# Patient Record
Sex: FEMALE | Race: BLACK OR AFRICAN AMERICAN | Employment: OTHER | URBAN - METROPOLITAN AREA
[De-identification: names, ages, dates, MRNs, and addresses within clinical notes are randomized per-mention and may not be internally consistent; named-entity substitution may affect disease eponyms.]

---

## 2018-05-31 ENCOUNTER — ANESTHESIA EVENT (OUTPATIENT)
Dept: SURGERY | Age: 78
End: 2018-05-31
Payer: MEDICARE

## 2018-05-31 RX ORDER — LATANOPROST 50 UG/ML
1 SOLUTION/ DROPS OPHTHALMIC
COMMUNITY

## 2018-05-31 RX ORDER — SPIRONOLACTONE 25 MG/1
25 TABLET ORAL DAILY
COMMUNITY
End: 2019-05-20

## 2018-05-31 NOTE — PERIOP NOTES
[facility]  Ambulatory Surgery Unit  Pre-operative Instructions for Endo Procedures    Procedure Date  06/18/18            Tentative Arrival [id number]      1. On the day of your procedure, please report to the Ambulatory Surgery Unit Registration Desk and sign in at your designated time. The Ambulatory Surgery Unit is located in [facility] on the [facility] side of the [facility] across from the [address] [place] [address]. Please have all of your health insurance cards and a photo ID. 2. You must have someone with you to drive you home, as you should not drive a car for 24 hours following anesthesia. Please make arrangements for a responsible adult friend or family member to stay with you for at least the first 24 hours after your procedure. 3. Do not have anything to eat or drink (including water, gum, mints, coffee, juice) after midnight   05/31/18. This may not apply to medications prescribed by your physician. (Please note below the special instructions with medications to take the morning of your procedure.)    4. If applicable, follow the clear liquid diet and bowel prep instructions provided by your physician's office. If you do not have this information, or have any questions, please contact your physician's office. 5. We recommend you do not drink any alcoholic beverages for 24 hours before and after your procedure. 6. Contact your surgeons office for instructions on the following medications: non-steroidal anti-inflammatory drugs (i.e. Advil, Aleve), vitamins, and supplements. (Some surgeons will want you to stop these medications prior to surgery and others may allow you to take them)   **If you are currently taking Plavix, Coumadin, Aspirin and/or other blood-thinning agents, contact your surgeon for instructions. ** Your surgeon will partner with the physician prescribing these medications to determine if it is safe to stop or if you need to continue taking.  Please do not stop taking these medications without instructions from your surgeon. 7. In an effort to help prevent surgical site infection, we ask that you shower with an anti-bacterial soap (i.e. Dial or Safeguard) on the morning of your procedure. Do not apply any lotions, powders, or deodorants after showering. 8. Wear comfortable clothes. Wear glasses instead of contacts. Do not bring any jewelry or money (other than copays or fees as instructed). Do not wear make-up, particularly mascara, the morning of your procedure. Wear your hair loose or down, no ponytails, buns, mary pins or clips. All body piercings must be removed. 9. You should understand that if you do not follow these instructions your procedure may be cancelled. If your physical condition changes (i.e. fever, cold or flu) please contact your surgeon as soon as possible. 10. It is important that you be on time. If a situation occurs where you may be late, or if you have any questions or problems, please call [phone]. 11. Your procedure time may be subject to change. You will receive a phone call the day prior to confirm your arrival time. Special Instructions: Take all medications and inhalers, as prescribed, on the morning of surgery with a sip of water EXCEPT: n/a      I understand a pre-operative phone call will be made to verify my procedure time. In the event that I am not available, I give permission for a message to be left on my answering service and/or with another person?       yes      Preop instructions reviewed  Pt verbalized understanding.    ___________________      ___________________      ___________________  (Signature of Patient)          (Witness)                   (Date and Time)

## 2018-06-01 ENCOUNTER — HOSPITAL ENCOUNTER (OUTPATIENT)
Age: 78
Setting detail: OUTPATIENT SURGERY
Discharge: HOME | End: 2018-06-01
Attending: SPECIALIST | Admitting: SPECIALIST
Payer: MEDICARE

## 2018-06-01 ENCOUNTER — ANESTHESIA (OUTPATIENT)
Dept: SURGERY | Age: 78
End: 2018-06-01
Payer: MEDICARE

## 2018-06-01 VITALS
WEIGHT: 293 LBS | BODY MASS INDEX: 44.41 KG/M2 | TEMPERATURE: 98.3 F | HEIGHT: 68 IN | SYSTOLIC BLOOD PRESSURE: 104 MMHG | RESPIRATION RATE: 22 BRPM | HEART RATE: 54 BPM | DIASTOLIC BLOOD PRESSURE: 73 MMHG | OXYGEN SATURATION: 95 %

## 2018-06-01 PROBLEM — Z86.010 HISTORY OF COLON POLYPS: Status: ACTIVE | Noted: 2018-06-01

## 2018-06-01 PROCEDURE — 76030000002 HC AMB SURG OR TIME FIRST 0.: Performed by: SPECIALIST

## 2018-06-01 PROCEDURE — 76210000040 HC AMBSU PH I REC FIRST 0.5 HR: Performed by: SPECIALIST

## 2018-06-01 PROCEDURE — 77030009426 HC FCPS BIOP ENDOSC BSC -B: Performed by: SPECIALIST

## 2018-06-01 PROCEDURE — 77030013992 HC SNR POLYP ENDOSC BSC -B: Performed by: SPECIALIST

## 2018-06-01 PROCEDURE — 77030021352 HC CBL LD SYS DISP COVD -B: Performed by: SPECIALIST

## 2018-06-01 PROCEDURE — 74011250636 HC RX REV CODE- 250/636: Performed by: ANESTHESIOLOGY

## 2018-06-01 PROCEDURE — 76060000073 HC AMB SURG ANES FIRST 0.5 HR: Performed by: SPECIALIST

## 2018-06-01 PROCEDURE — 76210000050 HC AMBSU PH II REC 0.5 TO 1 HR: Performed by: SPECIALIST

## 2018-06-01 PROCEDURE — 88305 TISSUE EXAM BY PATHOLOGIST: CPT | Performed by: SPECIALIST

## 2018-06-01 PROCEDURE — 74011000250 HC RX REV CODE- 250

## 2018-06-01 PROCEDURE — 74011250636 HC RX REV CODE- 250/636

## 2018-06-01 PROCEDURE — 77030020255 HC SOL INJ LR 1000ML BG: Performed by: SPECIALIST

## 2018-06-01 RX ORDER — DIPHENHYDRAMINE HYDROCHLORIDE 50 MG/ML
12.5 INJECTION, SOLUTION INTRAMUSCULAR; INTRAVENOUS AS NEEDED
Status: DISCONTINUED | OUTPATIENT
Start: 2018-06-01 | End: 2018-06-01 | Stop reason: HOSPADM

## 2018-06-01 RX ORDER — MIDAZOLAM HYDROCHLORIDE 1 MG/ML
.25-5 INJECTION, SOLUTION INTRAMUSCULAR; INTRAVENOUS
Status: DISCONTINUED | OUTPATIENT
Start: 2018-06-01 | End: 2018-06-01 | Stop reason: HOSPADM

## 2018-06-01 RX ORDER — ATROPINE SULFATE 0.1 MG/ML
0.5 INJECTION INTRAVENOUS
Status: DISCONTINUED | OUTPATIENT
Start: 2018-06-01 | End: 2018-06-01 | Stop reason: HOSPADM

## 2018-06-01 RX ORDER — LIDOCAINE HYDROCHLORIDE 20 MG/ML
INJECTION, SOLUTION EPIDURAL; INFILTRATION; INTRACAUDAL; PERINEURAL AS NEEDED
Status: DISCONTINUED | OUTPATIENT
Start: 2018-06-01 | End: 2018-06-01 | Stop reason: HOSPADM

## 2018-06-01 RX ORDER — ONDANSETRON 2 MG/ML
4 INJECTION INTRAMUSCULAR; INTRAVENOUS AS NEEDED
Status: DISCONTINUED | OUTPATIENT
Start: 2018-06-01 | End: 2018-06-01 | Stop reason: HOSPADM

## 2018-06-01 RX ORDER — FLUMAZENIL 0.1 MG/ML
0.2 INJECTION INTRAVENOUS
Status: DISCONTINUED | OUTPATIENT
Start: 2018-06-01 | End: 2018-06-01 | Stop reason: HOSPADM

## 2018-06-01 RX ORDER — SODIUM CHLORIDE 9 MG/ML
100 INJECTION, SOLUTION INTRAVENOUS CONTINUOUS
Status: DISCONTINUED | OUTPATIENT
Start: 2018-06-01 | End: 2018-06-01 | Stop reason: HOSPADM

## 2018-06-01 RX ORDER — SODIUM CHLORIDE 0.9 % (FLUSH) 0.9 %
5-10 SYRINGE (ML) INJECTION AS NEEDED
Status: DISCONTINUED | OUTPATIENT
Start: 2018-06-01 | End: 2018-06-01 | Stop reason: HOSPADM

## 2018-06-01 RX ORDER — SODIUM CHLORIDE 0.9 % (FLUSH) 0.9 %
5-10 SYRINGE (ML) INJECTION EVERY 8 HOURS
Status: DISCONTINUED | OUTPATIENT
Start: 2018-06-01 | End: 2018-06-01 | Stop reason: HOSPADM

## 2018-06-01 RX ORDER — LIDOCAINE HYDROCHLORIDE 10 MG/ML
0.1 INJECTION, SOLUTION EPIDURAL; INFILTRATION; INTRACAUDAL; PERINEURAL AS NEEDED
Status: DISCONTINUED | OUTPATIENT
Start: 2018-06-01 | End: 2018-06-01 | Stop reason: HOSPADM

## 2018-06-01 RX ORDER — DEXTROMETHORPHAN/PSEUDOEPHED 2.5-7.5/.8
1.2 DROPS ORAL
Status: DISCONTINUED | OUTPATIENT
Start: 2018-06-01 | End: 2018-06-01 | Stop reason: HOSPADM

## 2018-06-01 RX ORDER — SODIUM CHLORIDE, SODIUM LACTATE, POTASSIUM CHLORIDE, CALCIUM CHLORIDE 600; 310; 30; 20 MG/100ML; MG/100ML; MG/100ML; MG/100ML
25 INJECTION, SOLUTION INTRAVENOUS CONTINUOUS
Status: DISCONTINUED | OUTPATIENT
Start: 2018-06-01 | End: 2018-06-01 | Stop reason: HOSPADM

## 2018-06-01 RX ORDER — NALOXONE HYDROCHLORIDE 0.4 MG/ML
0.4 INJECTION, SOLUTION INTRAMUSCULAR; INTRAVENOUS; SUBCUTANEOUS
Status: DISCONTINUED | OUTPATIENT
Start: 2018-06-01 | End: 2018-06-01 | Stop reason: HOSPADM

## 2018-06-01 RX ORDER — FENTANYL CITRATE 50 UG/ML
25 INJECTION, SOLUTION INTRAMUSCULAR; INTRAVENOUS
Status: DISCONTINUED | OUTPATIENT
Start: 2018-06-01 | End: 2018-06-01 | Stop reason: HOSPADM

## 2018-06-01 RX ORDER — PROPOFOL 10 MG/ML
INJECTION, EMULSION INTRAVENOUS AS NEEDED
Status: DISCONTINUED | OUTPATIENT
Start: 2018-06-01 | End: 2018-06-01 | Stop reason: HOSPADM

## 2018-06-01 RX ADMIN — SODIUM CHLORIDE, SODIUM LACTATE, POTASSIUM CHLORIDE, AND CALCIUM CHLORIDE 25 ML/HR: 600; 310; 30; 20 INJECTION, SOLUTION INTRAVENOUS at 07:56

## 2018-06-01 RX ADMIN — PROPOFOL 100 MG: 10 INJECTION, EMULSION INTRAVENOUS at 08:19

## 2018-06-01 RX ADMIN — PROPOFOL 100 MG: 10 INJECTION, EMULSION INTRAVENOUS at 08:26

## 2018-06-01 RX ADMIN — LIDOCAINE HYDROCHLORIDE 40 MG: 20 INJECTION, SOLUTION EPIDURAL; INFILTRATION; INTRACAUDAL; PERINEURAL at 08:19

## 2018-06-01 RX ADMIN — PROPOFOL 50 MG: 10 INJECTION, EMULSION INTRAVENOUS at 08:33

## 2018-06-01 NOTE — H&P
Pre-endoscopy H and P    The patient was seen and examined in the endoscopy suite. The airway was assessed and docuemented. The problem list, past medical history, and medications were reviewed. Patient Active Problem List   Diagnosis Code    Screen for colon cancer Z12.11    History of colon polyps Z86.010     Social History     Social History    Marital status:      Spouse name: N/A    Number of children: N/A    Years of education: N/A     Occupational History    Not on file. Social History Main Topics    Smoking status: Never Smoker    Smokeless tobacco: Never Used    Alcohol use No    Drug use: No    Sexual activity: Not on file     Other Topics Concern    Not on file     Social History Narrative     Past Medical History:   Diagnosis Date    Anemia     Arthritis     Chronic kidney disease     Stage II followed by Dr Ángel Price [facility]ph[facility]     Diverticulosis     H/O colonoscopy with polypectomy     benign    History of colon polyps 6/1/2018 2013 tubular adenoma     Hypertension     Ill-defined condition     pulmonary hypertension    Other ill-defined conditions(799.89)     glaucoma and cataracts    Sleep apnea     CPAP    Thromboembolus ([address] 2015    Right  leg     Thyroid disease     hyptothyroidism     The patient has a family history of na    Prior to Admission Medications   Prescriptions Last Dose Informant Patient Reported? Taking? DORZOLAMIDE HCL/TIMOLOL MALEAT (DORZOLAMIDE-TIMOLOL OP) 6/1/2018 at Unknown time  Yes Yes   Sig: Apply 1 Drop to eye two (2) times a day. One drop to each eye twice daily   MULTIVITS W-FE,OTHER MIN/LUT (CENTRUM SILVER ULTRA WOMEN'S PO) 5/31/2018 at Unknown time  Yes Yes   Sig: Take 1 Tab by mouth daily. acetaminophen (TYLENOL EXTRA STRENGTH) 500 mg tablet 5/1/2018 at Unknown time  Yes Yes   Sig: Take 1,000 mg by mouth every six (6) hours as needed.  Indications: ARTHRITIC PAIN, PAIN   amLODIPine (NORVASC) 10 mg tablet 6/1/2018 at Unknown time  Yes Yes   Sig: Take 10 mg by mouth daily. Indications: HYPERTENSION   atenolol (TENORMIN) 25 mg tablet 6/1/2018 at 0610  Yes Yes   Sig: Take 25 mg by mouth daily. Indications: hypertension   brimonidine (ALPHAGAN) 0.2 % ophthalmic solution 6/1/2018 at Unknown time  Yes Yes   Sig: Administer 1 Drop to both eyes two (2) times a day. furosemide (LASIX) 40 mg tablet 6/1/2018 at Unknown time  Yes Yes   Sig: Take 40 mg by mouth two (2) times a day. Indications: HYPERTENSION   hydralazine HCl (HYDRALAZINE PO) 6/1/2018 at Unknown time  Yes Yes   Sig: Take 25 mg by mouth two (2) times a day. iron bisgly,ps-FA-B-C#12-succ 65 mg-65 mg -1,000 mcg (24) tab 5/31/2018 at Unknown time  Yes Yes   Sig: Take  by mouth.   latanoprost (XALATAN) 0.005 % ophthalmic solution 6/1/2018 at Unknown time  Yes Yes   Sig: Administer 1 Drop to both eyes nightly. levothyroxine (SYNTHROID) 100 mcg tablet 6/1/2018 at Unknown time  Yes Yes   Sig: Take 100 mcg by mouth Daily (before breakfast). Indications: HYPOTHYROIDISM   losartan (COZAAR) 100 mg tablet 6/1/2018 at Unknown time  Yes Yes   Sig: Take 100 mg by mouth daily. Indications: HYPERTENSION   spironolactone (ALDACTONE) 25 mg tablet 6/1/2018 at Unknown time  Yes Yes   Sig: Take 25 mg by mouth daily. Facility-Administered Medications: None           The review of systems is:  negative for shortness of breath or chest pain      The heart, lungs, and mental status were satisfactory for the administration of anesthesia sedation and for the procedure. I discussed with the patient the objectives, risks, consequences and alternatives to the procedure.       Jesus Bagley MD  6/1/2018  8:04 AM

## 2018-06-01 NOTE — IP AVS SNAPSHOT
[address] [facility] 
[phone] Patient: Daralyn Bernheim MRN: [id number] KUF:[id number] About your hospitalization You were admitted on:  June 1, 2018 You last received care in the:  [facility] PACU You were discharged on:  June 1, 2018 Why you were hospitalized Your primary diagnosis was:  Not on File Your diagnoses also included:  History Of Colon Polyps Follow-up Information Follow up With Details Comments Contact Info Rico Fernandez MD   [address] [place] [address] 
[phone] Discharge Orders None A check eden indicates which time of day the medication should be taken. My Medications CONTINUE taking these medications Instructions Each Dose to Equal  
 Morning Noon Evening Bedtime  
 amLODIPine 10 mg tablet Commonly known as:  Nancy Lawrence Your last dose was: Your next dose is: Take 10 mg by mouth daily. Indications: HYPERTENSION 10 mg  
    
   
   
   
  
 atenolol 25 mg tablet Commonly known as:  TENORMIN Your last dose was: Your next dose is: Take 25 mg by mouth daily. Indications: hypertension 25 mg  
    
   
   
   
  
 brimonidine 0.2 % ophthalmic solution Commonly known as:  Ward Cork Your last dose was: Your next dose is:    
   
   
 Administer 1 Drop to both eyes two (2) times a day. 1 Drop CENTRUM SILVER ULTRA WOMEN'S PO Your last dose was: Your next dose is: Take 1 Tab by mouth daily. 1 Tab DORZOLAMIDE-TIMOLOL OP Your last dose was: Your next dose is:    
   
   
 Apply 1 Drop to eye two (2) times a day. One drop to each eye twice daily 1 Drop  
    
   
   
   
  
 furosemide 40 mg tablet Commonly known as:  LASIX Your last dose was: Your next dose is: Take 40 mg by mouth two (2) times a day. Indications: HYPERTENSION 40 mg HYDRALAZINE PO Your last dose was: Your next dose is: Take 25 mg by mouth two (2) times a day. 25 mg  
    
   
   
   
  
 iron bisgly,ps-FA-B-C#12-succ 65 mg-65 mg -1,000 mcg (24) Tab Your last dose was: Your next dose is: Take  by mouth.  
     
   
   
   
  
 latanoprost 0.005 % ophthalmic solution Commonly known as:  Seth Ortizdi Your last dose was: Your next dose is:    
   
   
 Administer 1 Drop to both eyes nightly. 1 Drop  
    
   
   
   
  
 levothyroxine 100 mcg tablet Commonly known as:  SYNTHROID Your last dose was: Your next dose is: Take 100 mcg by mouth Daily (before breakfast). Indications: HYPOTHYROIDISM  
 100 mcg  
    
   
   
   
  
 losartan 100 mg tablet Commonly known as:  COZAAR Your last dose was: Your next dose is: Take 100 mg by mouth daily. Indications: HYPERTENSION  
 100 mg  
    
   
   
   
  
 spironolactone 25 mg tablet Commonly known as:  ALDACTONE Your last dose was: Your next dose is: Take 25 mg by mouth daily. 25 mg  
    
   
   
   
  
 TYLENOL EXTRA STRENGTH 500 mg tablet Generic drug:  acetaminophen Your last dose was: Your next dose is: Take 1,000 mg by mouth every six (6) hours as needed. Indications: ARTHRITIC PAIN, PAIN  
 1000 mg Discharge Instructions Chris Luciano [id number] 
1940 Procedure  Discharge Instructions:   
 
Discomfort: 
Redness at IV site- apply warm compress to area; if redness or soreness persist- contact your physician There may be a slight amount of blood passed from the rectum Gaseous discomfort- walking, belching will help relieve any discomfort You may not operate a vehicle for 24 hours You may not engage in an occupation involving machinery or appliances for rest of today You may not drink alcoholic beverages for at least 24 hours Avoid making any critical decisions for at least 24 hour DIET: 
 You may resume your normal diet today. You should not overeat or \"feast\" today as your abdomen may become distended or uncomfortable. MEDICATIONS: 
 I reconciled this list from the list you gave us when you came today for the procedure. Please clarify with me, your primary care physician and the nurse who is discharging you if we have any discrepancies. Aspirin and or non-steroidal medication (Ibuprofen, Motrin, naproxen, etc.) is ok in limited quantities. ACTIVITY: 
You may resume your normal daily activities it is recommended that you spend the remainder of the day resting -  avoid any strenuous activity. CALL M.D. ANY SIGN OF: Increasing pain, nausea, vomiting Abdominal distension (swelling) New increased bleeding (oral or rectal) Fever (chills) Pain in chest area Bloody discharge from nose or mouth Shortness of breath Follow-up Instructions: 
 Call Dr. Farzad Rose for the results of  biopsy in approximately one week Telephone #  [phone] Follow up visit as needed; recall 3 years. Rodolfo Clark MD 
8:46 AM 
6/1/2018 DO NOT TAKE SLEEPING MEDICATIONS OR ANTIANXIETY MEDICATIONS WHILE TAKING NARCOTIC PAIN MEDICATIONS,  ESPECIALLY THE NIGHT OF ANESTHESIA. CPAP PATIENTS BE SURE TO WEAR MACHINE WHENEVER NAPPING OR SLEEPING. DISCHARGE SUMMARY from Nurse The following personal items collected during your admission are returned to you:  
Dental Appliance: Dental Appliances: Lowers, Uppers, With patient Vision: Visual Aid: Glasses, At home Hearing Aid:   
Jewelry:   
Clothing:   
Other Valuables:   
Valuables sent to safe:   
 
 
PATIENT INSTRUCTIONS: 
 
 After General Anesthesia or Intravenous Sedation, for 24 hours or while taking prescription Narcotics: 
      Someone should be with you for the next 24 hours. For your own safety, a responsible adult must drive you home. · Limit your activities · Recommended activity: Rest today, up with assistance today. Do not climb stairs or shower unattended for the next 24 hours. · Please start with a soft bland diet and advance as tolerated (no nausea) to regular diet. · If you have a sore throat you should try the following: fluids, warm salt water gargles, or throat lozenges. If it does not improve after several days please follow up with your primary physician. · Do not drive and operate hazardous machinery · Do not make important personal or business decisions · Do  not drink alcoholic beverages · If you have not urinated within 8 hours after discharge, please contact your surgeon on call. Report the following to your surgeon: 
· Excessive pain, swelling, redness or odor of or around the surgical area · Temperature over 100.5 · Nausea and vomiting lasting longer than 4 hours or if unable to take medications · Any signs of decreased circulation or nerve impairment to extremity: change in color, persistent  numbness, tingling, coldness or increase pain · You will receive a Post Operative Call from one of the Recovery Room Nurses on the day after your surgery to check on you. It is very important for us to know how you are recovering after your surgery. If you have an issue or need to speak with someone, please call your surgeon, do not wait for the post operative call. · You may receive an e-mail or letter in the mail from [employer] regarding your experience with us in the Ambulatory Surgery Unit. Your feedback is valuable to us and we appreciate your participation in the survey.   
 
 
· If the above instructions are not adequate, please contact Osvaldo Spicer Juancarlos Sanabria RN, [place] anesthesia Nurse Manager or our Anesthesiologist, at [phone]. If you are having problems after your surgery, call the physician at his office number. · We wish you a speedy recovery ? What to do at Home: *  Please give a list of your current medications to your Primary Care Provider. *  Please update this list whenever your medications are discontinued, doses are 
    changed, or new medications (including over-the-counter products) are added. *  Please carry medication information at all times in case of emergency situations. These are general instructions for a healthy lifestyle: No smoking/ No tobacco products/ Avoid exposure to second hand smoke Surgeon General's Warning:  Quitting smoking now greatly reduces serious risk to your health. Obesity, smoking, and sedentary lifestyle greatly increases your risk for illness A healthy diet, regular physical exercise & weight monitoring are important for maintaining a healthy lifestyle You may be retaining fluid if you have a history of heart failure or if you experience any of the following symptoms:  Weight gain of 3 pounds or more overnight or 5 pounds in a week, increased swelling in our hands or feet or shortness of breath while lying flat in bed. Please call your doctor as soon as you notice any of these symptoms; do not wait until your next office visit. Recognize signs and symptoms of STROKE: 
 
B - Balance E - Eyes F-  Face looks uneven A-  Arms unable to move or move even S-  Speech slurred or non-existent T-  Time-call 911 as soon as signs and symptoms begin-DO NOT go Back to bed or wait to see if you get better-TIME IS BRAIN. If you have not received your influenza and/or pneumococcal vaccine, please follow up with your primary care physician.  
 
 
The discharge information has been reviewed with the patient and caregiver. The patient and cargiver verbalized understanding. Introducing [facility]! Neisha Jean-Baptiste introduces InterSt[employer]Net patient portal. Now you can access parts of your medical record, email your doctor's office, and request medication refills online. 1. In your internet browser, go to [email or web address] 2. Click on the First Time User? Click Here link in the Sign In box. You will see the New Member Sign Up page. 3. Enter your InterStelNet Access Code exactly as it appears below. You will not need to use this code after youve completed the sign-up process. If you do not sign up before the expiration date, you must request a new code. · InterStelNet Access Code: [email or web address] Expires: 8/12/2018  2:07 PM 
 
4. Enter the last four digits of your Social Security Number (xxxx) and Date of Birth (mm/dd/yyyy) as indicated and click Submit. You will be taken to the next sign-up page. 5. Create a InterStelNet ID. This will be your InterStelNet login ID and cannot be changed, so think of one that is secure and easy to remember. 6. Create a InterStelNet password. You can change your password at any time. 7. Enter your Password Reset Question and Answer. This can be used at a later time if you forget your password. 8. Enter your e-mail address. You will receive e-mail notification when new information is available in [address] 9. Click Sign Up. You can now view and download portions of your medical record. 10. Click the Download Summary menu link to download a portable copy of your medical information. If you have questions, please visit the Frequently Asked Questions section of the InterStelNet website. Remember, InterStelNet is NOT to be used for urgent needs. For medical emergencies, dial 911. Now available from your iPhone and Android! Introducing Taj Knight As a Neisha Jean-Baptiste patient, I wanted to make you aware of our electronic visit tool called Taj Knight. Iqua allows you to connect within minutes with a medical provider 24 hours a day, seven days a week via a mobile device or tablet or logging into a secure website from your computer. You can access Integration Management from anywhere in the [place]. A virtual visit might be right for you when you have a simple condition and feel like you just dont want to get out of bed, or cant get away from work for an appointment, when your regular [employer] provider is not available (evenings, weekends or holidays), or when youre out of town and need minor care. Electronic visits cost only $49 and if the [employer] provider determines a prescription is needed to treat your condition, one can be electronically transmitted to a nearby pharmacy*. Please take a moment to enroll today if you have not already done so. The enrollment process is free and takes just a few minutes. To enroll, please download the Iqua leonela to your tablet or phone, or visit [email or web address] to enroll on your computer. And, as an [address] [place] [address] patient with a [employer] account, the results of your visits will be scanned into your electronic medical record and your primary care provider will be able to view the scanned results. We urge you to continue to see your regular [employer] provider for your ongoing medical care. And while your primary care provider may not be the one available when you seek a [employer] virtual visit, the peace of mind you get from getting a real diagnosis real time can be priceless. For more information on [employer], view our Frequently Asked Questions (FAQs) at [email or web address]. Sincerely, 
 
Christopher Dave MD 
Chief Medical Officer Whitney8 Martha Barros *:  certain medications cannot be prescribed via [facility] [employer] Providers Seen During Your Hospitalization Provider Specialty Primary office phone Katelynn Mello MD Gastroenterology [phone] Your Primary Care Physician (PCP) Primary Care Physician Office Phone Office Fax [address] [phone] You are allergic to the following No active allergies Recent Documentation Height Weight Breastfeeding? BMI OB Status Smoking Status 1.727 m 135.2 kg No 45.31 kg/m2 Hysterectomy Never Smoker Emergency Contacts Name Discharge Info Relation Home Work Mobile [address] [facility] CAREGIVER [3] Son [22] [phone] JarrettFlora DISCHARGE CAREGIVER [3] Child [2] [phone] Patient Belongings The following personal items are in your possession at time of discharge: 
  Dental Appliances: Lowers, Uppers, With patient  Visual Aid: Glasses, At home Please provide this summary of care documentation to your next provider. Signatures-by signing, you are acknowledging that this After Visit Summary has been reviewed with you and you have received a copy. Patient Signature:  ____________________________________________________________ Date:  ____________________________________________________________  
  
Vidya Finger Provider Signature:  ____________________________________________________________ Date:  ____________________________________________________________

## 2018-06-01 NOTE — ANESTHESIA POSTPROCEDURE EVALUATION
Post-Anesthesia Evaluation and Assessment    Patient: Eveline Miranda MRN: [id number]  SSN: [id number]    YOB: 1940  Age: 68 y.o. Sex: female       Cardiovascular Function/Vital Signs  Visit Vitals    /73    Pulse (!) 54    Temp 36.8 °C (98.3 °F)    Resp 22    Ht 5' 8\" (1.727 m)    Wt 135.2 kg (298 lb)    SpO2 95%    Breastfeeding No    BMI 45.31 kg/m2       Patient is status post MAC anesthesia for Procedure(s):  COLONOSCOPY  ENDOSCOPIC POLYPECTOMY  COLON BIOPSY. Nausea/Vomiting: None    Postoperative hydration reviewed and adequate. Pain:  Pain Scale 1: Numeric (0 - 10) (06/01/18 0910)  Pain Intensity 1: 0 (06/01/18 0910)   Managed    Neurological Status:   Neuro (WDL): Within Defined Limits (06/01/18 0910)  Neuro  Neurologic State: Alert;Eyes open spontaneously (06/01/18 0910)  LUE Motor Response: Purposeful;Spontaneous  (06/01/18 0910)  LLE Motor Response: Purposeful;Spontaneous  (06/01/18 0910)  RUE Motor Response: Purposeful;Spontaneous  (06/01/18 0910)  RLE Motor Response: Purposeful;Spontaneous  (06/01/18 0910)   At baseline    Mental Status and Level of Consciousness: Arousable    Pulmonary Status:   O2 Device: Room air (06/01/18 0910)   Adequate oxygenation and airway patent    Complications related to anesthesia: None    Post-anesthesia assessment completed.  No concerns    Signed By: Luis Alberto Alegria MD     June 1, 2018

## 2018-06-01 NOTE — PERIOP NOTES
Amna Pan  1940  [id number]    Situation:  Verbal report given from: ZORA Salazar RN, Nehemiah Muse CRNA  Procedure: Procedure(s):  COLONOSCOPY  ENDOSCOPIC POLYPECTOMY  COLON BIOPSY    Background:    Preoperative diagnosis: PERSONAL HISTORY COLONIC POLYPS    Postoperative diagnosis: COLON POLYPS, DIVERTICULOSIS, RECTAL PROLAPSE     :  Dr. Allen Mulligan    Assistant(s): Circ-1: Ketty Hartley RN  Circ-2: Jane Gates RN  Scrub Tech-1: Vallie Councilman    Specimens:   ID Type Source Tests Collected by Time Destination   1 : TRANSVERSE COLON POLYP Preservative   Curtis Polanco MD 6/1/2018 8316 Pathology       Assessment:  Intra-procedure medications         Anesthesia gave intra-procedure sedation and medications, see anesthesia flow sheet     Intravenous fluids: LR@ KVO     Vital signs stable       Recommendation:    Permission to share finding with Kandice Child (son) : yes

## 2018-06-01 NOTE — PERIOP NOTES
9089 Pt arrived via stretcher into PACU from OR procedure with Dr. Melinda Curry drowsy and laying on left side. Received report from RN and Candis Curry brought back son and gave report to pt on procedure. 6583 Tolerating fluids. Having some congestioni and throat agitation. 2026 Pt. Alert. Denies pain or chill. Discharge instructions reviewed with caregiver and patient. Allowed and answered any and all questions. Tolerating PO fluids. Both state ready for discharge. Assisted pt with getting dressed.  Confirmed all belongings with patient

## 2018-06-01 NOTE — DISCHARGE INSTRUCTIONS
eLnard Ruiz  [id number]  1940              Procedure  Discharge Instructions:      Discomfort:  Redness at IV site- apply warm compress to area; if redness or soreness persist- contact your physician  There may be a slight amount of blood passed from the rectum  Gaseous discomfort- walking, belching will help relieve any discomfort  You may not operate a vehicle for 24 hours  You may not engage in an occupation involving machinery or appliances for rest of today  You may not drink alcoholic beverages for at least 24 hours  Avoid making any critical decisions for at least 24 hour  DIET:   You may resume your normal diet today. You should not overeat or \"feast\" today as your abdomen may become distended or uncomfortable. MEDICATIONS:   I reconciled this list from the list you gave us when you came today for the procedure. Please clarify with me, your primary care physician and the nurse who is discharging you if we have any discrepancies. Aspirin and or non-steroidal medication (Ibuprofen, Motrin, naproxen, etc.) is ok in limited quantities. ACTIVITY:  You may resume your normal daily activities it is recommended that you spend the remainder of the day resting -  avoid any strenuous activity. CALL M.D. ANY SIGN OF:  Increasing pain, nausea, vomiting  Abdominal distension (swelling)  New increased bleeding (oral or rectal)  Fever (chills)  Pain in chest area  Bloody discharge from nose or mouth  Shortness of breath          Follow-up Instructions:   Call Dr. Km Perrin for the results of  biopsy in approximately one week  Telephone #  [phone]  Follow up visit as needed; recall 3 years. Hunter Abrams MD  8:46 AM  6/1/2018             DO NOT TAKE SLEEPING MEDICATIONS OR ANTIANXIETY MEDICATIONS WHILE TAKING NARCOTIC PAIN MEDICATIONS,  ESPECIALLY THE NIGHT OF ANESTHESIA. CPAP PATIENTS BE SURE TO WEAR MACHINE WHENEVER NAPPING OR SLEEPING.     DISCHARGE SUMMARY from Nurse    The following personal items collected during your admission are returned to you:   Dental Appliance: Dental Appliances: Lowers, Uppers, With patient  Vision: Visual Aid: Glasses, At home  Hearing Aid:    Baldemar Mena:    Clothing:    Other Valuables:    Valuables sent to safe:        PATIENT INSTRUCTIONS:    After General Anesthesia or Intravenous Sedation, for 24 hours or while taking prescription Narcotics:        Someone should be with you for the next 24 hours. For your own safety, a responsible adult must drive you home. · Limit your activities  · Recommended activity: Rest today, up with assistance today. Do not climb stairs or shower unattended for the next 24 hours. · Please start with a soft bland diet and advance as tolerated (no nausea) to regular diet. · If you have a sore throat you should try the following: fluids, warm salt water gargles, or throat lozenges. If it does not improve after several days please follow up with your primary physician. · Do not drive and operate hazardous machinery  · Do not make important personal or business decisions  · Do  not drink alcoholic beverages  · If you have not urinated within 8 hours after discharge, please contact your surgeon on call. Report the following to your surgeon:  · Excessive pain, swelling, redness or odor of or around the surgical area  · Temperature over 100.5  · Nausea and vomiting lasting longer than 4 hours or if unable to take medications  · Any signs of decreased circulation or nerve impairment to extremity: change in color, persistent  numbness, tingling, coldness or increase pain      · You will receive a Post Operative Call from one of the Recovery Room Nurses on the day after your surgery to check on you. It is very important for us to know how you are recovering after your surgery. If you have an issue or need to speak with someone, please call your surgeon, do not wait for the post operative call.     · You may receive an e-mail or letter in the mail from [facility] regarding your experience with us in the Ambulatory Surgery Unit. Your feedback is valuable to us and we appreciate your participation in the survey. · If the above instructions are not adequate, please contact Asia Palomares RN, [facility] anesthesia Nurse Manager or our Anesthesiologist, at [phone]. If you are having problems after your surgery, call the physician at his office number. · We wish you a speedy recovery ? What to do at Home:      *  Please give a list of your current medications to your Primary Care Provider. *  Please update this list whenever your medications are discontinued, doses are      changed, or new medications (including over-the-counter products) are added. *  Please carry medication information at all times in case of emergency situations. These are general instructions for a healthy lifestyle:    No smoking/ No tobacco products/ Avoid exposure to second hand smoke    Surgeon General's Warning:  Quitting smoking now greatly reduces serious risk to your health. Obesity, smoking, and sedentary lifestyle greatly increases your risk for illness    A healthy diet, regular physical exercise & weight monitoring are important for maintaining a healthy lifestyle    You may be retaining fluid if you have a history of heart failure or if you experience any of the following symptoms:  Weight gain of 3 pounds or more overnight or 5 pounds in a week, increased swelling in our hands or feet or shortness of breath while lying flat in bed. Please call your doctor as soon as you notice any of these symptoms; do not wait until your next office visit.     Recognize signs and symptoms of STROKE:    B - Balance  E - Eyes    F-  Face looks uneven    A-  Arms unable to move or move even    S-  Speech slurred or non-existent    T-  Time-call 911 as soon as signs and symptoms begin-DO NOT go       Back to bed or wait to see if you get better-TIME IS BRAIN. If you have not received your influenza and/or pneumococcal vaccine, please follow up with your primary care physician. The discharge information has been reviewed with the patient and caregiver. The patient and cargiver verbalized understanding.

## 2018-06-01 NOTE — PERIOP NOTES
Permission received to review discharge instructions and discuss private health information with Santi Acuña (son).

## 2018-06-01 NOTE — ANESTHESIA PREPROCEDURE EVALUATION
Anesthetic History   No history of anesthetic complications            Review of Systems / Medical History  Patient summary reviewed, nursing notes reviewed and pertinent labs reviewed    Pulmonary        Sleep apnea: CPAP           Neuro/Psych   Within defined limits           Cardiovascular    Hypertension              Exercise tolerance: >4 METS     GI/Hepatic/Renal  Within defined limits              Endo/Other      Hypothyroidism  Morbid obesity and arthritis     Other Findings   Comments: DVT 2015           Physical Exam    Airway  Mallampati: II  TM Distance: 4 - 6 cm  Neck ROM: normal range of motion   Mouth opening: Normal     Cardiovascular  Regular rate and rhythm,  S1 and S2 normal,  no murmur, click, rub, or gallop             Dental  No notable dental hx       Pulmonary  Breath sounds clear to auscultation               Abdominal  GI exam deferred       Other Findings            Anesthetic Plan    ASA: 3  Anesthesia type: MAC            Anesthetic plan and risks discussed with: Patient

## 2018-06-01 NOTE — PROCEDURES
Colonoscopy    Indications: history of colon polyps    Pre-operative Diagnosis: see above    Medications:  See anesthesia form    Post-operative Diagnosis:  COLON POLYPS, DIVERTICULOSIS, RECTAL PROLAPSE       Procedure Details   Prior to the procedure its objectives, risks, consequences and alternatives were discussed with the patient who then elected to proceed. All questions were answered. Digital Rectal Exam:  Showed decreased tone. The Olympus videocolonoscope was inserted in the rectum and advanced to the cecum. The cecum was identified by typical landmarks. The colonoscope was slowly and carefully withdrawn as the mucosa was inspected. There were three polyps in the transverse colon (12mm, 8mm, 6mm). I hot snared the largest two and cold snared the smallest one. I used cold forceps biopsy to remove the remnant of the smallest one. Left sided diverticula were seen. No other abnormalities were noted. Retroflexion in the rectum was not performed (patient could not hold air in in her rectum). Upon removal of the scope I saw that mucosal prolapse occurred easily. Photos to document the ileocecal valve, appendiceal orifice and distal rectal exam were obtained. The preparation was adequate      Estimated Blood Loss:  none    Specimens:  Transverse colon polyps    Findings: Three polyps remove  Rectal prolapse  Decreased anal tone  diverticulosis    Complications:  none    Repeat colonoscopy is recommended in: Three years.                Flo Mota MD  8:44 AM  6/1/2018

## 2018-06-12 ENCOUNTER — APPOINTMENT (OUTPATIENT)
Dept: GENERAL RADIOLOGY | Age: 78
End: 2018-06-12
Attending: EMERGENCY MEDICINE
Payer: MEDICARE

## 2018-06-12 ENCOUNTER — HOSPITAL ENCOUNTER (EMERGENCY)
Age: 78
Discharge: HOME | End: 2018-06-12
Attending: EMERGENCY MEDICINE | Admitting: EMERGENCY MEDICINE
Payer: MEDICARE

## 2018-06-12 VITALS
BODY MASS INDEX: 43.95 KG/M2 | TEMPERATURE: 97.7 F | RESPIRATION RATE: 18 BRPM | OXYGEN SATURATION: 96 % | DIASTOLIC BLOOD PRESSURE: 82 MMHG | SYSTOLIC BLOOD PRESSURE: 135 MMHG | HEART RATE: 64 BPM | WEIGHT: 290 LBS | HEIGHT: 68 IN

## 2018-06-12 DIAGNOSIS — S81.811A LACERATION OF RIGHT LOWER EXTREMITY, INITIAL ENCOUNTER: Primary | ICD-10-CM

## 2018-06-12 PROCEDURE — 74011250637 HC RX REV CODE- 250/637: Performed by: EMERGENCY MEDICINE

## 2018-06-12 PROCEDURE — 77030002916 HC SUT ETHLN J&J -A

## 2018-06-12 PROCEDURE — 99284 EMERGENCY DEPT VISIT MOD MDM: CPT

## 2018-06-12 PROCEDURE — 75810000294 HC INTERM/LAYERED WND RPR

## 2018-06-12 PROCEDURE — 73590 X-RAY EXAM OF LOWER LEG: CPT

## 2018-06-12 PROCEDURE — 77030018836 HC SOL IRR NACL ICUM -A

## 2018-06-12 RX ORDER — LIDOCAINE HYDROCHLORIDE AND EPINEPHRINE 20; 5 MG/ML; UG/ML
INJECTION, SOLUTION EPIDURAL; INFILTRATION; INTRACAUDAL; PERINEURAL
Status: DISCONTINUED
Start: 2018-06-12 | End: 2018-06-13 | Stop reason: HOSPADM

## 2018-06-12 RX ORDER — HYDROCODONE BITARTRATE AND ACETAMINOPHEN 5; 325 MG/1; MG/1
1 TABLET ORAL
Qty: 10 TAB | Refills: 0 | Status: SHIPPED | OUTPATIENT
Start: 2018-06-12 | End: 2019-05-20

## 2018-06-12 RX ORDER — OXYCODONE AND ACETAMINOPHEN 5; 325 MG/1; MG/1
1 TABLET ORAL
Status: COMPLETED | OUTPATIENT
Start: 2018-06-12 | End: 2018-06-12

## 2018-06-12 RX ORDER — LIDOCAINE HYDROCHLORIDE AND EPINEPHRINE 20; 5 MG/ML; UG/ML
1.5 INJECTION, SOLUTION EPIDURAL; INFILTRATION; INTRACAUDAL; PERINEURAL
Status: DISCONTINUED | OUTPATIENT
Start: 2018-06-12 | End: 2018-06-13 | Stop reason: HOSPADM

## 2018-06-12 RX ADMIN — OXYCODONE HYDROCHLORIDE AND ACETAMINOPHEN 1 TABLET: 5; 325 TABLET ORAL at 18:30

## 2018-06-12 NOTE — ED PROVIDER NOTES
EMERGENCY DEPARTMENT HISTORY AND PHYSICAL EXAM      Date: 6/12/2018  Patient Name: Hans Medina    History of Presenting Illness     Chief Complaint   Patient presents with    Fall     Via w/c w/ c/o open wound to R calf from falling about 1.5 hours PTA       History Provided By: Patient    HPI: Hans Medina, 68 y.o. female with PMHx significant for HTN, anemia, CKD, sleep apnea, diverticulitis presents ambulatory to the ED with cc of sudden onset horizontal laceration to right shin with surrounding stinging sensation s/p mechanical GLF around 1600 today. She reports she hit her leg against a steel rail while falling down. She denies weakness, numbness, or tingling. Chief Complaint: laceration  Duration: 2 Hours  Timing:  Constant  Location: right shin  Quality: N/A  Severity: Moderate  Modifying Factors:   Associated Symptoms: bleeding      There are no other complaints, changes, or physical findings at this time. PCP: Yeimy Kong MD    Current Facility-Administered Medications   Medication Dose Route Frequency Provider Last Rate Last Dose    lidocaine-EPINEPHrine (PF) (XYLOCAINE) 2 %-1:200,000 injection 30 mg  1.5 mL IntraDERMal NOW Bindu James,         lidocaine-EPINEPHrine (PF) (XYLOCAINE) 2 %-1:200,000 injection              Current Outpatient Prescriptions   Medication Sig Dispense Refill    HYDROcodone-acetaminophen (NORCO) 5-325 mg per tablet Take 1 Tab by mouth every four (4) hours as needed for Pain. Max Daily Amount: 6 Tabs. 10 Tab 0    iron bisgly,ps-FA-B-C#12-succ 65 mg-65 mg -1,000 mcg (24) tab Take  by mouth.  spironolactone (ALDACTONE) 25 mg tablet Take 25 mg by mouth daily.  hydralazine HCl (HYDRALAZINE PO) Take 25 mg by mouth two (2) times a day.  latanoprost (XALATAN) 0.005 % ophthalmic solution Administer 1 Drop to both eyes nightly.  atenolol (TENORMIN) 25 mg tablet Take 25 mg by mouth daily.  Indications: hypertension      furosemide (LASIX) 40 mg tablet Take 40 mg by mouth two (2) times a day. Indications: HYPERTENSION      amLODIPine (NORVASC) 10 mg tablet Take 10 mg by mouth daily. Indications: HYPERTENSION      losartan (COZAAR) 100 mg tablet Take 100 mg by mouth daily. Indications: HYPERTENSION      MULTIVITS W-FE,OTHER MIN/LUT (CENTRUM SILVER ULTRA WOMEN'S PO) Take 1 Tab by mouth daily.  DORZOLAMIDE HCL/TIMOLOL MALEAT (DORZOLAMIDE-TIMOLOL OP) Apply 1 Drop to eye two (2) times a day. One drop to each eye twice daily      brimonidine (ALPHAGAN) 0.2 % ophthalmic solution Administer 1 Drop to both eyes two (2) times a day.  acetaminophen (TYLENOL EXTRA STRENGTH) 500 mg tablet Take 1,000 mg by mouth every six (6) hours as needed. Indications: ARTHRITIC PAIN, PAIN      levothyroxine (SYNTHROID) 100 mcg tablet Take 100 mcg by mouth Daily (before breakfast). Indications: HYPOTHYROIDISM         Past History     Past Medical History:  Past Medical History:   Diagnosis Date    Anemia     Arthritis     Chronic kidney disease     Stage II followed by Dr Shakira Barrientos Nephrology     Diverticulosis     H/O colonoscopy with polypectomy     benign    History of colon polyps 6/1/2018    2013 tubular adenoma     Hypertension     Ill-defined condition     pulmonary hypertension    Other ill-defined conditions(799.89)     glaucoma and cataracts    Sleep apnea     CPAP    Thromboembolus ([address] 2015    Right  leg     Thyroid disease     hyptothyroidism       Past Surgical History:  Past Surgical History:   Procedure Laterality Date    COLONOSCOPY N/A 6/1/2018    COLONOSCOPY performed by Jaleesa Duncan MD at [address] [place] [address]  6/1/2018         HX HEENT  1984    thyroid biopsy-benign    HX HYSTERECTOMY         Family History:  History reviewed. No pertinent family history.     Social History:  Social History   Substance Use Topics    Smoking status: Never Smoker    Smokeless tobacco: Never Used    Alcohol use No Allergies:  No Known Allergies      Review of Systems   Review of Systems   Constitutional: Negative for fatigue and fever. HENT: Negative. Eyes: Negative. Respiratory: Negative for shortness of breath and wheezing. Cardiovascular: Negative for chest pain and leg swelling. Gastrointestinal: Negative for blood in stool, constipation, diarrhea, nausea and vomiting. Endocrine: Negative. Genitourinary: Negative for difficulty urinating and dysuria. Musculoskeletal: Negative. Skin: Positive for wound (laceration right shin ). Negative for rash. Allergic/Immunologic: Negative. Neurological: Negative for weakness and numbness. Hematological: Negative. Psychiatric/Behavioral: Negative. Physical Exam   Physical Exam   Constitutional: She is oriented to person, place, and time. She appears well-developed and well-nourished. No distress. HENT:   Head: Normocephalic and atraumatic. Mouth/Throat: Oropharynx is clear and moist.   Eyes: Conjunctivae and EOM are normal.   Neck: Neck supple. No JVD present. No tracheal deviation present. Cardiovascular: Normal rate, regular rhythm and intact distal pulses. Exam reveals no gallop and no friction rub. No murmur heard. Pulmonary/Chest: Effort normal and breath sounds normal. No stridor. No respiratory distress. She has no wheezes. Abdominal: Soft. Bowel sounds are normal. She exhibits no distension and no mass. There is no tenderness. There is no guarding. Musculoskeletal: Normal range of motion. She exhibits no edema or tenderness. No deformity   Neurological: She is alert and oriented to person, place, and time. She has normal strength. No focal deficits   Skin: Skin is warm, dry and intact. No rash noted. 10cm gapping linear laceration to R shin, no foreign body   Psychiatric: She has a normal mood and affect.  Her behavior is normal. Judgment and thought content normal.   Nursing note and vitals reviewed. Diagnostic Study Results     Labs -   No results found for this or any previous visit (from the past 12 hour(s)). Radiologic Studies -   XR TIB/FIB RT   Final Result        CT Results  (Last 48 hours)    None        CXR Results  (Last 48 hours)    None            Medical Decision Making   I am the first provider for this patient. I reviewed the vital signs, available nursing notes, past medical history, past surgical history, family history and social history. Vital Signs-Reviewed the patient's vital signs. Patient Vitals for the past 12 hrs:   Temp Pulse Resp BP SpO2   06/12/18 2130 - - - - 96 %   06/12/18 2115 - - - - 96 %   06/12/18 2100 - - - 135/82 96 %   06/12/18 2045 - - - - 95 %   06/12/18 2030 - - - 139/90 96 %   06/12/18 2015 - - - - 96 %   06/12/18 2000 - - - 139/83 97 %   06/12/18 1945 - - - - 96 %   06/12/18 1930 - - - 139/86 97 %   06/12/18 1915 - - - - 98 %   06/12/18 1900 - - - 139/79 95 %   06/12/18 1830 - - - (!) 142/96 97 %   06/12/18 1745 97.7 °F (36.5 °C) 64 18 154/85 96 %       Pulse Oximetry Analysis - 96% on RA    Cardiac Monitor:   Rate: 64 bpm  Rhythm: Normal Sinus Rhythm      Records Reviewed: Nursing Notes and Old Medical Records    Provider Notes (Medical Decision Making):   Pt with a large leg laceration. No appreciable FB on exam. Will get XR to rule out foreign bodies and fx. Will clean and repair laceration. Pt is UTD on her immunizations. ED Course:   Initial assessment performed. The patients presenting problems have been discussed, and they are in agreement with the care plan formulated and outlined with them. I have encouraged them to ask questions as they arise throughout their visit. 8:57 PM  Pt states she is UTD on tetanus vaccine. Procedure Note - Laceration Repair:  9:00 PM  Procedure by Anushka Wallace MD.  Complexity: complex  10cm linear laceration to R shin  was irrigated copiously with NS under jet lavage and draped in a sterile fashion. The area was anesthetized with 10 mLs of  Lidocaine 1% without epinephrine via local infiltration. The wound was explored with the following results: No foreign bodies found. The wound was repaired with Two layer suture closure: Subcutaneous Layer:  2 sutures placed, stitch type:simple interrupted, suture: 4-0 chromic gut. Skin Layer:  26 sutures placed, stitch type:simple interrupted, suture: 4-0 ethylon. .  The wound was closed with good hemostasis and approximation. Sterile dressing applied. Estimated blood loss: 2mLs  The procedure took 16-30 minutes, and pt tolerated well. Written by Ilya Méndez, ED Scribe, as dictated by Shiva Rodriguez MD.          Disposition:  DISCHARGE NOTE:  8:59 PM  Pt has been reexamined. Pt has no new complaints, changes, or physical findings. Care plan outlined and precautions discussed. All available results reviewed with pt. All medications reviewed with pt. All of pts questions and concerns addressed. Pt agrees to f/u as instructed and agrees to return to ED upon further deterioration. Pt is ready to go home. Written by Ilya Méndez ED Scribe as dictated by Laurita Schmidt DO      PLAN:  1. Discharge Medication List as of 6/12/2018  8:55 PM      START taking these medications    Details   HYDROcodone-acetaminophen (NORCO) 5-325 mg per tablet Take 1 Tab by mouth every four (4) hours as needed for Pain. Max Daily Amount: 6 Tabs., Print, Disp-10 Tab, R-0         CONTINUE these medications which have NOT CHANGED    Details   iron bisgly,ps-FA-B-C#12-succ 65 mg-65 mg -1,000 mcg (24) tab Take  by mouth., Historical Med      spironolactone (ALDACTONE) 25 mg tablet Take 25 mg by mouth daily. , Historical Med      hydralazine HCl (HYDRALAZINE PO) Take 25 mg by mouth two (2) times a day., Historical Med      latanoprost (XALATAN) 0.005 % ophthalmic solution Administer 1 Drop to both eyes nightly., Historical Med      atenolol (TENORMIN) 25 mg tablet Take 25 mg by mouth daily. Indications: hypertension, Historical Med      furosemide (LASIX) 40 mg tablet Take 40 mg by mouth two (2) times a day. Indications: HYPERTENSION, Historical Med      amLODIPine (NORVASC) 10 mg tablet Take 10 mg by mouth daily. Indications: HYPERTENSION, Historical Med      losartan (COZAAR) 100 mg tablet Take 100 mg by mouth daily. Indications: HYPERTENSION, Historical Med      MULTIVITS W-FE,OTHER MIN/LUT (CENTRUM SILVER ULTRA WOMEN'S PO) Take 1 Tab by mouth daily. , Historical Med      DORZOLAMIDE HCL/TIMOLOL MALEAT (DORZOLAMIDE-TIMOLOL OP) Apply 1 Drop to eye two (2) times a day. One drop to each eye twice daily, Historical Med      brimonidine (ALPHAGAN) 0.2 % ophthalmic solution Administer 1 Drop to both eyes two (2) times a day., Historical Med      acetaminophen (TYLENOL EXTRA STRENGTH) 500 mg tablet Take 1,000 mg by mouth every six (6) hours as needed. Indications: ARTHRITIC PAIN, PAIN, Historical Med      levothyroxine (SYNTHROID) 100 mcg tablet Take 100 mcg by mouth Daily (before breakfast). Indications: HYPOTHYROIDISM, Historical Med           2. Follow-up Information     Follow up With Details Comments 529 Shirley Melendez MD Schedule an appointment as soon as possible for a visit in 1 day  [address]  [address] [address] [phone]      [facility] EMERGENCY DEPT  As needed, If symptoms worsen [address] [place] [address]  [phone]        Return to ED if worse     Diagnosis     Clinical Impression:   1. Laceration of right lower extremity, initial encounter        Attestations: This note is prepared by Alexis Bravo, acting as Scribe for [employer] DO. [employer] DO: The scribe's documentation has been prepared under my direction and personally reviewed by me in its entirety. I confirm that the note above accurately reflects all work, treatment, procedures, and medical decision making performed by me.

## 2018-06-12 NOTE — ED NOTES
Pt states she was stepping over a metal yard edger and tripped and fell. When she did her right shin hit the metal edger. Pt has an aprox 10 cm laceration to her right shin just above her ankle. Pt has adipose tissue hanging out of the wound. Bleeding is controlled at this time. Denies hitting her head. No LOC.

## 2018-06-13 NOTE — ED NOTES
Dr. Isaiah Clay reviewed discharge instructions with the patient. The patient verbalized understanding. Vitals stable. Pt to be wheeled out to discharge.

## 2018-06-13 NOTE — DISCHARGE INSTRUCTIONS
Cuts: Care Instructions  Your Care Instructions  A cut can happen anywhere on your body. Stitches, staples, skin adhesives, or pieces of tape called Steri-Strips are sometimes used to keep the edges of a cut together and help it heal. Steri-Strips can be used by themselves or with stitches or staples. Sometimes cuts are left open. If the cut went deep and through the skin, the doctor may have closed the cut in two layers. A deeper layer of stitches brings the deep part of the cut together. These stitches will dissolve and don't need to be removed. The upper layer closure, which could be stitches, staples, Steri-Strips, or adhesive, is what you see on the cut. A cut is often covered by a bandage. The doctor has checked you carefully, but problems can develop later. If you notice any problems or new symptoms, get medical treatment right away. Follow-up care is a key part of your treatment and safety. Be sure to make and go to all appointments, and call your doctor if you are having problems. It's also a good idea to know your test results and keep a list of the medicines you take. How can you care for yourself at home? If a cut is open or closed  · Prop up the sore area on a pillow anytime you sit or lie down during the next 3 days. Try to keep it above the level of your heart. This will help reduce swelling. · Keep the cut dry for the first 24 to 48 hours. After this, you can shower if your doctor okays it. Pat the cut dry. · Don't soak the cut, such as in a bathtub. Your doctor will tell you when it's safe to get the cut wet. · After the first 24 to 48 hours, clean the cut with soap and water 2 times a day unless your doctor gives you different instructions. ¨ Don't use hydrogen peroxide or alcohol, which can slow healing. ¨ You may cover the cut with a thin layer of petroleum jelly and a nonstick bandage.   ¨ If the doctor put a bandage over the cut, put on a new bandage after cleaning the cut or if the bandage gets wet or dirty. · Avoid any activity that could cause your cut to reopen. · Be safe with medicines. Read and follow all instructions on the label. ¨ If the doctor gave you a prescription medicine for pain, take it as prescribed. ¨ If you are not taking a prescription pain medicine, ask your doctor if you can take an over-the-counter medicine. If the cut is closed with stitches, staples, or Steri-Strips  · Follow the above instructions for open or closed cuts. · Do not remove the stitches or staples on your own. Your doctor will tell you when to come back to have the stitches or staples removed. · Leave Steri-Strips on until they fall off. If the cut is closed with a skin adhesive  · Follow the above instructions for open or closed cuts. · Leave the skin adhesive on your skin until it falls off on its own. This may take 5 to 10 days. · Do not scratch, rub, or pick at the adhesive. · Do not put the sticky part of a bandage directly on the adhesive. · Do not put any kind of ointment, cream, or lotion over the area. This can make the adhesive fall off too soon. Do not use hydrogen peroxide or alcohol, which can slow healing. When should you call for help? Call your doctor now or seek immediate medical care if:  ? · You have new pain, or your pain gets worse. ? · The skin near the cut is cold or pale or changes color. ? · You have tingling, weakness, or numbness near the cut.   ? · The cut starts to bleed, and blood soaks through the bandage. Oozing small amounts of blood is normal.   ? · You have trouble moving the area near the cut.   ?       Cuts Closed With Stitches: Care Instructions  Your Care Instructions  A cut can happen anywhere on your body. The doctor used stitches to close the cut. Using stitches also helps the cut heal and reduces scarring. Sometimes pieces of tape called Steri-Strips are put over the stitches.   If the cut went deep and through the skin, the doctor may have put in two layers of stitches. The deeper layer brings the deep part of the cut together. These stitches will dissolve and don't need to be removed. The stitches in the upper layer are the ones you see on the cut. You will probably have a bandage over the stitches. You will need to have the stitches removed, usually in 7 to 14 days. The doctor has checked you carefully, but problems can develop later. If you notice any problems or new symptoms, get medical treatment right away. Follow-up care is a key part of your treatment and safety. Be sure to make and go to all appointments, and call your doctor if you are having problems. It's also a good idea to know your test results and keep a list of the medicines you take. How can you care for yourself at home? · Keep the cut dry for the first 24 to 48 hours. After this, you can shower if your doctor okays it. Pat the cut dry. · Don't soak the cut, such as in a bathtub. Your doctor will tell you when it's safe to get the cut wet. · If your doctor told you how to care for your cut, follow your doctor's instructions. If you did not get instructions, follow this general advice:  ¨ After the first 24 to 48 hours, wash around the cut with clean water 2 times a day. Don't use hydrogen peroxide or alcohol, which can slow healing. ¨ You may cover the cut with a thin layer of petroleum jelly, such as Vaseline, and a nonstick bandage. ¨ Apply more petroleum jelly and replace the bandage as needed. · Prop up the sore area on a pillow anytime you sit or lie down during the next 3 days. Try to keep it above the level of your heart. This will help reduce swelling. · Avoid any activity that could cause your cut to reopen. · Do not remove the stitches on your own. Your doctor will tell you when to come back to have the stitches removed. · Leave Steri-Strips on until they fall off. · Be safe with medicines. Read and follow all instructions on the label.   ¨ If the doctor gave you a prescription medicine for pain, take it as prescribed. ¨ If you are not taking a prescription pain medicine, ask your doctor if you can take an over-the-counter medicine. When should you call for help? Call your doctor now or seek immediate medical care if:  ? · You have new pain, or your pain gets worse. ? · The skin near the cut is cold or pale or changes color. ? · You have tingling, weakness, or numbness near the cut.   ? · The cut starts to bleed, and blood soaks through the bandage. Oozing small amounts of blood is normal.   ? · You have trouble moving the area near the cut.   ? · You have symptoms of infection, such as:  ¨ Increased pain, swelling, warmth, or redness around the cut. ¨ Red streaks leading from the cut. ¨ Pus draining from the cut. ¨ A fever. ? Watch closely for changes in your health, and be sure to contact your doctor if:  ? · The cut reopens. ? · You do not get better as expected. Where can you learn more? Go to http://brunaCapsoVision.info/. Enter R217 in the search box to learn more about \"Cuts Closed With Stitches: Care Instructions. \"  Current as of: March 20, 2017  Content Version: 11.4  © 3977-9040 [employer]. Care instructions adapted under license by [employer] (which disclaims liability or warranty for this information). If you have questions about a medical condition or this instruction, always ask your healthcare professional. [place] [address] any warranty or liability for your use of this information. ? Watch closely for changes in your health, and be sure to contact your doctor if:  ? · The cut reopens. ? · You do not get better as expected. Where can you learn more? Go to http://[employer].info/. Enter M735 in the search box to learn more about \"Cuts: Care Instructions. \"  Current as of: March 20, 2017  Content Version: 11.4  © 8768-2582 [employer]. Care instructions adapted under license by [employer] (which disclaims liability or warranty for this information). If you have questions about a medical condition or this instruction, always ask your healthcare professional. [address] any warranty or liability for your use of this information.

## 2019-05-17 ENCOUNTER — HOSPITAL ENCOUNTER (OUTPATIENT)
Dept: GENERAL RADIOLOGY | Age: 79
Discharge: HOME | End: 2019-05-17
Payer: MEDICARE

## 2019-05-17 DIAGNOSIS — R13.10 DYSPHAGIA: ICD-10-CM

## 2019-05-17 DIAGNOSIS — K59.00 ACUTE CONSTIPATION: ICD-10-CM

## 2019-05-17 DIAGNOSIS — R12 HEARTBURN: ICD-10-CM

## 2019-05-17 PROCEDURE — 74018 RADEX ABDOMEN 1 VIEW: CPT

## 2019-05-20 ENCOUNTER — HOSPITAL ENCOUNTER (OUTPATIENT)
Dept: GENERAL RADIOLOGY | Age: 79
Discharge: HOME | End: 2019-05-20
Attending: SPECIALIST
Payer: MEDICARE

## 2019-05-20 DIAGNOSIS — R13.10 DYSPHAGIA: ICD-10-CM

## 2019-05-20 DIAGNOSIS — K59.00 ACUTE CONSTIPATION: ICD-10-CM

## 2019-05-20 DIAGNOSIS — R12 HEARTBURN: ICD-10-CM

## 2019-05-20 PROCEDURE — 74018 RADEX ABDOMEN 1 VIEW: CPT

## 2019-05-20 PROCEDURE — 74220 X-RAY XM ESOPHAGUS 1CNTRST: CPT

## 2019-05-22 ENCOUNTER — HOSPITAL ENCOUNTER (OUTPATIENT)
Age: 79
Setting detail: OUTPATIENT SURGERY
Discharge: HOME | End: 2019-05-22
Attending: SPECIALIST | Admitting: SPECIALIST
Payer: MEDICARE

## 2019-05-22 VITALS
HEART RATE: 63 BPM | WEIGHT: 261.06 LBS | BODY MASS INDEX: 39.56 KG/M2 | TEMPERATURE: 97.5 F | DIASTOLIC BLOOD PRESSURE: 92 MMHG | RESPIRATION RATE: 13 BRPM | HEIGHT: 68 IN | SYSTOLIC BLOOD PRESSURE: 143 MMHG | OXYGEN SATURATION: 96 %

## 2019-05-22 PROBLEM — R93.5 ABNORMAL X-RAY OF ABDOMEN: Status: ACTIVE | Noted: 2019-05-22

## 2019-05-22 PROBLEM — R13.19 ESOPHAGEAL DYSPHAGIA: Status: ACTIVE | Noted: 2019-05-22

## 2019-05-22 PROCEDURE — 76060000032 HC ANESTHESIA 0.5 TO 1 HR: Performed by: SPECIALIST

## 2019-05-22 PROCEDURE — 88305 TISSUE EXAM BY PATHOLOGIST: CPT

## 2019-05-22 PROCEDURE — 77030018712 HC DEV BLLN INFL BSC -B: Performed by: SPECIALIST

## 2019-05-22 PROCEDURE — 74011250636 HC RX REV CODE- 250/636: Performed by: SPECIALIST

## 2019-05-22 PROCEDURE — 76040000007: Performed by: SPECIALIST

## 2019-05-22 PROCEDURE — 88374 M/PHMTRC ALYS ISHQUANT/SEMIQ: CPT

## 2019-05-22 PROCEDURE — C1726 CATH, BAL DIL, NON-VASCULAR: HCPCS | Performed by: SPECIALIST

## 2019-05-22 PROCEDURE — 77030019988 HC FCPS ENDOSC DISP BSC -B: Performed by: SPECIALIST

## 2019-05-22 RX ORDER — MIDAZOLAM HYDROCHLORIDE 1 MG/ML
.25-5 INJECTION, SOLUTION INTRAMUSCULAR; INTRAVENOUS
Status: DISCONTINUED | OUTPATIENT
Start: 2019-05-22 | End: 2019-05-22 | Stop reason: HOSPADM

## 2019-05-22 RX ORDER — SODIUM CHLORIDE 0.9 % (FLUSH) 0.9 %
5-40 SYRINGE (ML) INJECTION EVERY 8 HOURS
Status: DISCONTINUED | OUTPATIENT
Start: 2019-05-22 | End: 2019-05-22 | Stop reason: HOSPADM

## 2019-05-22 RX ORDER — FENTANYL CITRATE 50 UG/ML
100 INJECTION, SOLUTION INTRAMUSCULAR; INTRAVENOUS
Status: DISCONTINUED | OUTPATIENT
Start: 2019-05-22 | End: 2019-05-22 | Stop reason: HOSPADM

## 2019-05-22 RX ORDER — SODIUM CHLORIDE 0.9 % (FLUSH) 0.9 %
5-40 SYRINGE (ML) INJECTION AS NEEDED
Status: DISCONTINUED | OUTPATIENT
Start: 2019-05-22 | End: 2019-05-22 | Stop reason: HOSPADM

## 2019-05-22 RX ORDER — DEXTROMETHORPHAN/PSEUDOEPHED 2.5-7.5/.8
1.2 DROPS ORAL
Status: DISCONTINUED | OUTPATIENT
Start: 2019-05-22 | End: 2019-05-22 | Stop reason: HOSPADM

## 2019-05-22 RX ORDER — MAG HYDROX/ALUMINUM HYD/SIMETH 200-200-20
30 SUSPENSION, ORAL (FINAL DOSE FORM) ORAL
Status: ON HOLD | COMMUNITY
End: 2020-03-05

## 2019-05-22 RX ORDER — ATROPINE SULFATE 0.1 MG/ML
0.5 INJECTION INTRAVENOUS
Status: DISCONTINUED | OUTPATIENT
Start: 2019-05-22 | End: 2019-05-22 | Stop reason: HOSPADM

## 2019-05-22 RX ORDER — NALOXONE HYDROCHLORIDE 0.4 MG/ML
0.4 INJECTION, SOLUTION INTRAMUSCULAR; INTRAVENOUS; SUBCUTANEOUS
Status: DISCONTINUED | OUTPATIENT
Start: 2019-05-22 | End: 2019-05-22 | Stop reason: HOSPADM

## 2019-05-22 RX ORDER — SODIUM CHLORIDE 9 MG/ML
75 INJECTION, SOLUTION INTRAVENOUS CONTINUOUS
Status: DISCONTINUED | OUTPATIENT
Start: 2019-05-22 | End: 2019-05-22 | Stop reason: HOSPADM

## 2019-05-22 RX ORDER — FLUMAZENIL 0.1 MG/ML
0.2 INJECTION INTRAVENOUS
Status: DISCONTINUED | OUTPATIENT
Start: 2019-05-22 | End: 2019-05-22 | Stop reason: HOSPADM

## 2019-05-22 RX ORDER — POLYETHYLENE GLYCOL 3350 17 G/17G
17 POWDER, FOR SOLUTION ORAL DAILY
Status: ON HOLD | COMMUNITY
End: 2020-03-05

## 2019-05-22 RX ADMIN — FENTANYL CITRATE 25 MCG: 50 INJECTION, SOLUTION INTRAMUSCULAR; INTRAVENOUS at 13:00

## 2019-05-22 RX ADMIN — MIDAZOLAM 1 MG: 1 INJECTION INTRAMUSCULAR; INTRAVENOUS at 13:00

## 2019-05-22 RX ADMIN — SODIUM CHLORIDE 75 ML/HR: 900 INJECTION, SOLUTION INTRAVENOUS at 12:29

## 2019-05-22 NOTE — PROGRESS NOTES
CRE balloon dilatation of the esophagus   8 mm Balloon inflated to 3 ATMs and held for 60 seconds. 9 mm Balloon inflated to 5.5 ATMs and held for 60 seconds. 10 mm Balloon inflated to 9 ATMs and held for 60 seconds. No subcutaneous crepitus of the chest or cervical region was noted post dilatation.

## 2019-05-22 NOTE — DISCHARGE INSTRUCTIONS
Evangelina Velázquez  [id number]  1940              Procedure  Discharge Instructions:      Discomfort:  Redness at IV site- apply warm compress to area; if redness or soreness persist- contact your physician  There may be a slight amount of blood passed from the rectum  Gaseous discomfort- walking, belching will help relieve any discomfort  You may not operate a vehicle for 12 hours  You may not engage in an occupation involving machinery or appliances for rest of today  You may not drink alcoholic beverages for at least 12 hours  Avoid making any critical decisions for at least 24 hour  DIET:   You may resume diet of liquids only today . You should not overeat or \"feast\" today as your abdomen may become distended or uncomfortable. MEDICATIONS:   I reconciled this list from the list you gave us when you came today for the procedure. Please clarify with me, your primary care physician and the nurse who is discharging you if we have any discrepancies. Aspirin and or non-steroidal medication (Ibuprofen, Motrin, naproxen, etc.) is ok in limited quantities. ACTIVITY:  You may resume your normal daily activities it is recommended that you spend the remainder of the day resting -  avoid any strenuous activity. CALL M.D. ANY SIGN OF:  Increasing pain, nausea, vomiting  Abdominal distension (swelling)  New increased bleeding (oral or rectal)  Fever (chills)  Pain in chest area  Bloody discharge from nose or mouth  Shortness of breath          Follow-up Instructions:   Call Dr. César Sky for the results of  biopsy in approximately one week  Telephone #  [phone]  Follow up visit as previously scheduled. I will arrange for you to get ct scan and to be dilated again. If biopsies are positive for cancer I will arrange for you to see a medical oncologist after the ct scans.     Nannette Benson MD  1:28 PM  5/22/2019

## 2019-05-22 NOTE — ROUTINE PROCESS
Debra Daysi  1940  [id number]    Situation:  Verbal report received from: VIRGIE Rico RN  Procedure: Procedure(s):  ESOPHAGOGASTRODUODENOSCOPY (EGD)  ESOPHAGOGASTRODUODENAL (EGD) BIOPSY  ESOPHAGEAL DILATION    Background:    Preoperative diagnosis: DYSPHAGIA  Postoperative diagnosis: EGD:Esophageal mass [K22.9]  Esophageal stricture [K22.2]        :  Dr. Rich Villalobos  Assistant(s): Endoscopy Technician-1: Angel De Anda  Endoscopy RN-1: Yenni Rico RN    Specimens:   ID Type Source Tests Collected by Time Destination   1 : proxminal esophagus mass Preservative   Ambrosio Arriaga MD 5/22/2019 1314 Pathology     H. Pylori  no    Assessment:  Intra-procedure medications   Versed  4 mg  Fentanyl 50mcg      Anesthesia gave intra-procedure sedation and medications, see anesthesia flow sheet     Intravenous fluids: NS@ KVO     Vital signs stable     Abdominal assessment: round and soft     Recommendation:  Discharge patient per MD order.   Family or Friend Permission to share finding with family or friend

## 2019-05-22 NOTE — PROCEDURES
Esophagogastroduodenoscopy    Indications:  Dysphagia  Abnormal barium swallow showing mass, stricture and large hh    Medications:  Fentanyl 50 mcg  Midazolam 4mg  Start time meds  1300  End time of procedure 1318  All sedation given by rn under my direct supervision    Assistants:  Danae Conn      Post procedure diagnosis:   Incomplete exam to 29 cm  Esophageal stricture  Esophageal mass  esophagesal nodules. Description of Procedure:    Prior to the procedure its objectives, risks, consequences and alternatives were discussed with the patient who then elected to proceed. The Olympus video endoscope was inserted under direct vision into the mouth and then into the esophagus. The esophagus looked normal.    There was a mass in the proximal/ mid esophagus at 27 cm. It was 2 cm long, soft, to biopsy and not obviously malignant. It occupied about 1/4 of the lumen. Opposite the mass there were approximately 3 nodules, less than 1 cm. The mass connected to a stricture/ stenosis at 29 cm. This was tight and had irregular mucosa that was firm to biopsy. The stricture is concerning for malignancy. I could not go through the stricture. I took biopsies of the mass, the stricture and the nodules. I used an 8 to 10 mm dilator. After several attempts, I was able to advance the dilator tip freely. I dilated the distal esophagus with a through the scope balloon. I dilated from 8mm to 9mm to 10 mm. Each time the balloon was inflated for sixty seconds. There were no apparent complications. I attempted to advance the scope across the lumen of the stricture but it was still too tight. I took more biopsies within the stricture. Complications: There were no apparent complications and the patient tolerated the procedure well.         Implants:  none    Estimated Blood Loss:  Less than 15 ml  Specimens Removed:  esophageal mass, nodules and stricture  27 to 29 cm (all in one jar)  Impressions: Mass in esophagus proximal to stricture  27 to 29 cm   Tight, irregular structure suggesting malignance  Nodules esophagus at about 27 cm      Signed By: Amelia Armstrong MD                        May 22, 2019     1:21 PM

## 2019-05-22 NOTE — H&P
Pre-endoscopy H and P    The patient was seen and examined in the endoscopy suite. The airway was assessed and docuemented. The problem list, past medical history, and medications were reviewed.      Patient Active Problem List   Diagnosis Code    Screen for colon cancer Z12.11    History of colon polyps Z86.010    Esophageal dysphagia R13.10    Abnormal x-ray of abdomen R93.5     Social History     Socioeconomic History    Marital status:      Spouse name: Not on file    Number of children: Not on file    Years of education: Not on file    Highest education level: Not on file   Occupational History    Not on file   Social Needs    Financial resource strain: Not on file    Food insecurity:     Worry: Not on file     Inability: Not on file    Transportation needs:     Medical: Not on file     Non-medical: Not on file   Tobacco Use    Smoking status: Never Smoker    Smokeless tobacco: Never Used   Substance and Sexual Activity    Alcohol use: Not Currently     Comment: in her 19's     Drug use: Never    Sexual activity: Not on file   Lifestyle    Physical activity:     Days per week: Not on file     Minutes per session: Not on file    Stress: Not on file   Relationships    Social connections:     Talks on phone: Not on file     Gets together: Not on file     Attends Restorationism service: Not on file     Active member of club or organization: Not on file     Attends meetings of clubs or organizations: Not on file     Relationship status: Not on file    Intimate partner violence:     Fear of current or ex partner: Not on file     Emotionally abused: Not on file     Physically abused: Not on file     Forced sexual activity: Not on file   Other Topics Concern    Not on file   Social History Narrative    Not on file     Past Medical History:   Diagnosis Date    Abnormal x-ray of abdomen 5/22/2019    Bas showed food and irregular stricture above ge junction in addition to large hiatal hernia 5.20.2019    Anemia     Arthritis     Chronic kidney disease     Stage II followed by Dr Efraín Watts Nephrology     Diverticulosis     Esophageal dysphagia 5/22/2019    H/O colonoscopy with polypectomy     benign    History of colon polyps 6/1/2018 2013 tubular adenoma     Hypertension     Ill-defined condition     pulmonary hypertension    Other ill-defined conditions(799.89)     glaucoma and cataracts    Sleep apnea     CPAP compliant, as stated 5/20/2019    Thromboembolus ([address] 2015    Right  leg     Thyroid disease     hypothyroidism     The patient has a family history of na    Prior to Admission Medications   Prescriptions Last Dose Informant Patient Reported? Taking? DORZOLAMIDE HCL/TIMOLOL MALEAT (DORZOLAMIDE-TIMOLOL OP) 5/22/2019 at Unknown time  Yes Yes   Sig: Apply 1 Drop to eye two (2) times a day. One drop to each eye twice daily   MULTIVITS W-FE,OTHER MIN/LUT (CENTRUM SILVER ULTRA WOMEN'S PO) 5/21/2019 at Unknown time  Yes Yes   Sig: Take 1 Tab by mouth daily. acetaminophen (TYLENOL EXTRA STRENGTH) 500 mg tablet 5/19/2019  Yes No   Sig: Take 1,000 mg by mouth every six (6) hours as needed. Indications: ARTHRITIC PAIN, PAIN   alum-mag hydroxide-simeth (MYLANTA) 200-200-20 mg/5 mL susp 5/19/2019  Yes Yes   Sig: Take 30 mL by mouth every four (4) hours as needed. amLODIPine (NORVASC) 10 mg tablet 5/22/2019 at Unknown time  Yes Yes   Sig: Take 10 mg by mouth daily. Indications: HYPERTENSION   brimonidine (ALPHAGAN) 0.2 % ophthalmic solution 5/22/2019 at Unknown time  Yes Yes   Sig: Administer 1 Drop to both eyes two (2) times a day. cloNIDine HCl (CATAPRES) 0.1 mg tablet 5/22/2019 at Unknown time  Yes Yes   Sig: Take 0.2 mg by mouth two (2) times a day. docusate sodium (COLACE) 100 mg capsule 4/22/2019 at Unknown time  Yes Yes   Sig: Take 100 mg by mouth two (2) times daily as needed for Constipation.    folic acid-vit G7-MDS S59 (FOLBEE) 2.5-25-1 mg tablet 5/22/2019 at Unknown time  Yes Yes   Sig: Take 1 Tab by mouth daily. iron bisgly,ps-FA-B-C#12-succ 65 mg-65 mg -1,000 mcg (24) tab 5/21/2019 at Unknown time  Yes Yes   Sig: Take 1 Tab by mouth daily. labetalol (NORMODYNE) 300 mg tablet 5/22/2019 at Unknown time  Yes Yes   Sig: Take 300 mg by mouth two (2) times a day. latanoprost (XALATAN) 0.005 % ophthalmic solution 5/21/2019 at Unknown time  Yes Yes   Sig: Administer 1 Drop to both eyes nightly. levothyroxine (SYNTHROID) 100 mcg tablet 5/22/2019 at Unknown time  Yes Yes   Sig: Take 100 mcg by mouth Daily (before breakfast). Indications: HYPOTHYROIDISM   losartan (COZAAR) 100 mg tablet 5/22/2019 at Unknown time  Yes Yes   Sig: Take 100 mg by mouth daily. Indications: HYPERTENSION   polyethylene glycol (MIRALAX) 17 gram packet 5/19/2019  Yes Yes   Sig: Take 17 g by mouth daily. Facility-Administered Medications: None           The review of systems is:  negative for shortness of breath or chest pain      The heart, lungs, and mental status were satisfactory for the administration of conscious sedation and for the procedure. I discussed with the patient the objectives, risks, consequences and alternatives to the procedure.       Trev Rosas MD  5/22/2019  12:56 PM

## 2019-05-30 ENCOUNTER — HOSPITAL ENCOUNTER (OUTPATIENT)
Age: 79
Setting detail: OUTPATIENT SURGERY
Discharge: HOME | End: 2019-05-30
Attending: SPECIALIST | Admitting: SPECIALIST
Payer: MEDICARE

## 2019-05-30 ENCOUNTER — ANESTHESIA (OUTPATIENT)
Dept: ENDOSCOPY | Age: 79
End: 2019-05-30
Payer: MEDICARE

## 2019-05-30 ENCOUNTER — ANESTHESIA EVENT (OUTPATIENT)
Dept: ENDOSCOPY | Age: 79
End: 2019-05-30
Payer: MEDICARE

## 2019-05-30 VITALS
DIASTOLIC BLOOD PRESSURE: 87 MMHG | HEIGHT: 68 IN | SYSTOLIC BLOOD PRESSURE: 134 MMHG | HEART RATE: 62 BPM | TEMPERATURE: 97.7 F | RESPIRATION RATE: 19 BRPM | OXYGEN SATURATION: 93 % | BODY MASS INDEX: 39.12 KG/M2 | WEIGHT: 258.13 LBS

## 2019-05-30 PROBLEM — C15.9 ADENOCARCINOMA OF ESOPHAGUS (HCC): Status: ACTIVE | Noted: 2019-05-30

## 2019-05-30 PROCEDURE — 77030039825 HC MSK NSL PAP SUPERNO2VA VYRM -B: Performed by: ANESTHESIOLOGY

## 2019-05-30 PROCEDURE — 76060000031 HC ANESTHESIA FIRST 0.5 HR: Performed by: SPECIALIST

## 2019-05-30 PROCEDURE — 74011250636 HC RX REV CODE- 250/636: Performed by: SPECIALIST

## 2019-05-30 PROCEDURE — 77030018712 HC DEV BLLN INFL BSC -B: Performed by: SPECIALIST

## 2019-05-30 PROCEDURE — 76040000019: Performed by: SPECIALIST

## 2019-05-30 PROCEDURE — 74011250636 HC RX REV CODE- 250/636

## 2019-05-30 PROCEDURE — C1726 CATH, BAL DIL, NON-VASCULAR: HCPCS | Performed by: SPECIALIST

## 2019-05-30 PROCEDURE — 88305 TISSUE EXAM BY PATHOLOGIST: CPT

## 2019-05-30 PROCEDURE — 77030019988 HC FCPS ENDOSC DISP BSC -B: Performed by: SPECIALIST

## 2019-05-30 RX ORDER — LIDOCAINE HYDROCHLORIDE 20 MG/ML
INJECTION, SOLUTION EPIDURAL; INFILTRATION; INTRACAUDAL; PERINEURAL AS NEEDED
Status: DISCONTINUED | OUTPATIENT
Start: 2019-05-30 | End: 2019-05-30 | Stop reason: HOSPADM

## 2019-05-30 RX ORDER — FLUMAZENIL 0.1 MG/ML
0.2 INJECTION INTRAVENOUS
Status: DISCONTINUED | OUTPATIENT
Start: 2019-05-30 | End: 2019-05-30 | Stop reason: HOSPADM

## 2019-05-30 RX ORDER — SODIUM CHLORIDE 0.9 % (FLUSH) 0.9 %
5-40 SYRINGE (ML) INJECTION AS NEEDED
Status: DISCONTINUED | OUTPATIENT
Start: 2019-05-30 | End: 2019-05-30 | Stop reason: HOSPADM

## 2019-05-30 RX ORDER — SODIUM CHLORIDE 0.9 % (FLUSH) 0.9 %
5-40 SYRINGE (ML) INJECTION EVERY 8 HOURS
Status: DISCONTINUED | OUTPATIENT
Start: 2019-05-30 | End: 2019-05-30 | Stop reason: HOSPADM

## 2019-05-30 RX ORDER — NALOXONE HYDROCHLORIDE 0.4 MG/ML
0.4 INJECTION, SOLUTION INTRAMUSCULAR; INTRAVENOUS; SUBCUTANEOUS
Status: DISCONTINUED | OUTPATIENT
Start: 2019-05-30 | End: 2019-05-30 | Stop reason: HOSPADM

## 2019-05-30 RX ORDER — ATROPINE SULFATE 0.1 MG/ML
0.5 INJECTION INTRAVENOUS
Status: DISCONTINUED | OUTPATIENT
Start: 2019-05-30 | End: 2019-05-30 | Stop reason: HOSPADM

## 2019-05-30 RX ORDER — FENTANYL CITRATE 50 UG/ML
50 INJECTION, SOLUTION INTRAMUSCULAR; INTRAVENOUS
Status: DISCONTINUED | OUTPATIENT
Start: 2019-05-30 | End: 2019-05-30 | Stop reason: HOSPADM

## 2019-05-30 RX ORDER — SODIUM CHLORIDE 9 MG/ML
75 INJECTION, SOLUTION INTRAVENOUS CONTINUOUS
Status: DISCONTINUED | OUTPATIENT
Start: 2019-05-30 | End: 2019-05-30 | Stop reason: HOSPADM

## 2019-05-30 RX ORDER — DEXTROMETHORPHAN/PSEUDOEPHED 2.5-7.5/.8
1.2 DROPS ORAL
Status: DISCONTINUED | OUTPATIENT
Start: 2019-05-30 | End: 2019-05-30 | Stop reason: HOSPADM

## 2019-05-30 RX ORDER — PROPOFOL 10 MG/ML
INJECTION, EMULSION INTRAVENOUS AS NEEDED
Status: DISCONTINUED | OUTPATIENT
Start: 2019-05-30 | End: 2019-05-30 | Stop reason: HOSPADM

## 2019-05-30 RX ORDER — MIDAZOLAM HYDROCHLORIDE 1 MG/ML
.25-5 INJECTION, SOLUTION INTRAMUSCULAR; INTRAVENOUS
Status: DISCONTINUED | OUTPATIENT
Start: 2019-05-30 | End: 2019-05-30 | Stop reason: HOSPADM

## 2019-05-30 RX ADMIN — LIDOCAINE HYDROCHLORIDE 80 MG: 20 INJECTION, SOLUTION EPIDURAL; INFILTRATION; INTRACAUDAL; PERINEURAL at 09:06

## 2019-05-30 RX ADMIN — LIDOCAINE HYDROCHLORIDE 20 MG: 20 INJECTION, SOLUTION EPIDURAL; INFILTRATION; INTRACAUDAL; PERINEURAL at 08:57

## 2019-05-30 RX ADMIN — PROPOFOL 50 MG: 10 INJECTION, EMULSION INTRAVENOUS at 09:09

## 2019-05-30 RX ADMIN — PROPOFOL 50 MG: 10 INJECTION, EMULSION INTRAVENOUS at 09:03

## 2019-05-30 RX ADMIN — PROPOFOL 50 MG: 10 INJECTION, EMULSION INTRAVENOUS at 09:08

## 2019-05-30 RX ADMIN — PROPOFOL 50 MG: 10 INJECTION, EMULSION INTRAVENOUS at 09:05

## 2019-05-30 RX ADMIN — PROPOFOL 50 MG: 10 INJECTION, EMULSION INTRAVENOUS at 09:12

## 2019-05-30 RX ADMIN — SODIUM CHLORIDE 75 ML/HR: 900 INJECTION, SOLUTION INTRAVENOUS at 08:11

## 2019-05-30 RX ADMIN — PROPOFOL 50 MG: 10 INJECTION, EMULSION INTRAVENOUS at 09:01

## 2019-05-30 RX ADMIN — PROPOFOL 50 MG: 10 INJECTION, EMULSION INTRAVENOUS at 09:04

## 2019-05-30 NOTE — PROCEDURES
Esophagogastroduodenoscopy Indications:  Dysphagia Known esophageal cancer for dilation and evaluation of stomach and duodenum Medications:  See anesthesia form Assistants:  Joselyn Osborn [facility] Post procedure diagnosis:  esophageal cancer, esophageal stricture, gastric ulcers, thick pre-pyloric fold, hiatal hernia, possible para esophageal hernia, barretts Description of Procedure:   
Prior to the procedure its objectives, risks, consequences and alternatives were discussed with the patient who then elected to proceed. The Olympus video endoscope was inserted under direct vision into the mouth and then into the esophagus. The esophagus looked normal to 27 cm. There was a 2 cm nodule that led to a tight stenosis. I could not go through the stenosis. I then dilated the distal esophagus with a through the scope balloon. I dilated from 10mm to 11mm to 12 mm. Each time the balloon was inflated for sixty seconds. There were no apparent complications. I was able to go through the lumen of the stricture. After the dilation I could see Boogie's mucosa in the mid esophagus. The mass was circumferential from 29 cm to the z line. The z-line was located at 33 cm. A large hiatal hernia was seen with the diaphragm pinch at 40 cm. There may be a paraesophageal component of the hernia. There were no mucosal diagnostic abnormalities of the body, fundus, and cardia of the stomach. This included direct and retroflexion examination. Along the lesser curve of the mid and distal antrum there was a 2 cm thick fold with overlying ulceration. It appeared benign. I took biopsies. The first and second portion of the duodenum appeared normal.   
 
 
 
Complications: There were no apparent complications and the patient tolerated the procedure well. Implants:  none Estimated Blood Loss:  Less than 20 ml Specimens Removed:  Gastric antrum ulcer Impressions:  Ulcer and thick fold distal stomach Large hh with possible paraesophageal component Distal esophageal cancer with stenosis Boogie's Signed By: Marisol Wynn MD 
                      May 30, 2019  
  9:19 AM

## 2019-05-30 NOTE — DISCHARGE INSTRUCTIONS
Keanu Bhandari  [id number]  1940              Procedure  Discharge Instructions:      Discomfort:  Redness at IV site- apply warm compress to area; if redness or soreness persist- contact your physician  There may be a slight amount of blood passed from the rectum  Gaseous discomfort- walking, belching will help relieve any discomfort  You may not operate a vehicle for 12 hours  You may not engage in an occupation involving machinery or appliances for rest of today  You may not drink alcoholic beverages for at least 12 hours  Avoid making any critical decisions for at least 24 hour  DIET:   You may resume a liquid and pureed food diet today. You should not overeat or \"feast\" today as your abdomen may become distended or uncomfortable. MEDICATIONS:   I reconciled this list from the list you gave us when you came today for the procedure. Please clarify with me, your primary care physician and the nurse who is discharging you if we have any discrepancies. Aspirin and or non-steroidal medication (Ibuprofen, Motrin, naproxen, etc.) is ok in limited quantities. ACTIVITY:  You may resume your normal daily activities it is recommended that you spend the remainder of the day resting -  avoid any strenuous activity. CALL M.D. ANY SIGN OF:  Increasing pain, nausea, vomiting  Abdominal distension (swelling)  New increased bleeding (oral or rectal)  Fever (chills)  Pain in chest area  Bloody discharge from nose or mouth  Shortness of breath          Follow-up Instructions:   Call Dr. Aman Youssef for the results of  biopsy in approximately one week  Telephone #  [phone]  Follow up visit as previously scheduled. We need to get your ct scan performed and have you see medical oncology. If the CT is negative for spread of tumor then you may need another scope with ultrasound.       Ayana Sanchez MD  9:25 AM  5/30/2019

## 2019-05-30 NOTE — ROUTINE PROCESS
Noé Machuca 1940 
[id number] Situation: 
Verbal report received from: [facility] Procedure: Procedure(s): ESOPHAGOGASTRODUODENOSCOPY (EGD) ESOPHAGEAL DILATION 
ESOPHAGOGASTRODUODENAL (EGD) BIOPSY Background: 
 
Preoperative diagnosis: Theadore Ruthy HEARTBURN Postoperative diagnosis: esophageal cancer, esophageal stricture, gastric ulcers, thick pre-pyloric fold, hiatal hernia, possible esophageal hernia, barretts :  Dr. Mahendra Cruz Assistant(s): Endoscopy Technician-1: Rocky Brito Endoscopy RN-1: Pual Philippe RN Specimens:  
ID Type Source Tests Collected by Time Destination 1 : gastric ulcers bx Preservative Gastric  Kaylah Pruett MD 5/30/2019 7411 Pathology H. Pylori  no Assessment: Anesthesia gave intra-procedure sedation and medications, see anesthesia flow sheet yes Intravenous fluids: NS@ [place] Awkward Vital signs stable Abdominal assessment: round and soft Recommendation: 
Discharge patient per MD order. Family or Friend Permission to share finding with family or friend yes

## 2019-05-30 NOTE — PROGRESS NOTES
..Anesthesia reports 350mg Propofol, 100mg Lidocaine and 450mL NS given during procedure. Received report from anesthesia staff on vital signs and status of patient.

## 2019-05-30 NOTE — ANESTHESIA POSTPROCEDURE EVALUATION
Procedure(s): ESOPHAGOGASTRODUODENOSCOPY (EGD) ESOPHAGEAL DILATION 
ESOPHAGOGASTRODUODENAL (EGD) BIOPSY. total IV anesthesia, general 
 
Anesthesia Post Evaluation Patient location during evaluation: PACU Note status: Adequate. Level of consciousness: responsive to verbal stimuli and sleepy but conscious Pain management: satisfactory to patient Airway patency: patent Anesthetic complications: no 
Cardiovascular status: acceptable Respiratory status: acceptable Hydration status: acceptable Comments: +Post-Anesthesia Evaluation and Assessment Patient: Samy Avalos MRN: [id number]  SSN: [id number] YOB: 1940  Age: 66 y.o. Sex: female Cardiovascular Function/Vital Signs BP (!) 134/97   Pulse 66   Temp 36.5 °C (97.7 °F)   Resp 19   Ht 5' 8\" (1.727 m)   Wt 117.1 kg (258 lb 2 oz)   SpO2 99%   Breastfeeding? No   BMI 39.25 kg/m² Patient is status post Procedure(s): ESOPHAGOGASTRODUODENOSCOPY (EGD) ESOPHAGEAL DILATION 
ESOPHAGOGASTRODUODENAL (EGD) BIOPSY. Nausea/Vomiting: Controlled. Postoperative hydration reviewed and adequate. Pain: 
Pain Scale 1: Numeric (0 - 10) (05/30/19 0925) Pain Intensity 1: 0 (05/30/19 0925) Managed. Neurological Status: At baseline. Mental Status and Level of Consciousness: Arousable. Pulmonary Status:  
O2 Device: Room air (05/30/19 0925) Adequate oxygenation and airway patent. Complications related to anesthesia: None Post-anesthesia assessment completed. No concerns. Signed By: Penelope Ware MD  
 5/30/2019 Post anesthesia nausea and vomiting:  controlled Vitals Value Taken Time /97 5/30/2019  9:31 AM  
Temp Pulse Resp SpO2 Vitals shown include unvalidated device data.

## 2019-05-30 NOTE — H&P
Pre-endoscopy H and P The patient was seen and examined in the endoscopy suite. The airway was assessed and docuemented. The problem list, past medical history, and medications were reviewed. Patient Active Problem List  
Diagnosis Code  Screen for colon cancer Z12.11  
 History of colon polyps Z86.010  
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  
  Worry: Not on file Inability: Not on file  Transportation needs:  
  Medical: Not on file Non-medical: Not on file Tobacco Use  Smoking status: Never Smoker  Smokeless tobacco: Never Used Substance and Sexual Activity  Alcohol use: Not Currently Comment: in her 19's  Drug use: Never  Sexual activity: Not on file Lifestyle  Physical activity:  
  Days per week: Not on file Minutes per session: Not on file  Stress: Not on file Relationships  Social connections:  
  Talks on phone: Not on file Gets together: Not on file Attends Zoroastrianism service: Not on file Active member of club or organization: Not on file Attends meetings of clubs or organizations: Not on file Relationship status: Not on file  Intimate partner violence:  
  Fear of current or ex partner: Not on file Emotionally abused: Not on file Physically abused: Not on file Forced sexual activity: Not on file Other Topics Concern  Not on file Social History Narrative  Not on file Past Medical History:  
Diagnosis Date  Abnormal x-ray of abdomen 5/22/2019 Bas showed food and irregular stricture above ge junction in addition to large hiatal hernia  5.20.2019  Adenocarcinoma of esophagus ([facility] [address] 5/30/2019  Anemia  Arthritis  Chronic kidney disease Stage II followed by Dr Suad Hilario Nephrology  Diverticulosis  Esophageal dysphagia 5/22/2019  H/O colonoscopy with polypectomy   
 benign  History of colon polyps 6/1/2018 2013 tubular adenoma  Hypertension  Ill-defined condition   
 pulmonary hypertension  Other ill-defined conditions(799.89)   
 glaucoma and cataracts  Sleep apnea CPAP compliant, as stated 5/20/2019  Thromboembolus ([facility] [address] 2015 Right  leg , spontaneous  Thyroid disease   
 hypothyroidism The patient has a family history of na Prior to Admission Medications Prescriptions Last Dose Informant Patient Reported? Taking? DORZOLAMIDE HCL/TIMOLOL MALEAT (DORZOLAMIDE-TIMOLOL OP) 5/29/2019 at Unknown time  Yes Yes Sig: Apply 1 Drop to eye three (3) times daily. One drop to each eye twice daily MULTIVITS W-FE,OTHER MIN/LUT (CENTRUM SILVER ULTRA WOMEN'S PO) 5/29/2019 at Unknown time  Yes Yes Sig: Take 1 Tab by mouth daily. acetaminophen (TYLENOL EXTRA STRENGTH) 500 mg tablet 5/23/2019 at Unknown time  Yes Yes Sig: Take 1,000 mg by mouth every six (6) hours as needed. Indications: ARTHRITIC PAIN, PAIN  
alum-mag hydroxide-simeth (MYLANTA) 200-200-20 mg/5 mL susp 5/23/2019 at Unknown time  Yes Yes Sig: Take 30 mL by mouth every four (4) hours as needed. amLODIPine (NORVASC) 10 mg tablet 5/29/2019 at Unknown time  Yes Yes Sig: Take 10 mg by mouth daily. Indications: HYPERTENSION  
brimonidine (ALPHAGAN) 0.2 % ophthalmic solution 5/30/2019 at Unknown time  Yes Yes Sig: Administer 1 Drop to both eyes three (3) times daily. cloNIDine HCl (CATAPRES) 0.1 mg tablet 5/29/2019 at Unknown time  Yes Yes Sig: Take 0.2 mg by mouth two (2) times a day. folic acid-vit Z8-AMW O91 (FOLBEE) 2.5-25-1 mg tablet 5/29/2019 at Unknown time  Yes Yes Sig: Take 1 Tab by mouth daily. iron chantel,ps-FA-B-C#12-succ 65 mg-65 mg -1,000 mcg (24) tab 5/29/2019 at Unknown time  Yes Yes Sig: Take 1 Tab by mouth daily. labetalol (NORMODYNE) 300 mg tablet 5/29/2019 at Unknown time  Yes Yes Sig: Take 300 mg by mouth two (2) times a day. latanoprost (XALATAN) 0.005 % ophthalmic solution 5/29/2019 at Unknown time  Yes Yes Sig: Administer 1 Drop to both eyes nightly. levothyroxine (SYNTHROID) 100 mcg tablet 5/29/2019 at Unknown time  Yes Yes Sig: Take 100 mcg by mouth Daily (before breakfast). Indications: HYPOTHYROIDISM  
losartan (COZAAR) 100 mg tablet 5/29/2019 at Unknown time  Yes Yes Sig: Take 100 mg by mouth daily. Indications: HYPERTENSION  
polyethylene glycol (MIRALAX) 17 gram packet 5/29/2019 at Unknown time  Yes Yes Sig: Take 17 g by mouth daily. Facility-Administered Medications: None The review of systems is:  negative for shortness of breath or chest pain The heart, lungs, and mental status were satisfactory for the administration of anesthesia sedation and for the procedure. I discussed with the patient the objectives, risks, consequences and alternatives to the procedure.    
 
Cholo West MD 
5/30/2019 
8:59 AM

## 2019-05-30 NOTE — ANESTHESIA PREPROCEDURE EVALUATION
Anesthetic History No history of anesthetic complications Review of Systems / Medical History Patient summary reviewed, nursing notes reviewed and pertinent labs reviewed Pulmonary Sleep apnea: CPAP Neuro/Psych Within defined limits Cardiovascular Hypertension Exercise tolerance: >4 METS 
  
GI/Hepatic/Renal 
Within defined limits Endo/Other Hypothyroidism Morbid obesity and arthritis Other Findings Comments: DVT 2015 Physical Exam 
 
Airway Mallampati: II 
TM Distance: 4 - 6 cm Neck ROM: normal range of motion Mouth opening: Normal 
 
 Cardiovascular Regular rate and rhythm,  S1 and S2 normal,  no murmur, click, rub, or gallop Dental 
No notable dental hx Pulmonary Breath sounds clear to auscultation Abdominal 
GI exam deferred Other Findings Anesthetic Plan ASA: 3 Anesthesia type: total IV anesthesia and general 
 
 
 
 
Induction: Intravenous Anesthetic plan and risks discussed with: Patient Propofol MAC/Supernova

## 2019-05-30 NOTE — PROGRESS NOTES
..CRE balloon dilatation of the esophagus 10 mm Balloon inflated to 3 ATMs and held for 60 seconds. 11 mm Balloon inflated to 5 ATMs and held for 60 seconds. 12 mm Balloon inflated to 8 ATMs and held for 60 seconds. No subcutaneous crepitus of the chest or cervical region was noted post dilatation.

## 2019-06-05 ENCOUNTER — HOSPITAL ENCOUNTER (OUTPATIENT)
Age: 79
Setting detail: OUTPATIENT SURGERY
Discharge: HOME | End: 2019-06-05
Attending: SPECIALIST | Admitting: SPECIALIST
Payer: MEDICARE

## 2019-06-05 VITALS
BODY MASS INDEX: 39.1 KG/M2 | WEIGHT: 258 LBS | DIASTOLIC BLOOD PRESSURE: 75 MMHG | HEART RATE: 69 BPM | HEIGHT: 68 IN | SYSTOLIC BLOOD PRESSURE: 155 MMHG | RESPIRATION RATE: 15 BRPM | OXYGEN SATURATION: 98 %

## 2019-06-05 PROCEDURE — 76040000019: Performed by: SPECIALIST

## 2019-06-05 PROCEDURE — 77030020268 HC MISC GENERAL SUPPLY: Performed by: SPECIALIST

## 2019-06-05 NOTE — PROGRESS NOTES
Rectal exam done by Shonda Patel RN. Anal manometry catheter inserted into rectum. Manometry procedure complete. Catheter inserted into rectum. Balloon filled with 40cc of luke warm H2O, and pt escorted to bathroom for 3 min expulsion test.  Pt was able to expel balloon. Balloon and catheter removed. Pt tolerated procedures well.

## 2019-06-05 NOTE — DISCHARGE INSTRUCTIONS
Juan Sylvester  [id number]  1940      MANOMETRY DISCHARGE INSTRUCTION    You may resume your regular diet as tolerated. You may resume your normal daily activities. If you develop a sore throat- throat lozenges or warm salt water gargles will help. Call your Physician if you have any complications or questions. [employer] Activation    Thank you for requesting access to [employer]. Please follow the instructions below to securely access and download your online medical record. [employer] allows you to send messages to your doctor, view your test results, renew your prescriptions, schedule appointments, and more. How Do I Sign Up? 1. In your internet browser, go to [email or web address]  2. Click on the First Time User? Click Here link in the Sign In box. You will be redirect to the New Member Sign Up page. 3. Enter your [employer] Access Code exactly as it appears below. You will not need to use this code after youve completed the sign-up process. If you do not sign up before the expiration date, you must request a new code. [employer] Access Code: [email or web address]  Expires: 2019  1:06 PM (This is the date your [employer] access code will )    4. Enter the last four digits of your Social Security Number (xxxx) and Date of Birth (mm/dd/yyyy) as indicated and click Submit. You will be taken to the next sign-up page. 5. Create a [employer] ID. This will be your [employer] login ID and cannot be changed, so think of one that is secure and easy to remember. 6. Create a [employer] password. You can change your password at any time. 7. Enter your Password Reset Question and Answer. This can be used at a later time if you forget your password. 8. Enter your e-mail address. You will receive e-mail notification when new information is available in [address] 9. Click Sign Up. You can now view and download portions of your medical record.   10. Click the Download Summary menu link to download a portable copy of your medical information. Additional Information    If you have questions, please visit the Frequently Asked Questions section of the [employer] website at [email or web address] Remember, [employer] is NOT to be used for urgent needs. For medical emergencies, dial 911.

## 2019-06-06 NOTE — PROCEDURES
[address] PROCEDURE NOTE Name:  Vijaya Cox 
MR#:  [id number] :  1940 ACCOUNT #:  [de-identified] DATE OF SERVICE:  2019 PREPROCEDURE DIAGNOSIS:  Constipation. POSTPROCEDURE DIAGNOSIS: 
1. Failure to relax as a voluntary squeeze. 2.  Was able to expel the balloon. 3.  Abnormal sensory findings, see below. PROCEDURES PLANNED AND PERFORMED: 
1. Anorectal manometry. 2.  Assessment of anorectal function with balloon. :  Марина Lopes MD 
 
ASSISTANT:  Ramy Shah RN 
 
ANESTHESIA:  None. ESTIMATED BLOOD LOSS:  None. SPECIMENS REMOVED:  None. COMPLICATIONS:  None. IMPLANTS:  None. DESCRIPTION OF PROCEDURE:  High-resolution anorectal manometry was performed by the nursing staff with subsequent interpretation by Dr. Lambert. Sphincter pressures were measured, rectal and abdominal reference mean and max. Maximum rectal reference pressure 79.2, mean 70.5. Maximum abdominal reference pressure 69.5 mmHg, mean 60.8. Normals are greater than 30 mmHg. The patient was then asked to voluntarily squeeze. Normals will increase squeeze by more than 30 mm and sustain for more than 10 seconds. She increases squeeze to 256.3 mmHg by rectal reference and 256.4 by abdominal reference. She sustains for 19.5 seconds. She was then asked to push or bear down. Residual anal pressure by abdominal reference is essentially unchanged at 69.4 mmHg. This is scored with a 26% relaxation. Intrarectal pressure at this time is 69.4 mmHg for a rectoanal pressure differential of 0. The balloon was utilized. She was able to expel the balloon. Rectoanal inhibitory reflex is present. First sensation to rectal filling is at 70 ml, normals are 20 ml. The urge to defecate is at 120 ml, normal is . Maximum discomfort is at 180 ml, which is normal, there are number of minor findings.   The failure to relax significantly with push maneuver appears to be compensated by a satisfactory enough intrarectal pressure to facilitate balloon expulsion. The squeeze pressure seems high and I wonder if gluteal muscles are being precluded for this. I will consider enemas that were suppositories as well as laxatives first.  If this would not help allow doing MR defecography, look for other findings and consider Valium suppository. Pelvic floor training could then be considered. Mely Daugherty MD 
 
 
[id number] 
D:  06/06/2019 10:08 
T:  06/06/2019 10:16 JOB #:  [id number] CC:  Jomar Luis MD

## 2019-06-10 ENCOUNTER — HOSPITAL ENCOUNTER (OUTPATIENT)
Dept: CT IMAGING | Age: 79
End: 2019-06-10
Attending: SPECIALIST
Payer: MEDICARE

## 2019-06-10 ENCOUNTER — HOSPITAL ENCOUNTER (OUTPATIENT)
Dept: CT IMAGING | Age: 79
Discharge: HOME | End: 2019-06-10
Attending: SPECIALIST
Payer: MEDICARE

## 2019-06-10 DIAGNOSIS — R63.4 WEIGHT LOSS: ICD-10-CM

## 2019-06-10 DIAGNOSIS — R13.10 DYSPHAGIA: ICD-10-CM

## 2019-06-10 DIAGNOSIS — K59.09 OTHER CONSTIPATION: ICD-10-CM

## 2019-06-10 DIAGNOSIS — Z85.01 PERSONAL HISTORY OF ESOPHAGEAL CANCER: ICD-10-CM

## 2019-06-10 DIAGNOSIS — R07.9 CHEST PAIN: ICD-10-CM

## 2019-06-10 LAB — CREAT BLD-MCNC: 2.9 MG/DL (ref 0.6–1.3)

## 2019-06-10 PROCEDURE — 82565 ASSAY OF CREATININE: CPT

## 2019-06-10 PROCEDURE — 74176 CT ABD & PELVIS W/O CONTRAST: CPT

## 2019-06-10 PROCEDURE — 71250 CT THORAX DX C-: CPT

## 2019-06-10 RX ORDER — SODIUM CHLORIDE 0.9 % (FLUSH) 0.9 %
10 SYRINGE (ML) INJECTION
Status: DISCONTINUED | OUTPATIENT
Start: 2019-06-10 | End: 2019-06-11 | Stop reason: HOSPADM

## 2019-06-10 RX ORDER — BARIUM SULFATE 20 MG/ML
900 SUSPENSION ORAL
Status: DISCONTINUED | OUTPATIENT
Start: 2019-06-10 | End: 2019-06-11 | Stop reason: HOSPADM

## 2019-06-13 ENCOUNTER — HOSPITAL ENCOUNTER (OUTPATIENT)
Dept: NUTRITION | Age: 79
End: 2019-06-13

## 2019-06-24 ENCOUNTER — ANESTHESIA EVENT (OUTPATIENT)
Dept: ENDOSCOPY | Age: 79
End: 2019-06-24
Payer: MEDICARE

## 2019-06-24 ENCOUNTER — ANESTHESIA (OUTPATIENT)
Dept: ENDOSCOPY | Age: 79
End: 2019-06-24
Payer: MEDICARE

## 2019-06-24 ENCOUNTER — HOSPITAL ENCOUNTER (OUTPATIENT)
Age: 79
Setting detail: OUTPATIENT SURGERY
Discharge: HOME | End: 2019-06-24
Attending: INTERNAL MEDICINE | Admitting: INTERNAL MEDICINE
Payer: MEDICARE

## 2019-06-24 ENCOUNTER — APPOINTMENT (OUTPATIENT)
Dept: ULTRASOUND IMAGING | Age: 79
End: 2019-06-24
Attending: INTERNAL MEDICINE
Payer: MEDICARE

## 2019-06-24 VITALS
TEMPERATURE: 97.8 F | DIASTOLIC BLOOD PRESSURE: 74 MMHG | RESPIRATION RATE: 24 BRPM | SYSTOLIC BLOOD PRESSURE: 132 MMHG | OXYGEN SATURATION: 98 %

## 2019-06-24 PROCEDURE — 76040000007: Performed by: INTERNAL MEDICINE

## 2019-06-24 PROCEDURE — 77030019957 HC CUF BLN GASTSCP OCOA -B: Performed by: INTERNAL MEDICINE

## 2019-06-24 PROCEDURE — 76060000032 HC ANESTHESIA 0.5 TO 1 HR: Performed by: INTERNAL MEDICINE

## 2019-06-24 PROCEDURE — 74011250636 HC RX REV CODE- 250/636

## 2019-06-24 RX ORDER — SODIUM CHLORIDE 9 MG/ML
INJECTION, SOLUTION INTRAVENOUS
Status: DISCONTINUED | OUTPATIENT
Start: 2019-06-24 | End: 2019-06-24 | Stop reason: HOSPADM

## 2019-06-24 RX ORDER — NALOXONE HYDROCHLORIDE 0.4 MG/ML
0.4 INJECTION, SOLUTION INTRAMUSCULAR; INTRAVENOUS; SUBCUTANEOUS
Status: DISCONTINUED | OUTPATIENT
Start: 2019-06-24 | End: 2019-06-24 | Stop reason: HOSPADM

## 2019-06-24 RX ORDER — SODIUM CHLORIDE 9 MG/ML
50 INJECTION, SOLUTION INTRAVENOUS CONTINUOUS
Status: DISCONTINUED | OUTPATIENT
Start: 2019-06-24 | End: 2019-06-24 | Stop reason: HOSPADM

## 2019-06-24 RX ORDER — SODIUM CHLORIDE 0.9 % (FLUSH) 0.9 %
5-40 SYRINGE (ML) INJECTION AS NEEDED
Status: DISCONTINUED | OUTPATIENT
Start: 2019-06-24 | End: 2019-06-24 | Stop reason: HOSPADM

## 2019-06-24 RX ORDER — ATROPINE SULFATE 0.1 MG/ML
0.5 INJECTION INTRAVENOUS
Status: DISCONTINUED | OUTPATIENT
Start: 2019-06-24 | End: 2019-06-24 | Stop reason: HOSPADM

## 2019-06-24 RX ORDER — EPINEPHRINE 0.1 MG/ML
1 INJECTION INTRACARDIAC; INTRAVENOUS
Status: DISCONTINUED | OUTPATIENT
Start: 2019-06-24 | End: 2019-06-24 | Stop reason: HOSPADM

## 2019-06-24 RX ORDER — DEXTROMETHORPHAN/PSEUDOEPHED 2.5-7.5/.8
1.2 DROPS ORAL
Status: DISCONTINUED | OUTPATIENT
Start: 2019-06-24 | End: 2019-06-24 | Stop reason: HOSPADM

## 2019-06-24 RX ORDER — FLUMAZENIL 0.1 MG/ML
0.2 INJECTION INTRAVENOUS
Status: DISCONTINUED | OUTPATIENT
Start: 2019-06-24 | End: 2019-06-24 | Stop reason: HOSPADM

## 2019-06-24 RX ORDER — LIDOCAINE HYDROCHLORIDE 20 MG/ML
INJECTION, SOLUTION EPIDURAL; INFILTRATION; INTRACAUDAL; PERINEURAL AS NEEDED
Status: DISCONTINUED | OUTPATIENT
Start: 2019-06-24 | End: 2019-06-24 | Stop reason: HOSPADM

## 2019-06-24 RX ORDER — PROPOFOL 10 MG/ML
INJECTION, EMULSION INTRAVENOUS AS NEEDED
Status: DISCONTINUED | OUTPATIENT
Start: 2019-06-24 | End: 2019-06-24 | Stop reason: HOSPADM

## 2019-06-24 RX ADMIN — PROPOFOL 30 MG: 10 INJECTION, EMULSION INTRAVENOUS at 07:58

## 2019-06-24 RX ADMIN — PROPOFOL 40 MG: 10 INJECTION, EMULSION INTRAVENOUS at 07:50

## 2019-06-24 RX ADMIN — LIDOCAINE HYDROCHLORIDE 50 MG: 20 INJECTION, SOLUTION EPIDURAL; INFILTRATION; INTRACAUDAL; PERINEURAL at 07:48

## 2019-06-24 RX ADMIN — SODIUM CHLORIDE: 9 INJECTION, SOLUTION INTRAVENOUS at 07:30

## 2019-06-24 RX ADMIN — PROPOFOL 100 MG: 10 INJECTION, EMULSION INTRAVENOUS at 07:48

## 2019-06-24 RX ADMIN — PROPOFOL 60 MG: 10 INJECTION, EMULSION INTRAVENOUS at 07:56

## 2019-06-24 NOTE — ANESTHESIA POSTPROCEDURE EVALUATION
Procedure(s): 
RADIAL EUS 
ESOPHAGOGASTRODUODENOSCOPY (EGD). total IV anesthesia, general 
 
Anesthesia Post Evaluation Patient location during evaluation: PACU Patient participation: complete - patient participated Level of consciousness: awake and alert Pain management: adequate Airway patency: patent Anesthetic complications: no 
Cardiovascular status: acceptable Respiratory status: acceptable Hydration status: acceptable Comments: I have seen and evaluated the patient and is ready for discharge. Comfort Boyd MD 
 
Post anesthesia nausea and vomiting:  none Vitals Value Taken Time /70 6/24/2019  8:12 AM  
Temp Pulse 56 6/24/2019  8:15 AM  
Resp 25 6/24/2019  8:15 AM  
SpO2 99 % 6/24/2019  8:15 AM  
Vitals shown include unvalidated device data.

## 2019-06-24 NOTE — PROCEDURES
[address] [facility]. 
[address] [facility] [address] [facility], [address] 
[phone] Endoscopic Ultrasound NAME:  Primo Moon :   1940 MRN:   [id number] Date/Time:  2019 Procedure Type: Radial upper EUS Indications: Esophageal mass Pre-operative Diagnosis: see indication above Post-operative Diagnosis:  See findings below : Yasmin Dupree MD 
 
Surgical Assistant: None Implants: none Referring Provider: -Gale Najera MD -Ramírez Anderson MD 
 
Anethesia/Sedation:  MAC anesthesia Procedure Details After infom consent was obtained for the procedure, with all risks and benefits of procedure explained the patient was taken to the endoscopy suite and placed in the left lateral decubitus position. Following sequential administration of sedation as per above, the radial echoendoscope was inserted into the mouth and advanced under direct vision to esophagus. A careful inspection was made as the gastroscope was withdrawn, including a retroflexed view of the proximal stomach; findings and interventions are described below. Findings:  
 
Endoscopic: 
 Esophagus: Extensive whitish plaques were seen in the upper and middle third of esophagus. A friable obstructing mass was seen at 28 cm. This could not be traversed. Stomach: not examined Duodenum/jejunum: not examined Ultrasound: 
 Esophagus: A partially circumferential mass was seen in mid esophagus. Mass was seen invading the muscularis propria. No extension into adjacent organs or vascular structures was noted. No emeterio-lesional lymph nodes were noted. Stomach: not examined Pancreas: not examined Liver: not examined Lymph Node: no adenopathy Specimen Removed:  None Complications: None. EBL:  None. Interventions: none Recommendations:  
-Start Fluconazole 
-EGD with stent placement for nutrition -Follow up with Dr Terell Rodriguez for further treatment Kajal العراقي MD 
6/24/2019  8:12 AM

## 2019-06-24 NOTE — ROUTINE PROCESS
Jeannine Mg 1940 
[id number] Situation: 
[facility] Procedure: Procedure(s): 
RADIAL EUS 
ESOPHAGOGASTRODUODENOSCOPY (EGD) Background: 
 
Preoperative diagnosis: MALIGNNAT TUMOR OF ESOPHAGUS, ESOPHAGEAL MASS, DYSPHAGIA Postoperative diagnosis: Esophageal Mass Candida Esophagitis Esophageal Stricture :  Dr. Geovany Randolph 
Assistant(s): Endoscopy Technician-1: Betina Beasley Endoscopy RN-1: Dago Cleveland RN Specimens: * No specimens in log * H. Pylori  no and no Assessment: 
Intra-procedure medications Anesthesia gave intra-procedure sedation and medications, see anesthesia flow sheet yes Intravenous fluids: NS@ Candida Goon Vital signs stable yes Abdominal assessment: round and soft  yes Recommendation: 
Discharge patient per MD order yes . Return to floor na Family or Friend fam 
Permission to share finding with family or friend yes

## 2019-06-24 NOTE — DISCHARGE INSTRUCTIONS
[address] [facility].  [address] [facility] [address] Jeannine Mills  [id number]  1940    DISCOMFORT:  Sore throat- throat lozenges or warm salt water gargle  redness at IV site- apply warm compress to area; if redness or soreness persist- contact your physician  Gaseous discomfort- walking, belching will help relieve any discomfort  You may not operate a vehicle for 12 hours  You may not engage in an occupation involving machinery or appliances for rest of today  You may not drink alcoholic beverages for at least 12 hours  Avoid making any critical decisions for at least 24 hour  DIET  You may eat and drink after you leave. You may resume your regular diet - however -  remember your colon is empty and a heavy meal will produce gas. Avoid these foods:  vegetables, fried / greasy foods, carbonated drinks    ACTIVITY  You may resume your normal daily activities   Spend the remainder of the day resting -  avoid any strenuous activity. CALL M.D. ANY SIGN OF   Increasing pain, nausea, vomiting  Abdominal distension (swelling)  New increased bleeding (oral or rectal)  Fever (chills)  Pain in chest area  Bloody discharge from nose or mouth  Shortness of breath    Follow-up Instructions:   Call Dr. Alfred Villegas for any questions or problems. If we took a biopsy please call the office within 2 weeks to discuss your                             pathology results. Telephone # [phone]        Continue same medications.     ENDOSCOPY FINDINGS:   Esophageal Mass  Candida Esophagitis  Esophageal Stricture

## 2019-06-24 NOTE — PROGRESS NOTES

## 2019-06-26 ENCOUNTER — APPOINTMENT (OUTPATIENT)
Dept: GENERAL RADIOLOGY | Age: 79
End: 2019-06-26
Attending: INTERNAL MEDICINE
Payer: MEDICARE

## 2019-06-26 ENCOUNTER — ANESTHESIA (OUTPATIENT)
Dept: ENDOSCOPY | Age: 79
End: 2019-06-26
Payer: MEDICARE

## 2019-06-26 ENCOUNTER — HOSPITAL ENCOUNTER (OUTPATIENT)
Age: 79
Setting detail: OUTPATIENT SURGERY
Discharge: HOME | End: 2019-06-26
Attending: INTERNAL MEDICINE | Admitting: INTERNAL MEDICINE
Payer: MEDICARE

## 2019-06-26 ENCOUNTER — ANESTHESIA EVENT (OUTPATIENT)
Dept: ENDOSCOPY | Age: 79
End: 2019-06-26
Payer: MEDICARE

## 2019-06-26 VITALS
HEART RATE: 60 BPM | DIASTOLIC BLOOD PRESSURE: 84 MMHG | BODY MASS INDEX: 39.1 KG/M2 | HEIGHT: 68 IN | WEIGHT: 258 LBS | OXYGEN SATURATION: 96 % | SYSTOLIC BLOOD PRESSURE: 154 MMHG | TEMPERATURE: 97.8 F | RESPIRATION RATE: 16 BRPM

## 2019-06-26 PROCEDURE — 76060000032 HC ANESTHESIA 0.5 TO 1 HR: Performed by: INTERNAL MEDICINE

## 2019-06-26 PROCEDURE — 74011250636 HC RX REV CODE- 250/636: Performed by: INTERNAL MEDICINE

## 2019-06-26 PROCEDURE — 74011250636 HC RX REV CODE- 250/636

## 2019-06-26 PROCEDURE — 76040000007: Performed by: INTERNAL MEDICINE

## 2019-06-26 PROCEDURE — 74011000250 HC RX REV CODE- 250

## 2019-06-26 PROCEDURE — C1769 GUIDE WIRE: HCPCS | Performed by: INTERNAL MEDICINE

## 2019-06-26 PROCEDURE — C1874 STENT, COATED/COV W/DEL SYS: HCPCS | Performed by: INTERNAL MEDICINE

## 2019-06-26 DEVICE — STENT SYSTEM
Type: IMPLANTABLE DEVICE | Site: ESOPHAGUS | Status: FUNCTIONAL
Brand: WALLFLEX™ ESOPHAGEAL

## 2019-06-26 RX ORDER — GLYCOPYRROLATE 0.2 MG/ML
INJECTION INTRAMUSCULAR; INTRAVENOUS AS NEEDED
Status: DISCONTINUED | OUTPATIENT
Start: 2019-06-26 | End: 2019-06-26 | Stop reason: HOSPADM

## 2019-06-26 RX ORDER — LIDOCAINE HYDROCHLORIDE 20 MG/ML
INJECTION, SOLUTION INFILTRATION; PERINEURAL AS NEEDED
Status: DISCONTINUED | OUTPATIENT
Start: 2019-06-26 | End: 2019-06-26 | Stop reason: HOSPADM

## 2019-06-26 RX ORDER — SODIUM CHLORIDE 9 MG/ML
50 INJECTION, SOLUTION INTRAVENOUS CONTINUOUS
Status: DISCONTINUED | OUTPATIENT
Start: 2019-06-26 | End: 2019-06-26 | Stop reason: HOSPADM

## 2019-06-26 RX ORDER — SODIUM CHLORIDE 9 MG/ML
INJECTION, SOLUTION INTRAVENOUS
Status: DISCONTINUED | OUTPATIENT
Start: 2019-06-26 | End: 2019-06-26 | Stop reason: HOSPADM

## 2019-06-26 RX ORDER — DEXTROMETHORPHAN/PSEUDOEPHED 2.5-7.5/.8
1.2 DROPS ORAL
Status: DISCONTINUED | OUTPATIENT
Start: 2019-06-26 | End: 2019-06-26 | Stop reason: HOSPADM

## 2019-06-26 RX ORDER — ATROPINE SULFATE 0.1 MG/ML
0.5 INJECTION INTRAVENOUS
Status: DISCONTINUED | OUTPATIENT
Start: 2019-06-26 | End: 2019-06-26 | Stop reason: HOSPADM

## 2019-06-26 RX ORDER — SODIUM CHLORIDE 0.9 % (FLUSH) 0.9 %
5-40 SYRINGE (ML) INJECTION EVERY 8 HOURS
Status: DISCONTINUED | OUTPATIENT
Start: 2019-06-26 | End: 2019-06-26 | Stop reason: HOSPADM

## 2019-06-26 RX ORDER — EPINEPHRINE 0.1 MG/ML
1 INJECTION INTRACARDIAC; INTRAVENOUS
Status: DISCONTINUED | OUTPATIENT
Start: 2019-06-26 | End: 2019-06-26 | Stop reason: HOSPADM

## 2019-06-26 RX ORDER — FLUMAZENIL 0.1 MG/ML
0.2 INJECTION INTRAVENOUS
Status: DISCONTINUED | OUTPATIENT
Start: 2019-06-26 | End: 2019-06-26 | Stop reason: HOSPADM

## 2019-06-26 RX ORDER — SODIUM CHLORIDE 0.9 % (FLUSH) 0.9 %
5-40 SYRINGE (ML) INJECTION AS NEEDED
Status: DISCONTINUED | OUTPATIENT
Start: 2019-06-26 | End: 2019-06-26 | Stop reason: HOSPADM

## 2019-06-26 RX ORDER — NALOXONE HYDROCHLORIDE 0.4 MG/ML
0.4 INJECTION, SOLUTION INTRAMUSCULAR; INTRAVENOUS; SUBCUTANEOUS
Status: DISCONTINUED | OUTPATIENT
Start: 2019-06-26 | End: 2019-06-26 | Stop reason: HOSPADM

## 2019-06-26 RX ORDER — PROPOFOL 10 MG/ML
INJECTION, EMULSION INTRAVENOUS AS NEEDED
Status: DISCONTINUED | OUTPATIENT
Start: 2019-06-26 | End: 2019-06-26 | Stop reason: HOSPADM

## 2019-06-26 RX ADMIN — PROPOFOL 30 MG: 10 INJECTION, EMULSION INTRAVENOUS at 08:40

## 2019-06-26 RX ADMIN — SODIUM CHLORIDE: 9 INJECTION, SOLUTION INTRAVENOUS at 08:25

## 2019-06-26 RX ADMIN — LIDOCAINE HYDROCHLORIDE 50 MG: 20 INJECTION, SOLUTION INFILTRATION; PERINEURAL at 08:35

## 2019-06-26 RX ADMIN — PROPOFOL 30 MG: 10 INJECTION, EMULSION INTRAVENOUS at 08:43

## 2019-06-26 RX ADMIN — PROPOFOL 80 MG: 10 INJECTION, EMULSION INTRAVENOUS at 08:35

## 2019-06-26 RX ADMIN — GLYCOPYRROLATE 0.2 MG: 0.2 INJECTION INTRAMUSCULAR; INTRAVENOUS at 08:25

## 2019-06-26 RX ADMIN — PROPOFOL 30 MG: 10 INJECTION, EMULSION INTRAVENOUS at 08:52

## 2019-06-26 RX ADMIN — PROPOFOL 30 MG: 10 INJECTION, EMULSION INTRAVENOUS at 08:37

## 2019-06-26 RX ADMIN — PROPOFOL 30 MG: 10 INJECTION, EMULSION INTRAVENOUS at 08:36

## 2019-06-26 RX ADMIN — PROPOFOL 30 MG: 10 INJECTION, EMULSION INTRAVENOUS at 08:49

## 2019-06-26 RX ADMIN — PROPOFOL 30 MG: 10 INJECTION, EMULSION INTRAVENOUS at 08:46

## 2019-06-26 NOTE — H&P
[facility] 
[address] [place], [address] 
[phone] History and Physical  
 
NAME: Nawaf Castañeda :  1940 MRN:  [id number] HPI:  The patient was seen and examined. Past Surgical History:  
Procedure Laterality Date  ANAL PRESSURE RECORD  2019  COLONOSCOPY N/A 2018 COLONOSCOPY performed by Nadya Wagner MD at [place] [address]  2018  HX CATARACT REMOVAL Bilateral   HX HEENT  1984 thyroid biopsy-benign  HX HYSTERECTOMY  UPPER GI ENDOSCOPY,BALL DIL,30MM  2019  UPPER GI ENDOSCOPY,BALL DIL,30MM  2019  UPPER GI ENDOSCOPY,BIOPSY  2019  UPPER GI ENDOSCOPY,BIOPSY  2019 Past Medical History:  
Diagnosis Date  Abnormal x-ray of abdomen 2019 Bas showed food and irregular stricture above ge junction in addition to large hiatal hernia  2019  Adenocarcinoma of esophagus ([address] 2019  Anemia  Arthritis  Chronic kidney disease Stage II followed by Dr Lorie Gilford Nephrology  Diverticulosis  Esophageal dysphagia 2019  H/O colonoscopy with polypectomy   
 benign  History of colon polyps 2018 2013 tubular adenoma  Hypertension  Ill-defined condition   
 pulmonary hypertension  Other ill-defined conditions(799.89)   
 glaucoma and cataracts  Sleep apnea CPAP compliant, as stated 2019  Thromboembolus ([address] 2015 Right  leg , spontaneous  Thyroid disease   
 hypothyroidism Social History Tobacco Use  Smoking status: Never Smoker  Smokeless tobacco: Never Used Substance Use Topics  Alcohol use: Not Currently Comment: in her 19's  Drug use: Never No Known Allergies History reviewed. No pertinent family history. No current facility-administered medications for this encounter.    
 
 
 
PHYSICAL EXAM: 
 General: WD, WN. Alert, cooperative, no acute distress   
HEENT: NC, Atraumatic. PERRLA, EOMI. Anicteric sclerae. Lungs:  CTA Bilaterally. No Wheezing/Rhonchi/Rales. Heart:  Regular  rhythm,  No murmur, No Rubs, No Gallops Abdomen: Soft, Non distended, Non tender.  +Bowel sounds, no HSM Extremities: No c/c/e Neurologic:  CN 2-12 gi, Alert and oriented X 3. No acute neurological distress Psych:   Good insight. Not anxious nor agitated. The heart, lungs and mental status were satisfactory for the administration of MAC sedation and for the procedure. Mallampati score: 3 Assessment:  
· Esophageal cancer-for stent placement Plan:  
· Endoscopic procedure · MAC sedation ·

## 2019-06-26 NOTE — ROUTINE PROCESS
Stefano Wynn 1940 
[id number] Situation: 
Verbal report received from: [address] [place] [address] RN Procedure: Procedure(s): ESOPHAGOGASTRODUODENOSCOPY (EGD) ENDOSCOPY WITH PROSTHESIS OR STENT PLACEMENT Background: 
 
Preoperative diagnosis: Esophageal Mass Postoperative diagnosis: 1.esophaeal cancer 2.hiatal hurnia :  Dr. Chery Cabrera 
Assistant(s): Endoscopy Technician-1: Brent Lizarraga Endoscopy RN-1: Elizabeth Jean Specimens: * No specimens in log * H. Pylori  no Assessment: 
Intra-procedure medications Anesthesia gave intra-procedure sedation and medications, see anesthesia flow sheet yes Intravenous fluids: NS@ Horris Harms Vital signs stable Abdominal assessment: round and soft Recommendation: 
Discharge patient per MD order. Family or Friend Permission to share finding with family or friend yes

## 2019-06-26 NOTE — ANESTHESIA POSTPROCEDURE EVALUATION
Post-Anesthesia Evaluation and Assessment Patient: Keanu Bhandari MRN: [id number]  SSN: [id number] YOB: 1940  Age: 66 y.o. Sex: female I have evaluated the patient and they are stable and ready for discharge from the PACU. Cardiovascular Function/Vital Signs Visit Vitals /82 Pulse 65 Temp 37.1 °C (98.7 °F) Resp 15 Ht 5' 8\" (1.727 m) Wt 117 kg (258 lb) SpO2 100% Breastfeeding? No  
BMI 39.23 kg/m² Patient is status post MAC anesthesia for Procedure(s): ESOPHAGOGASTRODUODENOSCOPY (EGD) ENDOSCOPY WITH PROSTHESIS OR STENT PLACEMENT. Nausea/Vomiting: None Postoperative hydration reviewed and adequate. Pain: 
Pain Scale 1: Numeric (0 - 10) (06/26/19 8966) Pain Intensity 1: 0 (06/26/19 0806) Managed Neurological Status: At baseline Mental Status, Level of Consciousness: Alert and  oriented to person, place, and time Pulmonary Status:  
O2 Device: 02 face tent (06/26/19 0890) Adequate oxygenation and airway patent Complications related to anesthesia: None Post-anesthesia assessment completed. No concerns Signed By: Robert Lobato MD   
 June 26, 2019 Procedure(s): ESOPHAGOGASTRODUODENOSCOPY (EGD) ENDOSCOPY WITH PROSTHESIS OR STENT PLACEMENT. MAC 
 
<BSHSIANPOST> Vitals Value Taken Time /82 6/26/2019  8:55 AM  
Temp Pulse 65 6/26/2019  8:55 AM  
Resp 15 6/26/2019  8:55 AM  
SpO2 100 % 6/26/2019  8:55 AM

## 2019-06-26 NOTE — ANESTHESIA PREPROCEDURE EVALUATION
Relevant Problems No relevant active problems Anesthetic History No history of anesthetic complications Review of Systems / Medical History Patient summary reviewed, nursing notes reviewed and pertinent labs reviewed Pulmonary Sleep apnea: CPAP Neuro/Psych Within defined limits Cardiovascular Hypertension: well controlled GI/Hepatic/Renal 
Within defined limits Endo/Other Hypothyroidism: well controlled Morbid obesity Other Findings Physical Exam 
 
Airway Mallampati: II 
TM Distance: > 6 cm Neck ROM: normal range of motion Mouth opening: Normal 
 
 Cardiovascular Regular rate and rhythm,  S1 and S2 normal,  no murmur, click, rub, or gallop Dental 
No notable dental hx Pulmonary Breath sounds clear to auscultation Abdominal 
GI exam deferred Other Findings Anesthetic Plan ASA: 3 Anesthesia type: MAC Anesthetic plan and risks discussed with: Patient

## 2019-06-26 NOTE — PERIOP NOTES

## 2019-06-26 NOTE — PROCEDURES
[address] [facility]. 
[address] [facility] [address] [place], [address]  
[phone] NAME:  Juan Sylvester :   1940 MRN:   [id number] Date/Time:  2019 8:58 AMEsophagogastroduodenoscopy (EGD) Procedure Note :  Binta Molina MD 
 
Surgical Assistant: None Implants: Wallflex fully covered esophageal stent 18 mm X 120 mm [address]) Referring Provider:  Mary Alice Amaya MD 
 
Anethesia/Sedation:  MAC anesthesia Procedure Details After infom consent was obtained for the procedure, with all risks and benefits of procedure explained the patient was taken to the endoscopy suite and placed in the left lateral decubitus position. Following sequential administration of sedation as per above, the Ultrathin gastroscope was inserted into the mouth and advanced under direct vision to stomach. A careful inspection was made as the gastroscope was withdrawn, including a retroflexed view of the proximal stomach; findings and interventions are described below. Findings: 
Esophagus: A circumferential infiltrating mass with tight stenosis was noted at 28 cm and extended till 31 cm. Z line was noted at 33 cm. A large hiatal hernia with a paraesophageal component was noted. Stomach: A nodule was noted in antrum. This has been biopsied earlier. Duodenum/jejunum: not intubated Therapies:  A stiffshaft Dreamwire was placed into the stomach. Wallflex fully covered esophageal stent 18 mm X 120 mm [facility]) was placed successfully under fluoroscopic and direct visualization. Specimens: none EBL: None Complications:   None; patient tolerated the procedure well. Impression:   
See Postoperative diagnosis above Recommendations: 
-Continue acid suppression. ,  
-Follow symptoms. ,  
-Post stent diet 
-X ray KUB in 4 weeks to assess the position of stent and then every 2 weeks Discharge disposition:  Home in the company of  when able to ambulate Mono Crews MD

## 2019-06-26 NOTE — DISCHARGE INSTRUCTIONS
[address] [facility]3].  [address] [address] Jeannine Danielle  [id number]  1940    DISCOMFORT:  Sore throat- throat lozenges or warm salt water gargle  redness at IV site- apply warm compress to area; if redness or soreness persist- contact your physician  Gaseous discomfort- walking, belching will help relieve any discomfort  You may not operate a vehicle for 12 hours  You may not engage in an occupation involving machinery or appliances for rest of today  You may not drink alcoholic beverages for at least 12 hours  Avoid making any critical decisions for at least 24 hour  DIET  You may eat and drink after you leave. You may resume your regular diet - however -  remember your colon is empty and a heavy meal will produce gas. Avoid these foods:  vegetables, fried / greasy foods, carbonated drinks    ACTIVITY  You may resume your normal daily activities   Spend the remainder of the day resting -  avoid any strenuous activity. CALL M.D. ANY SIGN OF   Increasing pain, nausea, vomiting  Abdominal distension (swelling)  New increased bleeding (oral or rectal)  Fever (chills)  Pain in chest area  Bloody discharge from nose or mouth  Shortness of breath    Follow-up Instructions:   Call Dr. Rupal Saez for any questions or problems. If we took a biopsy please call the office within 2 weeks to discuss your  pathology results. Telephone # [phone]     Post stent diet  X ray KUB in 4 weeks and then every 2 weeks  Continue same medications. ENDOSCOPY FINDINGS:  1.esophaeal cancer-stent placed  2.hiatal hurnia       Patient Education        Hiatal Hernia: Care Instructions  Your Care Instructions  A hiatal hernia occurs when part of the stomach bulges into the chest cavity. A hiatal hernia may allow stomach acid and juices to back up into the esophagus (acid reflux).  This can cause a feeling of burning, warmth, heat, or pain behind the breastbone. This feeling may often occur after you eat, soon after you lie down, or when you bend forward, and it may come and go. You also may have a sour taste in your mouth. These symptoms are commonly known as heartburn or reflux. But not all hiatal hernias cause symptoms. Follow-up care is a key part of your treatment and safety. Be sure to make and go to all appointments, and call your doctor if you are having problems. It's also a good idea to know your test results and keep a list of the medicines you take. How can you care for yourself at home? · Take your medicines exactly as prescribed. Call your doctor if you think you are having a problem with your medicine. · Do not take aspirin or other nonsteroidal anti-inflammatory drugs (NSAIDs), such as ibuprofen (Advil, Motrin) or naproxen (Aleve), unless your doctor says it is okay. Ask your doctor what you can take for pain. · Your doctor may recommend over-the-counter medicine. For mild or occasional indigestion, antacids such as Tums, Gaviscon, Maalox, or Mylanta may help. Your doctor also may recommend over-the-counter acid reducers, such as famotidine (Pepcid AC), cimetidine (Tagamet HB), ranitidine (Zantac 75 and Zantac 150), or omeprazole (Prilosec). Read and follow all instructions on the label. If you use these medicines often, talk with your doctor. · Change your eating habits. ? It's best to eat several small meals instead of two or three large meals. ? After you eat, wait 2 to 3 hours before you lie down. Late-night snacks aren't a good idea. ? Chocolate, mint, and alcohol can make heartburn worse. They relax the valve between the esophagus and the stomach. ? Spicy foods, foods that have a lot of acid (like tomatoes and oranges), and coffee can make heartburn symptoms worse in some people.  If your symptoms are worse after you eat a certain food, you may want to stop eating that food to see if your symptoms get better. · Do not smoke or chew tobacco.  · If you get heartburn at night, raise the head of your bed 6 to 8 inches by putting the frame on blocks or placing a foam wedge under the head of your mattress. (Adding extra pillows does not work.)  · Do not wear tight clothing around your middle. · Lose weight if you need to. Losing just 5 to 10 pounds can help. When should you call for help? Call your doctor now or seek immediate medical care if:    · You have new or worse belly pain.     · You are vomiting.    Watch closely for changes in your health, and be sure to contact your doctor if:    · You have new or worse symptoms of indigestion.     · You have trouble or pain swallowing.     · You are losing weight.     · You do not get better as expected. Where can you learn more? Go to http://bruna-adela.info/. Enter E000 in the search box to learn more about \"Hiatal Hernia: Care Instructions. \"  Current as of: March 27, 2018  Content Version: 11.9  © 7156-8449 [employer]. Care instructions adapted under license by [employer] (which disclaims liability or warranty for this information). If you have questions about a medical condition or this instruction, always ask your healthcare professional. [address] any warranty or liability for your use of this information.

## 2019-07-05 ENCOUNTER — HOSPITAL ENCOUNTER (OUTPATIENT)
Dept: PET IMAGING | Age: 79
Discharge: HOME | End: 2019-07-05
Attending: INTERNAL MEDICINE
Payer: MEDICARE

## 2019-07-05 VITALS — WEIGHT: 248 LBS | HEIGHT: 68 IN | BODY MASS INDEX: 37.59 KG/M2

## 2019-07-05 DIAGNOSIS — C15.3 MALIGNANT NEOPLASM OF CERVICAL ESOPHAGUS (HCC): ICD-10-CM

## 2019-07-05 PROCEDURE — 78815 PET IMAGE W/CT SKULL-THIGH: CPT

## 2019-07-05 RX ORDER — SODIUM CHLORIDE 0.9 % (FLUSH) 0.9 %
10 SYRINGE (ML) INJECTION
Status: COMPLETED | OUTPATIENT
Start: 2019-07-05 | End: 2019-07-05

## 2019-07-05 RX ADMIN — Medication 10 ML: at 07:36

## 2019-07-16 ENCOUNTER — HOSPITAL ENCOUNTER (OUTPATIENT)
Dept: GENERAL RADIOLOGY | Age: 79
Discharge: HOME | End: 2019-07-16
Payer: MEDICARE

## 2019-07-16 DIAGNOSIS — Z86.010 PERSONAL HISTORY OF COLONIC POLYPS: ICD-10-CM

## 2019-07-16 PROCEDURE — 74018 RADEX ABDOMEN 1 VIEW: CPT

## 2019-07-19 ENCOUNTER — HOSPITAL ENCOUNTER (OUTPATIENT)
Dept: INTERVENTIONAL RADIOLOGY/VASCULAR | Age: 79
Discharge: HOME | End: 2019-07-19
Attending: INTERNAL MEDICINE
Payer: MEDICARE

## 2019-07-19 VITALS
DIASTOLIC BLOOD PRESSURE: 73 MMHG | BODY MASS INDEX: 38.19 KG/M2 | TEMPERATURE: 98.1 F | SYSTOLIC BLOOD PRESSURE: 135 MMHG | RESPIRATION RATE: 21 BRPM | OXYGEN SATURATION: 98 % | WEIGHT: 252 LBS | HEIGHT: 68 IN | HEART RATE: 64 BPM

## 2019-07-19 DIAGNOSIS — C15.3 MALIGNANT NEOPLASM OF UPPER THIRD OF ESOPHAGUS (HCC): ICD-10-CM

## 2019-07-19 PROCEDURE — C1892 INTRO/SHEATH,FIXED,PEEL-AWAY: HCPCS

## 2019-07-19 PROCEDURE — 74011250636 HC RX REV CODE- 250/636: Performed by: RADIOLOGY

## 2019-07-19 PROCEDURE — C1788 PORT, INDWELLING, IMP: HCPCS

## 2019-07-19 PROCEDURE — C1769 GUIDE WIRE: HCPCS

## 2019-07-19 PROCEDURE — 77030039266 HC ADH SKN EXOFIN S2SG -A

## 2019-07-19 PROCEDURE — 36561 INSERT TUNNELED CV CATH: CPT

## 2019-07-19 PROCEDURE — 77030031139 HC SUT VCRL2 J&J -A

## 2019-07-19 PROCEDURE — 74011000250 HC RX REV CODE- 250: Performed by: RADIOLOGY

## 2019-07-19 PROCEDURE — 77030029065 HC DRSG HEMO QCLOT ZMED -B

## 2019-07-19 RX ORDER — LIDOCAINE HYDROCHLORIDE AND EPINEPHRINE 10; 10 MG/ML; UG/ML
20 INJECTION, SOLUTION INFILTRATION; PERINEURAL ONCE
Status: COMPLETED | OUTPATIENT
Start: 2019-07-19 | End: 2019-07-19

## 2019-07-19 RX ORDER — MIDAZOLAM HYDROCHLORIDE 1 MG/ML
5 INJECTION, SOLUTION INTRAMUSCULAR; INTRAVENOUS
Status: DISCONTINUED | OUTPATIENT
Start: 2019-07-19 | End: 2019-07-19

## 2019-07-19 RX ORDER — CEFAZOLIN SODIUM/WATER 2 G/20 ML
2 SYRINGE (ML) INTRAVENOUS ONCE
Status: COMPLETED | OUTPATIENT
Start: 2019-07-19 | End: 2019-07-19

## 2019-07-19 RX ORDER — LIDOCAINE HYDROCHLORIDE 20 MG/ML
18 INJECTION, SOLUTION INFILTRATION; PERINEURAL ONCE
Status: COMPLETED | OUTPATIENT
Start: 2019-07-19 | End: 2019-07-19

## 2019-07-19 RX ORDER — SODIUM CHLORIDE 9 MG/ML
25 INJECTION, SOLUTION INTRAVENOUS CONTINUOUS
Status: DISCONTINUED | OUTPATIENT
Start: 2019-07-19 | End: 2019-07-19

## 2019-07-19 RX ORDER — FENTANYL CITRATE 50 UG/ML
100 INJECTION, SOLUTION INTRAMUSCULAR; INTRAVENOUS
Status: DISCONTINUED | OUTPATIENT
Start: 2019-07-19 | End: 2019-07-19

## 2019-07-19 RX ORDER — HEPARIN SODIUM 200 [USP'U]/100ML
400 INJECTION, SOLUTION INTRAVENOUS ONCE
Status: DISPENSED | OUTPATIENT
Start: 2019-07-19 | End: 2019-07-19

## 2019-07-19 RX ORDER — HEPARIN 100 UNIT/ML
300 SYRINGE INTRAVENOUS ONCE
Status: COMPLETED | OUTPATIENT
Start: 2019-07-19 | End: 2019-07-19

## 2019-07-19 RX ADMIN — MIDAZOLAM 1 MG: 1 INJECTION INTRAMUSCULAR; INTRAVENOUS at 10:36

## 2019-07-19 RX ADMIN — MIDAZOLAM 1 MG: 1 INJECTION INTRAMUSCULAR; INTRAVENOUS at 10:25

## 2019-07-19 RX ADMIN — LIDOCAINE HYDROCHLORIDE 10 MG: 20 INJECTION, SOLUTION INFILTRATION; PERINEURAL at 10:37

## 2019-07-19 RX ADMIN — Medication 2 G: at 10:15

## 2019-07-19 RX ADMIN — SODIUM CHLORIDE 25 ML/HR: 900 INJECTION, SOLUTION INTRAVENOUS at 10:15

## 2019-07-19 RX ADMIN — FENTANYL CITRATE 25 MCG: 50 INJECTION, SOLUTION INTRAMUSCULAR; INTRAVENOUS at 10:41

## 2019-07-19 RX ADMIN — FENTANYL CITRATE 25 MCG: 50 INJECTION, SOLUTION INTRAMUSCULAR; INTRAVENOUS at 10:26

## 2019-07-19 RX ADMIN — FENTANYL CITRATE 25 MCG: 50 INJECTION, SOLUTION INTRAMUSCULAR; INTRAVENOUS at 10:36

## 2019-07-19 RX ADMIN — LIDOCAINE HYDROCHLORIDE AND EPINEPHRINE 200 MG: 10; 10 INJECTION, SOLUTION INFILTRATION; PERINEURAL at 10:38

## 2019-07-19 RX ADMIN — SODIUM CHLORIDE, PRESERVATIVE FREE 300 UNITS: 5 INJECTION INTRAVENOUS at 10:37

## 2019-07-19 NOTE — ROUTINE PROCESS
Discharge instructions have been reviewed with patient and present family. Copy given to patient. Pt verbalized understand of instructions and was given  the opportunity to ask questions and receive answers prior to leaving. Pt signed paper copy of printed AVS instructions due to sign pad on this computer not working. Pt discharged with family and assisted out by RN in wheelchair.

## 2019-07-19 NOTE — H&P
Interventional and Vascular Radiology History and Physical    Patient: Raymundo Valenzuela 66 y.o. female       Chief Complaint: No chief complaint on file. History of Present Illness: chemotherapy     History:    Past Medical History:   Diagnosis Date    Abnormal x-ray of abdomen 5/22/2019    Bas showed food and irregular stricture above ge junction in addition to large hiatal hernia  5.20.2019    Adenocarcinoma of esophagus ([facility] [address] 5/30/2019    Anemia     Arthritis     Chronic kidney disease     Stage II followed by Dr Paolo Luna Nephrology     Diverticulosis     Esophageal dysphagia 5/22/2019    H/O colonoscopy with polypectomy     benign    History of colon polyps 6/1/2018    2013 tubular adenoma     Hypertension     Ill-defined condition     pulmonary hypertension    Other ill-defined conditions(799.89)     glaucoma and cataracts    Sleep apnea     CPAP compliant, as stated 5/20/2019    Thromboembolus ([facility] [address] 2015    Right  leg , spontaneous    Thyroid disease     hypothyroidism     No family history on file.   Social History     Socioeconomic History    Marital status:      Spouse name: Not on file    Number of children: Not on file    Years of education: Not on file    Highest education level: Not on file   Occupational History    Not on file   Social Needs    Financial resource strain: Not on file    Food insecurity:     Worry: Not on file     Inability: Not on file    Transportation needs:     Medical: Not on file     Non-medical: Not on file   Tobacco Use    Smoking status: Never Smoker    Smokeless tobacco: Never Used   Substance and Sexual Activity    Alcohol use: Not Currently     Comment: in her 19's     Drug use: Never    Sexual activity: Not on file   Lifestyle    Physical activity:     Days per week: Not on file     Minutes per session: Not on file    Stress: Not on file   Relationships    Social connections:     Talks on phone: Not on file     Gets together: Not on file     Attends Judaism service: Not on file     Active member of club or organization: Not on file     Attends meetings of clubs or organizations: Not on file     Relationship status: Not on file    Intimate partner violence:     Fear of current or ex partner: Not on file     Emotionally abused: Not on file     Physically abused: Not on file     Forced sexual activity: Not on file   Other Topics Concern    Not on file   Social History Narrative    Not on file       Allergies: No Known Allergies    Current Medications:  Current Outpatient Medications   Medication Sig    polyethylene glycol (MIRALAX) 17 gram packet Take 17 g by mouth daily.  labetalol (NORMODYNE) 300 mg tablet Take 300 mg by mouth two (2) times a day.  cloNIDine HCl (CATAPRES) 0.1 mg tablet Take 0.2 mg by mouth two (2) times a day.  folic acid-vit T7-SEV L65 (FOLBEE) 2.5-25-1 mg tablet Take 1 Tab by mouth daily.  iron bisgly,ps-FA-B-C#12-succ 65 mg-65 mg -1,000 mcg (24) tab Take 1 Tab by mouth daily.  latanoprost (XALATAN) 0.005 % ophthalmic solution Administer 1 Drop to both eyes nightly.  amLODIPine (NORVASC) 10 mg tablet Take 10 mg by mouth daily. Indications: HYPERTENSION    losartan (COZAAR) 100 mg tablet Take 100 mg by mouth daily. Indications: HYPERTENSION    MULTIVITS W-FE,OTHER MIN/LUT (CENTRUM SILVER ULTRA WOMEN'S PO) Take 1 Tab by mouth daily.  DORZOLAMIDE HCL/TIMOLOL MALEAT (DORZOLAMIDE-TIMOLOL OP) Apply 1 Drop to eye three (3) times daily. One drop to each eye twice daily     brimonidine (ALPHAGAN) 0.2 % ophthalmic solution Administer 1 Drop to both eyes three (3) times daily.  levothyroxine (SYNTHROID) 100 mcg tablet Take 100 mcg by mouth Daily (before breakfast). Indications: HYPOTHYROIDISM    alum-mag hydroxide-simeth (MYLANTA) 200-200-20 mg/5 mL susp Take 30 mL by mouth every four (4) hours as needed.     acetaminophen (TYLENOL EXTRA STRENGTH) 500 mg tablet Take 1,000 mg by mouth every six (6) hours as needed. Indications: ARTHRITIC PAIN, PAIN     Current Facility-Administered Medications   Medication Dose Route Frequency    heparinized saline 2 units/mL infusion 800 Units  400 mL Irrigation ONCE        Physical Exam:  Blood pressure 135/73, pulse 64, temperature 98.1 °F (36.7 °C), resp. rate 21, height 5' 8\" (1.727 m), weight 114.3 kg (252 lb), SpO2 98 %, not currently breastfeeding. LUNG: clear to auscultation bilaterally, HEART: regular rate and rhythm, S1, S2 normal, no murmur, click, rub or gallop      Alerts:    Hospital Problems  Date Reviewed: 5/30/2019    None          Laboratory:    No results for input(s): HGB, HCT, WBC, PLT, INR, BUN, CREA, K, CRCLT, HGBEXT, HCTEXT, PLTEXT in the last 72 hours. No lab exists for component: PTT, PT, INREXT      Plan of Care/Planned Procedure:  Risks, benefits, and alternatives reviewed with patient and she agrees to proceed with the procedure. Conscious sedation will be performed with IV fentanyl and versed.  Plan is for chest port placement       Мария Sutton MD

## 2019-07-19 NOTE — ROUTINE PROCESS
0845-Received care of pt from [facility] to radiology recovery area for [facility]. Pt being prepped for procedure. 0940-Dr. Rufino Jones into assess pt prior to procedure, assessment completed and procedure explained along with risks and benefits; consent obtained.

## 2019-07-19 NOTE — DISCHARGE INSTRUCTIONS
[address]  Angiography Department      Radiologist:   Dr. Zoe Rodriguez      Date:   07/19/2019      [facility] Discharge Instructions      Watch for signs of infection:    1. Redness,   2. Fever, chills,   3. Increased pain, and/or drainage from the site. If this occurs, call your physician at once. Return next week for a [facility] Check check:   Friday, July 26, 2019    Do not register. Proceed to the Radiology Waiting Area and let the  know you are here for a \"PORT site check\". If you have an appointment with a provider or at the infusion center in the upcoming week,  they may check your site. Keep your port site clean and dry. Do not get area wet for the next week. Do not pick at Dermabond (Skin Glue) at site. Continue your previous diet and restart your regularly prescribed medications. You may take Tylenol, as directed on the label, for pain if needed. Avoid ibuprofen (Advil, Motrin) and aspirin as they may cause you to bleed. Because you received sedation, you are not to drive or sign any legal documents for the next 24 hours. Do not lift anything heavier than 5 pounds with the affected arm and avoid pushing and pulling movements for several days. If you have any questions or concerns, please call our radiology department at [phone].

## 2019-10-15 ENCOUNTER — HOSPITAL ENCOUNTER (OUTPATIENT)
Dept: CT IMAGING | Age: 79
Discharge: HOME | End: 2019-10-15
Attending: INTERNAL MEDICINE
Payer: MEDICARE

## 2019-10-15 DIAGNOSIS — Z51.11 ENCOUNTER FOR ANTINEOPLASTIC CHEMOTHERAPY: ICD-10-CM

## 2019-10-15 DIAGNOSIS — C15.3 MALIGNANT NEOPLASM OF UPPER THIRD OF ESOPHAGUS (HCC): ICD-10-CM

## 2019-10-15 PROCEDURE — 74150 CT ABDOMEN W/O CONTRAST: CPT

## 2019-10-15 PROCEDURE — 71250 CT THORAX DX C-: CPT

## 2019-10-17 ENCOUNTER — HOSPITAL ENCOUNTER (OUTPATIENT)
Age: 79
Setting detail: OUTPATIENT SURGERY
Discharge: HOME | End: 2019-10-17
Attending: SPECIALIST | Admitting: SPECIALIST
Payer: MEDICARE

## 2019-10-17 ENCOUNTER — ANESTHESIA EVENT (OUTPATIENT)
Dept: ENDOSCOPY | Age: 79
End: 2019-10-17
Payer: MEDICARE

## 2019-10-17 ENCOUNTER — ANESTHESIA (OUTPATIENT)
Dept: ENDOSCOPY | Age: 79
End: 2019-10-17
Payer: MEDICARE

## 2019-10-17 VITALS
HEART RATE: 88 BPM | TEMPERATURE: 97.9 F | HEIGHT: 68 IN | SYSTOLIC BLOOD PRESSURE: 101 MMHG | BODY MASS INDEX: 35.19 KG/M2 | DIASTOLIC BLOOD PRESSURE: 59 MMHG | OXYGEN SATURATION: 99 % | WEIGHT: 232.19 LBS | RESPIRATION RATE: 21 BRPM

## 2019-10-17 PROCEDURE — 76060000031 HC ANESTHESIA FIRST 0.5 HR: Performed by: SPECIALIST

## 2019-10-17 PROCEDURE — 74011250636 HC RX REV CODE- 250/636: Performed by: SPECIALIST

## 2019-10-17 PROCEDURE — 88305 TISSUE EXAM BY PATHOLOGIST: CPT

## 2019-10-17 PROCEDURE — 76040000019: Performed by: SPECIALIST

## 2019-10-17 PROCEDURE — 74011000250 HC RX REV CODE- 250: Performed by: NURSE ANESTHETIST, CERTIFIED REGISTERED

## 2019-10-17 PROCEDURE — 74011250636 HC RX REV CODE- 250/636: Performed by: NURSE ANESTHETIST, CERTIFIED REGISTERED

## 2019-10-17 PROCEDURE — 77030019988 HC FCPS ENDOSC DISP BSC -B: Performed by: SPECIALIST

## 2019-10-17 RX ORDER — SODIUM CHLORIDE 9 MG/ML
75 INJECTION, SOLUTION INTRAVENOUS CONTINUOUS
Status: DISCONTINUED | OUTPATIENT
Start: 2019-10-17 | End: 2019-10-17 | Stop reason: HOSPADM

## 2019-10-17 RX ORDER — MIDAZOLAM HYDROCHLORIDE 1 MG/ML
.25-5 INJECTION, SOLUTION INTRAMUSCULAR; INTRAVENOUS
Status: DISCONTINUED | OUTPATIENT
Start: 2019-10-17 | End: 2019-10-17 | Stop reason: HOSPADM

## 2019-10-17 RX ORDER — FLUMAZENIL 0.1 MG/ML
0.2 INJECTION INTRAVENOUS
Status: DISCONTINUED | OUTPATIENT
Start: 2019-10-17 | End: 2019-10-17 | Stop reason: HOSPADM

## 2019-10-17 RX ORDER — MIDAZOLAM HYDROCHLORIDE 1 MG/ML
INJECTION, SOLUTION INTRAMUSCULAR; INTRAVENOUS
Status: DISCONTINUED
Start: 2019-10-17 | End: 2019-10-17 | Stop reason: HOSPADM

## 2019-10-17 RX ORDER — DEXTROMETHORPHAN/PSEUDOEPHED 2.5-7.5/.8
1.2 DROPS ORAL
Status: DISCONTINUED | OUTPATIENT
Start: 2019-10-17 | End: 2019-10-17 | Stop reason: HOSPADM

## 2019-10-17 RX ORDER — PROPOFOL 10 MG/ML
INJECTION, EMULSION INTRAVENOUS AS NEEDED
Status: DISCONTINUED | OUTPATIENT
Start: 2019-10-17 | End: 2019-10-17 | Stop reason: HOSPADM

## 2019-10-17 RX ORDER — LIDOCAINE HYDROCHLORIDE 20 MG/ML
INJECTION, SOLUTION EPIDURAL; INFILTRATION; INTRACAUDAL; PERINEURAL AS NEEDED
Status: DISCONTINUED | OUTPATIENT
Start: 2019-10-17 | End: 2019-10-17 | Stop reason: HOSPADM

## 2019-10-17 RX ORDER — SODIUM CHLORIDE 0.9 % (FLUSH) 0.9 %
5-40 SYRINGE (ML) INJECTION AS NEEDED
Status: DISCONTINUED | OUTPATIENT
Start: 2019-10-17 | End: 2019-10-17 | Stop reason: HOSPADM

## 2019-10-17 RX ORDER — SODIUM CHLORIDE 0.9 % (FLUSH) 0.9 %
5-40 SYRINGE (ML) INJECTION EVERY 8 HOURS
Status: DISCONTINUED | OUTPATIENT
Start: 2019-10-17 | End: 2019-10-17 | Stop reason: HOSPADM

## 2019-10-17 RX ORDER — ATROPINE SULFATE 0.1 MG/ML
0.5 INJECTION INTRAVENOUS
Status: DISCONTINUED | OUTPATIENT
Start: 2019-10-17 | End: 2019-10-17 | Stop reason: HOSPADM

## 2019-10-17 RX ORDER — NALOXONE HYDROCHLORIDE 0.4 MG/ML
0.4 INJECTION, SOLUTION INTRAMUSCULAR; INTRAVENOUS; SUBCUTANEOUS
Status: DISCONTINUED | OUTPATIENT
Start: 2019-10-17 | End: 2019-10-17 | Stop reason: HOSPADM

## 2019-10-17 RX ADMIN — MIDAZOLAM 1 MG: 1 INJECTION INTRAMUSCULAR; INTRAVENOUS at 13:30

## 2019-10-17 RX ADMIN — SODIUM CHLORIDE 75 ML/HR: 900 INJECTION, SOLUTION INTRAVENOUS at 12:22

## 2019-10-17 RX ADMIN — PROPOFOL 50 MG: 10 INJECTION, EMULSION INTRAVENOUS at 13:22

## 2019-10-17 RX ADMIN — LIDOCAINE HYDROCHLORIDE 40 MG: 20 INJECTION, SOLUTION EPIDURAL; INFILTRATION; INTRACAUDAL; PERINEURAL at 13:22

## 2019-10-17 RX ADMIN — PROPOFOL 50 MG: 10 INJECTION, EMULSION INTRAVENOUS at 13:30

## 2019-10-17 RX ADMIN — PROPOFOL 100 MG: 10 INJECTION, EMULSION INTRAVENOUS at 13:27

## 2019-10-17 RX ADMIN — PROPOFOL 50 MG: 10 INJECTION, EMULSION INTRAVENOUS at 13:25

## 2019-10-17 NOTE — DISCHARGE INSTRUCTIONS
Josy Cedeño  [id number]  1940              Procedure  Discharge Instructions:      Discomfort:  Redness at IV site- apply warm compress to area; if redness or soreness persist- contact your physician  There may be a slight amount of blood passed from the rectum  Gaseous discomfort- walking, belching will help relieve any discomfort  You may not operate a vehicle for 12 hours  You may not engage in an occupation involving machinery or appliances for rest of today  You may not drink alcoholic beverages for at least 12 hours  Avoid making any critical decisions for at least 24 hour  DIET:   You may resume your normal diet today. You should not overeat or \"feast\" today as your abdomen may become distended or uncomfortable. MEDICATIONS:   I reconciled this list from the list you gave us when you came today for the procedure. Please clarify with me, your primary care physician and the nurse who is discharging you if we have any discrepancies. Aspirin and or non-steroidal medication (Ibuprofen, Motrin, naproxen, etc.) is ok in limited quantities. ACTIVITY:  You may resume your normal daily activities it is recommended that you spend the remainder of the day resting -  avoid any strenuous activity. CALL M.D. ANY SIGN OF:  Increasing pain, nausea, vomiting  Abdominal distension (swelling)  New increased bleeding (oral or rectal)  Fever (chills)  Pain in chest area  Bloody discharge from nose or mouth  Shortness of breath          Follow-up Instructions:   Call Dr. Latosha Keane for the results of  biopsy in approximately one week  Telephone #  [phone]  Follow up visit as needed or as previously scheduled. We will leave the stent in until you have been seen by the surgeon or unless Dr Angelika Brandt advises otherwise.       Apryl Mo MD  1:46 PM  10/17/2019

## 2019-10-17 NOTE — H&P
Pre-endoscopy H and P The patient was seen and examined in the endoscopy suite. The airway was assessed and docuemented. The problem list, past medical history, and medications were reviewed. Patient Active Problem List  
Diagnosis Code  History of colon polyps Z86.010  
 Esophageal dysphagia R13.10  Abnormal x-ray of abdomen R93.5  Adenocarcinoma of esophagus (HCC) C15.9 this is a re do post Rx to check status of esophageal cancer. Social History Socioeconomic History  Marital status:  Spouse name: Not on file  Number of children: Not on file  Years of education: Not on file  Highest education level: Not on file Occupational History  Not on file Social Needs  Financial resource strain: Not on file  Food insecurity:  
  Worry: Not on file Inability: Not on file  Transportation needs:  
  Medical: Not on file Non-medical: Not on file Tobacco Use  Smoking status: Never Smoker  Smokeless tobacco: Never Used Substance and Sexual Activity  Alcohol use: Not Currently Comment: in her 19's  Drug use: Never  Sexual activity: Not on file Lifestyle  Physical activity:  
  Days per week: Not on file Minutes per session: Not on file  Stress: Not on file Relationships  Social connections:  
  Talks on phone: Not on file Gets together: Not on file Attends Church service: Not on file Active member of club or organization: Not on file Attends meetings of clubs or organizations: Not on file Relationship status: Not on file  Intimate partner violence:  
  Fear of current or ex partner: Not on file Emotionally abused: Not on file Physically abused: Not on file Forced sexual activity: Not on file Other Topics Concern  Not on file Social History Narrative  Not on file Past Medical History:  
Diagnosis Date  Abnormal x-ray of abdomen 5/22/2019 Bas showed food and irregular stricture above ge junction in addition to large hiatal hernia  5.20.2019  Adenocarcinoma of esophagus ([facility] [address] 5/30/2019  Anemia  Arthritis  Chronic kidney disease Stage II followed by Dr Madelin Avendano Nephrology  Diverticulosis  Esophageal dysphagia 5/22/2019  H/O colonoscopy with polypectomy   
 benign  History of colon polyps 6/1/2018 2013 tubular adenoma  Hypertension  Ill-defined condition   
 pulmonary hypertension  Other ill-defined conditions(799.89)   
 glaucoma and cataracts  Sleep apnea CPAP compliant, as stated 5/20/2019  Thromboembolus ([facility] [address] 2015 Right  leg , spontaneous  Thyroid disease   
 hypothyroidism The patient has a family history of na Prior to Admission Medications Prescriptions Last Dose Informant Patient Reported? Taking? DORZOLAMIDE HCL/TIMOLOL MALEAT (DORZOLAMIDE-TIMOLOL OP) 10/16/2019 at Unknown time  Yes Yes Sig: Apply 1 Drop to eye three (3) times daily. One drop to each eye twice daily MULTIVITS W-FE,OTHER MIN/LUT (CENTRUM SILVER ULTRA WOMEN'S PO) 10/16/2019 at Unknown time  Yes Yes Sig: Take 1 Tab by mouth daily. acetaminophen (TYLENOL EXTRA STRENGTH) 500 mg tablet Unknown at Unknown time  Yes No  
Sig: Take 1,000 mg by mouth every six (6) hours as needed. Indications: ARTHRITIC PAIN, PAIN  
alum-mag hydroxide-simeth (MYLANTA) 200-200-20 mg/5 mL susp 10/10/2019 at Unknown time  Yes Yes Sig: Take 30 mL by mouth every four (4) hours as needed. amLODIPine (NORVASC) 10 mg tablet 10/17/2019 at Unknown time  Yes Yes Sig: Take 10 mg by mouth daily. Indications: HYPERTENSION  
brimonidine (ALPHAGAN) 0.2 % ophthalmic solution 10/17/2019 at Unknown time  Yes Yes Sig: Administer 1 Drop to both eyes three (3) times daily. cloNIDine HCl (CATAPRES) 0.1 mg tablet 10/17/2019 at Unknown time  Yes Yes Sig: Take 0.2 mg by mouth two (2) times a day. folic acid-vit F9-[facility] O65 (FOLBEE) 2.5-25-1 mg tablet 10/17/2019 at Unknown time  Yes Yes Sig: Take 1 Tab by mouth daily. iron chantel,ps-FA-B-C#12-succ 65 mg-65 mg -1,000 mcg (24) tab 10/16/2019 at Unknown time  Yes Yes Sig: Take 1 Tab by mouth daily. labetalol (NORMODYNE) 300 mg tablet 10/17/2019 at Unknown time  Yes Yes Sig: Take 300 mg by mouth two (2) times a day. latanoprost (XALATAN) 0.005 % ophthalmic solution 10/17/2019 at Unknown time  Yes Yes Sig: Administer 1 Drop to both eyes nightly. levothyroxine (SYNTHROID) 100 mcg tablet 10/17/2019 at Unknown time  Yes Yes Sig: Take 100 mcg by mouth Daily (before breakfast). Indications: HYPOTHYROIDISM  
losartan (COZAAR) 100 mg tablet 10/17/2019 at Unknown time  Yes Yes Sig: Take 100 mg by mouth daily. Indications: HYPERTENSION  
polyethylene glycol (MIRALAX) 17 gram packet 9/17/2019 at Unknown time  Yes Yes Sig: Take 17 g by mouth daily. Facility-Administered Medications: None The review of systems is:  negative for shortness of breath or chest pain The heart, lungs, and mental status were satisfactory for the administration of anesthesia sedation and for the procedure. I discussed with the patient the objectives, risks, consequences and alternatives to the procedure.    
 
Lucia Ibrahim MD 
10/17/2019 
1:16 PM

## 2019-10-17 NOTE — ANESTHESIA PREPROCEDURE EVALUATION
Relevant Problems No relevant active problems Anesthetic History No history of anesthetic complications Review of Systems / Medical History Patient summary reviewed, nursing notes reviewed and pertinent labs reviewed Pulmonary Sleep apnea: CPAP Neuro/Psych Within defined limits Cardiovascular Hypertension: well controlled Exercise tolerance: >4 METS 
  
GI/Hepatic/Renal 
Within defined limits Endo/Other Hypothyroidism: well controlled Morbid obesity, arthritis and cancer Other Findings Comments: Dysphagia Esophageal stent CA esophagus Physical Exam 
 
Airway Mallampati: II 
TM Distance: > 6 cm Neck ROM: normal range of motion Mouth opening: Normal 
 
 Cardiovascular Regular rate and rhythm,  S1 and S2 normal,  no murmur, click, rub, or gallop Dental 
No notable dental hx Pulmonary Breath sounds clear to auscultation Abdominal 
GI exam deferred Other Findings Anesthetic Plan ASA: 3 Anesthesia type: MAC and total IV anesthesia Induction: Intravenous Anesthetic plan and risks discussed with: Patient

## 2019-10-17 NOTE — ANESTHESIA POSTPROCEDURE EVALUATION
Procedure(s): ESOPHAGOGASTRODUODENOSCOPY (EGD) ESOPHAGOGASTRODUODENAL (EGD) BIOPSY. MAC, total IV anesthesia Anesthesia Post Evaluation Patient location during evaluation: PACU Note status: Adequate. Level of consciousness: responsive to verbal stimuli and sleepy but conscious Pain management: satisfactory to patient Airway patency: patent Anesthetic complications: no 
Cardiovascular status: acceptable Respiratory status: acceptable Hydration status: acceptable Comments: +Post-Anesthesia Evaluation and Assessment Patient: Laila Mccauley MRN: [id number]  SSN: [id number] YOB: 1940  Age: 66 y.o. Sex: female Cardiovascular Function/Vital Signs /59   Pulse 88   Temp 36.6 °C (97.9 °F)   Resp 21   Ht 5' 8\" (1.727 m)   Wt 105.3 kg (232 lb 3 oz)   SpO2 99%   Breastfeeding? No   BMI 35.30 kg/m² Patient is status post Procedure(s): ESOPHAGOGASTRODUODENOSCOPY (EGD) ESOPHAGOGASTRODUODENAL (EGD) BIOPSY. Nausea/Vomiting: Controlled. Postoperative hydration reviewed and adequate. Pain: 
Pain Scale 1: Numeric (0 - 10) (10/17/19 1409) Pain Intensity 1: 0 (10/17/19 1409) Managed. Neurological Status: At baseline. Mental Status and Level of Consciousness: Arousable. Pulmonary Status:  
O2 Device: Room air (10/17/19 1409) Adequate oxygenation and airway patent. Complications related to anesthesia: None Post-anesthesia assessment completed. No concerns. Signed By: Edvin Gore MD  
 10/17/2019 Post anesthesia nausea and vomiting:  controlled Vitals Value Taken Time /59 10/17/2019  2:10 PM  
Temp 36.6 °C (97.9 °F) 10/17/2019  1:49 PM  
Pulse 85 10/17/2019  2:10 PM  
Resp 29 10/17/2019  2:10 PM  
SpO2 98 % 10/17/2019  2:10 PM  
Vitals shown include unvalidated device data.

## 2019-10-17 NOTE — PROCEDURES
Esophagogastroduodenoscopy Indications:  History of esopahgeal cancer, s/p RX for re evaulation Medications:  See anesthesia form Assistants:  Hossein guillen Post procedure diagnosis:  Possible tissue in growth into proximal stent, esophageal stent Description of Procedure:   
Prior to the procedure its objectives, risks, consequences and alternatives were discussed with the patient who then elected to proceed. The Olympus video endoscope was inserted under direct vision into the mouth and then into the esophagus. The esophagus looked normal to about 26 cm. There is some erythema above the upper aspect of the stent. IN the proximal area of the stent there is some debris, mucus or tissue. It appeared benign. I took biopsies of the proximal esophagus and the material in the stent. The stent continues to the stomach. It protrudes about 3cm into the gastric lumen. The z-line was not seen. There were no diagnostic abnormalities of the body, fundus, antrum, cardia and incisura of the stomach. This included direct and retroflexion examination. The first and second portion of the duodenum appeared normal.   
 
 
 
Complications: There were no apparent complications and the patient tolerated the procedure well. Implants:  none Estimated Blood Loss:  none Specimens Removed:  Proximal esopahgus (tissue in stent and proximal to stent) Impressions:  Stent in place No obvious malignancy seen Tissue or debris in proximal stent, sampled Signed By: Kristin Santana MD 
                      October 17, 2019  
  1:43 PM

## 2019-10-17 NOTE — ROUTINE PROCESS
Steve Suárez 1940 
[id number] Situation: 
Verbal report received from: Tevin Domingo $RN 
Procedure: Procedure(s): ESOPHAGOGASTRODUODENOSCOPY (EGD) ESOPHAGOGASTRODUODENAL (EGD) BIOPSY Background: 
 
Preoperative diagnosis: DYSPHAGIA, ESOPHAGEAL MASS, GASTRIC ULCER, HEARTBURN, H PYLORI, MALIGNANT NEOPLASM OF MIDDLE THIRD OF ESOPHAGUS, MALIGNANT TUMOR OF ESOPHAGUS Postoperative diagnosis: tissue in grow into proximal stent, esophageal stent :  Dr. Randa Valenzuela Assistant(s): Endoscopy Technician-1: Chance Granda Scrub RN-1: Linsey Melgar RN Specimens:  
ID Type Source Tests Collected by Time Destination 1 : proximal esophagus bx Preservative Esophagus, Proximal  Elviraynda MD Bandar 10/17/2019 1324 Pathology H. Pylori  no Assessment: 
Intra-procedure medications Anesthesia gave intra-procedure sedation and medications, see anesthesia flow sheet Intravenous fluids: NS@ Lodema Kilts Vital signs stable Abdominal assessment: round and soft Recommendation: 
Discharge patient per MD order. Family or Friend Permission to share finding with family or friend yes

## 2019-10-17 NOTE — PROGRESS NOTES
..Anesthesia reports 300mg Propofol, 40mg Lidocaine and 100mL NS given during procedure. Received report from anesthesia staff on vital signs and status of patient.

## 2020-01-01 ENCOUNTER — HOSPITAL ENCOUNTER (OUTPATIENT)
Dept: CT IMAGING | Age: 80
Discharge: HOME | End: 2020-11-10
Attending: INTERNAL MEDICINE
Payer: MEDICARE

## 2020-01-01 ENCOUNTER — TRANSCRIBE ORDER (OUTPATIENT)
Dept: SCHEDULING | Age: 80
End: 2020-01-01

## 2020-01-01 ENCOUNTER — ANESTHESIA EVENT (OUTPATIENT)
Dept: ENDOSCOPY | Age: 80
End: 2020-01-01
Payer: MEDICARE

## 2020-01-01 ENCOUNTER — HOSPITAL ENCOUNTER (OUTPATIENT)
Dept: PREADMISSION TESTING | Age: 80
Discharge: HOME | End: 2020-12-07
Payer: MEDICARE

## 2020-01-01 ENCOUNTER — ANESTHESIA (OUTPATIENT)
Dept: ENDOSCOPY | Age: 80
End: 2020-01-01
Payer: MEDICARE

## 2020-01-01 ENCOUNTER — HOSPITAL ENCOUNTER (OUTPATIENT)
Age: 80
Setting detail: OUTPATIENT SURGERY
Discharge: HOME | End: 2020-12-11
Attending: SPECIALIST | Admitting: SPECIALIST
Payer: MEDICARE

## 2020-01-01 VITALS
BODY MASS INDEX: 30.37 KG/M2 | HEART RATE: 75 BPM | SYSTOLIC BLOOD PRESSURE: 150 MMHG | WEIGHT: 200.4 LBS | DIASTOLIC BLOOD PRESSURE: 83 MMHG | TEMPERATURE: 100.5 F | RESPIRATION RATE: 25 BRPM | OXYGEN SATURATION: 99 % | HEIGHT: 68 IN

## 2020-01-01 DIAGNOSIS — R63.0 ANOREXIA: ICD-10-CM

## 2020-01-01 DIAGNOSIS — C15.3 MALIGNANT NEOPLASM OF CERVICAL ESOPHAGUS (HCC): ICD-10-CM

## 2020-01-01 DIAGNOSIS — C15.3: Primary | ICD-10-CM

## 2020-01-01 DIAGNOSIS — R19.7 DIARRHEA: ICD-10-CM

## 2020-01-01 DIAGNOSIS — C15.3 MALIGNANT NEOPLASM OF CERVICAL ESOPHAGUS (HCC): Primary | ICD-10-CM

## 2020-01-01 DIAGNOSIS — D63.1 ANEMIA IN CHRONIC RENAL DISEASE: ICD-10-CM

## 2020-01-01 DIAGNOSIS — R53.83 FATIGUE: ICD-10-CM

## 2020-01-01 DIAGNOSIS — Z51.11 ENCOUNTER FOR ANTINEOPLASTIC CHEMOTHERAPY: ICD-10-CM

## 2020-01-01 DIAGNOSIS — N18.9 ANEMIA IN CHRONIC RENAL DISEASE: ICD-10-CM

## 2020-01-01 DIAGNOSIS — N18.30 CHRONIC KIDNEY DISEASE, STAGE III (MODERATE) (HCC): ICD-10-CM

## 2020-01-01 LAB
HEALTH STATUS, XMCV2T: NORMAL
SARS-COV-2, COV2NT: NOT DETECTED
SOURCE, COVRS: NORMAL
SPECIMEN SOURCE, FCOV2M: NORMAL
SPECIMEN TYPE, XMCV1T: NORMAL

## 2020-01-01 PROCEDURE — 76040000019: Performed by: SPECIALIST

## 2020-01-01 PROCEDURE — 76060000031 HC ANESTHESIA FIRST 0.5 HR: Performed by: SPECIALIST

## 2020-01-01 PROCEDURE — 74176 CT ABD & PELVIS W/O CONTRAST: CPT

## 2020-01-01 PROCEDURE — 74011250636 HC RX REV CODE- 250/636: Performed by: NURSE ANESTHETIST, CERTIFIED REGISTERED

## 2020-01-01 PROCEDURE — 88305 TISSUE EXAM BY PATHOLOGIST: CPT

## 2020-01-01 PROCEDURE — 87635 SARS-COV-2 COVID-19 AMP PRB: CPT

## 2020-01-01 PROCEDURE — 2709999900 HC NON-CHARGEABLE SUPPLY: Performed by: SPECIALIST

## 2020-01-01 PROCEDURE — 88342 IMHCHEM/IMCYTCHM 1ST ANTB: CPT

## 2020-01-01 PROCEDURE — 77030021593 HC FCPS BIOP ENDOSC BSC -A: Performed by: SPECIALIST

## 2020-01-01 PROCEDURE — 71250 CT THORAX DX C-: CPT

## 2020-01-01 PROCEDURE — 74011000250 HC RX REV CODE- 250: Performed by: NURSE ANESTHETIST, CERTIFIED REGISTERED

## 2020-01-01 RX ORDER — LIDOCAINE HYDROCHLORIDE 20 MG/ML
INJECTION, SOLUTION EPIDURAL; INFILTRATION; INTRACAUDAL; PERINEURAL AS NEEDED
Status: DISCONTINUED | OUTPATIENT
Start: 2020-01-01 | End: 2020-01-01 | Stop reason: HOSPADM

## 2020-01-01 RX ORDER — MIDAZOLAM HYDROCHLORIDE 1 MG/ML
.25-5 INJECTION, SOLUTION INTRAMUSCULAR; INTRAVENOUS AS NEEDED
Status: DISCONTINUED | OUTPATIENT
Start: 2020-01-01 | End: 2020-01-01 | Stop reason: HOSPADM

## 2020-01-01 RX ORDER — SODIUM CHLORIDE 0.9 % (FLUSH) 0.9 %
10 SYRINGE (ML) INJECTION
Status: ACTIVE | OUTPATIENT
Start: 2020-01-01 | End: 2020-01-01

## 2020-01-01 RX ORDER — FLUMAZENIL 0.1 MG/ML
0.2 INJECTION INTRAVENOUS
Status: DISCONTINUED | OUTPATIENT
Start: 2020-01-01 | End: 2020-01-01 | Stop reason: HOSPADM

## 2020-01-01 RX ORDER — FENTANYL CITRATE 50 UG/ML
25 INJECTION, SOLUTION INTRAMUSCULAR; INTRAVENOUS AS NEEDED
Status: DISCONTINUED | OUTPATIENT
Start: 2020-01-01 | End: 2020-01-01 | Stop reason: HOSPADM

## 2020-01-01 RX ORDER — SODIUM CHLORIDE 9 MG/ML
50 INJECTION, SOLUTION INTRAVENOUS CONTINUOUS
Status: DISCONTINUED | OUTPATIENT
Start: 2020-01-01 | End: 2020-01-01 | Stop reason: HOSPADM

## 2020-01-01 RX ORDER — DEXTROMETHORPHAN/PSEUDOEPHED 2.5-7.5/.8
1.2 DROPS ORAL
Status: DISCONTINUED | OUTPATIENT
Start: 2020-01-01 | End: 2020-01-01 | Stop reason: HOSPADM

## 2020-01-01 RX ORDER — BARIUM SULFATE 20 MG/ML
900 SUSPENSION ORAL
Status: ACTIVE | OUTPATIENT
Start: 2020-01-01 | End: 2020-01-01

## 2020-01-01 RX ORDER — LANOLIN ALCOHOL/MO/W.PET/CERES
400 CREAM (GRAM) TOPICAL 2 TIMES DAILY
COMMUNITY
End: 2021-01-01

## 2020-01-01 RX ORDER — PROPOFOL 10 MG/ML
INJECTION, EMULSION INTRAVENOUS
Status: DISCONTINUED | OUTPATIENT
Start: 2020-01-01 | End: 2020-01-01 | Stop reason: HOSPADM

## 2020-01-01 RX ORDER — NALOXONE HYDROCHLORIDE 0.4 MG/ML
0.4 INJECTION, SOLUTION INTRAMUSCULAR; INTRAVENOUS; SUBCUTANEOUS
Status: DISCONTINUED | OUTPATIENT
Start: 2020-01-01 | End: 2020-01-01 | Stop reason: HOSPADM

## 2020-01-01 RX ORDER — SODIUM CHLORIDE 9 MG/ML
INJECTION, SOLUTION INTRAVENOUS
Status: DISCONTINUED | OUTPATIENT
Start: 2020-01-01 | End: 2020-01-01 | Stop reason: HOSPADM

## 2020-01-01 RX ORDER — FENTANYL CITRATE 50 UG/ML
INJECTION, SOLUTION INTRAMUSCULAR; INTRAVENOUS AS NEEDED
Status: DISCONTINUED | OUTPATIENT
Start: 2020-01-01 | End: 2020-01-01 | Stop reason: HOSPADM

## 2020-01-01 RX ORDER — LOSARTAN POTASSIUM 100 MG/1
100 TABLET ORAL DAILY
COMMUNITY

## 2020-01-01 RX ORDER — PROPOFOL 10 MG/ML
INJECTION, EMULSION INTRAVENOUS AS NEEDED
Status: DISCONTINUED | OUTPATIENT
Start: 2020-01-01 | End: 2020-01-01 | Stop reason: HOSPADM

## 2020-01-01 RX ADMIN — SODIUM CHLORIDE: 9 INJECTION, SOLUTION INTRAVENOUS at 14:00

## 2020-01-01 RX ADMIN — PROPOFOL 30 MG: 10 INJECTION, EMULSION INTRAVENOUS at 14:11

## 2020-01-01 RX ADMIN — PROPOFOL 100 MCG/KG/MIN: 10 INJECTION, EMULSION INTRAVENOUS at 14:11

## 2020-01-01 RX ADMIN — FENTANYL CITRATE 50 MCG: 0.05 INJECTION, SOLUTION INTRAMUSCULAR; INTRAVENOUS at 14:14

## 2020-01-01 RX ADMIN — LIDOCAINE HYDROCHLORIDE 100 MG: 20 INJECTION, SOLUTION INTRAVENOUS at 14:11

## 2020-03-04 ENCOUNTER — APPOINTMENT (OUTPATIENT)
Dept: GENERAL RADIOLOGY | Age: 80
DRG: 871 | End: 2020-03-04
Attending: EMERGENCY MEDICINE
Payer: MEDICARE

## 2020-03-04 ENCOUNTER — HOSPITAL ENCOUNTER (INPATIENT)
Age: 80
LOS: 7 days | Discharge: HOME | DRG: 871 | End: 2020-03-12
Attending: EMERGENCY MEDICINE | Admitting: INTERNAL MEDICINE
Payer: MEDICARE

## 2020-03-04 DIAGNOSIS — C15.9 ADENOCARCINOMA OF ESOPHAGUS (HCC): ICD-10-CM

## 2020-03-04 DIAGNOSIS — J96.01 ACUTE RESPIRATORY FAILURE WITH HYPOXIA (HCC): Primary | ICD-10-CM

## 2020-03-04 LAB
ALBUMIN SERPL-MCNC: 2.5 G/DL (ref 3.5–5)
ALBUMIN/GLOB SERPL: 0.7 {RATIO} (ref 1.1–2.2)
ALP SERPL-CCNC: 69 U/L (ref 45–117)
ALT SERPL-CCNC: 25 U/L (ref 12–78)
AMYLASE SERPL-CCNC: 81 U/L (ref 25–115)
ANION GAP SERPL CALC-SCNC: 12 MMOL/L (ref 5–15)
APPEARANCE UR: CLEAR
ARTERIAL PATENCY WRIST A: ABNORMAL
AST SERPL-CCNC: 23 U/L (ref 15–37)
BACTERIA URNS QL MICRO: NEGATIVE /HPF
BASE DEFICIT BLD-SCNC: 6 MMOL/L
BASOPHILS # BLD: 0.1 K/UL (ref 0–0.1)
BASOPHILS NFR BLD: 1 % (ref 0–1)
BDY SITE: ABNORMAL
BILIRUB SERPL-MCNC: 1.1 MG/DL (ref 0.2–1)
BILIRUB UR QL: NEGATIVE
BNP SERPL-MCNC: 6980 PG/ML
BUN SERPL-MCNC: 107 MG/DL (ref 6–20)
BUN/CREAT SERPL: 23 (ref 12–20)
CA-I BLD-SCNC: 1.15 MMOL/L (ref 1.12–1.32)
CALCIUM SERPL-MCNC: 8.3 MG/DL (ref 8.5–10.1)
CHLORIDE SERPL-SCNC: 103 MMOL/L (ref 97–108)
CO2 SERPL-SCNC: 23 MMOL/L (ref 21–32)
COLOR UR: ABNORMAL
COMMENT, HOLDF: NORMAL
CREAT SERPL-MCNC: 4.6 MG/DL (ref 0.55–1.02)
DIFFERENTIAL METHOD BLD: ABNORMAL
EOSINOPHIL # BLD: 0 K/UL (ref 0–0.4)
EOSINOPHIL NFR BLD: 0 % (ref 0–7)
EPITH CASTS URNS QL MICRO: ABNORMAL /LPF
ERYTHROCYTE [DISTWIDTH] IN BLOOD BY AUTOMATED COUNT: 19.2 % (ref 11.5–14.5)
GAS FLOW.O2 O2 DELIVERY SYS: ABNORMAL L/MIN
GLOBULIN SER CALC-MCNC: 3.7 G/DL (ref 2–4)
GLUCOSE SERPL-MCNC: 173 MG/DL (ref 65–100)
GLUCOSE UR STRIP.AUTO-MCNC: NEGATIVE MG/DL
HCO3 BLD-SCNC: 19.2 MMOL/L (ref 22–26)
HCT VFR BLD AUTO: 22.4 % (ref 35–47)
HGB BLD-MCNC: 7.3 G/DL (ref 11.5–16)
HGB UR QL STRIP: NEGATIVE
HYALINE CASTS URNS QL MICRO: ABNORMAL /LPF (ref 0–5)
IMM GRANULOCYTES # BLD AUTO: 0 K/UL
IMM GRANULOCYTES NFR BLD AUTO: 0 %
KETONES UR QL STRIP.AUTO: NEGATIVE MG/DL
LACTATE SERPL-SCNC: 0.8 MMOL/L (ref 0.4–2)
LEUKOCYTE ESTERASE UR QL STRIP.AUTO: NEGATIVE
LIPASE SERPL-CCNC: 363 U/L (ref 73–393)
LYMPHOCYTES # BLD: 0.1 K/UL (ref 0.8–3.5)
LYMPHOCYTES NFR BLD: 2 % (ref 12–49)
MAGNESIUM SERPL-MCNC: 1.6 MG/DL (ref 1.6–2.4)
MCH RBC QN AUTO: 31.5 PG (ref 26–34)
MCHC RBC AUTO-ENTMCNC: 32.6 G/DL (ref 30–36.5)
MCV RBC AUTO: 96.6 FL (ref 80–99)
MONOCYTES # BLD: 0.2 K/UL (ref 0–1)
MONOCYTES NFR BLD: 3 % (ref 5–13)
NEUTS BAND NFR BLD MANUAL: 3 % (ref 0–6)
NEUTS SEG # BLD: 6.5 K/UL (ref 1.8–8)
NEUTS SEG NFR BLD: 91 % (ref 32–75)
NITRITE UR QL STRIP.AUTO: NEGATIVE
NRBC # BLD: 0 K/UL (ref 0–0.01)
NRBC BLD-RTO: 0 PER 100 WBC
PCO2 BLD: 32.6 MMHG (ref 35–45)
PH BLD: 7.38 [PH] (ref 7.35–7.45)
PH UR STRIP: 5 [PH] (ref 5–8)
PHOSPHATE SERPL-MCNC: 5 MG/DL (ref 2.6–4.7)
PLATELET # BLD AUTO: 66 K/UL (ref 150–400)
PMV BLD AUTO: 12.5 FL (ref 8.9–12.9)
PO2 BLD: 57 MMHG (ref 80–100)
POTASSIUM SERPL-SCNC: 3.3 MMOL/L (ref 3.5–5.1)
PROT SERPL-MCNC: 6.2 G/DL (ref 6.4–8.2)
PROT UR STRIP-MCNC: 30 MG/DL
RBC # BLD AUTO: 2.32 M/UL (ref 3.8–5.2)
RBC #/AREA URNS HPF: ABNORMAL /HPF (ref 0–5)
RBC MORPH BLD: ABNORMAL
SAMPLES BEING HELD,HOLD: NORMAL
SAO2 % BLD: 89 % (ref 92–97)
SODIUM SERPL-SCNC: 138 MMOL/L (ref 136–145)
SP GR UR REFRACTOMETRY: 1.01 (ref 1–1.03)
SPECIMEN TYPE: ABNORMAL
TROPONIN I SERPL-MCNC: <0.05 NG/ML
UA: UC IF INDICATED,UAUC: ABNORMAL
UROBILINOGEN UR QL STRIP.AUTO: 1 EU/DL (ref 0.2–1)
WBC # BLD AUTO: 6.9 K/UL (ref 3.6–11)
WBC URNS QL MICRO: ABNORMAL /HPF (ref 0–4)

## 2020-03-04 PROCEDURE — 87040 BLOOD CULTURE FOR BACTERIA: CPT

## 2020-03-04 PROCEDURE — 87077 CULTURE AEROBIC IDENTIFY: CPT

## 2020-03-04 PROCEDURE — 83735 ASSAY OF MAGNESIUM: CPT

## 2020-03-04 PROCEDURE — 36600 WITHDRAWAL OF ARTERIAL BLOOD: CPT

## 2020-03-04 PROCEDURE — 36415 COLL VENOUS BLD VENIPUNCTURE: CPT

## 2020-03-04 PROCEDURE — 83690 ASSAY OF LIPASE: CPT

## 2020-03-04 PROCEDURE — 82803 BLOOD GASES ANY COMBINATION: CPT

## 2020-03-04 PROCEDURE — 87186 SC STD MICRODIL/AGAR DIL: CPT

## 2020-03-04 PROCEDURE — 84145 PROCALCITONIN (PCT): CPT

## 2020-03-04 PROCEDURE — 83605 ASSAY OF LACTIC ACID: CPT

## 2020-03-04 PROCEDURE — 85025 COMPLETE CBC W/AUTO DIFF WBC: CPT

## 2020-03-04 PROCEDURE — 84484 ASSAY OF TROPONIN QUANT: CPT

## 2020-03-04 PROCEDURE — 74011000250 HC RX REV CODE- 250: Performed by: EMERGENCY MEDICINE

## 2020-03-04 PROCEDURE — 99285 EMERGENCY DEPT VISIT HI MDM: CPT

## 2020-03-04 PROCEDURE — 94640 AIRWAY INHALATION TREATMENT: CPT

## 2020-03-04 PROCEDURE — 83880 ASSAY OF NATRIURETIC PEPTIDE: CPT

## 2020-03-04 PROCEDURE — 82150 ASSAY OF AMYLASE: CPT

## 2020-03-04 PROCEDURE — 81001 URINALYSIS AUTO W/SCOPE: CPT

## 2020-03-04 PROCEDURE — 84100 ASSAY OF PHOSPHORUS: CPT

## 2020-03-04 PROCEDURE — 77030019905 HC CATH URETH INTMIT MDII -A

## 2020-03-04 PROCEDURE — 80053 COMPREHEN METABOLIC PANEL: CPT

## 2020-03-04 PROCEDURE — 94664 DEMO&/EVAL PT USE INHALER: CPT

## 2020-03-04 PROCEDURE — 71045 X-RAY EXAM CHEST 1 VIEW: CPT

## 2020-03-04 PROCEDURE — 94762 N-INVAS EAR/PLS OXIMTRY CONT: CPT

## 2020-03-04 PROCEDURE — 93005 ELECTROCARDIOGRAM TRACING: CPT

## 2020-03-04 RX ADMIN — ALBUTEROL SULFATE 1 DOSE: 2.5 SOLUTION RESPIRATORY (INHALATION) at 22:57

## 2020-03-05 ENCOUNTER — APPOINTMENT (OUTPATIENT)
Dept: NUCLEAR MEDICINE | Age: 80
DRG: 871 | End: 2020-03-05
Attending: INTERNAL MEDICINE
Payer: MEDICARE

## 2020-03-05 ENCOUNTER — APPOINTMENT (OUTPATIENT)
Dept: NON INVASIVE DIAGNOSTICS | Age: 80
DRG: 871 | End: 2020-03-05
Attending: HOSPITALIST
Payer: MEDICARE

## 2020-03-05 ENCOUNTER — APPOINTMENT (OUTPATIENT)
Dept: VASCULAR SURGERY | Age: 80
DRG: 871 | End: 2020-03-05
Attending: INTERNAL MEDICINE
Payer: MEDICARE

## 2020-03-05 ENCOUNTER — APPOINTMENT (OUTPATIENT)
Dept: CT IMAGING | Age: 80
DRG: 871 | End: 2020-03-05
Attending: INTERNAL MEDICINE
Payer: MEDICARE

## 2020-03-05 ENCOUNTER — APPOINTMENT (OUTPATIENT)
Dept: GENERAL RADIOLOGY | Age: 80
DRG: 871 | End: 2020-03-05
Attending: HOSPITALIST
Payer: MEDICARE

## 2020-03-05 ENCOUNTER — APPOINTMENT (OUTPATIENT)
Dept: CT IMAGING | Age: 80
DRG: 871 | End: 2020-03-05
Attending: EMERGENCY MEDICINE
Payer: MEDICARE

## 2020-03-05 PROBLEM — J96.00 ACUTE RESPIRATORY FAILURE (HCC): Status: ACTIVE | Noted: 2020-03-05

## 2020-03-05 LAB
ALBUMIN SERPL-MCNC: 2.2 G/DL (ref 3.5–5)
ALBUMIN/GLOB SERPL: 0.7 {RATIO} (ref 1.1–2.2)
ALP SERPL-CCNC: 62 U/L (ref 45–117)
ALT SERPL-CCNC: 22 U/L (ref 12–78)
ANION GAP SERPL CALC-SCNC: 11 MMOL/L (ref 5–15)
ANION GAP SERPL CALC-SCNC: 13 MMOL/L (ref 5–15)
AST SERPL-CCNC: 20 U/L (ref 15–37)
ATRIAL RATE: 82 BPM
AV PEAK GRADIENT: 62.93 MMHG
AV VELOCITY RATIO: 0.74
AV VTI RATIO: 0.7
BASOPHILS # BLD: 0 K/UL (ref 0–0.1)
BASOPHILS NFR BLD: 0 % (ref 0–1)
BILIRUB SERPL-MCNC: 1 MG/DL (ref 0.2–1)
BUN SERPL-MCNC: 108 MG/DL (ref 6–20)
BUN SERPL-MCNC: 109 MG/DL (ref 6–20)
BUN/CREAT SERPL: 25 (ref 12–20)
BUN/CREAT SERPL: 27 (ref 12–20)
CALCIUM SERPL-MCNC: 7.9 MG/DL (ref 8.5–10.1)
CALCIUM SERPL-MCNC: 8.1 MG/DL (ref 8.5–10.1)
CALCULATED P AXIS, ECG09: 63 DEGREES
CALCULATED R AXIS, ECG10: 3 DEGREES
CALCULATED T AXIS, ECG11: 68 DEGREES
CHLORIDE SERPL-SCNC: 104 MMOL/L (ref 97–108)
CHLORIDE SERPL-SCNC: 106 MMOL/L (ref 97–108)
CHOLEST SERPL-MCNC: 96 MG/DL
CO2 SERPL-SCNC: 20 MMOL/L (ref 21–32)
CO2 SERPL-SCNC: 23 MMOL/L (ref 21–32)
COMMENT, HOLDF: NORMAL
CREAT SERPL-MCNC: 4 MG/DL (ref 0.55–1.02)
CREAT SERPL-MCNC: 4.39 MG/DL (ref 0.55–1.02)
DIAGNOSIS, 93000: NORMAL
DIFFERENTIAL METHOD BLD: ABNORMAL
ECHO AO ROOT DIAM: 3.25 CM
ECHO AV AREA PEAK VELOCITY: 3.3 CM2
ECHO AV AREA VTI: 3.2 CM2
ECHO AV MEAN GRADIENT: 11 MMHG
ECHO AV MEAN VELOCITY: 1.58 M/S
ECHO AV PEAK GRADIENT: 21.3 MMHG
ECHO AV PEAK VELOCITY: 230.7 CM/S
ECHO AV REGURGITANT PHT: 646.8 CM
ECHO AV VTI: 39.57 CM
ECHO IVC SNIFF: 2.31 CM
ECHO LA AREA 4C: 37.1 CM2
ECHO LA MAJOR AXIS: 4.92 CM
ECHO LA TO AORTIC ROOT RATIO: 1.51
ECHO LA VOL 2C: 169.03 ML (ref 22–52)
ECHO LA VOL 4C: 150.47 ML (ref 22–52)
ECHO LA VOLUME INDEX A2C: 77.1 ML/M2 (ref 16–28)
ECHO LA VOLUME INDEX A4C: 68.63 ML/M2 (ref 16–28)
ECHO LV INTERNAL DIMENSION DIASTOLIC: 4.29 CM (ref 3.9–5.3)
ECHO LV INTERNAL DIMENSION SYSTOLIC: 2.89 CM
ECHO LV IVSD: 1.35 CM (ref 0.6–0.9)
ECHO LV MASS 2D: 268.7 G (ref 67–162)
ECHO LV MASS INDEX 2D: 122.6 G/M2 (ref 43–95)
ECHO LV POSTERIOR WALL DIASTOLIC: 1.41 CM (ref 0.6–0.9)
ECHO LVOT DIAM: 2.36 CM
ECHO LVOT PEAK GRADIENT: 11.8 MMHG
ECHO LVOT PEAK VELOCITY: 171.77 CM/S
ECHO LVOT SV: 125.6 ML
ECHO LVOT VTI: 28.64 CM
ECHO PULMONARY ARTERY SYSTOLIC PRESSURE (PASP): 50 MMHG
ECHO PV MAX VELOCITY: 91.13 CM/S
ECHO PV PEAK GRADIENT: 3.3 MMHG
ECHO RV TAPSE: 1.99 CM (ref 1.5–2)
ECHO TV REGURGITANT MAX VELOCITY: 314.93 CM/S
ECHO TV REGURGITANT PEAK GRADIENT: 39.7 MMHG
EOSINOPHIL # BLD: 0 K/UL (ref 0–0.4)
EOSINOPHIL NFR BLD: 0 % (ref 0–7)
ERYTHROCYTE [DISTWIDTH] IN BLOOD BY AUTOMATED COUNT: 18.5 % (ref 11.5–14.5)
EST. AVERAGE GLUCOSE BLD GHB EST-MCNC: 128 MG/DL
FERRITIN SERPL-MCNC: 613 NG/ML (ref 26–388)
FOLATE SERPL-MCNC: 95.7 NG/ML (ref 5–21)
GLOBULIN SER CALC-MCNC: 3.3 G/DL (ref 2–4)
GLUCOSE SERPL-MCNC: 157 MG/DL (ref 65–100)
GLUCOSE SERPL-MCNC: 174 MG/DL (ref 65–100)
HBA1C MFR BLD: 6.1 % (ref 4–5.6)
HCT VFR BLD AUTO: 20.3 % (ref 35–47)
HCT VFR BLD AUTO: 21.1 % (ref 35–47)
HDLC SERPL-MCNC: 12 MG/DL
HDLC SERPL: 8 {RATIO} (ref 0–5)
HGB BLD-MCNC: 6.9 G/DL (ref 11.5–16)
HGB BLD-MCNC: 7 G/DL (ref 11.5–16)
IMM GRANULOCYTES # BLD AUTO: 0 K/UL
IMM GRANULOCYTES NFR BLD AUTO: 0 %
INR PPP: 1.9 (ref 0.9–1.1)
IRON SATN MFR SERPL: 16 % (ref 20–50)
IRON SERPL-MCNC: 23 UG/DL (ref 35–150)
LACTATE SERPL-SCNC: 1.1 MMOL/L (ref 0.4–2)
LACTATE SERPL-SCNC: 1.4 MMOL/L (ref 0.4–2)
LDLC SERPL CALC-MCNC: 40.8 MG/DL (ref 0–100)
LIPID PROFILE,FLP: ABNORMAL
LVFS 2D: 32.56 %
LVOT MG: 6.23 MMHG
LVOT MV: 1.15 CM/S
LYMPHOCYTES # BLD: 0.1 K/UL (ref 0.8–3.5)
LYMPHOCYTES NFR BLD: 1 % (ref 12–49)
MCH RBC QN AUTO: 31.9 PG (ref 26–34)
MCHC RBC AUTO-ENTMCNC: 34 G/DL (ref 30–36.5)
MCV RBC AUTO: 94 FL (ref 80–99)
MONOCYTES # BLD: 0.1 K/UL (ref 0–1)
MONOCYTES NFR BLD: 1 % (ref 5–13)
NEUTS BAND NFR BLD MANUAL: 1 % (ref 0–6)
NEUTS SEG # BLD: 5.7 K/UL (ref 1.8–8)
NEUTS SEG NFR BLD: 97 % (ref 32–75)
NRBC # BLD: 0 K/UL (ref 0–0.01)
NRBC BLD-RTO: 0 PER 100 WBC
P-R INTERVAL, ECG05: 166 MS
PISA AR MAX VEL: 396.64 CM/S
PLATELET # BLD AUTO: 65 K/UL (ref 150–400)
PMV BLD AUTO: 12 FL (ref 8.9–12.9)
POTASSIUM SERPL-SCNC: 3.1 MMOL/L (ref 3.5–5.1)
POTASSIUM SERPL-SCNC: 3.1 MMOL/L (ref 3.5–5.1)
PROCALCITONIN SERPL-MCNC: 37.1 NG/ML
PROT SERPL-MCNC: 5.5 G/DL (ref 6.4–8.2)
PROTHROMBIN TIME: 18.5 SEC (ref 9–11.1)
Q-T INTERVAL, ECG07: 392 MS
QRS DURATION, ECG06: 88 MS
QTC CALCULATION (BEZET), ECG08: 457 MS
RBC # BLD AUTO: 2.16 M/UL (ref 3.8–5.2)
RBC MORPH BLD: ABNORMAL
SAMPLES BEING HELD,HOLD: NORMAL
SODIUM SERPL-SCNC: 137 MMOL/L (ref 136–145)
SODIUM SERPL-SCNC: 140 MMOL/L (ref 136–145)
TIBC SERPL-MCNC: 148 UG/DL (ref 250–450)
TRIGL SERPL-MCNC: 216 MG/DL (ref ?–150)
TROPONIN I SERPL-MCNC: <0.05 NG/ML
TSH SERPL DL<=0.05 MIU/L-ACNC: 1.42 UIU/ML (ref 0.36–3.74)
VENTRICULAR RATE, ECG03: 82 BPM
VIT B12 SERPL-MCNC: >2000 PG/ML (ref 193–986)
VLDLC SERPL CALC-MCNC: 43.2 MG/DL
WBC # BLD AUTO: 5.9 K/UL (ref 3.6–11)

## 2020-03-05 PROCEDURE — 94664 DEMO&/EVAL PT USE INHALER: CPT

## 2020-03-05 PROCEDURE — 82533 TOTAL CORTISOL: CPT

## 2020-03-05 PROCEDURE — 74011250636 HC RX REV CODE- 250/636: Performed by: HOSPITALIST

## 2020-03-05 PROCEDURE — 36600 WITHDRAWAL OF ARTERIAL BLOOD: CPT

## 2020-03-05 PROCEDURE — P9045 ALBUMIN (HUMAN), 5%, 250 ML: HCPCS | Performed by: EMERGENCY MEDICINE

## 2020-03-05 PROCEDURE — 93970 EXTREMITY STUDY: CPT

## 2020-03-05 PROCEDURE — 84443 ASSAY THYROID STIM HORMONE: CPT

## 2020-03-05 PROCEDURE — 74011250636 HC RX REV CODE- 250/636: Performed by: NURSE PRACTITIONER

## 2020-03-05 PROCEDURE — 94640 AIRWAY INHALATION TREATMENT: CPT

## 2020-03-05 PROCEDURE — 84484 ASSAY OF TROPONIN QUANT: CPT

## 2020-03-05 PROCEDURE — 74011000258 HC RX REV CODE- 258: Performed by: HOSPITALIST

## 2020-03-05 PROCEDURE — 71045 X-RAY EXAM CHEST 1 VIEW: CPT

## 2020-03-05 PROCEDURE — 36430 TRANSFUSION BLD/BLD COMPNT: CPT

## 2020-03-05 PROCEDURE — 83540 ASSAY OF IRON: CPT

## 2020-03-05 PROCEDURE — 80053 COMPREHEN METABOLIC PANEL: CPT

## 2020-03-05 PROCEDURE — 82728 ASSAY OF FERRITIN: CPT

## 2020-03-05 PROCEDURE — 36415 COLL VENOUS BLD VENIPUNCTURE: CPT

## 2020-03-05 PROCEDURE — 74011250637 HC RX REV CODE- 250/637: Performed by: INTERNAL MEDICINE

## 2020-03-05 PROCEDURE — 82607 VITAMIN B-12: CPT

## 2020-03-05 PROCEDURE — 74011000250 HC RX REV CODE- 250: Performed by: INTERNAL MEDICINE

## 2020-03-05 PROCEDURE — 83036 HEMOGLOBIN GLYCOSYLATED A1C: CPT

## 2020-03-05 PROCEDURE — 83605 ASSAY OF LACTIC ACID: CPT

## 2020-03-05 PROCEDURE — 84145 PROCALCITONIN (PCT): CPT

## 2020-03-05 PROCEDURE — 74011250637 HC RX REV CODE- 250/637: Performed by: HOSPITALIST

## 2020-03-05 PROCEDURE — 30233N1 TRANSFUSION OF NONAUTOLOGOUS RED BLOOD CELLS INTO PERIPHERAL VEIN, PERCUTANEOUS APPROACH: ICD-10-PCS | Performed by: INTERNAL MEDICINE

## 2020-03-05 PROCEDURE — 82746 ASSAY OF FOLIC ACID SERUM: CPT

## 2020-03-05 PROCEDURE — 85025 COMPLETE CBC W/AUTO DIFF WBC: CPT

## 2020-03-05 PROCEDURE — 74011000258 HC RX REV CODE- 258: Performed by: INTERNAL MEDICINE

## 2020-03-05 PROCEDURE — 85018 HEMOGLOBIN: CPT

## 2020-03-05 PROCEDURE — 65660000001 HC RM ICU INTERMED STEPDOWN

## 2020-03-05 PROCEDURE — P9016 RBC LEUKOCYTES REDUCED: HCPCS

## 2020-03-05 PROCEDURE — 71250 CT THORAX DX C-: CPT

## 2020-03-05 PROCEDURE — A9558 XE133 XENON 10MCI: HCPCS

## 2020-03-05 PROCEDURE — 77010033678 HC OXYGEN DAILY

## 2020-03-05 PROCEDURE — 74011250636 HC RX REV CODE- 250/636: Performed by: EMERGENCY MEDICINE

## 2020-03-05 PROCEDURE — 80061 LIPID PANEL: CPT

## 2020-03-05 PROCEDURE — P9047 ALBUMIN (HUMAN), 25%, 50ML: HCPCS | Performed by: HOSPITALIST

## 2020-03-05 PROCEDURE — 86923 COMPATIBILITY TEST ELECTRIC: CPT

## 2020-03-05 PROCEDURE — 86900 BLOOD TYPING SEROLOGIC ABO: CPT

## 2020-03-05 PROCEDURE — 93306 TTE W/DOPPLER COMPLETE: CPT

## 2020-03-05 PROCEDURE — 85610 PROTHROMBIN TIME: CPT

## 2020-03-05 RX ORDER — CARVEDILOL 12.5 MG/1
12.5 TABLET ORAL 2 TIMES DAILY WITH MEALS
COMMUNITY
End: 2020-03-12

## 2020-03-05 RX ORDER — SODIUM BICARBONATE 650 MG/1
650 TABLET ORAL 2 TIMES DAILY
COMMUNITY
End: 2020-06-11

## 2020-03-05 RX ORDER — ACETAMINOPHEN 325 MG/1
650 TABLET ORAL
Status: DISCONTINUED | OUTPATIENT
Start: 2020-03-05 | End: 2020-03-12 | Stop reason: HOSPADM

## 2020-03-05 RX ORDER — IPRATROPIUM BROMIDE AND ALBUTEROL SULFATE 2.5; .5 MG/3ML; MG/3ML
3 SOLUTION RESPIRATORY (INHALATION)
Status: DISCONTINUED | OUTPATIENT
Start: 2020-03-05 | End: 2020-03-12 | Stop reason: HOSPADM

## 2020-03-05 RX ORDER — ALBUMIN HUMAN 50 G/1000ML
25 SOLUTION INTRAVENOUS ONCE
Status: COMPLETED | OUTPATIENT
Start: 2020-03-05 | End: 2020-03-06

## 2020-03-05 RX ORDER — CAPECITABINE 500 MG/1
1500 TABLET, FILM COATED ORAL 2 TIMES DAILY
COMMUNITY
End: 2020-06-11

## 2020-03-05 RX ORDER — DORZOLAMIDE HCL 20 MG/ML
1 SOLUTION/ DROPS OPHTHALMIC 3 TIMES DAILY
Status: DISCONTINUED | OUTPATIENT
Start: 2020-03-05 | End: 2020-03-12 | Stop reason: HOSPADM

## 2020-03-05 RX ORDER — ONDANSETRON 2 MG/ML
4 INJECTION INTRAMUSCULAR; INTRAVENOUS
Status: DISCONTINUED | OUTPATIENT
Start: 2020-03-05 | End: 2020-03-12 | Stop reason: HOSPADM

## 2020-03-05 RX ORDER — MICONAZOLE NITRATE 2 %
POWDER (GRAM) TOPICAL 2 TIMES DAILY
Status: DISCONTINUED | OUTPATIENT
Start: 2020-03-05 | End: 2020-03-12 | Stop reason: HOSPADM

## 2020-03-05 RX ORDER — FERROUS SULFATE 324(65)MG
324 TABLET, DELAYED RELEASE (ENTERIC COATED) ORAL 2 TIMES DAILY WITH MEALS
COMMUNITY
End: 2020-06-11

## 2020-03-05 RX ORDER — SODIUM CHLORIDE 0.9 % (FLUSH) 0.9 %
5-40 SYRINGE (ML) INJECTION AS NEEDED
Status: DISCONTINUED | OUTPATIENT
Start: 2020-03-05 | End: 2020-03-12 | Stop reason: HOSPADM

## 2020-03-05 RX ORDER — SODIUM CHLORIDE 0.9 % (FLUSH) 0.9 %
5-40 SYRINGE (ML) INJECTION EVERY 8 HOURS
Status: DISCONTINUED | OUTPATIENT
Start: 2020-03-05 | End: 2020-03-12 | Stop reason: HOSPADM

## 2020-03-05 RX ORDER — POTASSIUM CHLORIDE 750 MG/1
10 TABLET, FILM COATED, EXTENDED RELEASE ORAL
Status: COMPLETED | OUTPATIENT
Start: 2020-03-05 | End: 2020-03-05

## 2020-03-05 RX ORDER — HEPARIN SODIUM 5000 [USP'U]/ML
5000 INJECTION, SOLUTION INTRAVENOUS; SUBCUTANEOUS EVERY 8 HOURS
Status: DISCONTINUED | OUTPATIENT
Start: 2020-03-05 | End: 2020-03-05

## 2020-03-05 RX ORDER — ALBUMIN HUMAN 250 G/1000ML
SOLUTION INTRAVENOUS
Status: DISPENSED
Start: 2020-03-05 | End: 2020-03-06

## 2020-03-05 RX ORDER — LATANOPROST 50 UG/ML
1 SOLUTION/ DROPS OPHTHALMIC
Status: DISCONTINUED | OUTPATIENT
Start: 2020-03-05 | End: 2020-03-12 | Stop reason: HOSPADM

## 2020-03-05 RX ORDER — BRIMONIDINE TARTRATE 2 MG/ML
1 SOLUTION/ DROPS OPHTHALMIC 3 TIMES DAILY
Status: DISCONTINUED | OUTPATIENT
Start: 2020-03-05 | End: 2020-03-12 | Stop reason: HOSPADM

## 2020-03-05 RX ORDER — CHLORTHALIDONE 25 MG/1
25 TABLET ORAL DAILY
COMMUNITY
End: 2020-03-12

## 2020-03-05 RX ORDER — BISACODYL 5 MG
5 TABLET, DELAYED RELEASE (ENTERIC COATED) ORAL DAILY PRN
Status: DISCONTINUED | OUTPATIENT
Start: 2020-03-05 | End: 2020-03-12 | Stop reason: HOSPADM

## 2020-03-05 RX ORDER — LEVOTHYROXINE SODIUM 100 UG/1
100 TABLET ORAL
Status: DISCONTINUED | OUTPATIENT
Start: 2020-03-05 | End: 2020-03-12 | Stop reason: HOSPADM

## 2020-03-05 RX ORDER — SODIUM CHLORIDE 9 MG/ML
250 INJECTION, SOLUTION INTRAVENOUS AS NEEDED
Status: DISCONTINUED | OUTPATIENT
Start: 2020-03-05 | End: 2020-03-12 | Stop reason: HOSPADM

## 2020-03-05 RX ORDER — TIMOLOL MALEATE 5 MG/ML
1 SOLUTION/ DROPS OPHTHALMIC 2 TIMES DAILY
Status: DISCONTINUED | OUTPATIENT
Start: 2020-03-05 | End: 2020-03-12 | Stop reason: HOSPADM

## 2020-03-05 RX ORDER — SODIUM CHLORIDE 9 MG/ML
125 INJECTION, SOLUTION INTRAVENOUS CONTINUOUS
Status: DISCONTINUED | OUTPATIENT
Start: 2020-03-05 | End: 2020-03-05

## 2020-03-05 RX ORDER — ALBUMIN HUMAN 250 G/1000ML
25 SOLUTION INTRAVENOUS ONCE
Status: DISPENSED | OUTPATIENT
Start: 2020-03-05 | End: 2020-03-06

## 2020-03-05 RX ORDER — OMEPRAZOLE 20 MG/1
20 TABLET, DELAYED RELEASE ORAL DAILY
COMMUNITY
End: 2021-01-01

## 2020-03-05 RX ORDER — ALBUMIN HUMAN 250 G/1000ML
25 SOLUTION INTRAVENOUS EVERY 6 HOURS
Status: COMPLETED | OUTPATIENT
Start: 2020-03-05 | End: 2020-03-06

## 2020-03-05 RX ORDER — POLYETHYLENE GLYCOL 3350 17 G/17G
17 POWDER, FOR SOLUTION ORAL DAILY
Status: DISCONTINUED | OUTPATIENT
Start: 2020-03-05 | End: 2020-03-05

## 2020-03-05 RX ORDER — TIMOLOL MALEATE 5 MG/ML
1 SOLUTION/ DROPS OPHTHALMIC 2 TIMES DAILY
Status: DISCONTINUED | OUTPATIENT
Start: 2020-03-05 | End: 2020-03-05 | Stop reason: SDUPTHER

## 2020-03-05 RX ORDER — CALCIUM ACETATE 667 MG/1
1 TABLET ORAL
Status: DISCONTINUED | OUTPATIENT
Start: 2020-03-05 | End: 2020-03-12 | Stop reason: HOSPADM

## 2020-03-05 RX ORDER — DORZOLAMIDE HCL 20 MG/ML
1 SOLUTION/ DROPS OPHTHALMIC 2 TIMES DAILY
Status: DISCONTINUED | OUTPATIENT
Start: 2020-03-05 | End: 2020-03-05 | Stop reason: SDUPTHER

## 2020-03-05 RX ADMIN — Medication 10 ML: at 05:08

## 2020-03-05 RX ADMIN — LEVOTHYROXINE SODIUM 100 MCG: 0.1 TABLET ORAL at 07:33

## 2020-03-05 RX ADMIN — ALBUMIN (HUMAN) 25 G: 0.25 INJECTION, SOLUTION INTRAVENOUS at 09:20

## 2020-03-05 RX ADMIN — DEXTROSE MONOHYDRATE AND SODIUM CHLORIDE: 5; .45 INJECTION, SOLUTION INTRAVENOUS at 18:21

## 2020-03-05 RX ADMIN — DORZOLAMIDE HYDROCHLORIDE 1 DROP: 20 SOLUTION/ DROPS OPHTHALMIC at 23:21

## 2020-03-05 RX ADMIN — BRIMONIDINE TARTRATE 1 DROP: 2 SOLUTION OPHTHALMIC at 23:21

## 2020-03-05 RX ADMIN — ALBUMIN (HUMAN) 25 G: 12.5 INJECTION, SOLUTION INTRAVENOUS at 23:00

## 2020-03-05 RX ADMIN — TIMOLOL MALEATE 1 DROP: 5 SOLUTION/ DROPS OPHTHALMIC at 17:25

## 2020-03-05 RX ADMIN — Medication 667 MG: at 09:20

## 2020-03-05 RX ADMIN — Medication 667 MG: at 17:24

## 2020-03-05 RX ADMIN — TIMOLOL MALEATE 1 DROP: 5 SOLUTION/ DROPS OPHTHALMIC at 09:24

## 2020-03-05 RX ADMIN — PIPERACILLIN AND TAZOBACTAM 3.38 G: 3; .375 INJECTION, POWDER, LYOPHILIZED, FOR SOLUTION INTRAVENOUS at 11:05

## 2020-03-05 RX ADMIN — IPRATROPIUM BROMIDE AND ALBUTEROL SULFATE 3 ML: .5; 3 SOLUTION RESPIRATORY (INHALATION) at 19:43

## 2020-03-05 RX ADMIN — DORZOLAMIDE HYDROCHLORIDE 1 DROP: 20 SOLUTION/ DROPS OPHTHALMIC at 09:24

## 2020-03-05 RX ADMIN — SODIUM CHLORIDE 75 ML/HR: 900 INJECTION, SOLUTION INTRAVENOUS at 11:03

## 2020-03-05 RX ADMIN — POTASSIUM CHLORIDE 10 MEQ: 750 TABLET, FILM COATED, EXTENDED RELEASE ORAL at 05:07

## 2020-03-05 RX ADMIN — Medication 10 ML: at 14:00

## 2020-03-05 RX ADMIN — MICONAZOLE NITRATE 2 % TOPICAL POWDER: at 18:22

## 2020-03-05 RX ADMIN — ALBUMIN (HUMAN) 25 G: 0.25 INJECTION, SOLUTION INTRAVENOUS at 17:03

## 2020-03-05 RX ADMIN — SODIUM CHLORIDE, SODIUM LACTATE, POTASSIUM CHLORIDE, AND CALCIUM CHLORIDE 1000 ML: 600; 310; 30; 20 INJECTION, SOLUTION INTRAVENOUS at 22:09

## 2020-03-05 RX ADMIN — BRIMONIDINE TARTRATE 1 DROP: 2 SOLUTION OPHTHALMIC at 18:21

## 2020-03-05 RX ADMIN — DORZOLAMIDE HYDROCHLORIDE 1 DROP: 20 SOLUTION/ DROPS OPHTHALMIC at 17:25

## 2020-03-05 RX ADMIN — SODIUM CHLORIDE 500 ML: 900 INJECTION, SOLUTION INTRAVENOUS at 10:00

## 2020-03-05 RX ADMIN — Medication 10 ML: at 18:24

## 2020-03-05 RX ADMIN — IPRATROPIUM BROMIDE AND ALBUTEROL SULFATE 3 ML: .5; 3 SOLUTION RESPIRATORY (INHALATION) at 14:48

## 2020-03-05 RX ADMIN — Medication 10 ML: at 22:00

## 2020-03-05 NOTE — ED PROVIDER NOTES
The patient is a 41-year-old female with a past medical history significant for adenocarcinoma of the esophagus, anemia, arthritis, chronic kidney disease, diverticulosis, esophageal dysphagia, hypertension, sleep apnea, hypothyroidism who presents to the ED with family at the bedside with a complaint of shortness of breath today, decreased appetite, decreased activity, lethargy and generalized weakness x2 days. Family states that the patient had an attempt at freezing of her tumor last week but has been slowly declining since then. He denies any fever, sore throat, chest pain, nausea, vomiting, abdominal pain, diarrhea, constipation, dysuria, dizziness, extremity weakness or numbness, sick contacts or skin rash. The patient is full code at this time. Past Medical History:  
Diagnosis Date  Abnormal x-ray of abdomen 5/22/2019 Bas showed food and irregular stricture above ge junction in addition to large hiatal hernia  5.20.2019  Adenocarcinoma of esophagus ([facility] [address] 5/30/2019  Anemia  Arthritis  Chronic kidney disease Stage II followed by Dr Loza [facility] Nephrology  Diverticulosis  Esophageal dysphagia 5/22/2019  H/O colonoscopy with polypectomy   
 benign  History of colon polyps 6/1/2018 2013 tubular adenoma  Hypertension  Ill-defined condition   
 pulmonary hypertension  Other ill-defined conditions(799.89)   
 glaucoma and cataracts  Sleep apnea CPAP compliant, as stated 5/20/2019  Thromboembolus ([address] 2015 Right  leg , spontaneous  Thyroid disease   
 hypothyroidism Past Surgical History:  
Procedure Laterality Date  ANAL PRESSURE RECORD  6/6/2019  COLONOSCOPY N/A 6/1/2018 COLONOSCOPY performed by Fabiana Acevedo MD at [place] [address]  6/1/2018  HX CATARACT REMOVAL Bilateral 2017  HX HEENT  1984 thyroid biopsy-benign  HX HYSTERECTOMY  IR INSERT TUNL CVC W PORT OVER 5 YEARS  7/19/2019  UPPER GI ENDOSCOPY,BALL DIL,30MM  5/22/2019  UPPER GI ENDOSCOPY,BALL DIL,30MM  5/30/2019  UPPER GI ENDOSCOPY,BIOPSY  5/22/2019  UPPER GI ENDOSCOPY,BIOPSY  5/30/2019  UPPER GI ENDOSCOPY,BIOPSY  10/17/2019 History reviewed. No pertinent family history. Social History Socioeconomic History  Marital status:  Spouse name: Not on file  Number of children: Not on file  Years of education: Not on file  Highest education level: Not on file Occupational History  Not on file Social Needs  Financial resource strain: Not on file  Food insecurity:  
  Worry: Not on file Inability: Not on file  Transportation needs:  
  Medical: Not on file Non-medical: Not on file Tobacco Use  Smoking status: Never Smoker  Smokeless tobacco: Never Used Substance and Sexual Activity  Alcohol use: Not Currently Comment: in her 19's  Drug use: Never  Sexual activity: Not on file Lifestyle  Physical activity:  
  Days per week: Not on file Minutes per session: Not on file  Stress: Not on file Relationships  Social connections:  
  Talks on phone: Not on file Gets together: Not on file Attends Restoration service: Not on file Active member of club or organization: Not on file Attends meetings of clubs or organizations: Not on file Relationship status: Not on file  Intimate partner violence:  
  Fear of current or ex partner: Not on file Emotionally abused: Not on file Physically abused: Not on file Forced sexual activity: Not on file Other Topics Concern  Not on file Social History Narrative  Not on file ALLERGIES: Patient has no known allergies. Review of Systems All other systems reviewed and are negative. Vitals:  
 03/04/20 2211 BP: (!) 78/36 Pulse: 91  
Resp: (!) 32  
 Temp: 98 °F (36.7 °C) SpO2: (!) 87% Physical Exam 
Vitals signs and nursing note reviewed. Exam conducted with a chaperone present. CONSTITUTIONAL: Well-appearing; well-nourished; in no apparent distress HEAD: Normocephalic; atraumatic EYES: PERRL; EOM intact; conjunctiva and sclera are clear bilaterally. ENT: No rhinorrhea; normal pharynx with no tonsillar hypertrophy; mucous membranes pink/moist, no erythema, no exudate. NECK: Supple; non-tender; no cervical lymphadenopathy CARD: Normal S1, S2; no murmurs, rubs, or gallops. Regular rate and rhythm. RESP: Normal respiratory effort; breath sounds clear and equal bilaterally; no wheezes, rhonchi, or rales. ABD: Normal bowel sounds; non-distended; non-tender; no palpable organomegaly, no masses, no bruits. Back Exam: Normal inspection; no vertebral point tenderness, no CVA tenderness. Normal range of motion. EXT: Normal ROM in all four extremities; non-tender to palpation; no swelling or deformity; distal pulses are normal, no edema. SKIN: Warm; dry; no rash. NEURO:Alert and oriented x 3, coherent, DIVYA-XII grossly intact, sensory and motor are non-focal. 
 
 
 
MDM Number of Diagnoses or Management Options Acute respiratory failure with hypoxia [facility]):  
Adenocarcinoma of esophagus [facility]):  
Diagnosis management comments: Assessment: Differential diagnosis include pneumonia/ VTE/ ACS/electrolyte abnormality; failure to thrive and progression of illness Plan: EKG/chest x-ray/lab/IV fluids/ABG/respiratory support with supplemental oxygen/serial exam/ Monitor and Reevaluate. Amount and/or Complexity of Data Reviewed Clinical lab tests: ordered and reviewed Tests in the radiology section of CPT®: ordered and reviewed Tests in the medicine section of CPT®: reviewed and ordered Discussion of test results with the performing providers: yes Decide to obtain previous medical records or to obtain history from someone other than the patient: yes Obtain history from someone other than the patient: yes Review and summarize past medical records: yes Discuss the patient with other providers: yes Independent visualization of images, tracings, or specimens: yes Risk of Complications, Morbidity, and/or Mortality Presenting problems: moderate Diagnostic procedures: moderate Management options: moderate Critical Care Total time providing critical care: (Total critical care time spent exclusive of procedures: 45 minutes) Patient Progress Patient progress: stable Procedures ED EKG interpretation: 
Rhythm: normal sinus rhythm; and irregular. Rate (approx.): 82; Axis: left axis deviation; P wave: normal; QRS interval: normal ; ST/T wave: non-specific changes; in  Lead: Diffusely; occasional PVCs;other findings: abnormal ekg. This EKG was interpreted by Cady Robles MD,ED Provider. XRAY INTERPRETATION (ED MD) Chest Xray No acute process seen. Normal heart size. No bony abnormalities. No infiltrate. Marcio Henderson MD 10:26 PM 
 
PROGRESS NOTE: 
Pt has been reexamined by Marcio Henderson MD all available results have been reviewed with pt and any available family. Pt understands sx, dx, and tx in ED. Care plan has been outlined and questions have been answered. Pt and any available family understands and agrees to need for admission to hospital for further tx not available in ED. Pt is ready for admission. Written by Cady Robles MD,  10:26 PM 
 
Hospitalist Perfect Serve for Admission 12:42 AM 
 
ED Room Number: KP75/99 Patient Name and age:  Nawaf Castañeda 78 y.o.  female Working Diagnosis: 1. Acute respiratory failure with hypoxia ([address] 2. Adenocarcinoma of esophagus ([facility] [address] Readmission: no 
Isolation Requirements:  no 
Recommended Level of Care:  telemetry Code Status:  Full Code Department:[facility] Adult ED - [phone] Other: The patient is not usually on oxygen and satting approximately 85 to 90% on room air CONSULT NOTE: 
Tino Mohr MD spoke with Dr. Gabriella Bardales of the adult hospitalist team. Discussed patient's presentation, history, physical assessment, and available diagnostic results. He will evaluate, write orders and admit the patient to the hospital. 12:42 AM 
 
.

## 2020-03-05 NOTE — PROGRESS NOTES
1700: Dr Compa Jung notified of repeat Hgb at 7.0 at 1600. Order received from Dr Compa Jung to hold blood transfusion for today.

## 2020-03-05 NOTE — H&P
[address] HISTORY AND PHYSICAL Name:  Viv Last 
MR#:  [id number] :  1940 ACCOUNT #:  [de-identified] ADMIT DATE:  2020 The patient was seen, evaluated and admitted by me on 2020. PRIMARY CARE PHYSICIAN:  Jack Khan MD 
 
SOURCE OF INFORMATION:  The patient and the patient's relatives who were present at the bedside. CHIEF COMPLAINT:  Shortness of breath. HISTORY OF PRESENT ILLNESS:  This 66-year-old woman with past medical history significant for adenocarcinoma of the esophagus, hypothyroidism, obstructive sleep apnea, hypertension, chronic kidney disease, and glaucoma was in her usual state of health until about 2 days ago when the patient developed shortness of breath. The shortness of breath is progressive and getting worse. The shortness of breath is worse when the patient is lying down and also with very minimal exertion such as walking. The patient has adenocarcinoma of the esophagus. According to the patient, an attempt was made last week to undergo some form of procedure for the adenocarcinoma of the esophagus. Since after the procedure, the patient has been having progressive shortness of breath as well as lethargy and weakness. The patient described the weakness as generalized weakness. The patient came to the emergency room today for further evaluation. The patient denies fever, rigors, and chills. No record of prior admission to this hospital.  When the patient arrived at the emergency room, the patient was found to be in acute respiratory failure. The patient was referred to the hospitalist service for evaluation for admission. The patient is not on oxygen at home. The patient has no history of respiratory failure. No congestive heart failure. No COPD. PAST MEDICAL HISTORY:  Adenocarcinoma of the esophagus, hypothyroidism, obstructive sleep apnea, hypertension, chronic kidney disease, and glaucoma. ALLERGIES:  NO KNOWN DRUG ALLERGIES. MEDICATIONS: 
1. Tylenol Extra Strength 1000 mg every 6 hours as needed. 2.  Mylanta 30 mL every 4 hours as needed. 3.  Norvasc 10 mg daily. 4.  Alphagan 0.2% ophthalmic solution 1 drop into each eye 3 times daily. 5.  Clonidine 0.2 mg twice daily. 6.  Dorzolamide/timolol 1 drop into each eye 3 times daily. 7.  Labetalol 300 mg twice daily. 8.  Xalatan 0.005% ophthalmic solution 1 drop into each eye daily at bedtime. 9.  Synthroid 100 mcg daily. 10.  Losartan 100 mg daily. 11.  MiraLax 17 g daily. FAMILY HISTORY:  This was reviewed. Her mother had diabetes and hypertension. PAST SURGICAL HISTORY:  This is significant for hysterectomy and bilateral cataract extraction. SOCIAL HISTORY:  No history of alcohol or tobacco abuse. REVIEW OF SYSTEMS: 
HEAD, EYES, EARS, NOSE AND THROAT:  No headache. No dizziness. No blurring of vision. No photophobia. RESPIRATORY SYSTEM:  This is positive for shortness of breath. No cough. No hemoptysis. CARDIOVASCULAR SYSTEM:  This is positive for orthopnea. No chest pain. No palpitation. GASTROINTESTINAL SYSTEM:  No nausea or vomiting. No diarrhea. No constipation. GENITOURINARY SYSTEM:  No dysuria. No urgency. No frequency. All other systems are reviewed and they are negative. PHYSICAL EXAMINATION: 
GENERAL APPEARANCE:  The patient appeared ill and in moderate distress. VITAL SIGNS:  On arrival at the emergency room; temperature 98, pulse 91, respiratory rate 32, and blood pressure 78/36. This eventually improved to 92/55. Oxygen saturation 87% on room air. HEAD:  Normocephalic, atraumatic. EYES:  Normal eye movements. No redness. No drainage. No discharge. EARS:  Normal external ears with no obvious drainage. NOSE:  No deformity. No drainage. MOUTH AND THROAT:  No visible oral lesion. NECK:  Neck is supple. Mild JVD. No thyromegaly. CHEST:  Bilateral basilar crackles. No wheezing. HEART:  Normal S1 and S2, regular. No clinically appreciable murmur. ABDOMEN:  Soft, nontender. Normal bowel sounds. CNS:  Alert and oriented x3. No gross focal neurological deficit. EXTREMITIES:  Edema 2+. Pulses 2+ bilaterally. MUSCULOSKELETAL SYSTEM:  No obvious joint deformity and swelling. SKIN:  No active skin lesions seen in the exposed part of the body. PSYCHIATRY:  Normal mood and affect. LYMPHATIC SYSTEM:  No cervical lymphadenopathy. DIAGNOSTIC DATA:  The EKG shows sinus rhythm and nonspecific ST and T-wave abnormalities. Chest x-ray, no acute process. The CT scan of the chest without contrast shows bibasilar atelectasis and small pleural effusions, large hiatal hernia, interval removal of esophageal stent, enlarged paratracheal lymph node, cystic structures in the kidney and liver partly visualized similar to prior study. LABORATORY DATA:  Lactic acid level 0.8. Urinalysis is significant for negative blood, negative nitrite, negative leukocyte esterase, and negative for bacteria. Hematology; WBC 6.9, hemoglobin 7.3, hematocrit 22.4, platelets 66. Magnesium level 1.6, amylase 81, and lipase 363. Cardiac profile, troponin less than 0.05, pro-BNP level 6980, and phosphorus 5.0. Chemistry; sodium 138, potassium 3.1, chloride 103, CO2 is 23, glucose 173, , creatinine 4.60, calcium 8.3, total bilirubin 1.1, ALT 25, AST 23, alkaline phosphatase 69, total protein 6.2, albumin level 2.5, and globulin at 3.7. ABG done in the emergency room; pH 7.379, pCO2 is 32.6, pO2 of 57, bicarbonate 19.2, and oxygen saturation 89%. ASSESSMENT: 
1. Acute respiratory failure with hypoxemia. 2.  Adenocarcinoma of the esophagus. 3.  Hypothyroidism. 4.  Suspected acute congestive heart failure. 5.  Obstructive sleep apnea. 6.  Hypotension. 7.  Hyperglycemia. 8.  Hyperphosphatemia. 9.  Hypokalemia. 10.  Acute-on-chronic kidney disease stage V. 
11.  Bilateral leg swelling. 12.  Glaucoma. 13.  Anemia. 14.  Thrombocytopenia. PLAN: 
1. Acute respiratory failure with hypoxemia. We will admit the patient for further evaluation and treatment. We will identify and treat underlying etiological factors. We will continue with supplemental oxygen. This is most likely due to congestive heart failure. This will be discussed below. Because of the fluid overload from the chronic kidney disease, this will also be discussed below. We will check VQ scan of the chest to evaluate the patient for pulmonary embolism as a possible cause of acute respiratory failure with hypoxemia. We will identify and treat underlying etiological factors. We will monitor the patient closely. 2.  Adenocarcinoma of the esophagus. Hematology consult will be requested to assist in further evaluation and treatment. 3.  Hypothyroidism. We will continue with Synthroid. We will check TSH level. 4.  Suspected acute congestive heart failure. This is contributing to the patient's shortness of breath. We will obtain echocardiogram to determine the ejection fraction and the type of congestive heart failure. We will hold Lasix because of the patient's associated hypotension. We will await further recommendation from the cardiologist.  We will check serial cardiac markers to rule out acute myocardial infarction as a possible cause of shortness of breath. 5.  Obstructive sleep apnea. We will place the patient on CPAP with home setting. 6.  Hypotension. The patient's blood pressure later improved to 92/55. We will hold antihypertensive medication and monitor the patient's blood pressure closely. 7.  Hyperglycemia. We will check hemoglobin A1c level. The patient has no history of diabetes. 8.  Hyperphosphatemia. This is most likely as a result of the patient's renal failure. Start the patient on PhosLo and repeat phosphorus level. 9.  Hypokalemia. We will replace potassium and repeat potassium level. We will also check magnesium level. 10.  Acute-on-chronic kidney disease stage V. We will obtain CT scan of the abdomen and pelvis to rule out obstructive lesion. The patient's Nephrology group will be consulted to assist in further evaluation and treatment. 11.  Bilateral leg swelling. We will check ultrasound of the lower extremity for DVT. This is most likely due to the patient's congestive heart failure. It could also be due to the patient's renal failure. 12.  Glaucoma. We will resume her preadmission medication. 13.  Anemia. This is most likely due to chronic disease. We will carry out anemia workup including checking stool guaiac to rule out occult GI bleed. 14.  Thrombocytopenia. The patient is asymptomatic. We will monitor the patient's platelet count. We will avoid heparin product. 15.  Other issues. Code status, the patient is a full code. We will request SCD for DVT prophylaxis. FUNCTIONAL STATUS PRIOR TO ADMISSION:  The patient came from home. The patient is ambulatory with a cane. MD RUSLAN [id number] 
D:  03/05/2020 6:20 T:  03/05/2020 7:41 JOB #:  [id number]

## 2020-03-05 NOTE — PROGRESS NOTES
Consult called for patient Nita Earl for esophageal cancer. After discussion with patient, it was found she is receiving treatment at [facility] with Dr. Preethi Macias. Informed Unit [place] to consult to [facility] for this patient. Signed By: Alexa Sawyer NP March 5, 2020

## 2020-03-05 NOTE — CONSULTS
Cardiology Consult Note CC: SOB Reason for consult:  CHF Requesting MD:  Dr. Claudio Abbott Subjective:  
  
Date of  Admission: 3/4/2020  9:48 PM  
 
Admission type:Emergency Manlynne Wynn is a 78 y.o. female admitted for Acute respiratory failure ([facility] [address] [J96.00]. Patient complains of worsening SOB/DEAN/weakness since last week. Her weight is up some but she does not know how much. She reports of orthopnea for last couple of days. No CP or palpitations. In my review of her previous office records, no mention of CHF, MI, or arrhythmia. Her last two years were remarkable for leg wound and progressive CKD. 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 Patient Active Problem List  
 Diagnosis Date Noted  Acute respiratory failure ([facility] [address] 03/05/2020  Adenocarcinoma of esophagus ([facility] [address] 05/30/2019  Esophageal dysphagia 05/22/2019  Abnormal x-ray of abdomen 05/22/2019  
 History of colon polyps 06/01/2018 Alyssa Noble MD 
Past Medical History:  
Diagnosis Date  Abnormal x-ray of abdomen 5/22/2019 Bas showed food and irregular stricture above ge junction in addition to large hiatal hernia  5.20.2019  Adenocarcinoma of esophagus ([facility] [address] 5/30/2019  Anemia  Arthritis  Chronic kidney disease Stage II followed by Dr [facility]  Diverticulosis  Esophageal dysphagia 5/22/2019  H/O colonoscopy with polypectomy   
 benign  History of colon polyps 6/1/2018 2013 tubular adenoma  Hypertension  Ill-defined condition   
 pulmonary hypertension  Other ill-defined conditions(799.89)   
 glaucoma and cataracts  Sleep apnea CPAP compliant, as stated 5/20/2019  Thromboembolus ([facility] [address] 2015 Right  leg , spontaneous  Thyroid disease   
 hypothyroidism Past Surgical History:  
Procedure Laterality Date  ANAL PRESSURE RECORD  6/6/2019  COLONOSCOPY N/A 6/1/2018 COLONOSCOPY performed by Cedrick Mcmillan MD at [place] [address]  6/1/2018  HX CATARACT REMOVAL Bilateral 2017  HX HEENT  1984 thyroid biopsy-benign  HX HYSTERECTOMY  IR INSERT TUNL CVC W PORT OVER 5 YEARS  7/19/2019  UPPER GI ENDOSCOPY,BALL DIL,30MM  5/22/2019  UPPER GI ENDOSCOPY,BALL DIL,30MM  5/30/2019  UPPER GI ENDOSCOPY,BIOPSY  5/22/2019  UPPER GI ENDOSCOPY,BIOPSY  5/30/2019  UPPER GI ENDOSCOPY,BIOPSY  10/17/2019 No Known Allergies History reviewed. No pertinent family history. Current Facility-Administered Medications Medication Dose Route Frequency  acetaminophen (TYLENOL) tablet 650 mg  650 mg Oral Q4H PRN  
 albuterol-ipratropium (DUO-NEB) 2.5 MG-0.5 MG/3 ML  3 mL Nebulization Q4H PRN  
 brimonidine (ALPHAGAN) 0.2 % ophthalmic solution 1 Drop  1 Drop Both Eyes TID  latanoprost (XALATAN) 0.005 % ophthalmic solution 1 Drop  1 Drop Both Eyes QHS  levothyroxine (SYNTHROID) tablet 100 mcg  100 mcg Oral ACB  sodium chloride (NS) flush 5-40 mL  5-40 mL IntraVENous Q8H  
 sodium chloride (NS) flush 5-40 mL  5-40 mL IntraVENous PRN  
 ondansetron (ZOFRAN) injection 4 mg  4 mg IntraVENous Q4H PRN  
 bisacodyL (DULCOLAX) tablet 5 mg  5 mg Oral DAILY PRN  
 calcium acetate(phosphat bind) (PHOSLO) tablet 667 mg  1 Tab Oral TID WITH MEALS  dorzolamide (TRUSOPT) 2 % ophthalmic solution 1 Drop  1 Drop Both Eyes TID And  
 timolol (TIMOPTIC) 0.5 % ophthalmic solution 1 Drop  1 Drop Both Eyes BID  albumin human 25% (BUMINATE) solution 25 g  25 g IntraVENous Q6H  
 0.9% sodium chloride infusion 250 mL  250 mL IntraVENous PRN  piperacillin-tazobactam (ZOSYN) 3.375 g in 0.9% sodium chloride (MBP/ADV) 100 mL  3.375 g IntraVENous Q12H  
 sodium chloride 0.9 % bolus infusion 500 mL  500 mL IntraVENous ONCE  
 0.9% sodium chloride infusion  75 mL/hr IntraVENous CONTINUOUS  
  
 
 Prior to Admission Medications: 
Prior to Admission medications Medication Sig Start Date End Date Taking? Authorizing Provider  
polyethylene glycol (MIRALAX) 17 gram packet Take 17 g by mouth daily. Yes Provider, Historical  
labetalol (NORMODYNE) 300 mg tablet Take 300 mg by mouth two (2) times a day. Yes Provider, Historical  
cloNIDine HCl (CATAPRES) 0.1 mg tablet Take 0.2 mg by mouth two (2) times a day. Yes Provider, Historical  
folic acid-vit V4-GJE R67 (FOLBEE) 2.5-25-1 mg tablet Take 1 Tab by mouth daily. Yes Provider, Historical  
iron bisgly,ps-FA-B-C#12-succ 65 mg-65 mg -1,000 mcg (24) tab Take 1 Tab by mouth daily. Yes Provider, Historical  
latanoprost (XALATAN) 0.005 % ophthalmic solution Administer 1 Drop to both eyes nightly. Yes Provider, Historical  
amLODIPine (NORVASC) 10 mg tablet Take 10 mg by mouth daily. Indications: HYPERTENSION   Yes Provider, Historical  
losartan (COZAAR) 100 mg tablet Take 100 mg by mouth daily. Indications: HYPERTENSION   Yes Provider, Historical  
MULTIVITS W-FE,OTHER MIN/LUT (CENTRUM SILVER ULTRA WOMEN'S PO) Take 1 Tab by mouth daily. Yes Provider, Historical  
DORZOLAMIDE HCL/TIMOLOL MALEAT (DORZOLAMIDE-TIMOLOL OP) Apply 1 Drop to eye three (3) times daily. One drop to each eye twice daily    Yes Provider, Historical  
brimonidine (ALPHAGAN) 0.2 % ophthalmic solution Administer 1 Drop to both eyes three (3) times daily. Yes Provider, Historical  
acetaminophen (TYLENOL EXTRA STRENGTH) 500 mg tablet Take 1,000 mg by mouth every six (6) hours as needed. Indications: ARTHRITIC PAIN, PAIN   Yes Provider, Historical  
levothyroxine (SYNTHROID) 100 mcg tablet Take 100 mcg by mouth Daily (before breakfast). Indications: HYPOTHYROIDISM   Yes Provider, Historical  
alum-mag hydroxide-simeth (MYLANTA) 200-200-20 mg/5 mL susp Take 30 mL by mouth every four (4) hours as needed. Provider, Historical  
 
 
 Review of Symptoms: Except as noted in HPI, patient denies recent fever or chills, nausea, vomiting, diarrhea, hemoptysis, hematemesis, dysuria, myalgias, focal neurologic symptoms, ecchymosis, angioedema, odynophagia, dysphagia, sore throat, earache,rash, melena, hematochezia, depression, GERD, cold intolerance, petechia, bleeding gums, or significant weight loss. A comprehensive review of systems was negative except for that written in the HPI. Subjective:  
 24 hr VS reviewed, overall VSSAF Temp (24hrs), Av.4 °F (36.9 °C), Min:98 °F (36.7 °C), Max:99.1 °F (37.3 °C) Patient Vitals for the past 8 hrs: 
 Pulse 20 0853 84  
20 0845 83  
20 0840 83  
20 0712 83  
20 0254 86  
20 0200 84 Patient Vitals for the past 8 hrs: 
 Resp  
20 0845 27  
20 0840 28  
20 0712 (!) 36  
20 0254 (!) 33  
20 0200 30 Patient Vitals for the past 8 hrs: 
 BP  
20 0853 93/44  
20 0845 92/61  
20 0844 (!) 88/52  
20 0840 (!) 88/48  
20 0712 (!) 81/47  
20 0254 97/58  
20 0200 92/55 No intake or output data in the 24 hours ending 20 0958 Physical Exam (complete single organ system exam) Visit Vitals BP 93/44 Pulse 84 Temp 98.3 °F (36.8 °C) Resp 27 Ht 5' 7.99\" (1.727 m) Wt 235 lb 14.3 oz (107 kg) SpO2 96% Breastfeeding No  
BMI 35.88 kg/m² General Appearance:  Well developed, well nourished,alert and oriented x 3, and individual in no acute distress. Ears/Nose/Mouth/Throat:   Hearing grossly normal. 
  
    Neck: Supple. Chest:   Lungs with rales Cardiovascular:  Regular rate and rhythm, S1, S2 normal, no murmur. Noted S4 gallop Abdomen:   Soft, non-tender, bowel sounds are active. Extremities: 2+ edema bilaterally. Skin: Warm and dry. Cardiographics Telemetry: normal sinus rhythm ECG: normal EKG, normal sinus rhythm, unchanged from previous tracings Echocardiogram: Not done Labs:  
Recent Results (from the past 24 hour(s)) EKG, 12 LEAD, INITIAL Collection Time: 03/04/20 10:34 PM  
Result Value Ref Range Ventricular Rate 82 BPM  
 Atrial Rate 82 BPM  
 P-R Interval 166 ms  
 QRS Duration 88 ms Q-T Interval 392 ms QTC Calculation (Bezet) 457 ms Calculated P Axis 63 degrees Calculated R Axis 3 degrees Calculated T Axis 68 degrees Diagnosis Sinus rhythm with occasional premature atrial complexes Nonspecific ST abnormality When compared with ECG of 30-JUL-2004 15:21, 
premature atrial complexes are now present Confirmed by Maria E Qureshi M.D., [place]amey ([id number]) on 3/5/2020 8:32:05 AM 
  
CBC WITH AUTOMATED DIFF Collection Time: 03/04/20 10:36 PM  
Result Value Ref Range WBC 6.9 3.6 - 11.0 K/uL  
 RBC 2.32 (L) 3.80 - 5.20 M/uL HGB 7.3 (L) 11.5 - 16.0 g/dL HCT 22.4 (L) 35.0 - 47.0 % MCV 96.6 80.0 - 99.0 FL  
 MCH 31.5 26.0 - 34.0 PG  
 MCHC 32.6 30.0 - 36.5 g/dL  
 RDW 19.2 (H) 11.5 - 14.5 % PLATELET 66 (L) 023 - 400 K/uL MPV 12.5 8.9 - 12.9 FL  
 NRBC 0.0 0  WBC ABSOLUTE NRBC 0.00 0.00 - 0.01 K/uL NEUTROPHILS 91 (H) 32 - 75 % BAND NEUTROPHILS 3 0 - 6 % LYMPHOCYTES 2 (L) 12 - 49 % MONOCYTES 3 (L) 5 - 13 % EOSINOPHILS 0 0 - 7 % BASOPHILS 1 0 - 1 % IMMATURE GRANULOCYTES 0 %  
 ABS. NEUTROPHILS 6.5 1.8 - 8.0 K/UL  
 ABS. LYMPHOCYTES 0.1 (L) 0.8 - 3.5 K/UL  
 ABS. MONOCYTES 0.2 0.0 - 1.0 K/UL  
 ABS. EOSINOPHILS 0.0 0.0 - 0.4 K/UL  
 ABS. BASOPHILS 0.1 0.0 - 0.1 K/UL  
 ABS. IMM. GRANS. 0.0 K/UL  
 DF MANUAL    
 RBC COMMENTS MACROCYTOSIS 1+ 
    
 RBC COMMENTS ANISOCYTOSIS 1+ 
    
 RBC COMMENTS OVALOCYTES 1+ RBC COMMENTS SCHISTOCYTES 1+ RBC COMMENTS HYPOCHROMIA 1+ 
    
 RBC COMMENTS MICROCYTOSIS 
1+ METABOLIC PANEL, COMPREHENSIVE Collection Time: 03/04/20 10:36 PM  
Result Value Ref Range Sodium 138 136 - 145 mmol/L  Potassium 3.3 (L) 3.5 - 5.1 mmol/L  
 Chloride 103 97 - 108 mmol/L  
 CO2 23 21 - 32 mmol/L Anion gap 12 5 - 15 mmol/L Glucose 173 (H) 65 - 100 mg/dL  (H) 6 - 20 MG/DL Creatinine 4.60 (H) 0.55 - 1.02 MG/DL  
 BUN/Creatinine ratio 23 (H) 12 - 20 GFR est AA 11 (L) >60 ml/min/1.73m2 GFR est non-AA 9 (L) >60 ml/min/1.73m2 Calcium 8.3 (L) 8.5 - 10.1 MG/DL Bilirubin, total 1.1 (H) 0.2 - 1.0 MG/DL  
 ALT (SGPT) 25 12 - 78 U/L  
 AST (SGOT) 23 15 - 37 U/L Alk. phosphatase 69 45 - 117 U/L Protein, total 6.2 (L) 6.4 - 8.2 g/dL Albumin 2.5 (L) 3.5 - 5.0 g/dL Globulin 3.7 2.0 - 4.0 g/dL A-G Ratio 0.7 (L) 1.1 - 2.2 LIPASE Collection Time: 03/04/20 10:36 PM  
Result Value Ref Range Lipase 363 73 - 393 U/L  
AMYLASE Collection Time: 03/04/20 10:36 PM  
Result Value Ref Range Amylase 81 25 - 115 U/L  
MAGNESIUM Collection Time: 03/04/20 10:36 PM  
Result Value Ref Range Magnesium 1.6 1.6 - 2.4 mg/dL PHOSPHORUS Collection Time: 03/04/20 10:36 PM  
Result Value Ref Range Phosphorus 5.0 (H) 2.6 - 4.7 MG/DL  
NT-PRO BNP Collection Time: 03/04/20 10:36 PM  
Result Value Ref Range NT pro-BNP 6,980 (H) <450 PG/ML  
TROPONIN I Collection Time: 03/04/20 10:36 PM  
Result Value Ref Range Troponin-I, Qt. <0.05 <0.05 ng/mL LACTIC ACID Collection Time: 03/04/20 10:38 PM  
Result Value Ref Range Lactic acid 0.8 0.4 - 2.0 MMOL/L  
URINALYSIS W/ REFLEX CULTURE Collection Time: 03/04/20 10:38 PM  
Result Value Ref Range Color YELLOW/STRAW Appearance CLEAR CLEAR Specific gravity 1.014 1.003 - 1.030    
 pH (UA) 5.0 5.0 - 8.0 Protein 30 (A) NEG mg/dL Glucose NEGATIVE  NEG mg/dL Ketone NEGATIVE  NEG mg/dL Bilirubin NEGATIVE  NEG Blood NEGATIVE  NEG Urobilinogen 1.0 0.2 - 1.0 EU/dL Nitrites NEGATIVE  NEG  Leukocyte Esterase NEGATIVE  NEG    
 UA:UC IF INDICATED CULTURE NOT INDICATED BY UA RESULT CNI    
 WBC 0-4 0 - 4 /hpf  
 RBC 0-5 0 - 5 /hpf  
 Epithelial cells FEW FEW /lpf Bacteria NEGATIVE  NEG /hpf Hyaline cast 2-5 0 - 5 /lpf CULTURE, BLOOD, PAIRED Collection Time: 03/04/20 11:13 PM  
Result Value Ref Range Special Requests: NO SPECIAL REQUESTS Culture result: (A) GRAM NEGATIVE RODS GROWING IN 1 OF 4 BOTTLES DRAWN SITE = NO SITE INDICATED Culture result: REMAINING BOTTLE(S) HAS/HAVE NO GROWTH SO FAR    
SAMPLES BEING HELD Collection Time: 03/04/20 11:13 PM  
Result Value Ref Range SAMPLES BEING HELD 1RED, 1BLU   
 COMMENT Add-on orders for these samples will be processed based on acceptable specimen integrity and analyte stability, which may vary by analyte. POC EG7 Collection Time: 03/04/20 11:19 PM  
Result Value Ref Range Calcium, ionized (POC) 1.15 1.12 - 1.32 mmol/L  
 pH (POC) 7.379 7.35 - 7.45    
 pCO2 (POC) 32.6 (L) 35.0 - 45.0 MMHG  
 pO2 (POC) 57 (L) 80 - 100 MMHG  
 HCO3 (POC) 19.2 (L) 22 - 26 MMOL/L Base deficit (POC) 6 mmol/L  
 sO2 (POC) 89 (L) 92 - 97 % Site RIGHT RADIAL Device: ROOM AIR Allens test (POC) N/A Specimen type (POC) ARTERIAL METABOLIC PANEL, COMPREHENSIVE Collection Time: 03/05/20  4:26 AM  
Result Value Ref Range Sodium 137 136 - 145 mmol/L Potassium 3.1 (L) 3.5 - 5.1 mmol/L Chloride 104 97 - 108 mmol/L  
 CO2 20 (L) 21 - 32 mmol/L Anion gap 13 5 - 15 mmol/L Glucose 174 (H) 65 - 100 mg/dL  (H) 6 - 20 MG/DL Creatinine 4.39 (H) 0.55 - 1.02 MG/DL  
 BUN/Creatinine ratio 25 (H) 12 - 20 GFR est AA 12 (L) >60 ml/min/1.73m2 GFR est non-AA 10 (L) >60 ml/min/1.73m2 Calcium 7.9 (L) 8.5 - 10.1 MG/DL Bilirubin, total 1.0 0.2 - 1.0 MG/DL  
 ALT (SGPT) 22 12 - 78 U/L  
 AST (SGOT) 20 15 - 37 U/L Alk. phosphatase 62 45 - 117 U/L Protein, total 5.5 (L) 6.4 - 8.2 g/dL Albumin 2.2 (L) 3.5 - 5.0 g/dL Globulin 3.3 2.0 - 4.0 g/dL A-G Ratio 0.7 (L) 1.1 - 2.2 LIPID PANEL  Collection Time: 03/05/20  4:26 AM  
 Result Value Ref Range LIPID PROFILE Cholesterol, total 96 <200 MG/DL Triglyceride 216 (H) <150 MG/DL  
 HDL Cholesterol 12 MG/DL  
 LDL, calculated 40.8 0 - 100 MG/DL VLDL, calculated 43.2 MG/DL  
 CHOL/HDL Ratio 8.0 (H) 0.0 - 5.0    
CBC WITH AUTOMATED DIFF Collection Time: 03/05/20  4:26 AM  
Result Value Ref Range WBC 5.9 3.6 - 11.0 K/uL  
 RBC 2.16 (L) 3.80 - 5.20 M/uL HGB 6.9 (L) 11.5 - 16.0 g/dL HCT 20.3 (L) 35.0 - 47.0 % MCV 94.0 80.0 - 99.0 FL  
 MCH 31.9 26.0 - 34.0 PG  
 MCHC 34.0 30.0 - 36.5 g/dL  
 RDW 18.5 (H) 11.5 - 14.5 % PLATELET 65 (L) 997 - 400 K/uL MPV 12.0 8.9 - 12.9 FL  
 NRBC 0.0 0  WBC ABSOLUTE NRBC 0.00 0.00 - 0.01 K/uL NEUTROPHILS 97 (H) 32 - 75 % BAND NEUTROPHILS 1 0 - 6 % LYMPHOCYTES 1 (L) 12 - 49 % MONOCYTES 1 (L) 5 - 13 % EOSINOPHILS 0 0 - 7 % BASOPHILS 0 0 - 1 % IMMATURE GRANULOCYTES 0 %  
 ABS. NEUTROPHILS 5.7 1.8 - 8.0 K/UL  
 ABS. LYMPHOCYTES 0.1 (L) 0.8 - 3.5 K/UL  
 ABS. MONOCYTES 0.1 0.0 - 1.0 K/UL  
 ABS. EOSINOPHILS 0.0 0.0 - 0.4 K/UL  
 ABS. BASOPHILS 0.0 0.0 - 0.1 K/UL  
 ABS. IMM. GRANS. 0.0 K/UL  
 DF MANUAL    
 RBC COMMENTS HYPOCHROMIA 3+ 
    
 RBC COMMENTS OVALOCYTES PRESENT 
    
 RBC COMMENTS ANISOCYTOSIS 2+ 
    
 RBC COMMENTS RBC FRAGMENTS PRESENT 
MACROCYTOSIS 1+ 
   
 RBC COMMENTS MICROCYTOSIS 
1+ TSH 3RD GENERATION Collection Time: 03/05/20  4:26 AM  
Result Value Ref Range TSH 1.42 0.36 - 3.74 uIU/mL  
TROPONIN I Collection Time: 03/05/20  4:26 AM  
Result Value Ref Range Troponin-I, Qt. <0.05 <0.05 ng/mL HEMOGLOBIN A1C WITH EAG Collection Time: 03/05/20  4:26 AM  
Result Value Ref Range Hemoglobin A1c 6.1 (H) 4.0 - 5.6 % Est. average glucose 128 mg/dL FOLATE Collection Time: 03/05/20  4:26 AM  
Result Value Ref Range Folate 95.7 (H) 5.0 - 21.0 ng/mL VITAMIN B12 Collection Time: 03/05/20  4:26 AM  
Result Value Ref Range Vitamin B12 >2,000 (H) 193 - 986 pg/mL IRON PROFILE Collection Time: 03/05/20  4:26 AM  
Result Value Ref Range Iron 23 (L) 35 - 150 ug/dL TIBC 148 (L) 250 - 450 ug/dL Iron % saturation 16 (L) 20 - 50 % FERRITIN Collection Time: 03/05/20  4:26 AM  
Result Value Ref Range Ferritin 613 (H) 26 - 388 NG/ML  
SAMPLES BEING HELD Collection Time: 03/05/20  4:26 AM  
Result Value Ref Range SAMPLES BEING HELD 1PST COMMENT Add-on orders for these samples will be processed based on acceptable specimen integrity and analyte stability, which may vary by analyte. TYPE + CROSSMATCH Collection Time: 03/05/20  9:15 AM  
Result Value Ref Range Crossmatch Expiration 03/08/2020 ABO/Rh(D) O POSITIVE Antibody screen PENDING Assessment: 
 
 Assessment:  
SOB; multiple factors; volume overload from advanced renal failure, anemia, ? PE, and possible acute diastolic failure CHF; acute diastolic HF; due to anemia and hypertensive CV disease; noted negative cardiac enzymes CKD; stage V 
HTN; a long history of HTN but now BPs on low side Plan:  
Tele Strict I & O 
Echo V/Q scan to rule out PE Diuretics per Dr. Valencia Daniels MD

## 2020-03-05 NOTE — WOUND CARE
WOCN Note Attempted to see patient for consult. Patient off the floor for testing. Will attempt later. Merton Ormond BSN RN [facility] Wound Care Office [phone] Pager [phone]

## 2020-03-05 NOTE — PROGRESS NOTES
TRANSFER - IN REPORT: 
 
Verbal report received from Deonna HO(name) on Asher Whelan  being received from ED(unit) for routine progression of care Report consisted of patients Situation, Background, Assessment and  
Recommendations(SBAR). Information from the following report(s) SBAR, Kardex, ED Summary, Intake/Output, MAR, Accordion, Recent Results, Med Rec Status and Cardiac Rhythm NSR was reviewed with the receiving nurse. Opportunity for questions and clarification was provided. Assessment completed upon patients arrival to unit and care assumed. DUAL SKIN ASSESSMENT Primary Nurse Tiesha Mora RN, Gianni Smith RN and Keturah Miranda RN performed a dual skin assessment on this patient and the following impairments were noted:  
 
Scars- R lower leg, scattered scars all over the body Cracked skin-both heels Open skin-R inner belly fold Dry skin-both elbows, all over the body Sacrum-redness Tomy score is 14 Problem: Pressure Injury - Risk of 
Goal: Prevention of pressure injury Outcome: Progressing Towards Goal 
Pt approximately every 2 hours, heels offloaded Problem: Breathing Pattern - Ineffective Goal: Absence of hypoxia Outcome: Progressing Towards Goal 
Pt on 3L NC, O2 saturation greater than 93% 
 
 
 
0650-Pt's HGB is now 6.9, it was at 7.3 before. Aydin Diez MD notified, orders received to draw another CBC in 4 hours, will continue to monitor. Hourly rounding done, pt in NSR, on 3L NC, O2 saturation greater than 93%, on bedrest, has a purewick,  no complaints of pain throughout shift. 0800-Bedside shift change report given to [address] [place] [address] (oncoming nurse) by Esme Spence RN (offgoing nurse). Report included the following information SBAR, Kardex, ED Summary, Intake/Output, MAR, Accordion, Recent Results, Med Rec Status and Cardiac Rhythm NSR.

## 2020-03-05 NOTE — PROGRESS NOTES
Bedside and Verbal shift change report given to Kandi Osuna RN (oncoming nurse) by Keith Balderrama RN (offgoing nurse). Report included the following information SBAR, Kardex, Intake/Output, MAR and Recent Results.

## 2020-03-05 NOTE — PROGRESS NOTES
NUTRITION COMPLETE ASSESSMENT 
 
RECOMMENDATIONS:  
 
2 gm Na+ diet. Avoid excess diet restrictions. RD added ONS TID with meals. Repeat Phos Monitor tolerance of regular texture diet Interventions/Plan:  
Food/Nutrient Delivery:  General/healthful diet - 2 gm Na+ Commercial supplement - Glucerna TID with meals Nutrition Discharge Needs: regular diet with supplements PRN Assessment:  
Reason for Assessment: [facility] Referral for EN/PN PTA Diet: cardiac, 2 gm Na+ Supplements: none at this time Meal Intake: No data found. Subjective: 
Per pt, she had a feeding tube in 2019, but has since been removed. Now eats by mouth without difficulty. Drink Ensure \"sometimes\" when doesn't feel like eating much. Denies recent wt loss. Objective: 
78 y.o. female admitted with Acute respiratory failure ([facility] [J96.00] Pt has a past medical history of Abnormal x-ray of abdomen (5/22/2019), Adenocarcinoma of esophagus ([facility] (5/30/2019), Anemia, Arthritis, Chronic kidney disease, Diverticulosis, Esophageal dysphagia (5/22/2019), H/O colonoscopy with polypectomy, History of colon polyps (6/1/2018), Hypertension, Ill-defined condition, Other ill-defined conditions(799.89), Sleep apnea, Thromboembolus ([facility] (2015), and Thyroid disease. Pt with recent hx of esophageal CA. Treatment hx is unclear at this time as records not in our system. Pt received treatment at [address] [place] [address] per EHR. Per discussion in rounds, pt had chemo 2-3 weeks ago and SOB developed 3 weeks (for which she was admitted). Per limited records available, pt had EGD in 10/2019 s/p treatment for esophageal CA to check status of disease. Impression - \"stent in place; no obvious malignancy seen; tissue or debris in proximal stent, sampled\" Per cardiology consult - SOB d/t multiple factors including volume overload from advanced renal failure, anemia, ?PE, and possible acute diastolic failure. Plan for ECHO. V/Q scan to rule out PE. Diuretics per nephrology. (noted, nephrology note still pending at this time). Pt declined supplements at this time, stating she is eating well. Pt was admitted overnight and no records of PO intake. Will continue to monitor for PO adequacy and need for supplements. Possibly follow-up when family present to try and obtain more detailed history of pt's nutrition, feeding tube hx, wt, etc over the past year as she seems to be a poor historian. Given wt records below, does appear that pt has lost close to 60 lb in the recent years. Noted - no hx of diabetes. HbA1C 6.1% on admission indicating some insulin resistance. Phos elevated on admission, but K+ low. Would avoid any excessive diet restriction given predicted inadequate oral intake. Continue Phos binder. Continue low Na+ diet restriction for now given acute fluid issues. D/c cardiac to open up some options. Will add Glucerna TID with meals for now (high kcal content, but less CHO and protein = 660 kcal, 30 gm pro). Monitor PO intake, supplement acceptance, labs, etc.   
If PO intake inadequate, would liberalize diet to SANNA. If BG uncontrolled, add no concentrated sweets. Wt Readings from Last 20 Encounters:  
03/05/20 107 kg (235 lb 14.3 oz) 10/17/19 105.3 kg (232 lb 3 oz) 07/19/19 114.3 kg (252 lb) 07/05/19 112.5 kg (248 lb)  
06/26/19 117 kg (258 lb) 06/05/19 117 kg (258 lb) 05/30/19 117.1 kg (258 lb 2 oz) 05/22/19 118.4 kg (261 lb 1 oz) 06/12/18 131.5 kg (290 lb) 06/01/18 135.2 kg (298 lb)  
03/14/13 129.4 kg (285 lb 6 oz) Nutritionally Significant Medications:  
Buminate, Phoslo TID with meals, Synthroid, Zosyn Intake/Output: 
No intake or output data in the 24 hours ending 03/05/20 1327 Last BM: 3/3 Physical Findings: Abdomen: WDL Edema: 2+ bilat LE Skin Integrity: not listed, but pending [facility] assessment for [employer] abdominal fold open wound/sacral redness\" Nutrition focused physical exam: deferred at this time Anthropometrics: 
Weight Loss Metrics 3/5/2020 10/17/2019 7/19/2019 7/5/2019 6/26/2019 6/5/2019 5/30/2019 Today's Wt 235 lb 14.3 oz 232 lb 3 oz 252 lb 248 lb 258 lb 258 lb 258 lb 2 oz BMI 35.87 kg/m2 35.3 kg/m2 38.32 kg/m2 37.71 kg/m2 39.23 kg/m2 39.23 kg/m2 39.25 kg/m2 Weight Source: Bed Height: 5' 8\" (172.7 cm), Body mass index is 35.87 kg/m². IBW : 63.5 kg (140 lb), % IBW (Calculated): 168.49 % 
 ,   
 
Labs:   
 
Recent Labs 03/05/20 
0426 03/04/20 
2236 * 173* * 107* CREA 4.39* 4.60*  138  
K 3.1* 3.3*  
 103 CO2 20* 23  
CA 7.9* 8.3* PHOS  --  5.0*  
MG  --  1.6 Recent Labs 03/05/20 
0426 03/04/20 
2236 SGOT 20 23 ALT 22 25 AP 62 69 TBILI 1.0 1.1* TP 5.5* 6.2* ALB 2.2* 2.5*  
GLOB 3.3 3.7 AML  --  81  
LPSE  --  363 Recent Labs 03/05/20 
1056 03/04/20 
2238 LAC 1.1 0.8 Recent Labs 03/05/20 
0426 03/04/20 
2236 WBC 5.9 6.9 HGB 6.9* 7.3* HCT 20.3* 22.4* PLT 65* 66* No results for input(s): PREALB in the last 72 hours. Recent Labs 03/05/20 
2798 TRIGL 216* No results for input(s): GLUCPOC in the last 72 hours. Lab Results Component Value Date/Time Hemoglobin A1c 6.1 (H) 03/05/2020 04:26 AM  
 
 
Cultural, Yazidi and ethnic food preferences identified: None Food Allergies: NKFA Diet Restrictions: Cultural/Episcopalian Preference(s): None Estimated Nutrition Needs:  
Kcals/day: 1900 Kcals/day(-2200 kcal/day) Protein: 90 g(1.2 gm/kg d/t CA - with caution d/t CKD) Fluid: 1900 ml(or per nephrology) Based On: Kcal/kg - specify (Comment)(25-30 kcal/kg) Weight Used: Other (comment)(Adj BW of 74.4 kg) Pt expected to meet estimated nutrient needs:  Unable to predict at this time Nutrition Diagnosis: 1. Increased nutrient needs related to disease as evidenced by esophageal CA 2. Predicted suboptimal energy intake related to SOB, cancer treatment, decreased appetite, increased needs as evidenced by unknown PO intake at this time with possible significant/recent wt loss 3.   related to   as evidenced by   
 
Goals:   
 PO >50% of meal and/or will consume >/=50% of supplement TID with meals over next 5-7 days Monitoring & Evaluation: Total energy intake, Protein intake, Oral fluids amount Weight/weight change, Electrolyte and renal profile, Glucose profile, GI, CV-pulmonary, Head and neck Food/nutrition knowledge, Behavior Previous Nutrition Goals Met:   N/A Previous Recommendations:    N/A Education & Discharge Needs: 
 [] None Identified 
 [x] Identified and addressed [x] Participated in care plan, discharge planning, and/or interdisciplinary rounds Adelso Floyd RD

## 2020-03-05 NOTE — NURSE NAVIGATOR
Chart reviewed by Heart Failure Nurse Navigator. Heart Failure database completed. EF:  No previous echo on record; awaiting echo ACEi/ARB/ARNi: Cozaar 100mg every day (POA) currently on hold due to hypotension BB:  
 
Aldosterone Antagonist: ** 
 
Obstructive Sleep Apnea Screening: previously done; pt uses cpap STOP-BANG score: 
 Referred to Sleep Medicine:  
 
CRT . NYHA Functional Class not assigned. Documentation requested Heart Failure Teach Back in Patient Education. Heart Failure Avoiding Triggers on Discharge Instructions. Cardiologist: Dr. Christa Horne [id number] [facility] Post discharge follow up phone call to be made within 48-72 hours of discharge.

## 2020-03-05 NOTE — ROUTINE PROCESS
TRANSFER - OUT REPORT: 
 
Verbal report given to rn(name) on Wilkins Dubin  being transferred to [facility](unit) for routine progression of care Report consisted of patients Situation, Background, Assessment and  
Recommendations(SBAR). Information from the following report(s) SBAR, ED Summary, MAR, Recent Results and Cardiac Rhythm nsr was reviewed with the receiving nurse. Lines:  
Venous Access Device BARD Power [place] [facility]#[id number] 07/19/19 Upper chest (subclavicular area, right (Active) Peripheral IV 03/04/20 Left External jugular (Active) Site Assessment Clean, dry, & intact 3/4/2020 10:13 PM  
Phlebitis Assessment 0 3/4/2020 10:13 PM  
Infiltration Assessment 0 3/4/2020 10:13 PM  
Dressing Status Clean, dry, & intact 3/4/2020 10:13 PM  
Dressing Type Transparent 3/4/2020 10:13 PM  
Hub Color/Line Status Green 3/4/2020 10:13 PM  
Alcohol Cap Used No 3/4/2020 10:13 PM  
   
Peripheral IV 03/04/20 Right Antecubital (Active) Site Assessment Clean, dry, & intact 3/4/2020 11:36 PM  
Phlebitis Assessment 0 3/4/2020 11:36 PM  
Infiltration Assessment 0 3/4/2020 11:36 PM  
Dressing Status Clean, dry, & intact 3/4/2020 11:36 PM  
Dressing Type Transparent 3/4/2020 11:36 PM  
Hub Color/Line Status Pink;Flushed 3/4/2020 11:36 PM  
Action Taken Blood drawn 3/4/2020 11:36 PM  
Alcohol Cap Used No 3/4/2020 11:36 PM  
  
 
Opportunity for questions and clarification was provided. Patient transported with: 
 Monitor O2 @ 3 liters Registered Nurse

## 2020-03-05 NOTE — WOUND CARE
WOCN Note:  
 
New consult placed for abdominal fold and sacrum. Chart reviewed. Admitted DX:  Acute respiratory failure Past Medical History: adenocarcinoma of esophagus, hypothyroidism, EMILY, htn, CKD and glaucoma. Assessment:  
Patient is A&O x 3, communicative and requires assist with repositioning. Bed: [facility] Patient wearing briefs for incontinence. Patient reports no pain. Heels intact with bogginess and without erythema and offloaded pillows. 1.  Sacrum and buttocks intact without erythema. Dark hyperpigmented dyschromia present with a hypopigmented pink area centrally from a suspected prior wound. 2. Abdominal fold, moist hyperpigmented rash with malodor consistent with Candidiasis. There is a hypopigmented resurfaced pink area within the fold. Recommendations:   
1. Sacrum and buttocks:  Protect skin with hydraguard cream (blue) 2. Abdominal fold:  Twice daily cleanse, dry and apply Miconazole antifungal powder. Skin Care & Pressure Prevention: 
Minimize layers of linen/pads under patient to optimize support surface. Turn/reposition approximately every 2 hours and offload heels. Manage incontinence / promote continence. Discussed above plan with patient and Kennedi Moore RN. Transition of Care: Plan to follow as needed while admitted to hospital. 
 
TIKA Duron RN [facility] Wound Care Office [phone] Pager [phone]

## 2020-03-05 NOTE — CONSULTS
[facility] 
 [address] [facility], [address] [facility] Phone: [phone] NOTE Patient: Merline Paterson MRN: [id number]  PCP: Page Westbrook MD  
:     1940  Age:   78 y.o. Sex:  female Referring physician: Brendan Driscoll MD 
Reason for consultation: 78 y.o. female with Acute respiratory failure ([facility] [address] [G31.07] complicated by SONIDO Admission Date: 3/4/2020  9:48 PM  LOS: 0 days ASSESSMENT and PLAN :  
SONIDO on CKD  
- 2/2 Gram Negative Sepsis, Hypotension  
- Non oliguric but w/ severe degree ARF  
- at very high risk for needing dialysis. D/w pt and daughter - Echo w/ preserved LVEF, moderate Pulm HTN  
- Increased rate of IVF to 125 cc/ hr  
- hold Losartan CKD Stage IV  
- 2/2 ADPKD, HTN  
- baseline Creatinine : 2 mg/ dl  
- followed by Dr Alebrto Riggs Mild metabolic acidosis : 
- add Bicarb to IVF  
 
GNR bacteremia w/ sepsis - per Primary team  
 
Esophageal Ca Hypothyroidism EMILY Care Plan discussed with:  Pt and daughter Thank you for consulting [facility] in the care of your patient. Subjective: HPI: Merline Paterson is a 78 y.o.  female who has been admitted to the hospital for SOB. She was found to be in ARF and hypotensive on admission. We were asked to see her for the same She has ckd 4 and was seen a week ago in our office when her Creatinine was 2.1 mg/dl . She was mildly acidotic and was started on Sodium Bicarb./ she saw Dr Kalpesh Francis  And received what sounds like Procrit injection for anemia. She denies any fevers, chills, rigors, nausea, vomiting, abdominal pain . Past Medical Hx:  
Past Medical History:  
Diagnosis Date  Abnormal x-ray of abdomen 2019 Bas showed food and irregular stricture above ge junction in addition to large hiatal hernia  2019  Adenocarcinoma of esophagus ([facility] [address] 2019  Anemia  Arthritis  Chronic kidney disease Stage II followed by Dr Abel Pinto Nephrology  Diverticulosis  Esophageal dysphagia 5/22/2019  H/O colonoscopy with polypectomy   
 benign  History of colon polyps 6/1/2018 2013 tubular adenoma  Hypertension  Ill-defined condition   
 pulmonary hypertension  Other ill-defined conditions(799.89)   
 glaucoma and cataracts  Sleep apnea CPAP compliant, as stated 5/20/2019  Thromboembolus ([address] 2015 Right  leg , spontaneous  Thyroid disease   
 hypothyroidism Past Surgical Hx: 
  
Past Surgical History:  
Procedure Laterality Date  ANAL PRESSURE RECORD  6/6/2019  COLONOSCOPY N/A 6/1/2018 COLONOSCOPY performed by Edmund Jenkins MD at [place] [address]  6/1/2018  HX CATARACT REMOVAL Bilateral 2017  HX HEENT  1984 thyroid biopsy-benign  HX HYSTERECTOMY  IR INSERT TUNL CVC W PORT OVER 5 YEARS  7/19/2019  UPPER GI ENDOSCOPY,BALL DIL,30MM  5/22/2019  UPPER GI ENDOSCOPY,BALL DIL,30MM  5/30/2019  UPPER GI ENDOSCOPY,BIOPSY  5/22/2019  UPPER GI ENDOSCOPY,BIOPSY  5/30/2019  UPPER GI ENDOSCOPY,BIOPSY  10/17/2019 No Known Allergies Social Hx:  reports that she has never smoked. She has never used smokeless tobacco. She reports previous alcohol use. She reports that she does not use drugs. History reviewed. No pertinent family history. Review of Systems: A thorough twelve point review of system was performed today. Pertinent positives and negatives are mentioned in the HPI. The reminder of the ROS is negative and noncontributory. Objective:   
Vitals:   
Vitals:  
 03/05/20 1307 03/05/20 1331 03/05/20 1448 03/05/20 1622 BP:  94/51 (!) 78/54 (!) 78/54 Pulse:  67 Resp:      
Temp:      
SpO2:  98% 95% Weight: 107 kg (235 lb 14.3 oz)   107 kg (235 lb 14.3 oz) Height: 5' 8\" (1.727 m) I&O's:  No intake/output data recorded. Visit Vitals BP (!) 78/54 Pulse 67 Temp 98.3 °F (36.8 °C) Resp 27 Ht 5' 8\" (1.727 m) Wt 107 kg (235 lb 14.3 oz) SpO2 95% Breastfeeding No  
BMI 35.87 kg/m² Physical Exam: 
General: ill looking HEENT: PERRL,  +++ Pallor , No Icterus Neck: Supple,no mass palpable, 
Lungs : diminished Left base CVS: irregular, S1 S2 normal, + systolic  murmur Abdomen: Soft, NT, BS + Extremities: no Edema Skin: No rash or lesions. Neurologic: non focal, AAO x 3 Psych: normal affect Laboratory Results: 
 
Recent Labs 03/05/20 
0426 03/04/20 
2236  138  
K 3.1* 3.3*  
 103 CO2 20* 23 * 173* * 107* CREA 4.39* 4.60* CA 7.9* 8.3*  
MG  --  1.6 PHOS  --  5.0* ALB 2.2* 2.5* SGOT 20 23 ALT 22 25 Recent Labs 03/05/20 
1616 03/05/20 
0426 03/04/20 
2236 WBC  --  5.9 6.9 HGB 7.0* 6.9* 7.3* HCT 21.1* 20.3* 22.4* PLT  --  65* 66* No results found for: SDES Lab Results Component Value Date/Time Culture result: (A) 03/04/2020 11:13 PM  
  GRAM NEGATIVE RODS GROWING IN ALL 4 BOTTLES DRAWN SITE = NO SITE INDICATED Recent Results (from the past 24 hour(s)) EKG, 12 LEAD, INITIAL Collection Time: 03/04/20 10:34 PM  
Result Value Ref Range Ventricular Rate 82 BPM  
 Atrial Rate 82 BPM  
 P-R Interval 166 ms  
 QRS Duration 88 ms Q-T Interval 392 ms QTC Calculation (Bezet) 457 ms Calculated P Axis 63 degrees Calculated R Axis 3 degrees Calculated T Axis 68 degrees Diagnosis Sinus rhythm with occasional premature atrial complexes Nonspecific ST abnormality When compared with ECG of 30-JUL-2004 15:21, 
premature atrial complexes are now present Confirmed by Neftali Darling M.D., Jaime Crawley ([id number]) on 3/5/2020 8:32:05 AM 
  
CBC WITH AUTOMATED DIFF Collection Time: 03/04/20 10:36 PM  
Result Value Ref Range WBC 6.9 3.6 - 11.0 K/uL  
 RBC 2.32 (L) 3.80 - 5.20 M/uL HGB 7.3 (L) 11.5 - 16.0 g/dL HCT 22.4 (L) 35.0 - 47.0 % MCV 96.6 80.0 - 99.0 FL  
 MCH 31.5 26.0 - 34.0 PG  
 MCHC 32.6 30.0 - 36.5 g/dL  
 RDW 19.2 (H) 11.5 - 14.5 % PLATELET 66 (L) 570 - 400 K/uL MPV 12.5 8.9 - 12.9 FL  
 NRBC 0.0 0  WBC ABSOLUTE NRBC 0.00 0.00 - 0.01 K/uL NEUTROPHILS 91 (H) 32 - 75 % BAND NEUTROPHILS 3 0 - 6 % LYMPHOCYTES 2 (L) 12 - 49 % MONOCYTES 3 (L) 5 - 13 % EOSINOPHILS 0 0 - 7 % BASOPHILS 1 0 - 1 % IMMATURE GRANULOCYTES 0 %  
 ABS. NEUTROPHILS 6.5 1.8 - 8.0 K/UL  
 ABS. LYMPHOCYTES 0.1 (L) 0.8 - 3.5 K/UL  
 ABS. MONOCYTES 0.2 0.0 - 1.0 K/UL  
 ABS. EOSINOPHILS 0.0 0.0 - 0.4 K/UL  
 ABS. BASOPHILS 0.1 0.0 - 0.1 K/UL  
 ABS. IMM. GRANS. 0.0 K/UL  
 DF MANUAL    
 RBC COMMENTS MACROCYTOSIS 1+ 
    
 RBC COMMENTS ANISOCYTOSIS 1+ 
    
 RBC COMMENTS OVALOCYTES 1+ RBC COMMENTS SCHISTOCYTES 1+ RBC COMMENTS HYPOCHROMIA 1+ 
    
 RBC COMMENTS MICROCYTOSIS 
1+ METABOLIC PANEL, COMPREHENSIVE Collection Time: 03/04/20 10:36 PM  
Result Value Ref Range Sodium 138 136 - 145 mmol/L Potassium 3.3 (L) 3.5 - 5.1 mmol/L Chloride 103 97 - 108 mmol/L  
 CO2 23 21 - 32 mmol/L Anion gap 12 5 - 15 mmol/L Glucose 173 (H) 65 - 100 mg/dL  (H) 6 - 20 MG/DL Creatinine 4.60 (H) 0.55 - 1.02 MG/DL  
 BUN/Creatinine ratio 23 (H) 12 - 20 GFR est AA 11 (L) >60 ml/min/1.73m2 GFR est non-AA 9 (L) >60 ml/min/1.73m2 Calcium 8.3 (L) 8.5 - 10.1 MG/DL Bilirubin, total 1.1 (H) 0.2 - 1.0 MG/DL  
 ALT (SGPT) 25 12 - 78 U/L  
 AST (SGOT) 23 15 - 37 U/L Alk. phosphatase 69 45 - 117 U/L Protein, total 6.2 (L) 6.4 - 8.2 g/dL Albumin 2.5 (L) 3.5 - 5.0 g/dL Globulin 3.7 2.0 - 4.0 g/dL A-G Ratio 0.7 (L) 1.1 - 2.2 LIPASE Collection Time: 03/04/20 10:36 PM  
Result Value Ref Range Lipase 363 73 - 393 U/L  
AMYLASE Collection Time: 03/04/20 10:36 PM  
Result Value Ref Range  Amylase 81 25 - 115 U/L  
 MAGNESIUM Collection Time: 03/04/20 10:36 PM  
Result Value Ref Range Magnesium 1.6 1.6 - 2.4 mg/dL PHOSPHORUS Collection Time: 03/04/20 10:36 PM  
Result Value Ref Range Phosphorus 5.0 (H) 2.6 - 4.7 MG/DL  
NT-PRO BNP Collection Time: 03/04/20 10:36 PM  
Result Value Ref Range NT pro-BNP 6,980 (H) <450 PG/ML  
TROPONIN I Collection Time: 03/04/20 10:36 PM  
Result Value Ref Range Troponin-I, Qt. <0.05 <0.05 ng/mL LACTIC ACID Collection Time: 03/04/20 10:38 PM  
Result Value Ref Range Lactic acid 0.8 0.4 - 2.0 MMOL/L  
URINALYSIS W/ REFLEX CULTURE Collection Time: 03/04/20 10:38 PM  
Result Value Ref Range Color YELLOW/STRAW Appearance CLEAR CLEAR Specific gravity 1.014 1.003 - 1.030    
 pH (UA) 5.0 5.0 - 8.0 Protein 30 (A) NEG mg/dL Glucose NEGATIVE  NEG mg/dL Ketone NEGATIVE  NEG mg/dL Bilirubin NEGATIVE  NEG Blood NEGATIVE  NEG Urobilinogen 1.0 0.2 - 1.0 EU/dL Nitrites NEGATIVE  NEG Leukocyte Esterase NEGATIVE  NEG    
 UA:UC IF INDICATED CULTURE NOT INDICATED BY UA RESULT CNI    
 WBC 0-4 0 - 4 /hpf  
 RBC 0-5 0 - 5 /hpf Epithelial cells FEW FEW /lpf Bacteria NEGATIVE  NEG /hpf Hyaline cast 2-5 0 - 5 /lpf CULTURE, BLOOD, PAIRED Collection Time: 03/04/20 11:13 PM  
Result Value Ref Range Special Requests: NO SPECIAL REQUESTS Culture result: (A) GRAM NEGATIVE RODS GROWING IN ALL 4 BOTTLES DRAWN SITE = NO SITE INDICATED  
SAMPLES BEING HELD Collection Time: 03/04/20 11:13 PM  
Result Value Ref Range SAMPLES BEING HELD 1RED, 1BLU   
 COMMENT Add-on orders for these samples will be processed based on acceptable specimen integrity and analyte stability, which may vary by analyte. PROCALCITONIN Collection Time: 03/04/20 11:13 PM  
Result Value Ref Range Procalcitonin 37.10 ng/mL POC EG7 Collection Time: 03/04/20 11:19 PM  
Result Value Ref Range Calcium, ionized (POC) 1.15 1.12 - 1.32 mmol/L  
 pH (POC) 7.379 7.35 - 7.45    
 pCO2 (POC) 32.6 (L) 35.0 - 45.0 MMHG  
 pO2 (POC) 57 (L) 80 - 100 MMHG  
 HCO3 (POC) 19.2 (L) 22 - 26 MMOL/L Base deficit (POC) 6 mmol/L  
 sO2 (POC) 89 (L) 92 - 97 % Site RIGHT RADIAL Device: ROOM AIR Allens test (POC) N/A Specimen type (POC) ARTERIAL METABOLIC PANEL, COMPREHENSIVE Collection Time: 03/05/20  4:26 AM  
Result Value Ref Range Sodium 137 136 - 145 mmol/L Potassium 3.1 (L) 3.5 - 5.1 mmol/L Chloride 104 97 - 108 mmol/L  
 CO2 20 (L) 21 - 32 mmol/L Anion gap 13 5 - 15 mmol/L Glucose 174 (H) 65 - 100 mg/dL  (H) 6 - 20 MG/DL Creatinine 4.39 (H) 0.55 - 1.02 MG/DL  
 BUN/Creatinine ratio 25 (H) 12 - 20 GFR est AA 12 (L) >60 ml/min/1.73m2 GFR est non-AA 10 (L) >60 ml/min/1.73m2 Calcium 7.9 (L) 8.5 - 10.1 MG/DL Bilirubin, total 1.0 0.2 - 1.0 MG/DL  
 ALT (SGPT) 22 12 - 78 U/L  
 AST (SGOT) 20 15 - 37 U/L Alk. phosphatase 62 45 - 117 U/L Protein, total 5.5 (L) 6.4 - 8.2 g/dL Albumin 2.2 (L) 3.5 - 5.0 g/dL Globulin 3.3 2.0 - 4.0 g/dL A-G Ratio 0.7 (L) 1.1 - 2.2 LIPID PANEL Collection Time: 03/05/20  4:26 AM  
Result Value Ref Range LIPID PROFILE Cholesterol, total 96 <200 MG/DL Triglyceride 216 (H) <150 MG/DL  
 HDL Cholesterol 12 MG/DL  
 LDL, calculated 40.8 0 - 100 MG/DL VLDL, calculated 43.2 MG/DL  
 CHOL/HDL Ratio 8.0 (H) 0.0 - 5.0    
CBC WITH AUTOMATED DIFF Collection Time: 03/05/20  4:26 AM  
Result Value Ref Range WBC 5.9 3.6 - 11.0 K/uL  
 RBC 2.16 (L) 3.80 - 5.20 M/uL HGB 6.9 (L) 11.5 - 16.0 g/dL HCT 20.3 (L) 35.0 - 47.0 % MCV 94.0 80.0 - 99.0 FL  
 MCH 31.9 26.0 - 34.0 PG  
 MCHC 34.0 30.0 - 36.5 g/dL  
 RDW 18.5 (H) 11.5 - 14.5 % PLATELET 65 (L) 666 - 400 K/uL MPV 12.0 8.9 - 12.9 FL  
 NRBC 0.0 0  WBC ABSOLUTE NRBC 0.00 0.00 - 0.01 K/uL NEUTROPHILS 97 (H) 32 - 75 % BAND NEUTROPHILS 1 0 - 6 % LYMPHOCYTES 1 (L) 12 - 49 % MONOCYTES 1 (L) 5 - 13 % EOSINOPHILS 0 0 - 7 % BASOPHILS 0 0 - 1 % IMMATURE GRANULOCYTES 0 %  
 ABS. NEUTROPHILS 5.7 1.8 - 8.0 K/UL  
 ABS. LYMPHOCYTES 0.1 (L) 0.8 - 3.5 K/UL  
 ABS. MONOCYTES 0.1 0.0 - 1.0 K/UL  
 ABS. EOSINOPHILS 0.0 0.0 - 0.4 K/UL  
 ABS. BASOPHILS 0.0 0.0 - 0.1 K/UL  
 ABS. IMM. GRANS. 0.0 K/UL  
 DF MANUAL    
 RBC COMMENTS HYPOCHROMIA 3+ 
    
 RBC COMMENTS OVALOCYTES PRESENT 
    
 RBC COMMENTS ANISOCYTOSIS 2+ 
    
 RBC COMMENTS RBC FRAGMENTS PRESENT 
MACROCYTOSIS 1+ 
   
 RBC COMMENTS MICROCYTOSIS 
1+ TSH 3RD GENERATION Collection Time: 03/05/20  4:26 AM  
Result Value Ref Range TSH 1.42 0.36 - 3.74 uIU/mL  
TROPONIN I Collection Time: 03/05/20  4:26 AM  
Result Value Ref Range Troponin-I, Qt. <0.05 <0.05 ng/mL HEMOGLOBIN A1C WITH EAG Collection Time: 03/05/20  4:26 AM  
Result Value Ref Range Hemoglobin A1c 6.1 (H) 4.0 - 5.6 % Est. average glucose 128 mg/dL FOLATE Collection Time: 03/05/20  4:26 AM  
Result Value Ref Range Folate 95.7 (H) 5.0 - 21.0 ng/mL VITAMIN B12 Collection Time: 03/05/20  4:26 AM  
Result Value Ref Range Vitamin B12 >2,000 (H) 193 - 986 pg/mL IRON PROFILE Collection Time: 03/05/20  4:26 AM  
Result Value Ref Range Iron 23 (L) 35 - 150 ug/dL TIBC 148 (L) 250 - 450 ug/dL Iron % saturation 16 (L) 20 - 50 % FERRITIN Collection Time: 03/05/20  4:26 AM  
Result Value Ref Range Ferritin 613 (H) 26 - 388 NG/ML  
SAMPLES BEING HELD Collection Time: 03/05/20  4:26 AM  
Result Value Ref Range SAMPLES BEING HELD 1PST COMMENT Add-on orders for these samples will be processed based on acceptable specimen integrity and analyte stability, which may vary by analyte. TYPE + CROSSMATCH Collection Time: 03/05/20  9:15 AM  
Result Value Ref Range Crossmatch Expiration 03/08/2020  ABO/Rh(D) O POSITIVE   
 Antibody screen NEG Unit number [id number] Blood component type RC LR Unit division 00 Status of unit ALLOCATED Crossmatch result Compatible LACTIC ACID Collection Time: 03/05/20 10:56 AM  
Result Value Ref Range Lactic acid 1.1 0.4 - 2.0 MMOL/L  
HGB & HCT Collection Time: 03/05/20  4:16 PM  
Result Value Ref Range HGB 7.0 (L) 11.5 - 16.0 g/dL HCT 21.1 (L) 35.0 - 47.0 % ECHO ADULT COMPLETE Collection Time: 03/05/20  4:43 PM  
Result Value Ref Range Aortic Valve Systolic Peak Velocity 687.10 cm/s AoV VTI 39.57 cm Aortic Valve Area by Continuity of Peak Velocity 3.3 cm2 Aortic Valve Area by Continuity of VTI 3.2 cm2 AoV PG 21.3 mmHg LVIDd 4.29 3.9 - 5.3 cm  
 LVPWd 1.41 (A) 0.6 - 0.9 cm LVIDs 2.89 cm IVSd 1.35 (A) 0.6 - 0.9 cm  
 LVOT d 2.36 cm  
 LVOT Peak Velocity 171.77 cm/s LVOT Peak Gradient 11.8 mmHg LVOT VTI 28.64 cm Left Atrium to Aortic Root Ratio 1.51 Aortic Valve Systolic Mean Gradient 00.4 mmHg Inferior Vena Cava Diameter Sniffing 2.31 cm  
 LA Vol 4C 150.47 (A) 22 - 52 mL  
 LA Vol 2C 169.03 (A) 22 - 52 mL  
 LA Area 4C 37.1 cm2 LV Mass .7 (A) 67 - 162 g  
 LV Mass AL Index 122.6 43 - 95 g/m2 Left Atrium Major Axis 4.92 cm Tapse 1.99 1.5 - 2.0 cm Triscuspid Valve Regurgitation Peak Gradient 39.7 mmHg Aortic Regurgitant Pressure Half-time 646.8 cm Pulmonic Valve Max Velocity 91.13 cm/s LVOT .6 ml  
 TR Max Velocity 314.93 cm/s  
 LA Vol Index 77.10 16 - 28 ml/m2 LA Vol Index 68.63 16 - 28 ml/m2 Ao Root D 3.25 cm  
 AR Max Francisco 396.64 cm/s Left Ventricular Fractional Shortening by 2D 48.10641416 % Left Ventricular Outflow Tract Mean Gradient 6.9799527610831 mmHg Left Ventricular Outflow Tract Mean Velocity 4.6614074055225 cm/s AV Velocity Ratio 0.74 AV VTI Ratio 0.7 Aortic valve mean velocity 1.5362929390444 m/s AV peak gradient 31.2446944 mmHg PV peak gradient 3.3 mmHg PASP 50.0 mmHg Urine dipstick:  
Lab Results Component Value Date/Time Color YELLOW/STRAW 03/04/2020 10:38 PM  
 Appearance CLEAR 03/04/2020 10:38 PM  
 Specific gravity 1.014 03/04/2020 10:38 PM  
 pH (UA) 5.0 03/04/2020 10:38 PM  
 Protein 30 (A) 03/04/2020 10:38 PM  
 Glucose NEGATIVE  03/04/2020 10:38 PM  
 Ketone NEGATIVE  03/04/2020 10:38 PM  
 Bilirubin NEGATIVE  03/04/2020 10:38 PM  
 Urobilinogen 1.0 03/04/2020 10:38 PM  
 Nitrites NEGATIVE  03/04/2020 10:38 PM  
 Leukocyte Esterase NEGATIVE  03/04/2020 10:38 PM  
 Epithelial cells FEW 03/04/2020 10:38 PM  
 Bacteria NEGATIVE  03/04/2020 10:38 PM  
 WBC 0-4 03/04/2020 10:38 PM  
 RBC 0-5 03/04/2020 10:38 PM  
 
 
I have reviewed the following: All pertinent labs, microbiology data, radiology imaging for my assessment Medications list Personally Reviewed   [x]      Yes     []               No    
 
Medications: 
Prior to Admission medications Medication Sig Start Date End Date Taking? Authorizing Provider  
carvediloL (COREG) 12.5 mg tablet Take 12.5 mg by mouth two (2) times daily (with meals). Yes Provider, Historical  
chlorthalidone (HYGROTEN) 25 mg tablet Take 25 mg by mouth daily. Yes Provider, Historical  
ferrous sulfate 324 mg (65 mg iron) tablet Take 324 mg by mouth two (2) times daily (with meals). Yes Provider, Historical  
omeprazole (PRILOSEC OTC) 20 mg tablet Take 20 mg by mouth daily. Yes Provider, Historical  
sodium bicarbonate 650 mg tablet Take 650 mg by mouth two (2) times a day. Yes Provider, Historical  
capecitabine (XELODA) 500 mg tablet 1,500 mg two (2) times a day. X 14 days then 7 days off   Yes Provider, Historical  
cloNIDine HCl (CATAPRES) 0.1 mg tablet Take 0.2 mg by mouth two (2) times a day. Yes Provider, Historical  
folic acid-vit W1-SVW N22 (FOLBEE) 2.5-25-1 mg tablet Take 1 Tab by mouth daily.    Yes Provider, Historical  
 iron chantel,ps-FA-B-C#12-succ 65 mg-65 mg -1,000 mcg (24) tab Take 1 Tab by mouth daily. Yes Provider, Historical  
latanoprost (XALATAN) 0.005 % ophthalmic solution Administer 1 Drop to both eyes nightly. Yes Provider, Historical  
amLODIPine (NORVASC) 10 mg tablet Take 10 mg by mouth daily. Indications: HYPERTENSION   Yes Provider, Historical  
losartan (COZAAR) 100 mg tablet Take 100 mg by mouth daily. Indications: HYPERTENSION   Yes Provider, Historical  
MULTIVITS W-FE,OTHER MIN/LUT (CENTRUM SILVER ULTRA WOMEN'S PO) Take 1 Tab by mouth daily. Yes Provider, Historical  
DORZOLAMIDE HCL/TIMOLOL MALEAT (DORZOLAMIDE-TIMOLOL OP) Apply 1 Drop to eye three (3) times daily. One drop to each eye twice daily    Yes Provider, Historical  
brimonidine (ALPHAGAN) 0.2 % ophthalmic solution Administer 1 Drop to both eyes three (3) times daily. Yes Provider, Historical  
acetaminophen (TYLENOL EXTRA STRENGTH) 500 mg tablet Take 1,000 mg by mouth every six (6) hours as needed. Indications: ARTHRITIC PAIN, PAIN   Yes Provider, Historical  
levothyroxine (SYNTHROID) 100 mcg tablet Take 100 mcg by mouth Daily (before breakfast). Indications: HYPOTHYROIDISM   Yes Provider, Historical  
  
 
Thank you for allowing us to participate in the care of this patient. We will follow patient. Please dont hesitate to call with any questions Olivia Sloan [address] [facility] [address] [facility], [address] [facility] Phone - [phone] Fax - [phone] 
www. [facility] [employer]

## 2020-03-06 ENCOUNTER — APPOINTMENT (OUTPATIENT)
Dept: CT IMAGING | Age: 80
DRG: 871 | End: 2020-03-06
Attending: INTERNAL MEDICINE
Payer: MEDICARE

## 2020-03-06 ENCOUNTER — APPOINTMENT (OUTPATIENT)
Dept: GENERAL RADIOLOGY | Age: 80
DRG: 871 | End: 2020-03-06
Payer: MEDICARE

## 2020-03-06 LAB
ABO + RH BLD: NORMAL
ALBUMIN SERPL-MCNC: 2.7 G/DL (ref 3.5–5)
ALBUMIN/GLOB SERPL: 1 {RATIO} (ref 1.1–2.2)
ALP SERPL-CCNC: 57 U/L (ref 45–117)
ALT SERPL-CCNC: 19 U/L (ref 12–78)
ANION GAP SERPL CALC-SCNC: 11 MMOL/L (ref 5–15)
ANION GAP SERPL CALC-SCNC: 6 MMOL/L (ref 5–15)
ARTERIAL PATENCY WRIST A: YES
AST SERPL-CCNC: 10 U/L (ref 15–37)
BASE DEFICIT BLD-SCNC: 4 MMOL/L
BASOPHILS # BLD: 0 K/UL (ref 0–0.1)
BASOPHILS NFR BLD: 0 % (ref 0–1)
BDY SITE: ABNORMAL
BILIRUB DIRECT SERPL-MCNC: 0.8 MG/DL (ref 0–0.2)
BILIRUB SERPL-MCNC: 1.2 MG/DL (ref 0.2–1)
BLD PROD TYP BPU: NORMAL
BLOOD GROUP ANTIBODIES SERPL: NORMAL
BNP SERPL-MCNC: 4580 PG/ML
BPU ID: NORMAL
BUN SERPL-MCNC: 108 MG/DL (ref 6–20)
BUN SERPL-MCNC: 110 MG/DL (ref 6–20)
BUN/CREAT SERPL: 28 (ref 12–20)
BUN/CREAT SERPL: 31 (ref 12–20)
CA-I BLD-SCNC: 1.23 MMOL/L (ref 1.12–1.32)
CALCIUM SERPL-MCNC: 8.2 MG/DL (ref 8.5–10.1)
CALCIUM SERPL-MCNC: 8.4 MG/DL (ref 8.5–10.1)
CHLORIDE SERPL-SCNC: 107 MMOL/L (ref 97–108)
CHLORIDE SERPL-SCNC: 110 MMOL/L (ref 97–108)
CO2 SERPL-SCNC: 23 MMOL/L (ref 21–32)
CO2 SERPL-SCNC: 24 MMOL/L (ref 21–32)
COMMENT, HOLDF: NORMAL
CORTIS SERPL-MCNC: 54.2 UG/DL
CREAT SERPL-MCNC: 3.53 MG/DL (ref 0.55–1.02)
CREAT SERPL-MCNC: 3.85 MG/DL (ref 0.55–1.02)
CROSSMATCH RESULT,%XM: NORMAL
DIFFERENTIAL METHOD BLD: ABNORMAL
EOSINOPHIL # BLD: 0 K/UL (ref 0–0.4)
EOSINOPHIL # BLD: 0 K/UL (ref 0–0.4)
EOSINOPHIL # BLD: 0.1 K/UL (ref 0–0.4)
EOSINOPHIL NFR BLD: 0 % (ref 0–7)
EOSINOPHIL NFR BLD: 1 % (ref 0–7)
EOSINOPHIL NFR BLD: 1 % (ref 0–7)
ERYTHROCYTE [DISTWIDTH] IN BLOOD BY AUTOMATED COUNT: 17.8 % (ref 11.5–14.5)
ERYTHROCYTE [DISTWIDTH] IN BLOOD BY AUTOMATED COUNT: 18.4 % (ref 11.5–14.5)
ERYTHROCYTE [DISTWIDTH] IN BLOOD BY AUTOMATED COUNT: 18.8 % (ref 11.5–14.5)
GAS FLOW.O2 O2 DELIVERY SYS: ABNORMAL L/MIN
GAS FLOW.O2 SETTING OXYMISER: 2 L/M
GLOBULIN SER CALC-MCNC: 2.8 G/DL (ref 2–4)
GLUCOSE BLD STRIP.AUTO-MCNC: 170 MG/DL (ref 65–100)
GLUCOSE BLD STRIP.AUTO-MCNC: 172 MG/DL (ref 65–100)
GLUCOSE BLD STRIP.AUTO-MCNC: 181 MG/DL (ref 65–100)
GLUCOSE SERPL-MCNC: 138 MG/DL (ref 65–100)
GLUCOSE SERPL-MCNC: 167 MG/DL (ref 65–100)
HCO3 BLD-SCNC: 21.2 MMOL/L (ref 22–26)
HCT VFR BLD AUTO: 18.1 % (ref 35–47)
HCT VFR BLD AUTO: 21.6 % (ref 35–47)
HCT VFR BLD AUTO: 21.8 % (ref 35–47)
HGB BLD-MCNC: 6.1 G/DL (ref 11.5–16)
HGB BLD-MCNC: 7.2 G/DL (ref 11.5–16)
HGB BLD-MCNC: 7.4 G/DL (ref 11.5–16)
IMM GRANULOCYTES # BLD AUTO: 0 K/UL
IMM GRANULOCYTES NFR BLD AUTO: 0 %
IRON SATN MFR SERPL: 11 % (ref 20–50)
IRON SERPL-MCNC: 18 UG/DL (ref 35–150)
LACTATE SERPL-SCNC: 0.6 MMOL/L (ref 0.4–2)
LYMPHOCYTES # BLD: 0.1 K/UL (ref 0.8–3.5)
LYMPHOCYTES NFR BLD: 2 % (ref 12–49)
MAGNESIUM SERPL-MCNC: 1.6 MG/DL (ref 1.6–2.4)
MCH RBC QN AUTO: 31.6 PG (ref 26–34)
MCH RBC QN AUTO: 32.3 PG (ref 26–34)
MCH RBC QN AUTO: 32.4 PG (ref 26–34)
MCHC RBC AUTO-ENTMCNC: 33 G/DL (ref 30–36.5)
MCHC RBC AUTO-ENTMCNC: 33.7 G/DL (ref 30–36.5)
MCHC RBC AUTO-ENTMCNC: 34.3 G/DL (ref 30–36.5)
MCV RBC AUTO: 94.3 FL (ref 80–99)
MCV RBC AUTO: 95.6 FL (ref 80–99)
MCV RBC AUTO: 96.3 FL (ref 80–99)
MONOCYTES # BLD: 0.4 K/UL (ref 0–1)
MONOCYTES # BLD: 0.5 K/UL (ref 0–1)
MONOCYTES # BLD: 0.6 K/UL (ref 0–1)
MONOCYTES NFR BLD: 7 % (ref 5–13)
MONOCYTES NFR BLD: 8 % (ref 5–13)
MONOCYTES NFR BLD: 9 % (ref 5–13)
MYELOCYTES NFR BLD MANUAL: 1 %
NEUTS BAND NFR BLD MANUAL: 3 % (ref 0–6)
NEUTS BAND NFR BLD MANUAL: 5 % (ref 0–6)
NEUTS SEG # BLD: 3.6 K/UL (ref 1.8–8)
NEUTS SEG # BLD: 6.3 K/UL (ref 1.8–8)
NEUTS SEG # BLD: 6.5 K/UL (ref 1.8–8)
NEUTS SEG NFR BLD: 83 % (ref 32–75)
NEUTS SEG NFR BLD: 85 % (ref 32–75)
NEUTS SEG NFR BLD: 91 % (ref 32–75)
NRBC # BLD: 0 K/UL (ref 0–0.01)
NRBC BLD-RTO: 0 PER 100 WBC
O2/TOTAL GAS SETTING VFR VENT: 28 %
PCO2 BLD: 39.1 MMHG (ref 35–45)
PH BLD: 7.34 [PH] (ref 7.35–7.45)
PHOSPHATE SERPL-MCNC: 4 MG/DL (ref 2.6–4.7)
PLATELET # BLD AUTO: 52 K/UL (ref 150–400)
PLATELET # BLD AUTO: 57 K/UL (ref 150–400)
PLATELET # BLD AUTO: 61 K/UL (ref 150–400)
PMV BLD AUTO: 12.3 FL (ref 8.9–12.9)
PMV BLD AUTO: 12.4 FL (ref 8.9–12.9)
PMV BLD AUTO: 12.6 FL (ref 8.9–12.9)
PO2 BLD: 61 MMHG (ref 80–100)
POTASSIUM SERPL-SCNC: 3.1 MMOL/L (ref 3.5–5.1)
POTASSIUM SERPL-SCNC: 3.6 MMOL/L (ref 3.5–5.1)
PROCALCITONIN SERPL-MCNC: 28.74 NG/ML
PROT SERPL-MCNC: 5.5 G/DL (ref 6.4–8.2)
RBC # BLD AUTO: 1.88 M/UL (ref 3.8–5.2)
RBC # BLD AUTO: 2.28 M/UL (ref 3.8–5.2)
RBC # BLD AUTO: 2.29 M/UL (ref 3.8–5.2)
RBC MORPH BLD: ABNORMAL
SAMPLES BEING HELD,HOLD: NORMAL
SAO2 % BLD: 90 % (ref 92–97)
SERVICE CMNT-IMP: ABNORMAL
SODIUM SERPL-SCNC: 140 MMOL/L (ref 136–145)
SODIUM SERPL-SCNC: 141 MMOL/L (ref 136–145)
SPECIMEN EXP DATE BLD: NORMAL
SPECIMEN TYPE: ABNORMAL
STATUS OF UNIT,%ST: NORMAL
TIBC SERPL-MCNC: 160 UG/DL (ref 250–450)
TOTAL RESP. RATE, ITRR: 16
UNIT DIVISION, %UDIV: 0
VIT B12 SERPL-MCNC: >2000 PG/ML (ref 193–986)
WBC # BLD AUTO: 4.1 K/UL (ref 3.6–11)
WBC # BLD AUTO: 7.1 K/UL (ref 3.6–11)
WBC # BLD AUTO: 7.2 K/UL (ref 3.6–11)

## 2020-03-06 PROCEDURE — 83735 ASSAY OF MAGNESIUM: CPT

## 2020-03-06 PROCEDURE — 94640 AIRWAY INHALATION TREATMENT: CPT

## 2020-03-06 PROCEDURE — 74011000250 HC RX REV CODE- 250: Performed by: EMERGENCY MEDICINE

## 2020-03-06 PROCEDURE — 74011250636 HC RX REV CODE- 250/636: Performed by: INTERNAL MEDICINE

## 2020-03-06 PROCEDURE — 83540 ASSAY OF IRON: CPT

## 2020-03-06 PROCEDURE — 85025 COMPLETE CBC W/AUTO DIFF WBC: CPT

## 2020-03-06 PROCEDURE — 74011250636 HC RX REV CODE- 250/636: Performed by: EMERGENCY MEDICINE

## 2020-03-06 PROCEDURE — 65610000006 HC RM INTENSIVE CARE

## 2020-03-06 PROCEDURE — C9113 INJ PANTOPRAZOLE SODIUM, VIA: HCPCS | Performed by: NURSE PRACTITIONER

## 2020-03-06 PROCEDURE — 80048 BASIC METABOLIC PNL TOTAL CA: CPT

## 2020-03-06 PROCEDURE — 82607 VITAMIN B-12: CPT

## 2020-03-06 PROCEDURE — 80076 HEPATIC FUNCTION PANEL: CPT

## 2020-03-06 PROCEDURE — 74011636637 HC RX REV CODE- 636/637: Performed by: NURSE PRACTITIONER

## 2020-03-06 PROCEDURE — 74011000258 HC RX REV CODE- 258: Performed by: EMERGENCY MEDICINE

## 2020-03-06 PROCEDURE — 36415 COLL VENOUS BLD VENIPUNCTURE: CPT

## 2020-03-06 PROCEDURE — 87040 BLOOD CULTURE FOR BACTERIA: CPT

## 2020-03-06 PROCEDURE — 74176 CT ABD & PELVIS W/O CONTRAST: CPT

## 2020-03-06 PROCEDURE — 74011250636 HC RX REV CODE- 250/636: Performed by: NURSE PRACTITIONER

## 2020-03-06 PROCEDURE — 74011250637 HC RX REV CODE- 250/637: Performed by: INTERNAL MEDICINE

## 2020-03-06 PROCEDURE — 74011000250 HC RX REV CODE- 250: Performed by: NURSE PRACTITIONER

## 2020-03-06 PROCEDURE — 74011000258 HC RX REV CODE- 258: Performed by: NURSE PRACTITIONER

## 2020-03-06 PROCEDURE — 83880 ASSAY OF NATRIURETIC PEPTIDE: CPT

## 2020-03-06 PROCEDURE — 77030038269 HC DRN EXT URIN PURWCK BARD -A

## 2020-03-06 PROCEDURE — 84100 ASSAY OF PHOSPHORUS: CPT

## 2020-03-06 PROCEDURE — 94660 CPAP INITIATION&MGMT: CPT

## 2020-03-06 PROCEDURE — 74011000250 HC RX REV CODE- 250: Performed by: INTERNAL MEDICINE

## 2020-03-06 PROCEDURE — 83605 ASSAY OF LACTIC ACID: CPT

## 2020-03-06 PROCEDURE — 36600 WITHDRAWAL OF ARTERIAL BLOOD: CPT

## 2020-03-06 PROCEDURE — 71045 X-RAY EXAM CHEST 1 VIEW: CPT

## 2020-03-06 PROCEDURE — 82962 GLUCOSE BLOOD TEST: CPT

## 2020-03-06 PROCEDURE — 82803 BLOOD GASES ANY COMBINATION: CPT

## 2020-03-06 PROCEDURE — 82747 ASSAY OF FOLIC ACID RBC: CPT

## 2020-03-06 RX ORDER — POTASSIUM CHLORIDE 7.45 MG/ML
10 INJECTION INTRAVENOUS
Status: COMPLETED | OUTPATIENT
Start: 2020-03-06 | End: 2020-03-06

## 2020-03-06 RX ORDER — MAGNESIUM SULFATE 100 %
4 CRYSTALS MISCELLANEOUS AS NEEDED
Status: DISCONTINUED | OUTPATIENT
Start: 2020-03-06 | End: 2020-03-11 | Stop reason: SDUPTHER

## 2020-03-06 RX ORDER — MAGNESIUM SULFATE HEPTAHYDRATE 40 MG/ML
2 INJECTION, SOLUTION INTRAVENOUS ONCE
Status: COMPLETED | OUTPATIENT
Start: 2020-03-07 | End: 2020-03-07

## 2020-03-06 RX ORDER — SODIUM CHLORIDE 9 MG/ML
100 INJECTION, SOLUTION INTRAVENOUS CONTINUOUS
Status: DISCONTINUED | OUTPATIENT
Start: 2020-03-06 | End: 2020-03-09

## 2020-03-06 RX ORDER — DEXTROSE MONOHYDRATE 100 MG/ML
0-250 INJECTION, SOLUTION INTRAVENOUS AS NEEDED
Status: DISCONTINUED | OUTPATIENT
Start: 2020-03-06 | End: 2020-03-11 | Stop reason: SDUPTHER

## 2020-03-06 RX ORDER — INSULIN LISPRO 100 [IU]/ML
INJECTION, SOLUTION INTRAVENOUS; SUBCUTANEOUS EVERY 6 HOURS
Status: DISCONTINUED | OUTPATIENT
Start: 2020-03-06 | End: 2020-03-11 | Stop reason: SDUPTHER

## 2020-03-06 RX ADMIN — POTASSIUM CHLORIDE 10 MEQ: 10 INJECTION, SOLUTION INTRAVENOUS at 05:02

## 2020-03-06 RX ADMIN — NOREPINEPHRINE BITARTRATE 10 MCG/MIN: 1 INJECTION, SOLUTION, CONCENTRATE INTRAVENOUS at 14:12

## 2020-03-06 RX ADMIN — MICONAZOLE NITRATE 2 % TOPICAL POWDER: at 17:21

## 2020-03-06 RX ADMIN — IPRATROPIUM BROMIDE AND ALBUTEROL SULFATE 3 ML: .5; 3 SOLUTION RESPIRATORY (INHALATION) at 06:33

## 2020-03-06 RX ADMIN — POTASSIUM CHLORIDE 10 MEQ: 10 INJECTION, SOLUTION INTRAVENOUS at 06:23

## 2020-03-06 RX ADMIN — Medication 10 ML: at 06:18

## 2020-03-06 RX ADMIN — INSULIN LISPRO 2 UNITS: 100 INJECTION, SOLUTION INTRAVENOUS; SUBCUTANEOUS at 17:20

## 2020-03-06 RX ADMIN — POTASSIUM CHLORIDE 10 MEQ: 10 INJECTION, SOLUTION INTRAVENOUS at 04:07

## 2020-03-06 RX ADMIN — Medication 10 ML: at 14:13

## 2020-03-06 RX ADMIN — TIMOLOL MALEATE 1 DROP: 5 SOLUTION/ DROPS OPHTHALMIC at 08:29

## 2020-03-06 RX ADMIN — DORZOLAMIDE HYDROCHLORIDE 1 DROP: 20 SOLUTION/ DROPS OPHTHALMIC at 23:04

## 2020-03-06 RX ADMIN — INSULIN LISPRO 2 UNITS: 100 INJECTION, SOLUTION INTRAVENOUS; SUBCUTANEOUS at 12:14

## 2020-03-06 RX ADMIN — MEROPENEM 500 MG: 500 INJECTION, POWDER, FOR SOLUTION INTRAVENOUS at 01:25

## 2020-03-06 RX ADMIN — BRIMONIDINE TARTRATE 1 DROP: 2 SOLUTION OPHTHALMIC at 08:29

## 2020-03-06 RX ADMIN — MICONAZOLE NITRATE 2 % TOPICAL POWDER: at 08:29

## 2020-03-06 RX ADMIN — MEROPENEM 500 MG: 500 INJECTION, POWDER, FOR SOLUTION INTRAVENOUS at 12:14

## 2020-03-06 RX ADMIN — TIMOLOL MALEATE 1 DROP: 5 SOLUTION/ DROPS OPHTHALMIC at 17:21

## 2020-03-06 RX ADMIN — POTASSIUM CHLORIDE 10 MEQ: 10 INJECTION, SOLUTION INTRAVENOUS at 08:12

## 2020-03-06 RX ADMIN — BRIMONIDINE TARTRATE 1 DROP: 2 SOLUTION OPHTHALMIC at 21:05

## 2020-03-06 RX ADMIN — SODIUM CHLORIDE 100 ML/HR: 900 INJECTION, SOLUTION INTRAVENOUS at 09:11

## 2020-03-06 RX ADMIN — VASOPRESSIN 0.03 UNITS/MIN: 20 INJECTION INTRAVENOUS at 08:11

## 2020-03-06 RX ADMIN — Medication 10 ML: at 21:07

## 2020-03-06 RX ADMIN — Medication 667 MG: at 12:14

## 2020-03-06 RX ADMIN — SODIUM CHLORIDE 100 ML/HR: 900 INJECTION, SOLUTION INTRAVENOUS at 20:14

## 2020-03-06 RX ADMIN — DORZOLAMIDE HYDROCHLORIDE 1 DROP: 20 SOLUTION/ DROPS OPHTHALMIC at 17:20

## 2020-03-06 RX ADMIN — INSULIN LISPRO 2 UNITS: 100 INJECTION, SOLUTION INTRAVENOUS; SUBCUTANEOUS at 06:21

## 2020-03-06 RX ADMIN — LATANOPROST 1 DROP: 50 SOLUTION OPHTHALMIC at 22:02

## 2020-03-06 RX ADMIN — SODIUM CHLORIDE 40 MG: 9 INJECTION INTRAMUSCULAR; INTRAVENOUS; SUBCUTANEOUS at 17:20

## 2020-03-06 RX ADMIN — BRIMONIDINE TARTRATE 1 DROP: 2 SOLUTION OPHTHALMIC at 17:20

## 2020-03-06 RX ADMIN — Medication 667 MG: at 17:20

## 2020-03-06 RX ADMIN — DORZOLAMIDE HYDROCHLORIDE 1 DROP: 20 SOLUTION/ DROPS OPHTHALMIC at 08:29

## 2020-03-06 RX ADMIN — SODIUM CHLORIDE 40 MG: 9 INJECTION INTRAMUSCULAR; INTRAVENOUS; SUBCUTANEOUS at 04:07

## 2020-03-06 RX ADMIN — NOREPINEPHRINE BITARTRATE 2 MCG/MIN: 1 INJECTION, SOLUTION, CONCENTRATE INTRAVENOUS at 01:22

## 2020-03-06 RX ADMIN — LEVOTHYROXINE SODIUM 100 MCG: 0.1 TABLET ORAL at 06:30

## 2020-03-06 NOTE — PROGRESS NOTES
Infectious Diseases Consultation Please see dictated note Impression 1. GN bacteremia -- source not idetified with concerns including Portacath infection vs occult abdominal source vs ??? 
 
2. Adenocarcinoma of the esophagus 3. Elevated creatinine -- CKD vs CKD with acute exacerbation 4. EMILY 5. Hypothyroidism 6. HTN 
 
7. Glaucoma Recommend: 1. Continue Meropenem pending cultures 2. Would CT abd and pelvis also 3. If no source of infection found, we should consider removal of the Portacath Thanks,  
Tanja Perea MD 
3/5/2020 
10:09 PM

## 2020-03-06 NOTE — PROGRESS NOTES
Bedside shift change report given to Vicente Bates (oncoming nurse) by Sleam Gloria (offgoing nurse). Report included the following information SBAR, Kardex, Intake/Output and Recent Results.

## 2020-03-06 NOTE — PROGRESS NOTES
Nephrology Progress Note Greg Lemus Date of Admission : 3/4/2020 CC: Follow up for SONIDO Assessment and Plan SONIDO on CKD: 
- 2/2 ATN due to Gram Negative Sepsis, Hypotension  
- resume IV NS at 100cc/hr 
- daily labs 
- no urgent need for RRT 
- pressors to keep MAP > 65 CKD Stage IV  
- 2/2 ADPKD, HTN  
- baseline Creatinine : 2 mg/ dl  
- followed by Dr Vinny Munoz  
  
Hypokalemia: 
- repletion ordered 
  
GNR bacteremia w/ sepsis - per Primary team  
- may need port removed if no infectious source identified 
  
Esophageal Ca Hypothyroidism EMILY Interval History: 
Seen and examined. On CPAP,  Appears comfortable. UOP 900cc. Transferred to ICU due to hypotension. On monique and vaso. No reported cp, sob, n/v/d per pt. Current Medications: all current  Medications have been eviewed in [facility] Review of Systems: Pertinent items are noted in HPI. Objective: 
Vitals:   
Vitals:  
 03/06/20 0645 03/06/20 0700 03/06/20 0715 03/06/20 0825 BP: (!) 85/56 (!) 86/57 (!) 86/57 Pulse: 90 76 95 Resp: 30 28 25 Temp:      
SpO2: 99% 99%  98% Weight:      
Height:      
 
Intake and Output: 
No intake/output data recorded. 03/04 1901 - 03/06 0700 In: 2735.8 [P.O.:75; I.V.:1960.8] Out: 900 [Urine:900] Physical Examination: 
Pt intubated     No 
General: Awake on CPAP Neck:  Supple, no mass Resp:  Reduced bibasilar breath sounds CV:  RRR,  no murmur or rub, no LE edema GI:  Soft, NT, + Bowel sounds, no hepatosplenomegaly Neurologic:  Non focal 
Psych:             AAO x 3 appropriate affect Skin:  No Rash :  No cuello []    High complexity decision making was performed 
[]    Patient is at high-risk of decompensation with multiple organ involvement Lab Data Personally Reviewed: I have reviewed all the pertinent labs, microbiology data and radiology studies during assessment. Recent Labs 03/06/20 
0402 03/05/20 
2225 03/05/20 
9563 03/04/20 2236  140 137 138  
K 3.1* 3.1* 3.1* 3.3*  
 106 104 103 CO2 23 23 20* 23 * 157* 174* 173* * 109* 108* 107* CREA 3.85* 4.00* 4.39* 4.60* CA 8.4* 8.1* 7.9* 8.3*  
MG  --   --   --  1.6 PHOS  --   --   --  5.0* ALB 2.7*  --  2.2* 2.5* SGOT 10*  --  20 23 ALT 19  --  22 25 INR  --  1.9*  --   --   
 
Recent Labs 03/06/20 
0403 03/05/20 
2225 03/05/20 
1616 03/05/20 
1453 WBC 7.2 4.1  --  5.9 HGB 7.4* 6.1* 7.0* 6.9*  
HCT 21.6* 18.1* 21.1* 20.3* PLT 61* 52*  --  65* No results found for: SDES Lab Results Component Value Date/Time Culture result: NO GROWTH AFTER 1 HOUR 03/06/2020 01:13 AM  
 Culture result: (A) 03/04/2020 11:13 PM  
  GRAM NEGATIVE RODS GROWING IN ALL 4 BOTTLES DRAWN SITE = NO SITE INDICATED Recent Results (from the past 24 hour(s)) TYPE + CROSSMATCH Collection Time: 03/05/20  9:15 AM  
Result Value Ref Range Crossmatch Expiration 03/08/2020 ABO/Rh(D) O POSITIVE Antibody screen NEG Unit number [id number] Blood component type RC LR Unit division 00 Status of unit TRANSFUSED Crossmatch result Compatible LACTIC ACID Collection Time: 03/05/20 10:56 AM  
Result Value Ref Range Lactic acid 1.1 0.4 - 2.0 MMOL/L  
HGB & HCT Collection Time: 03/05/20  4:16 PM  
Result Value Ref Range HGB 7.0 (L) 11.5 - 16.0 g/dL HCT 21.1 (L) 35.0 - 47.0 % ECHO ADULT COMPLETE Collection Time: 03/05/20  4:43 PM  
Result Value Ref Range Aortic Valve Systolic Peak Velocity 862.67 cm/s AoV VTI 39.57 cm Aortic Valve Area by Continuity of Peak Velocity 3.3 cm2 Aortic Valve Area by Continuity of VTI 3.2 cm2 AoV PG 21.3 mmHg LVIDd 4.29 3.9 - 5.3 cm  
 LVPWd 1.41 (A) 0.6 - 0.9 cm LVIDs 2.89 cm IVSd 1.35 (A) 0.6 - 0.9 cm  
 LVOT d 2.36 cm  
 LVOT Peak Velocity 171.77 cm/s LVOT Peak Gradient 11.8 mmHg LVOT VTI 28.64 cm  Left Atrium to Aortic Root Ratio 1.51   
 Aortic Valve Systolic Mean Gradient 84.6 mmHg Inferior Vena Cava Diameter Sniffing 2.31 cm  
 LA Vol 4C 150.47 (A) 22 - 52 mL  
 LA Vol 2C 169.03 (A) 22 - 52 mL  
 LA Area 4C 37.1 cm2 LV Mass .7 (A) 67 - 162 g  
 LV Mass AL Index 122.6 43 - 95 g/m2 Left Atrium Major Axis 4.92 cm Tapse 1.99 1.5 - 2.0 cm Triscuspid Valve Regurgitation Peak Gradient 39.7 mmHg Aortic Regurgitant Pressure Half-time 646.8 cm Pulmonic Valve Max Velocity 91.13 cm/s LVOT .6 ml  
 TR Max Velocity 314.93 cm/s  
 LA Vol Index 77.10 16 - 28 ml/m2 LA Vol Index 68.63 16 - 28 ml/m2 Ao Root D 3.25 cm  
 AR Max Francisco 396.64 cm/s Left Ventricular Fractional Shortening by 2D 48.95635308 % Left Ventricular Outflow Tract Mean Gradient 2.8373836613472 mmHg Left Ventricular Outflow Tract Mean Velocity 5.4205135322662 cm/s AV Velocity Ratio 0.74 AV VTI Ratio 0.7 Aortic valve mean velocity 1.9965385876682 m/s AV peak gradient 04.7102699 mmHg PV peak gradient 3.3 mmHg PASP 50.0 mmHg CBC WITH AUTOMATED DIFF Collection Time: 03/05/20 10:25 PM  
Result Value Ref Range WBC 4.1 3.6 - 11.0 K/uL  
 RBC 1.88 (L) 3.80 - 5.20 M/uL HGB 6.1 (L) 11.5 - 16.0 g/dL HCT 18.1 (L) 35.0 - 47.0 % MCV 96.3 80.0 - 99.0 FL  
 MCH 32.4 26.0 - 34.0 PG  
 MCHC 33.7 30.0 - 36.5 g/dL  
 RDW 18.8 (H) 11.5 - 14.5 % PLATELET 52 (L) 315 - 400 K/uL MPV 12.6 8.9 - 12.9 FL  
 NRBC 0.0 0  WBC ABSOLUTE NRBC 0.00 0.00 - 0.01 K/uL NEUTROPHILS 83 (H) 32 - 75 % BAND NEUTROPHILS 5 0 - 6 % LYMPHOCYTES 2 (L) 12 - 49 % MONOCYTES 9 5 - 13 % EOSINOPHILS 1 0 - 7 % BASOPHILS 0 0 - 1 % IMMATURE GRANULOCYTES 0 %  
 ABS. NEUTROPHILS 3.6 1.8 - 8.0 K/UL  
 ABS. LYMPHOCYTES 0.1 (L) 0.8 - 3.5 K/UL  
 ABS. MONOCYTES 0.4 0.0 - 1.0 K/UL  
 ABS. EOSINOPHILS 0.0 0.0 - 0.4 K/UL  
 ABS. BASOPHILS 0.0 0.0 - 0.1 K/UL  
 ABS. IMM.  GRANS. 0.0 K/UL  
 DF MANUAL    
 RBC COMMENTS MACROCYTOSIS 
1+ 
    
 RBC COMMENTS ANISOCYTOSIS 1+ 
    
 RBC COMMENTS OVALOCYTES 1+ RBC COMMENTS MICROCYTOSIS 1+ 
    
 RBC COMMENTS SCHISTOCYTES 1+ RBC COMMENTS HYPOCHROMIA 1+ METABOLIC PANEL, BASIC Collection Time: 03/05/20 10:25 PM  
Result Value Ref Range Sodium 140 136 - 145 mmol/L Potassium 3.1 (L) 3.5 - 5.1 mmol/L Chloride 106 97 - 108 mmol/L  
 CO2 23 21 - 32 mmol/L Anion gap 11 5 - 15 mmol/L Glucose 157 (H) 65 - 100 mg/dL  (H) 6 - 20 MG/DL Creatinine 4.00 (H) 0.55 - 1.02 MG/DL  
 BUN/Creatinine ratio 27 (H) 12 - 20 GFR est AA 13 (L) >60 ml/min/1.73m2 GFR est non-AA 11 (L) >60 ml/min/1.73m2 Calcium 8.1 (L) 8.5 - 10.1 MG/DL PROTHROMBIN TIME + INR Collection Time: 03/05/20 10:25 PM  
Result Value Ref Range INR 1.9 (H) 0.9 - 1.1 Prothrombin time 18.5 (H) 9.0 - 11.1 sec PROCALCITONIN Collection Time: 03/05/20 10:27 PM  
Result Value Ref Range Procalcitonin 28.74 ng/mL LACTIC ACID Collection Time: 03/05/20 10:27 PM  
Result Value Ref Range Lactic acid 1.4 0.4 - 2.0 MMOL/L  
NT-PRO BNP Collection Time: 03/06/20  1:06 AM  
Result Value Ref Range NT pro-BNP 4,580 (H) <450 PG/ML  
CULTURE, BLOOD, PAIRED Collection Time: 03/06/20  1:13 AM  
Result Value Ref Range Special Requests: NO SPECIAL REQUESTS Culture result: NO GROWTH AFTER 1 HOUR    
METABOLIC PANEL, BASIC Collection Time: 03/06/20  4:02 AM  
Result Value Ref Range Sodium 141 136 - 145 mmol/L Potassium 3.1 (L) 3.5 - 5.1 mmol/L Chloride 107 97 - 108 mmol/L  
 CO2 23 21 - 32 mmol/L Anion gap 11 5 - 15 mmol/L Glucose 167 (H) 65 - 100 mg/dL  (H) 6 - 20 MG/DL Creatinine 3.85 (H) 0.55 - 1.02 MG/DL  
 BUN/Creatinine ratio 28 (H) 12 - 20 GFR est AA 14 (L) >60 ml/min/1.73m2 GFR est non-AA 11 (L) >60 ml/min/1.73m2 Calcium 8.4 (L) 8.5 - 10.1 MG/DL  
HEPATIC FUNCTION PANEL Collection Time: 03/06/20  4:02 AM  
Result Value Ref Range Protein, total 5.5 (L) 6.4 - 8.2 g/dL Albumin 2.7 (L) 3.5 - 5.0 g/dL Globulin 2.8 2.0 - 4.0 g/dL A-G Ratio 1.0 (L) 1.1 - 2.2 Bilirubin, total 1.2 (H) 0.2 - 1.0 MG/DL Bilirubin, direct 0.8 (H) 0.0 - 0.2 MG/DL Alk. phosphatase 57 45 - 117 U/L  
 AST (SGOT) 10 (L) 15 - 37 U/L  
 ALT (SGPT) 19 12 - 78 U/L  
CBC WITH AUTOMATED DIFF Collection Time: 03/06/20  4:03 AM  
Result Value Ref Range WBC 7.2 3.6 - 11.0 K/uL  
 RBC 2.29 (L) 3.80 - 5.20 M/uL HGB 7.4 (L) 11.5 - 16.0 g/dL HCT 21.6 (L) 35.0 - 47.0 % MCV 94.3 80.0 - 99.0 FL  
 MCH 32.3 26.0 - 34.0 PG  
 MCHC 34.3 30.0 - 36.5 g/dL  
 RDW 17.8 (H) 11.5 - 14.5 % PLATELET 61 (L) 073 - 400 K/uL MPV 12.3 8.9 - 12.9 FL  
 NRBC 0.0 0  WBC ABSOLUTE NRBC 0.00 0.00 - 0.01 K/uL NEUTROPHILS 85 (H) 32 - 75 % BAND NEUTROPHILS 3 0 - 6 % LYMPHOCYTES 2 (L) 12 - 49 % MONOCYTES 8 5 - 13 % EOSINOPHILS 1 0 - 7 % BASOPHILS 0 0 - 1 % MYELOCYTES 1 (H) 0 % IMMATURE GRANULOCYTES 0 %  
 ABS. NEUTROPHILS 6.3 1.8 - 8.0 K/UL  
 ABS. LYMPHOCYTES 0.1 (L) 0.8 - 3.5 K/UL  
 ABS. MONOCYTES 0.6 0.0 - 1.0 K/UL  
 ABS. EOSINOPHILS 0.1 0.0 - 0.4 K/UL  
 ABS. BASOPHILS 0.0 0.0 - 0.1 K/UL  
 ABS. IMM. GRANS. 0.0 K/UL  
 DF MANUAL    
 RBC COMMENTS ANISOCYTOSIS 1+ 
    
 RBC COMMENTS MACROCYTOSIS 1+ 
    
 RBC COMMENTS MICROCYTOSIS 1+ 
    
 RBC COMMENTS OVALOCYTES 1+ RBC COMMENTS HYPOCHROMIA 1+ 
    
 RBC COMMENTS SCHISTOCYTES 1+ POC EG7 Collection Time: 03/06/20  4:15 AM  
Result Value Ref Range Calcium, ionized (POC) 1.23 1.12 - 1.32 mmol/L  
 FIO2 (POC) 28 % pH (POC) 7.343 (L) 7.35 - 7.45    
 pCO2 (POC) 39.1 35.0 - 45.0 MMHG  
 pO2 (POC) 61 (L) 80 - 100 MMHG  
 HCO3 (POC) 21.2 (L) 22 - 26 MMOL/L Base deficit (POC) 4 mmol/L  
 sO2 (POC) 90 (L) 92 - 97 % Site RIGHT RADIAL Device: NASAL CANNULA Flow rate (POC) 2 L/M Allens test (POC) YES  Specimen type (POC) ARTERIAL    
 Total resp. rate 16 LACTIC ACID Collection Time: 03/06/20  4:30 AM  
Result Value Ref Range Lactic acid 0.6 0.4 - 2.0 MMOL/L  
GLUCOSE, POC Collection Time: 03/06/20  6:17 AM  
Result Value Ref Range Glucose (POC) 170 (H) 65 - 100 mg/dL Performed by Shannon Gonzalez MD 
[address] [place] [address], [address] [facility] Phone - [phone] Fax - [phone] 
[email or web address]

## 2020-03-06 NOTE — PROGRESS NOTES
0730: Bedside and Verbal shift change report given to Funmilayo Cook RN (oncoming nurse) by Valeri Delgadillo RN (offgoing nurse). Report included the following information SBAR, Kardex, ED Summary, Procedure Summary, Intake/Output, MAR, Accordion, Recent Results, Med Rec Status, Cardiac Rhythm NSR, Alarm Parameters  and Dual Neuro Assessment.

## 2020-03-06 NOTE — CONSULTS
[facility] 
 
ICU Team Consult Note Name: Viviana Doan : 1940 MRN: [id number] Date: 3/5/2020 Subjective:  
Progress Note: 3/5/2020 Patient is asked to be seen by Dr. Pranay Finch for Sepsis- bacteremia and hypotension, necessitating the need for possible ICU care. Reason for ICU Admission: Sepsis- bacteremia and hypotension, HPI: 
The patient is a 66-year-old female with a past medical history of adenocarcinoma of the esophagus, anemia, arthritis, chronic kidney disease, diverticulosis, esophageal dysphagia, hypertension, sleep apnea, hypothyroidism who presented to the ED via EMS with a complaints of shortness of breath today, decreased appetite for a week, decreased activity, lethargy and generalized weakness x 2 days. Per documentation family stated that the patient had an attempt at freezing of her tumor last week, but has been slowly declining since then. In the ED an ABG was done that showed patient with Hypoxia on room air. Patient was placed on NC with improvement in oxygenation. Patient has a Mediport in right chest wall. Labs and blood cultures were sent. Blood cultures came back positive with GNR in 4/4 bottles. Suspected source is Mediport. Chest xray with mild congestion and U/A clean. No sputum production. Patient was started on zosyn in the ED, was transferred to the intermediate care unit. While on the unit patient had soft B/P that declined to systolic's of 92-70'Q. Hospitalist then consulted critical care for ICU admission. Evaluated patient in room with family at bedside. Patient is awake alert and oriented, slightly drowsy but answers all questions appropriately and follows commands. Patient denies any pain, nausea, vomiting, fevers changes in bowel and urination, blood in bowel or in urine, changes in vision. Patient does admit to shortness of breath, states that is better after adding NC O2. Denies use of O2 a home or inhaler use.  Denies smoking, ETOH or illicit drug use. Denies recent travel or sick contacts. POD:* No surgery found * S/P:  
 
Active Problem List:  
 
Problem List  Date Reviewed: 3/5/2020 Codes Class * (Principal) Acute respiratory failure (HCC) ICD-10-CM: J96.00 
ICD-9-CM: 518.81 Adenocarcinoma of esophagus (HCC) ICD-10-CM: C15.9 ICD-9-CM: 150.9 Esophageal dysphagia ICD-10-CM: R13.10 ICD-9-CM: 787.20 Abnormal x-ray of abdomen ICD-10-CM: R93.5 ICD-9-CM: 793.6 Overview Signed 5/22/2019 12:56 PM by Ambrosio Arriaga MD  
  Bas showed food and irregular stricture above ge junction in addition to large hiatal hernia  5.20.2019 History of colon polyps ICD-10-CM: Z86.010 
ICD-9-CM: V12.72 Overview Signed 6/1/2018  7:15 AM by Ambrosio Arriaga MD  
  2013 tubular adenoma Past Medical History:  
 
 has a past medical history of Abnormal x-ray of abdomen (5/22/2019), Adenocarcinoma of esophagus ([facility] [address] (5/30/2019), Anemia, Arthritis, Chronic kidney disease, Diverticulosis, Esophageal dysphagia (5/22/2019), H/O colonoscopy with polypectomy, History of colon polyps (6/1/2018), Hypertension, Ill-defined condition, Other ill-defined conditions(799.89), Sleep apnea, Thromboembolus ([facility] [address] (2015), and Thyroid disease. Past Surgical History:  
 
 has a past surgical history that includes hx hysterectomy; hx heent (1984); colonoscopy,remv lesn,snare (6/1/2018); colonoscopy (N/A, 6/1/2018); hx cataract removal (Bilateral, 2017); upper gi endoscopy,biopsy (5/22/2019); upper gi endoscopy,ball dil,30mm (5/22/2019); upper gi endoscopy,ball dil,30mm (5/30/2019); upper gi endoscopy,biopsy (5/30/2019); pr anal pressure record (6/6/2019); ir insert tunl cvc w port over 5 years (7/19/2019); and upper gi endoscopy,biopsy (10/17/2019). Home Medications:  
 
Prior to Admission medications Medication Sig Start Date End Date Taking? Authorizing Provider carvediloL (COREG) 12.5 mg tablet Take 12.5 mg by mouth two (2) times daily (with meals). Yes Provider, Historical  
chlorthalidone (HYGROTEN) 25 mg tablet Take 25 mg by mouth daily. Yes Provider, Historical  
ferrous sulfate 324 mg (65 mg iron) tablet Take 324 mg by mouth two (2) times daily (with meals). Yes Provider, Historical  
omeprazole (PRILOSEC OTC) 20 mg tablet Take 20 mg by mouth daily. Yes Provider, Historical  
sodium bicarbonate 650 mg tablet Take 650 mg by mouth two (2) times a day. Yes Provider, Historical  
capecitabine (XELODA) 500 mg tablet 1,500 mg two (2) times a day. X 14 days then 7 days off   Yes Provider, Historical  
cloNIDine HCl (CATAPRES) 0.1 mg tablet Take 0.2 mg by mouth two (2) times a day. Yes Provider, Historical  
folic acid-vit J4-KKI R21 (FOLBEE) 2.5-25-1 mg tablet Take 1 Tab by mouth daily. Yes Provider, Historical  
iron bisgly,ps-FA-B-C#12-succ 65 mg-65 mg -1,000 mcg (24) tab Take 1 Tab by mouth daily. Yes Provider, Historical  
latanoprost (XALATAN) 0.005 % ophthalmic solution Administer 1 Drop to both eyes nightly. Yes Provider, Historical  
amLODIPine (NORVASC) 10 mg tablet Take 10 mg by mouth daily. Indications: HYPERTENSION   Yes Provider, Historical  
losartan (COZAAR) 100 mg tablet Take 100 mg by mouth daily. Indications: HYPERTENSION   Yes Provider, Historical  
MULTIVITS W-FE,OTHER MIN/LUT (CENTRUM SILVER ULTRA WOMEN'S PO) Take 1 Tab by mouth daily. Yes Provider, Historical  
DORZOLAMIDE HCL/TIMOLOL MALEAT (DORZOLAMIDE-TIMOLOL OP) Apply 1 Drop to eye three (3) times daily. One drop to each eye twice daily    Yes Provider, Historical  
brimonidine (ALPHAGAN) 0.2 % ophthalmic solution Administer 1 Drop to both eyes three (3) times daily. Yes Provider, Historical  
acetaminophen (TYLENOL EXTRA STRENGTH) 500 mg tablet Take 1,000 mg by mouth every six (6) hours as needed.  Indications: ARTHRITIC PAIN, PAIN   Yes Provider, Historical  
 levothyroxine (SYNTHROID) 100 mcg tablet Take 100 mcg by mouth Daily (before breakfast). Indications: HYPOTHYROIDISM   Yes Provider, Historical  
polyethylene glycol (MIRALAX) 17 gram packet Take 17 g by mouth daily. 3/5/20  Provider, Historical  
alum-mag hydroxide-simeth (MYLANTA) 200-200-20 mg/5 mL susp Take 30 mL by mouth every four (4) hours as needed. 3/5/20  Provider, Historical  
labetalol (NORMODYNE) 300 mg tablet Take 300 mg by mouth two (2) times a day. 3/5/20  Provider, Historical  
 
 
Allergies/Social/Family History: No Known Allergies Social History Tobacco Use  Smoking status: Never Smoker  Smokeless tobacco: Never Used Substance Use Topics  Alcohol use: Not Currently Comment: in her 19's History reviewed. No pertinent family history. Review of Systems: A comprehensive review of systems was negative except for: Constitutional: positive for fatigue, malaise and anorexia Respiratory: positive for sudden shortness of breath Cardiovascular: positive for fatigue, dyspnea on exertion Objective:  
Vital Signs: 
Visit Vitals BP (!) 75/42 Pulse 83 Temp 98.6 °F (37 °C) Resp 30 Ht 5' 8\" (1.727 m) Wt 107 kg (235 lb 14.3 oz) SpO2 100% Breastfeeding No  
BMI 35.87 kg/m² O2 Flow Rate (L/min): 3 l/min O2 Device: Nasal cannula Temp (24hrs), Av.5 °F (36.9 °C), Min:98 °F (36.7 °C), Max:99.1 °F (37.3 °C) Intake/Output:  
 
Intake/Output Summary (Last 24 hours) at 3/5/2020 194 Last data filed at 3/5/2020 4628 Gross per 24 hour Intake  Output 600 ml Net -600 ml Physical Exam: 
 
General:  alert, fatigued, cooperative, no distress, appears stated age, Obese Eye:  conjunctivae/corneas clear. PERRL, EOM's intact. Neurologic:  oriented, normal mood, grossly non-focal, CN II-XII intact Lymphatic:  Cervical, supraclavicular, and axillary nodes normal.  
Neck:  normal and no erythema or exudates noted. Lungs:  diminished breath sounds R base, L base Heart:  regular rate and rhythm, S1, S2 normal, no murmur, click, rub or gallop Abdomen:  soft, non-tender. Bowel sounds normal. No masses,  no organomegaly Cardiovascular:  Regular rate and rhythm, S1S2 present, without murmur or extra heart sounds, pedal pulses normal and no edema Skin:  Normal. and no rash or abnormalities LABS AND  DATA: Personally reviewed Recent Labs 03/05/20 
1616 03/05/20 0426 03/04/20 2236 WBC  --  5.9 6.9 HGB 7.0* 6.9* 7.3* HCT 21.1* 20.3* 22.4* PLT  --  65* 66* Recent Labs 03/05/20 0426 03/04/20 2236  138  
K 3.1* 3.3*  
 103 CO2 20* 23 * 107* CREA 4.39* 4.60* * 173* CA 7.9* 8.3*  
MG  --  1.6 PHOS  --  5.0* Recent Labs 03/05/20 0426 03/04/20 2236 SGOT 20 23 AP 62 69  
TP 5.5* 6.2* ALB 2.2* 2.5*  
GLOB 3.3 3.7 AML  --  81  
LPSE  --  363 No results for input(s): INR, PTP, APTT, INREXT in the last 72 hours. Recent Labs 03/04/20 
2319 PHI 7.379 PCO2I 32.6*  
PO2I 57* Recent Labs 03/05/20 0426 03/04/20 2236 TROIQ <0.05 <0.05 Hemodynamics:  
PAP:   CO:    
Wedge:   CI:    
CVP:    SVR:    
  PVR:    
 
Ventilator Settings: 
Mode Rate Tidal Volume Pressure FiO2 PEEP Peak airway pressure:     
Minute ventilation:     
 
 
MEDS: Reviewed Chest X-Ray: personally reviewed and report checked ECHO 03/05/2020 Result status: Final result · Normal cavity size, systolic function (ejection fraction normal) and diastolic function. Severe concentric hypertrophy. Estimated left ventricular ejection fraction is 60 - 65%. No regional wall motion abnormality noted. Normal left ventricular strain. · Image quality for this study was suboptimal. 
· Mild to moderate tricuspid valve regurgitation is present. · Moderate pulmonary hypertension. · Mildly dilated left atrium. · Aortic valve sclerosis with no significant stenosis. Mild aortic valve regurgitation is present. Assessment:  
 
ICU Problems: 
Severe Sepsis - Bacteremia + GNR Hypotension 2' to above Compensated Metabolic acidosis 2 to above Acute on CKD Stage II Hypoxia - on NC Sleep Apnea Hypothyroidism Anemia, acute - ? Blood loss, ? GI bleed - pending occult stool - no active source of bleed yet identified Coagulopathy - check hepatic panel Hypokalemia - replaced Hx HTN Hx of Right leg thrombus - on no anticoagulation at home Hx glaucoma and cataracts Hx Esophagus CA 
 
ICU Comprehensive Plan of Care:  
Plans for this Shift: 1. Transfer to ICU 2. Give 1L LR bolus now 3. Recheck CBC, pro calcitonin, lactic acid, PT/INR and CMP. 4. Transfuse 1 unit PRBC's 
5. Check occult stool 6. Start on Protonix IV BID 7. May need to consult GI in the am 
8. ID consulted and following 9. Change Zosyn to [employer] 10. Recheck Blood cultures in am 3/7/20. 11. SSI PRN /Prevent Hypoglycemia 12. Serial CBC q 6 hrs 13. May need to start on levophed if B/P does not improve with Blood and fluid boluses. May use mediport until further central access is established if so. 14. Continue Synthroid,  
15.  O2 NC as need to keep sats greater than 92% 16. Repeat ABG tonight 17. Hold home B/P medications, watch for rebound tachycardia 18. Purewick for Strict I&Os, watch for decreased UOP 19. Recheck Hepatic panel in am  
20. Diet Reg 2Gm NA  
21. Follow up CT abd/pelvis - was ordered 22. Rest of plan as noted below: 
 
Multidisciplinary Rounds Completed:  Pending ABCDEF Bundle/Checklist 
Pain Medications: Acetaminophen Target RASS: N/A Sedation Medications: None CAM-ICU:  Negative Mobility: Bedrest 
PT/OT: Will consult once appropriate Restraints: None needed at this time Discussed Plan of Care (goals of care): Yes Addressed Code Status: Full Code CARDIOVASCULAR Cardiac Gtts: None SBP Goal of: > 90 mmHg MAP Goal of: > 65 mmHg Transfusion Trigger (Hgb): <7.5 g/dL RESPIRATORY Vent Goals: N/A 
DVT Prophylaxis (if no, list reason): SCD's or Sequential Compression Device SPO2 Goal: > 92% Pulmonary toilet: Duo-Nebs GI/ Ha Catheter Present: No 
GI Prophylaxis: Protonix (pantoprazole) Nutrition: Yes Reg 2gm Na diet IVFs: 1.5 amps HCO3 in D5% @ Bowel Movement: Pending Bowel Regimen: None needed at this time Insulin: SSI PRN 
 
ANTIBIOTICS Antibiotics: 
Meropenem T/L/D Tubes: None Lines: Peripheral IV and Mediport - accessed Drains: None SPECIAL EQUIPMENT None DISPOSITION Stay in ICU CRITICAL CARE CONSULTANT NOTE I had a face to face encounter with the patient, reviewed and interpreted patient data including clinical events, labs, images, vital signs, I/O's, and examined patient. I have discussed the case and the plan and management of the patient's care with the consulting services, the bedside nurses and the respiratory therapist.   
 

## 2020-03-06 NOTE — PROGRESS NOTES
Transported to ICU room 16 on monitor and O2 therapy. RN accompanied. Tolerated with no distress. Transferred to ICU bed and care released to [facility].

## 2020-03-06 NOTE — ROUTINE PROCESS
Spoke with [address] [facility] supervisor about status of bed in intensive care. Updated on patient status at this time.

## 2020-03-06 NOTE — PROGRESS NOTES
[facility] 
 
ICU TEAM Progress Note Name: Beth Silverio : 1940 MRN: [id number] Date: 3/6/2020 Assessment:  
  
ICU Problems: 
Septic shock related to- Bacteremia + GNR Acute on CKD Stage II Acute hypoxic respiratory failure Sleep Apnea Hypothyroidism Anemia, iron deficient, suspect chronic Thrombocytopenia, 
Coagulopathy Hypokalemia - replaced Hx HTN Hx of Right leg thrombus - on no anticoagulation at home PVL negative 3/5/2020 Hx glaucoma and cataracts Hx Esophagus CA 
  
ICU Comprehensive Plan of Care:  
Plans for this Shift:  
 
1. Continue antibiotics with Merrem, following cultures and adjust ABX according to results. , Continue IVF with NS at 100 mL/hour per nephrology repeat chest x-ray. Hold off on diuresis for now in the setting of hypotension on vasopressor therapy. Remains on Levophed at 10 MCG/M. Vasopressin stopped this afternoon. Use Mediport for central line access. ID following, Recheck Blood cultures in am 3/7/20. 
2. Continue supplemental O2 as required. Titrate FiO2 to maintain SPO2 greater than 90%. Will repeat chest x-ray. 3. Transfused 1 unit PRBCs on 3/5/2020. Following H&H, additional transfusion of indicated for hemoglobin less than 7. Hematology consulted . check occult stool. Continue Protonix IV BID. Following Coags and platelets 4. Nephrology consulted and following. 5. Recheck BMP post potassium replacement 6. SSI PRN /Prevent Hypoglycemia 7. Continue Synthroid,  
8. Hold home B/P medications, watch for rebound tachycardia 9. Purewick for Strict I&Os, watch for decreased UOP 10. Follow up CT abd/pelvis pending Discussed with RN Discussed with Dr. Chari Munoz Completed:  Pending 
  
ABCDEF Bundle/Checklist 
Pain Medications: Acetaminophen Target RASS: N/A Sedation Medications: None CAM-ICU:  Negative Mobility: Bedrest 
PT/OT: Will consult once appropriate Restraints: None needed at this time Discussed Plan of Care (goals of care): Yes Addressed Code Status: Full Code 
  
CARDIOVASCULAR Cardiac Gtts: None SBP Goal of: > 90 mmHg MAP Goal of: > 65 mmHg Transfusion Trigger (Hgb): <7 g/dL 
  
RESPIRATORY Vent Goals: N/A 
DVT Prophylaxis (if no, list reason): SCD's or Sequential Compression Device SPO2 Goal: > 92% Pulmonary toilet: Duo-Nebs  
  
GI/ Ha Catheter Present: No 
GI Prophylaxis: Protonix (pantoprazole) Nutrition: Yes Reg 2gm Na diet IVFs:  Normal saline Bowel Movement: Pending Bowel Regimen: None needed at this time Insulin: SSI PRN 
  
ANTIBIOTICS Antibiotics: 
Meropenem 
  
T/L/D Tubes: None Lines: Peripheral IV and Mediport - accessed Drains: None 
  
SPECIAL EQUIPMENT None 
  
DISPOSITION Stay in ICU 
  
 
Subjective:  
Progress Note: 3/6/2020 Reason for ICU Admission: The patient is a 68-year-old female with a past medical history of adenocarcinoma of the esophagus, anemia, arthritis, chronic kidney disease, diverticulosis, esophageal dysphagia, hypertension, sleep apnea, hypothyroidism who presented to the ED via EMS with a complaints of shortness of breath today, decreased appetite for a week, decreased activity, lethargy and generalized weakness x 2 days. Per documentation family stated that the patient had an attempt at freezing of her tumor last week, but has been slowly declining since then. In the ED an ABG was done that showed patient with Hypoxia on room air. Patient was placed on NC with improvement in oxygenation. Patient has a Mediport in right chest wall. Labs and blood cultures were sent. Blood cultures came back positive with GNR in 4/4 bottles. Suspected source is Mediport. Chest xray with mild congestion and U/A clean. No sputum production. Patient was started on zosyn in the ED, was transferred to the intermediate care unit.  While on the unit patient had soft B/P that declined to systolic's of 70-80's. Hospitalist then consulted critical care for ICU admission Interval events/plan summary: Additional blood culture bottles back positive with gram-negative rods also, awaiting final identification and sensitivity. ID following with possible need for Mediport removal going forward. Nephrology following. Remains on vasopressors with Levophed 10 mcg/minute. Will repeat chest x-ray. Continue IV fluids for now per nephrology, consider reducing/stopping based on chest x-ray. No diuresis with hypotension on vasopressor therapy with Levophed. Will repeat INR, following platelets. POD:* No surgery found * Active Problem List:  
 
Problem List  Date Reviewed: 3/5/2020 Codes Class * (Principal) Acute respiratory failure (HCC) ICD-10-CM: J96.00 
ICD-9-CM: 518.81 Adenocarcinoma of esophagus (HCC) ICD-10-CM: C15.9 ICD-9-CM: 150.9 Esophageal dysphagia ICD-10-CM: R13.10 ICD-9-CM: 787.20 Abnormal x-ray of abdomen ICD-10-CM: R93.5 ICD-9-CM: 793.6 Overview Signed 5/22/2019 12:56 PM by Ashley Sanders MD  
  Bas showed food and irregular stricture above ge junction in addition to large hiatal hernia  5.20.2019 History of colon polyps ICD-10-CM: Z86.010 
ICD-9-CM: V12.72 Overview Signed 6/1/2018  7:15 AM by Ashley Sanders MD  
  2013 tubular adenoma Past Medical History:  
 
 has a past medical history of Abnormal x-ray of abdomen (5/22/2019), Adenocarcinoma of esophagus ([facility] [address] (5/30/2019), Anemia, Arthritis, Chronic kidney disease, Diverticulosis, Esophageal dysphagia (5/22/2019), H/O colonoscopy with polypectomy, History of colon polyps (6/1/2018), Hypertension, Ill-defined condition, Other ill-defined conditions(799.89), Sleep apnea, Thromboembolus ([address] (2015), and Thyroid disease.  
 
Past Surgical History:  
 
 has a past surgical history that includes hx hysterectomy; hx heent (1984); colonoscopy,remv lesn,snare (6/1/2018); colonoscopy (N/A, 6/1/2018); hx cataract removal (Bilateral, 2017); upper gi endoscopy,biopsy (5/22/2019); upper gi endoscopy,ball dil,30mm (5/22/2019); upper gi endoscopy,ball dil,30mm (5/30/2019); upper gi endoscopy,biopsy (5/30/2019); pr anal pressure record (6/6/2019); ir insert tunl cvc w port over 5 years (7/19/2019); and upper gi endoscopy,biopsy (10/17/2019). Home Medications:  
 
Prior to Admission medications Medication Sig Start Date End Date Taking? Authorizing Provider  
carvediloL (COREG) 12.5 mg tablet Take 12.5 mg by mouth two (2) times daily (with meals). Yes Provider, Historical  
chlorthalidone (HYGROTEN) 25 mg tablet Take 25 mg by mouth daily. Yes Provider, Historical  
ferrous sulfate 324 mg (65 mg iron) tablet Take 324 mg by mouth two (2) times daily (with meals). Yes Provider, Historical  
omeprazole (PRILOSEC OTC) 20 mg tablet Take 20 mg by mouth daily. Yes Provider, Historical  
sodium bicarbonate 650 mg tablet Take 650 mg by mouth two (2) times a day. Yes Provider, Historical  
capecitabine (XELODA) 500 mg tablet 1,500 mg two (2) times a day. X 14 days then 7 days off   Yes Provider, Historical  
cloNIDine HCl (CATAPRES) 0.1 mg tablet Take 0.2 mg by mouth two (2) times a day. Yes Provider, Historical  
folic acid-vit N3-KDK U63 (FOLBEE) 2.5-25-1 mg tablet Take 1 Tab by mouth daily. Yes Provider, Historical  
iron bisgly,ps-FA-B-C#12-succ 65 mg-65 mg -1,000 mcg (24) tab Take 1 Tab by mouth daily. Yes Provider, Historical  
latanoprost (XALATAN) 0.005 % ophthalmic solution Administer 1 Drop to both eyes nightly. Yes Provider, Historical  
amLODIPine (NORVASC) 10 mg tablet Take 10 mg by mouth daily. Indications: HYPERTENSION   Yes Provider, Historical  
losartan (COZAAR) 100 mg tablet Take 100 mg by mouth daily.  Indications: HYPERTENSION   Yes Provider, Historical  
 MULTIVITS W-FE,OTHER MIN/LUT (CENTRUM SILVER ULTRA WOMEN'S PO) Take 1 Tab by mouth daily. Yes Provider, Historical  
DORZOLAMIDE HCL/TIMOLOL MALEAT (DORZOLAMIDE-TIMOLOL OP) Apply 1 Drop to eye three (3) times daily. One drop to each eye twice daily    Yes Provider, Historical  
brimonidine (ALPHAGAN) 0.2 % ophthalmic solution Administer 1 Drop to both eyes three (3) times daily. Yes Provider, Historical  
acetaminophen (TYLENOL EXTRA STRENGTH) 500 mg tablet Take 1,000 mg by mouth every six (6) hours as needed. Indications: ARTHRITIC PAIN, PAIN   Yes Provider, Historical  
levothyroxine (SYNTHROID) 100 mcg tablet Take 100 mcg by mouth Daily (before breakfast). Indications: HYPOTHYROIDISM   Yes Provider, Historical  
 
 
Allergies/Social/Family History: No Known Allergies Social History Tobacco Use  Smoking status: Never Smoker  Smokeless tobacco: Never Used Substance Use Topics  Alcohol use: Not Currently Comment: in her 19's History reviewed. No pertinent family history. Review of Systems:  
 
Does report fatigue and shortness of breath. Denies increased cough or sputum. Denies chest pain, palpitations, or worsening edema. Denies nausea, vomiting, or diarrhea. Otherwise negative. Objective:  
Vital Signs: 
Visit Vitals BP 96/61 Pulse 65 Temp 99.1 °F (37.3 °C) Resp 25 Ht 5' 8\" (1.727 m) Wt 112.5 kg (248 lb 0.3 oz) SpO2 97% Breastfeeding No  
BMI 37.71 kg/m² O2 Flow Rate (L/min): 2 l/min O2 Device: Nasal cannula Temp (24hrs), Av.7 °F (37.1 °C), Min:97.5 °F (36.4 °C), Max:100 °F (37.8 °C) Intake/Output:  
 
Intake/Output Summary (Last 24 hours) at 3/6/2020 1658 Last data filed at 3/6/2020 1600 Gross per 24 hour Intake 4033.59 ml Output 1000 ml Net 3033.59 ml Physical Exam: 
 
General:  alert, fatigued, cooperative, no distress, appears stated age, Obese Eye:  conjunctivae/corneas clear. PERRL, EOM's intact. Neurologic:   Drowsy but oriented, normal mood, grossly non-focal, CN II-XII intact Lymphatic:  Cervical, supraclavicular, and axillary nodes normal.  
Neck:  normal and no erythema or exudates noted. Lungs:   Lung sounds diminished in the bases. No crackles, wheezing, or rhonchi. Heart:  regular rate and rhythm, S1, S2 normal, no murmur, click, rub or gallop Abdomen:  soft, non-tender. Bowel sounds normal. No masses,  no organomegaly Cardiovascular:  Regular rate and rhythm, S1S2 present, without murmur or extra heart sounds, pedal pulses normal and no edema Skin:  Normal. and no rash or abnormalities 
  
 
LABS AND  DATA: Personally reviewed Recent Labs 03/06/20 
1445 03/06/20 
0403 WBC 7.1 7.2 HGB 7.2* 7.4* HCT 21.8* 21.6*  
PLT 57* 61* Recent Labs 03/06/20 
0402 03/05/20 
2225  03/04/20 
2236  140   < > 138  
K 3.1* 3.1*   < > 3.3*  
 106   < > 103 CO2 23 23   < > 23 * 109*   < > 107* CREA 3.85* 4.00*   < > 4.60* * 157*   < > 173* CA 8.4* 8.1*   < > 8.3*  
MG  --   --   --  1.6 PHOS  --   --   --  5.0*  
 < > = values in this interval not displayed. Recent Labs 03/06/20 
2385 03/05/20 
9623 03/04/20 
2236 SGOT 10* 20 23 AP 57 62 69  
TP 5.5* 5.5* 6.2* ALB 2.7* 2.2* 2.5*  
GLOB 2.8 3.3 3.7 AML  --   --  81  
LPSE  --   --  363 Recent Labs 03/05/20 2225 INR 1.9* PTP 18.5* Recent Labs 03/06/20 
0532 03/04/20 
2319 PHI 7.343* 7.379 PCO2I 39.1 32.6*  
PO2I 61* 57* FIO2I 28  --   
 
Recent Labs 03/05/20 
0426 03/04/20 
2236 TROIQ <0.05 <0.05  
 
 
 
MEDS: Reviewed Chest X-Ray: CXR Results  (Last 48 hours) 03/05/20 1711  XR CHEST PORT Final result Impression:  IMPRESSION: Mild central congestion Narrative:  EXAM: XR CHEST PORT INDICATION: Heart Failure COMPARISON: Prior day FINDINGS: A portable AP radiograph of the chest was obtained at 1658 hours.  The  
 patient is on a cardiac monitor. There is mild central pulmonary vascular  
congestion. A Port-A-Cath terminates at the cavoatrial junction. The cardiac and  
mediastinal contours are stable. The bones and soft tissues are grossly within  
normal limits. 03/04/20 2321  XR CHEST PORT Final result Impression:  IMPRESSION:  
No acute process. Narrative:  INDICATION: Chest pain EXAM:  AP CHEST RADIOGRAPH  
   
COMPARISON: May 20, 2019 FINDINGS:  
   
AP portable view of the chest demonstrates right internal jugular Port-A-Cath  
with tip over the distal SVC. Heart size upper normal. There is no edema,  
effusion, consolidation, or pneumothorax. The osseous structures are  
unremarkable. ECHO: 
 
 
Multidisciplinary Rounds Completed:  yes CRITICAL CARE CONSULTANT NOTE I had a face to face encounter with the patient, reviewed and interpreted patient data including clinical events, labs, images, vital signs, I/O's, and examined patient. I have discussed the case and the plan and management of the patient's care with the consulting services, the bedside nurses and the respiratory therapist.   
 
NOTE OF PERSONAL INVOLVEMENT IN CARE This patient has a high probability of imminent, clinically significant deterioration, which requires the highest level of preparedness to intervene urgently. I participated in the decision-making and personally managed or directed the management of the following life and organ supporting interventions that required my frequent assessment to treat or prevent imminent deterioration. I personally spent 30 minutes of critical care time. This is time spent at this critically ill patient's bedside actively involved in patient care as well as the coordination of care and discussions with the patient's family. This does not include any procedural time which has been billed separately. Pineda Kenyon [facility] Critical Care 3/6/2020

## 2020-03-06 NOTE — PROGRESS NOTES
Pt systolic and diastolic pressures low throughout the day ranging systolic 63S-29T and diastolic high 02W to 26Q. Dr. Jamari Vanegas notified. Jamari Vanegas ordered albumin and asked to be notified if MAP dropped lower than 60. MAP dropped to 56 at 1900. Message sent to physician on call, Dr. Мария Cesar. Jamari Vanegas ordered albumin and a transfer to ICU. Will continue to monitor. Awaiting transfer.

## 2020-03-06 NOTE — ROUTINE PROCESS
TRANSFER - OUT REPORT: 
 
Verbal report given to Lidia HO(name) on Kristyn Florian  being transferred to ICU(unit) for change in patient condition(Hypotension r/t sepsis) Report consisted of patients Situation, Background, Assessment and  
Recommendations(SBAR). Information from the following report(s) SBAR, Kardex, MAR, Recent Results and Cardiac Rhythm NSR c PVCs was reviewed with the receiving nurse. Lines:  
Venous Access Device [employer] [facility] RSQ#[id number] 07/19/19 Upper chest (subclavicular area, right (Active) Venous Access Device port a cath (power ports) Upper chest (subclavicular area, right (Active) Criteria for Appropriate Use Limited/no vessel suitable for conventional peripheral access 3/5/2020  5:28 PM  
Site Assessment Clean, dry, & intact 3/5/2020  5:28 PM  
Dressing Status Clean, dry, & intact 3/5/2020  5:28 PM  
Dressing Type Disk with Chlorhexadine gluconate (CHG) 3/5/2020  5:28 PM  
Action Taken Other (comment) 3/5/2020  5:28 PM  
Date Accessed (Medial Site) 03/05/20 3/5/2020  5:28 PM  
Access Time (Medial Site) 1720 3/5/2020  5:28 PM  
Access Needle Size (Site #1) 20 G 3/5/2020  5:28 PM  
Access Needle Length (Medial Site) 0.75 inches 3/5/2020  5:28 PM  
Positive Blood Return (Medial Site) Yes 3/5/2020  5:28 PM  
Alcohol Cap Used Yes 3/5/2020  5:28 PM  
   
Peripheral IV 03/04/20 Left External jugular (Active) Site Assessment Clean, dry, & intact 3/5/2020  4:18 PM  
Phlebitis Assessment 0 3/5/2020  4:18 PM  
Infiltration Assessment 0 3/5/2020  4:18 PM  
Dressing Status Clean, dry, & intact 3/5/2020  4:18 PM  
Dressing Type Transparent;Tape 3/5/2020  4:18 PM  
Hub Color/Line Status Green; Infusing 3/5/2020  4:18 PM  
Action Taken Open ports on tubing capped; Tubing changed 3/5/2020  4:18 PM  
Alcohol Cap Used Yes 3/5/2020  4:18 PM  
  
 
Opportunity for questions and clarification was provided. Patient transported with: 
 Monitor Registered Nurse

## 2020-03-06 NOTE — PROGRESS NOTES
0025 TRANSFER - IN REPORT: 
 
Verbal report received from Snehal Sotelo RN(name) on Asher Whelan  being received from [facility]) for change in patient condition(hypotension) Report consisted of patients Situation, Background, Assessment and  
Recommendations(SBAR). Information from the following report(s) SBAR, Kardex, Intake/Output, MAR, Recent Results, Cardiac Rhythm NSR, Alarm Parameters  and Dual Neuro Assessment was reviewed with the receiving nurse. Opportunity for questions and clarification was provided. Assessment completed upon patients arrival to unit and care assumed. Primary Nurse Randy Michaels and Cristina Becerra RN performed a dual skin assessment on this patient No impairment noted Tomy score is 12. Hyperpigmentation and excoriation noted on sacrum. Foam dressing applied. Scar noted on RLE, excoriation in groin. 0630 RN attempted PIV insertion x 5, no success. Marianna Friday, NP notified. Order for PICC placed. 0730 Bedside and Verbal shift change report given to Wojciech Riley RN (oncoming nurse) by Keith Borden RN (offgoing nurse). Report included the following information SBAR, Kardex, Intake/Output, MAR, Recent Results, Cardiac Rhythm NSR, Alarm Parameters  and Dual Neuro Assessment.

## 2020-03-06 NOTE — PROGRESS NOTES
Day #1 of Merrem Indication:  Bloodstream infection, Patient is immunocompromised with underlying cancer/chemotherapy and has hypotension, blood cultures grew gram-negative rods 4 out of 4 bottles. Current regimen: 1 gram q12h Abx regimen: [place] Recent Labs 20 
2225 20 
0426 20 
2236 WBC  --  5.9 6.9 CREA 4.00* 4.39* 4.60* * 108* 107* Est CrCl: 15 ml/min Temp (24hrs), Av.4 °F (36.9 °C), Min:98.1 °F (36.7 °C), Max:98.6 °F (37 °C) Cultures: blood Plan: Change to 500 mg q12h

## 2020-03-07 LAB
ANION GAP SERPL CALC-SCNC: 9 MMOL/L (ref 5–15)
BACTERIA SPEC CULT: ABNORMAL
BACTERIA SPEC CULT: NORMAL
BACTERIA SPEC CULT: NORMAL
BASOPHILS # BLD: 0 K/UL (ref 0–0.1)
BASOPHILS NFR BLD: 0 % (ref 0–1)
BUN SERPL-MCNC: 102 MG/DL (ref 6–20)
BUN/CREAT SERPL: 30 (ref 12–20)
CALCIUM SERPL-MCNC: 8.3 MG/DL (ref 8.5–10.1)
CHLORIDE SERPL-SCNC: 109 MMOL/L (ref 97–108)
CO2 SERPL-SCNC: 24 MMOL/L (ref 21–32)
CREAT SERPL-MCNC: 3.41 MG/DL (ref 0.55–1.02)
DIFFERENTIAL METHOD BLD: ABNORMAL
EOSINOPHIL # BLD: 0.1 K/UL (ref 0–0.4)
EOSINOPHIL NFR BLD: 1 % (ref 0–7)
ERYTHROCYTE [DISTWIDTH] IN BLOOD BY AUTOMATED COUNT: 18.5 % (ref 11.5–14.5)
GLUCOSE BLD STRIP.AUTO-MCNC: 158 MG/DL (ref 65–100)
GLUCOSE BLD STRIP.AUTO-MCNC: 164 MG/DL (ref 65–100)
GLUCOSE BLD STRIP.AUTO-MCNC: 178 MG/DL (ref 65–100)
GLUCOSE BLD STRIP.AUTO-MCNC: 207 MG/DL (ref 65–100)
GLUCOSE SERPL-MCNC: 129 MG/DL (ref 65–100)
HCT VFR BLD AUTO: 22.7 % (ref 35–47)
HEMOCCULT STL QL: POSITIVE
HGB BLD-MCNC: 7.4 G/DL (ref 11.5–16)
IMM GRANULOCYTES # BLD AUTO: 0 K/UL
IMM GRANULOCYTES NFR BLD AUTO: 0 %
INR PPP: 1.8 (ref 0.9–1.1)
LYMPHOCYTES # BLD: 0.2 K/UL (ref 0.8–3.5)
LYMPHOCYTES NFR BLD: 2 % (ref 12–49)
MAGNESIUM SERPL-MCNC: 2.1 MG/DL (ref 1.6–2.4)
MCH RBC QN AUTO: 31.4 PG (ref 26–34)
MCHC RBC AUTO-ENTMCNC: 32.6 G/DL (ref 30–36.5)
MCV RBC AUTO: 96.2 FL (ref 80–99)
MONOCYTES # BLD: 0.5 K/UL (ref 0–1)
MONOCYTES NFR BLD: 6 % (ref 5–13)
NEUTS SEG # BLD: 8 K/UL (ref 1.8–8)
NEUTS SEG NFR BLD: 91 % (ref 32–75)
NRBC # BLD: 0 K/UL (ref 0–0.01)
NRBC BLD-RTO: 0 PER 100 WBC
PHOSPHATE SERPL-MCNC: 4.2 MG/DL (ref 2.6–4.7)
PLATELET # BLD AUTO: 67 K/UL (ref 150–400)
PMV BLD AUTO: 11.8 FL (ref 8.9–12.9)
POTASSIUM SERPL-SCNC: 3.5 MMOL/L (ref 3.5–5.1)
PROTHROMBIN TIME: 17.6 SEC (ref 9–11.1)
RBC # BLD AUTO: 2.36 M/UL (ref 3.8–5.2)
RBC MORPH BLD: ABNORMAL
SERVICE CMNT-IMP: ABNORMAL
SERVICE CMNT-IMP: NORMAL
SODIUM SERPL-SCNC: 142 MMOL/L (ref 136–145)
WBC # BLD AUTO: 8.8 K/UL (ref 3.6–11)

## 2020-03-07 PROCEDURE — C9113 INJ PANTOPRAZOLE SODIUM, VIA: HCPCS | Performed by: NURSE PRACTITIONER

## 2020-03-07 PROCEDURE — 82962 GLUCOSE BLOOD TEST: CPT

## 2020-03-07 PROCEDURE — 77030018798 HC PMP KT ENTRL FED COVD -A

## 2020-03-07 PROCEDURE — 82272 OCCULT BLD FECES 1-3 TESTS: CPT

## 2020-03-07 PROCEDURE — 74011250636 HC RX REV CODE- 250/636: Performed by: NURSE PRACTITIONER

## 2020-03-07 PROCEDURE — 85610 PROTHROMBIN TIME: CPT

## 2020-03-07 PROCEDURE — 74011000250 HC RX REV CODE- 250: Performed by: INTERNAL MEDICINE

## 2020-03-07 PROCEDURE — 74011000250 HC RX REV CODE- 250: Performed by: NURSE PRACTITIONER

## 2020-03-07 PROCEDURE — 74011000250 HC RX REV CODE- 250: Performed by: EMERGENCY MEDICINE

## 2020-03-07 PROCEDURE — 74011250636 HC RX REV CODE- 250/636: Performed by: EMERGENCY MEDICINE

## 2020-03-07 PROCEDURE — 77030038269 HC DRN EXT URIN PURWCK BARD -A

## 2020-03-07 PROCEDURE — 84100 ASSAY OF PHOSPHORUS: CPT

## 2020-03-07 PROCEDURE — 74011000258 HC RX REV CODE- 258: Performed by: NURSE PRACTITIONER

## 2020-03-07 PROCEDURE — 74011250636 HC RX REV CODE- 250/636: Performed by: INTERNAL MEDICINE

## 2020-03-07 PROCEDURE — 83735 ASSAY OF MAGNESIUM: CPT

## 2020-03-07 PROCEDURE — 74011636637 HC RX REV CODE- 636/637: Performed by: NURSE PRACTITIONER

## 2020-03-07 PROCEDURE — 80048 BASIC METABOLIC PNL TOTAL CA: CPT

## 2020-03-07 PROCEDURE — 85025 COMPLETE CBC W/AUTO DIFF WBC: CPT

## 2020-03-07 PROCEDURE — 74011250637 HC RX REV CODE- 250/637: Performed by: INTERNAL MEDICINE

## 2020-03-07 PROCEDURE — 74011000258 HC RX REV CODE- 258: Performed by: EMERGENCY MEDICINE

## 2020-03-07 PROCEDURE — 74011000258 HC RX REV CODE- 258: Performed by: INTERNAL MEDICINE

## 2020-03-07 PROCEDURE — 36415 COLL VENOUS BLD VENIPUNCTURE: CPT

## 2020-03-07 PROCEDURE — 65610000006 HC RM INTENSIVE CARE

## 2020-03-07 RX ADMIN — DORZOLAMIDE HYDROCHLORIDE 1 DROP: 20 SOLUTION/ DROPS OPHTHALMIC at 15:57

## 2020-03-07 RX ADMIN — SODIUM CHLORIDE 100 ML/HR: 900 INJECTION, SOLUTION INTRAVENOUS at 07:21

## 2020-03-07 RX ADMIN — MAGNESIUM SULFATE HEPTAHYDRATE 2 G: 40 INJECTION, SOLUTION INTRAVENOUS at 00:12

## 2020-03-07 RX ADMIN — BRIMONIDINE TARTRATE 1 DROP: 2 SOLUTION OPHTHALMIC at 08:34

## 2020-03-07 RX ADMIN — INSULIN LISPRO 3 UNITS: 100 INJECTION, SOLUTION INTRAVENOUS; SUBCUTANEOUS at 11:48

## 2020-03-07 RX ADMIN — CEFTRIAXONE SODIUM 2 G: 2 INJECTION, POWDER, FOR SOLUTION INTRAMUSCULAR; INTRAVENOUS at 17:02

## 2020-03-07 RX ADMIN — Medication 10 ML: at 22:10

## 2020-03-07 RX ADMIN — SODIUM CHLORIDE 40 MG: 9 INJECTION INTRAMUSCULAR; INTRAVENOUS; SUBCUTANEOUS at 04:24

## 2020-03-07 RX ADMIN — NOREPINEPHRINE BITARTRATE 25 MCG/MIN: 1 INJECTION, SOLUTION, CONCENTRATE INTRAVENOUS at 01:22

## 2020-03-07 RX ADMIN — INSULIN LISPRO 2 UNITS: 100 INJECTION, SOLUTION INTRAVENOUS; SUBCUTANEOUS at 06:31

## 2020-03-07 RX ADMIN — Medication 667 MG: at 08:32

## 2020-03-07 RX ADMIN — SODIUM CHLORIDE 100 ML/HR: 900 INJECTION, SOLUTION INTRAVENOUS at 17:00

## 2020-03-07 RX ADMIN — LATANOPROST 1 DROP: 50 SOLUTION OPHTHALMIC at 22:11

## 2020-03-07 RX ADMIN — BRIMONIDINE TARTRATE 1 DROP: 2 SOLUTION OPHTHALMIC at 15:57

## 2020-03-07 RX ADMIN — MICONAZOLE NITRATE 2 % TOPICAL POWDER: at 08:35

## 2020-03-07 RX ADMIN — BISACODYL 5 MG: 5 TABLET, COATED ORAL at 05:06

## 2020-03-07 RX ADMIN — DORZOLAMIDE HYDROCHLORIDE 1 DROP: 20 SOLUTION/ DROPS OPHTHALMIC at 22:10

## 2020-03-07 RX ADMIN — Medication 667 MG: at 16:50

## 2020-03-07 RX ADMIN — BRIMONIDINE TARTRATE 1 DROP: 2 SOLUTION OPHTHALMIC at 22:11

## 2020-03-07 RX ADMIN — INSULIN LISPRO 2 UNITS: 100 INJECTION, SOLUTION INTRAVENOUS; SUBCUTANEOUS at 00:11

## 2020-03-07 RX ADMIN — LEVOTHYROXINE SODIUM 100 MCG: 0.1 TABLET ORAL at 06:32

## 2020-03-07 RX ADMIN — NOREPINEPHRINE BITARTRATE 15 MCG/MIN: 1 INJECTION, SOLUTION, CONCENTRATE INTRAVENOUS at 07:52

## 2020-03-07 RX ADMIN — Medication 40 ML: at 13:02

## 2020-03-07 RX ADMIN — DORZOLAMIDE HYDROCHLORIDE 1 DROP: 20 SOLUTION/ DROPS OPHTHALMIC at 08:34

## 2020-03-07 RX ADMIN — TIMOLOL MALEATE 1 DROP: 5 SOLUTION/ DROPS OPHTHALMIC at 17:02

## 2020-03-07 RX ADMIN — Medication 667 MG: at 11:41

## 2020-03-07 RX ADMIN — MICONAZOLE NITRATE 2 % TOPICAL POWDER: at 17:01

## 2020-03-07 RX ADMIN — Medication 10 ML: at 06:37

## 2020-03-07 RX ADMIN — MEROPENEM 500 MG: 500 INJECTION, POWDER, FOR SOLUTION INTRAVENOUS at 00:02

## 2020-03-07 RX ADMIN — NOREPINEPHRINE BITARTRATE 10 MCG/MIN: 1 INJECTION, SOLUTION, CONCENTRATE INTRAVENOUS at 15:55

## 2020-03-07 RX ADMIN — VASOPRESSIN 0.04 UNITS/MIN: 20 INJECTION INTRAVENOUS at 03:45

## 2020-03-07 RX ADMIN — MEROPENEM 500 MG: 500 INJECTION, POWDER, FOR SOLUTION INTRAVENOUS at 12:59

## 2020-03-07 RX ADMIN — Medication 10 ML: at 13:02

## 2020-03-07 RX ADMIN — SODIUM CHLORIDE 40 MG: 9 INJECTION INTRAMUSCULAR; INTRAVENOUS; SUBCUTANEOUS at 16:50

## 2020-03-07 RX ADMIN — INSULIN LISPRO 2 UNITS: 100 INJECTION, SOLUTION INTRAVENOUS; SUBCUTANEOUS at 17:20

## 2020-03-07 RX ADMIN — TIMOLOL MALEATE 1 DROP: 5 SOLUTION/ DROPS OPHTHALMIC at 08:34

## 2020-03-07 NOTE — PROGRESS NOTES
[facility] 
 
ICU TEAM Progress Note Name: Tito Clay : 1940 MRN: [id number] Date: 3/7/2020 Assessment:  
  
ICU Problems: 
Septic shock related to- Bacteremia + GNR Acute on CKD Stage IV Acute hypoxic respiratory failure Sleep Apnea Hypothyroidism Anemia, iron deficient, suspect chronic Thrombocytopenia, 
Coagulopathy Hypokalemia - replaced Hx HTN Hx of Right leg thrombus - on no anticoagulation at home PVL negative 3/5/2020 Hx glaucoma and cataracts Hx Esophagus CA 
  
ICU Comprehensive Plan of Care:  
Plans for this Shift:  
 
1. Continue antibiotics with Merrem, following cultures and adjust ABX according to results. ID following. 2. Continue fluid resuscitation with NS at 100 mL/hour per nephrology. Hold off on diuresis for now in the setting of hypotension on vasopressor therapy. Remains on Levophed at 15 MCG/M. Vasopressin stopped. 3. Use Mediport for central line access but remove tomorrow (3/8) and get new central line placed. Tunneled catheter so order placed for IR. 4. Continue supplemental O2 as required. Titrate FiO2 to maintain SPO2 greater than 90%. 5. Nephrology consulted and following. 6. Repeat blood cultures on 3/8/20 per ID. 7. SSI PRN/Prevent Hypoglycemia 8. Continue Synthroid,  
9. Hold home B/P medications, watch for rebound tachycardia 10. Purewick for Strict I&Os, watch for decreased UOP 11. Follow up CT abd/pelvis pending Discussed with RN and Dr. Cathy Ornelas. 
  
ABCDEF Bundle/Checklist 
Pain Medications: Acetaminophen Target RASS: N/A Sedation Medications: None CAM-ICU:  Negative Mobility: Bedrest 
PT/OT: Will consult once appropriate Restraints: None needed at this time Discussed Plan of Care (goals of care): Yes Addressed Code Status: Full Code 
  
CARDIOVASCULAR Cardiac Gtts: None SBP Goal of: > 90 mmHg MAP Goal of: > 65 mmHg Transfusion Trigger (Hgb): <7 g/dL 
  
RESPIRATORY Vent Goals:  N/A 
 DVT Prophylaxis (if no, list reason): SCD's or Sequential Compression Device SPO2 Goal: > 92% Pulmonary toilet: Duo-Nebs  
  
GI/ Ha Catheter Present: No 
GI Prophylaxis: Protonix (pantoprazole) Nutrition: Yes Reg 2gm Na diet IVFs:  Normal saline Bowel Movement: Pending Bowel Regimen: None needed at this time Insulin: SSI PRN 
  
ANTIBIOTICS Antibiotics:  Meropenem 
  
T/L/D Tubes: None Lines: Peripheral IV and Mediport - accessed Drains: None 
  
SPECIAL EQUIPMENT None 
  
DISPOSITION Stay in ICU 
  
 
Subjective:  
Progress Note: 3/7/2020 Reason for ICU Admission: The patient is a 72-year-old female with a past medical history of adenocarcinoma of the esophagus, anemia, arthritis, chronic kidney disease, diverticulosis, esophageal dysphagia, hypertension, sleep apnea, hypothyroidism who presented to the ED via EMS with a complaints of shortness of breath today, decreased appetite for a week, decreased activity, lethargy and generalized weakness x 2 days. Per documentation family stated that the patient had an attempt at freezing of her tumor last week, but has been slowly declining since then. In the ED an ABG was done that showed patient with Hypoxia on room air. Patient was placed on NC with improvement in oxygenation. Patient has a Mediport in right chest wall. Labs and blood cultures were sent. Blood cultures came back positive with GNR in 4/4 bottles. Suspected source is Mediport. Chest xray with mild congestion and U/A clean. No sputum production. Patient was started on zosyn in the ED, was transferred to the intermediate care unit. While on the unit patient had soft B/P that declined to systolic's of 14-30'L. Hospitalist then consulted critical care for ICU admission. She remains on levophed. Vasopressin has been weaned off. Today she has generalized myalgias and a nonproductive cough. She denies pain.   She remains on levophed and continues with fluid resuscitation at 100 ml/hr. Bps remain in the 90s. She is in SR with frequent PVCs and PACs. Active Problem List:  
 
Problem List  Date Reviewed: 3/5/2020 Codes Class * (Principal) Acute respiratory failure (HCC) ICD-10-CM: J96.00 
ICD-9-CM: 518.81 Adenocarcinoma of esophagus (HCC) ICD-10-CM: C15.9 ICD-9-CM: 150.9 Esophageal dysphagia ICD-10-CM: R13.10 ICD-9-CM: 787.20 Abnormal x-ray of abdomen ICD-10-CM: R93.5 ICD-9-CM: 793.6 Overview Signed 5/22/2019 12:56 PM by Nighat Flores MD  
  Bas showed food and irregular stricture above ge junction in addition to large hiatal hernia  5.20.2019 History of colon polyps ICD-10-CM: Z86.010 
ICD-9-CM: V12.72 Overview Signed 6/1/2018  7:15 AM by Nighat Flores MD  
  2013 tubular adenoma Past Medical History:  
 
 has a past medical history of Abnormal x-ray of abdomen (5/22/2019), Adenocarcinoma of esophagus ([facility] [address] (5/30/2019), Anemia, Arthritis, Chronic kidney disease, Diverticulosis, Esophageal dysphagia (5/22/2019), H/O colonoscopy with polypectomy, History of colon polyps (6/1/2018), Hypertension, Ill-defined condition, Other ill-defined conditions(799.89), Sleep apnea, Thromboembolus ([facility] [address] (2015), and Thyroid disease. Past Surgical History:  
 
 has a past surgical history that includes hx hysterectomy; hx heent (1984); colonoscopy,remv lesn,snare (6/1/2018); colonoscopy (N/A, 6/1/2018); hx cataract removal (Bilateral, 2017); upper gi endoscopy,biopsy (5/22/2019); upper gi endoscopy,ball dil,30mm (5/22/2019); upper gi endoscopy,ball dil,30mm (5/30/2019); upper gi endoscopy,biopsy (5/30/2019); pr anal pressure record (6/6/2019); ir insert tunl cvc w port over 5 years (7/19/2019); and upper gi endoscopy,biopsy (10/17/2019). Home Medications:  
 
Prior to Admission medications Medication Sig Start Date End Date Taking? Authorizing Provider carvediloL (COREG) 12.5 mg tablet Take 12.5 mg by mouth two (2) times daily (with meals). Yes Provider, Historical  
chlorthalidone (HYGROTEN) 25 mg tablet Take 25 mg by mouth daily. Yes Provider, Historical  
ferrous sulfate 324 mg (65 mg iron) tablet Take 324 mg by mouth two (2) times daily (with meals). Yes Provider, Historical  
omeprazole (PRILOSEC OTC) 20 mg tablet Take 20 mg by mouth daily. Yes Provider, Historical  
sodium bicarbonate 650 mg tablet Take 650 mg by mouth two (2) times a day. Yes Provider, Historical  
capecitabine (XELODA) 500 mg tablet 1,500 mg two (2) times a day. X 14 days then 7 days off   Yes Provider, Historical  
cloNIDine HCl (CATAPRES) 0.1 mg tablet Take 0.2 mg by mouth two (2) times a day. Yes Provider, Historical  
folic acid-vit X2-HZP N79 (FOLBEE) 2.5-25-1 mg tablet Take 1 Tab by mouth daily. Yes Provider, Historical  
iron bisgly,ps-FA-B-C#12-succ 65 mg-65 mg -1,000 mcg (24) tab Take 1 Tab by mouth daily. Yes Provider, Historical  
latanoprost (XALATAN) 0.005 % ophthalmic solution Administer 1 Drop to both eyes nightly. Yes Provider, Historical  
amLODIPine (NORVASC) 10 mg tablet Take 10 mg by mouth daily. Indications: HYPERTENSION   Yes Provider, Historical  
losartan (COZAAR) 100 mg tablet Take 100 mg by mouth daily. Indications: HYPERTENSION   Yes Provider, Historical  
MULTIVITS W-FE,OTHER MIN/LUT (CENTRUM SILVER ULTRA WOMEN'S PO) Take 1 Tab by mouth daily. Yes Provider, Historical  
DORZOLAMIDE HCL/TIMOLOL MALEAT (DORZOLAMIDE-TIMOLOL OP) Apply 1 Drop to eye three (3) times daily. One drop to each eye twice daily    Yes Provider, Historical  
brimonidine (ALPHAGAN) 0.2 % ophthalmic solution Administer 1 Drop to both eyes three (3) times daily. Yes Provider, Historical  
acetaminophen (TYLENOL EXTRA STRENGTH) 500 mg tablet Take 1,000 mg by mouth every six (6) hours as needed.  Indications: ARTHRITIC PAIN, PAIN   Yes Provider, Historical  
 levothyroxine (SYNTHROID) 100 mcg tablet Take 100 mcg by mouth Daily (before breakfast). Indications: HYPOTHYROIDISM   Yes Provider, Historical  
 
 
Allergies/Social/Family History: No Known Allergies Social History Tobacco Use  Smoking status: Never Smoker  Smokeless tobacco: Never Used Substance Use Topics  Alcohol use: Not Currently Comment: in her 19's History reviewed. No pertinent family history. Review of Systems:  
 
Negative other than what is described in the HPI. Objective:  
Vital Signs: 
Visit Vitals /65 Pulse 74 Temp 98.3 °F (36.8 °C) Resp (!) 36 Ht 5' 8\" (1.727 m) Wt 112.5 kg (248 lb 0.3 oz) SpO2 97% Breastfeeding No  
BMI 37.71 kg/m² O2 Flow Rate (L/min): 2 l/min O2 Device: Nasal cannula Temp (24hrs), Av.3 °F (36.8 °C), Min:97.2 °F (36.2 °C), Max:99.6 °F (37.6 °C) Intake/Output:  
 
Intake/Output Summary (Last 24 hours) at 3/7/2020 1632 Last data filed at 3/7/2020 1400 Gross per 24 hour Intake 3031.22 ml Output 500 ml Net 2531.22 ml Physical Exam: 
 
General:  alert, cooperative, no distress, appears stated age, obese. Eye:  conjunctivae/corneas clear. PERRL, EOM's intact. Neurologic:   Drowsy but oriented, normal mood, follows commands. Neck:  normal and no erythema or exudates noted. Lungs:   Lung sounds coarse. Heart:  regular rate and rhythm with frequent PVCs and PACs. Abdomen:  Soft, non-tender. Bowel sounds normal. No masses,  no organomegaly Skin:  Normal, no rash or abnormalities 
  
 
LABS AND  DATA: Personally reviewed Recent Labs 20 
0413 20 
1445 WBC 8.8 7.1 HGB 7.4* 7.2* HCT 22.7* 21.8* PLT 67* 57* Recent Labs 20 
0413 20 
2209  140  
K 3.5 3.6 * 110* CO2 24 24 * 110* CREA 3.41* 3.53* * 138* CA 8.3* 8.2* MG 2.1 1.6 PHOS 4.2 4.0 Recent Labs 20 
4810 20 
5650 20 
7772 SGOT 10* 20 23 AP 57 62 69  
TP 5.5* 5.5* 6.2* ALB 2.7* 2.2* 2.5*  
GLOB 2.8 3.3 3.7 AML  --   --  81  
LPSE  --   --  363 Recent Labs 03/07/20 
0413 03/05/20 
2225 INR 1.8* 1.9* PTP 17.6* 18.5* Recent Labs 03/06/20 
0524 03/04/20 
2319 PHI 7.343* 7.379 PCO2I 39.1 32.6*  
PO2I 61* 57* FIO2I 28  --   
 
Recent Labs 03/05/20 
0426 03/04/20 
2236 TROIQ <0.05 <0.05  
 
MEDS: Reviewed Chest X-Ray: CXR Results  (Last 48 hours) 03/06/20 1724  XR CHEST PORT Final result Impression:  IMPRESSION: Left pleural effusion and overlying airspace opacity which may  
reflect atelectasis. Trace right pleural effusion. Narrative:  EXAM: XR CHEST PORT INDICATION: respiratory failure COMPARISON: Chest x-ray 3/5/2020 and CT thorax 3/5/2020. FINDINGS: A portable AP radiograph of the chest was obtained at 17:03 hours. The  
patient is on a cardiac monitor. There is left pleural effusion and overlying  
left airspace opacity. There is a trace right pleural effusion appears The right  
lung is clear. Port-A-Cath remains in expected position without significant  
change in position of the tip at the atriocaval junction. The cardiac and  
mediastinal contours and pulmonary vascularity are normal.  The bones and soft  
tissues are grossly within normal limits. 03/05/20 1711  XR CHEST PORT Final result Impression:  IMPRESSION: Mild central congestion Narrative:  EXAM: XR CHEST PORT INDICATION: Heart Failure COMPARISON: Prior day FINDINGS: A portable AP radiograph of the chest was obtained at 1658 hours. The  
patient is on a cardiac monitor. There is mild central pulmonary vascular  
congestion. A Port-A-Cath terminates at the cavoatrial junction. The cardiac and  
mediastinal contours are stable. The bones and soft tissues are grossly within  
normal limits. ECHO: 3/5/20 · Normal cavity size, systolic function (ejection fraction normal) and diastolic function. Severe concentric hypertrophy. Estimated left ventricular ejection fraction is 60 - 65%. No regional wall motion abnormality noted. Normal left ventricular strain. · Image quality for this study was suboptimal. 
· Mild to moderate tricuspid valve regurgitation is present. · Moderate pulmonary hypertension. · Mildly dilated left atrium. · Aortic valve sclerosis with no significant stenosis. Mild aortic valve regurgitation is present. CRITICAL CARE CONSULTANT NOTE I had a face to face encounter with the patient, reviewed and interpreted patient data including clinical events, labs, images, vital signs, I/O's, and examined patient. I have discussed the case and the plan and management of the patient's care with the consulting services, the bedside nurses and the respiratory therapist.   
 
NOTE OF PERSONAL INVOLVEMENT IN CARE This patient has a high probability of imminent, clinically significant deterioration, which requires the highest level of preparedness to intervene urgently. I participated in the decision-making and personally managed or directed the management of the following life and organ supporting interventions that required my frequent assessment to treat or prevent imminent deterioration. I personally spent 30 minutes of critical care time. This is time spent at this critically ill patient's bedside actively involved in patient care as well as the coordination of care and discussions with the patient's family. This does not include any procedural time which has been billed separately. IRAM Laird-BC, TALISHA [facility] Critical Care 
3/7/2020

## 2020-03-07 NOTE — PROGRESS NOTES
Nephrology Progress Note Zabrina Gandhi Date of Admission : 3/4/2020 CC: Follow up for SONIDO Assessment and Plan SONIDO on CKD: 
- 2/2 ATN due to Gram Negative Sepsis, Hypotension  
- continue  IV NS at 100cc/hr 
- daily labs 
- no urgent need for RRT, made more urine - pressors to keep MAP > 65 CKD Stage IV  
- 2/2 ADPKD, HTN  
- baseline Creatinine : 2 mg/ dl  
- followed by Dr Romeo Javed  
  
Hypokalemia: 
- repletion ordered 
  
GNR bacteremia w/ sepsis - per Primary team  
- may need port removed if no infectious source identified 
  
Esophageal Ca Hypothyroidism EMILY Interval History: 
Seen and examined. Off CPAP,  Appears comfortable. UOP > 900cc. Ton levophed , off vasopressin Current Medications: all current  Medications have been eviewed in [facility] Review of Systems: Pertinent items are noted in HPI. Objective: 
Vitals:   
Vitals:  
 03/07/20 1200 03/07/20 1230 03/07/20 1300 03/07/20 1400 BP: 120/75 100/68 110/66 99/62 Pulse: 74 75 72 69 Resp: 24 (!) 37 (!) 36 25 Temp: 98.3 °F (36.8 °C) SpO2: 98% 95% 94% 96% Weight:      
Height:      
 
Intake and Output: 
03/07 0701 - 03/07 1900 In: 1006.7 [I.V.:1006.7] Out: 100 [Urine:100] 03/05 1901 - 03/07 0700 In: 5397.7 [I.V.:4697.7] Out: 1200 [Urine:1200] Physical Examination: 
Pt intubated     No 
General: Awake on CPAP Neck:  Supple, no mass Resp:  Reduced bibasilar breath sounds CV:  RRR,  no murmur or rub, no LE edema GI:  Soft, NT, + Bowel sounds, no hepatosplenomegaly Neurologic:  Non focal 
Psych:             AAO x 3 appropriate affect Skin:  No Rash :  No cuello [x]    High complexity decision making was performed 
[x]    Patient is at high-risk of decompensation with multiple organ involvement Lab Data Personally Reviewed: I have reviewed all the pertinent labs, microbiology data and radiology studies during assessment. Recent Labs 03/07/20 
0413 03/06/20 
2209 03/06/20 3134 03/05/20 
2225 03/05/20 
9292 03/04/20 2236  140 141 140 137 138  
K 3.5 3.6 3.1* 3.1* 3.1* 3.3*  
* 110* 107 106 104 103 CO2 24 24 23 23 20* 23 * 138* 167* 157* 174* 173* * 110* 108* 109* 108* 107* CREA 3.41* 3.53* 3.85* 4.00* 4.39* 4.60* CA 8.3* 8.2* 8.4* 8.1* 7.9* 8.3*  
MG 2.1 1.6  --   --   --  1.6 PHOS 4.2 4.0  --   --   --  5.0* ALB  --   --  2.7*  --  2.2* 2.5* SGOT  --   --  10*  --  20 23 ALT  --   --  19  --  22 25 INR 1.8*  --   --  1.9*  --   --   
 
Recent Labs 03/07/20 
0413 03/06/20 
1445 03/06/20 
1441 03/06/20 
0403 WBC 8.8 7.1  --  7.2 HGB 7.4* 7.2*  --  7.4* HCT 22.7* 21.8* 20.2* 21.6* PLT 67* 57*  --  61* No results found for: SDES Lab Results Component Value Date/Time Culture result:  03/06/2020 09:15 AM  
  MRSA NOT PRESENT. Apparent Staphylococus aureus (not MRSA noted). Culture result:  03/06/2020 09:15 AM  
      Screening of patient nares for MRSA is for surveillance purposes and, if positive, to facilitate isolation considerations in high risk settings. It is not intended for automatic decolonization interventions per se as regimens are not sufficiently effective to warrant routine use. Culture result: (A) 03/06/2020 01:13 AM  
  ESCHERICHIA COLI GROWING IN 2 OF 4 BOTTLES DRAWN , EACH FROM A DIFFERENT SITE. .. LAC AND RT. WRIST Culture result:  03/06/2020 01:13 AM  
  PLEASE REFER TO PREVIOUS BLOOD CULTURE 
[id number], COLLECTED 3/4/20 FOR SENSITIVITIES Culture result: REMAINING BOTTLE(S) HAS/HAVE NO GROWTH SO FAR 03/06/2020 01:13 AM  
 
Recent Results (from the past 24 hour(s)) GLUCOSE, POC Collection Time: 03/06/20  5:12 PM  
Result Value Ref Range Glucose (POC) 172 (H) 65 - 100 mg/dL Performed by Bernell Stai METABOLIC PANEL, BASIC Collection Time: 03/06/20 10:09 PM  
Result Value Ref Range Sodium 140 136 - 145 mmol/L  Potassium 3.6 3.5 - 5.1 mmol/L  
 Chloride 110 (H) 97 - 108 mmol/L  
 CO2 24 21 - 32 mmol/L Anion gap 6 5 - 15 mmol/L Glucose 138 (H) 65 - 100 mg/dL  (H) 6 - 20 MG/DL Creatinine 3.53 (H) 0.55 - 1.02 MG/DL  
 BUN/Creatinine ratio 31 (H) 12 - 20 GFR est AA 15 (L) >60 ml/min/1.73m2 GFR est non-AA 12 (L) >60 ml/min/1.73m2 Calcium 8.2 (L) 8.5 - 10.1 MG/DL MAGNESIUM Collection Time: 03/06/20 10:09 PM  
Result Value Ref Range Magnesium 1.6 1.6 - 2.4 mg/dL PHOSPHORUS Collection Time: 03/06/20 10:09 PM  
Result Value Ref Range Phosphorus 4.0 2.6 - 4.7 MG/DL  
GLUCOSE, POC Collection Time: 03/07/20 12:08 AM  
Result Value Ref Range Glucose (POC) 164 (H) 65 - 100 mg/dL Performed by Shannon Salas MAGNESIUM Collection Time: 03/07/20  4:13 AM  
Result Value Ref Range Magnesium 2.1 1.6 - 2.4 mg/dL PHOSPHORUS Collection Time: 03/07/20  4:13 AM  
Result Value Ref Range Phosphorus 4.2 2.6 - 4.7 MG/DL  
METABOLIC PANEL, BASIC Collection Time: 03/07/20  4:13 AM  
Result Value Ref Range Sodium 142 136 - 145 mmol/L Potassium 3.5 3.5 - 5.1 mmol/L Chloride 109 (H) 97 - 108 mmol/L  
 CO2 24 21 - 32 mmol/L Anion gap 9 5 - 15 mmol/L Glucose 129 (H) 65 - 100 mg/dL  (H) 6 - 20 MG/DL Creatinine 3.41 (H) 0.55 - 1.02 MG/DL  
 BUN/Creatinine ratio 30 (H) 12 - 20 GFR est AA 16 (L) >60 ml/min/1.73m2 GFR est non-AA 13 (L) >60 ml/min/1.73m2 Calcium 8.3 (L) 8.5 - 10.1 MG/DL  
CBC WITH AUTOMATED DIFF Collection Time: 03/07/20  4:13 AM  
Result Value Ref Range WBC 8.8 3.6 - 11.0 K/uL  
 RBC 2.36 (L) 3.80 - 5.20 M/uL HGB 7.4 (L) 11.5 - 16.0 g/dL HCT 22.7 (L) 35.0 - 47.0 % MCV 96.2 80.0 - 99.0 FL  
 MCH 31.4 26.0 - 34.0 PG  
 MCHC 32.6 30.0 - 36.5 g/dL  
 RDW 18.5 (H) 11.5 - 14.5 % PLATELET 67 (L) 829 - 400 K/uL MPV 11.8 8.9 - 12.9 FL  
 NRBC 0.0 0  WBC ABSOLUTE NRBC 0.00 0.00 - 0.01 K/uL NEUTROPHILS 91 (H) 32 - 75 % LYMPHOCYTES 2 (L) 12 - 49 % MONOCYTES 6 5 - 13 % EOSINOPHILS 1 0 - 7 % BASOPHILS 0 0 - 1 % IMMATURE GRANULOCYTES 0 %  
 ABS. NEUTROPHILS 8.0 1.8 - 8.0 K/UL  
 ABS. LYMPHOCYTES 0.2 (L) 0.8 - 3.5 K/UL  
 ABS. MONOCYTES 0.5 0.0 - 1.0 K/UL  
 ABS. EOSINOPHILS 0.1 0.0 - 0.4 K/UL  
 ABS. BASOPHILS 0.0 0.0 - 0.1 K/UL  
 ABS. IMM. GRANS. 0.0 K/UL  
 DF MANUAL    
 RBC COMMENTS ANISOCYTOSIS 1+ 
    
 RBC COMMENTS OVALOCYTES PRESENT 
    
 RBC COMMENTS HYPOCHROMIA 1+ PROTHROMBIN TIME + INR Collection Time: 03/07/20  4:13 AM  
Result Value Ref Range INR 1.8 (H) 0.9 - 1.1 Prothrombin time 17.6 (H) 9.0 - 11.1 sec GLUCOSE, POC Collection Time: 03/07/20  6:29 AM  
Result Value Ref Range Glucose (POC) 158 (H) 65 - 100 mg/dL Performed by Erica Shelton, POC Collection Time: 03/07/20 11:40 AM  
Result Value Ref Range Glucose (POC) 207 (H) 65 - 100 mg/dL Performed by MD Michele [facility] [address], [address] [facility] Phone - [phone] Fax - [phone] 
[email or web address]

## 2020-03-07 NOTE — PROGRESS NOTES
1930 Bedside and Verbal shift change report given to Andreia Ross RN (oncoming nurse) by Arnulfo Saunders RN (offgoing nurse). Report included the following information SBAR, Kardex, Intake/Output, MAR, Recent Results, Cardiac Rhythm NSR, Alarm Parameters  and Dual Neuro Assessment. 2130 Patient transported to [address] w/ RN and tech. Tolerated well, no issues. Shift Summary: Patient AAO x 4, follows commands. SUN spontaneously w/ generalized weakness. Patient c/o constipation but feeling the need to defecate, PRN dulcolax given x 1. Drips:   
Levophed @ 15 mcg/min Vasopressin @ 0.04 units/min 
 
0730 Bedside and Verbal shift change report given to Danielle Bess RN (oncoming nurse) by Andreia Ross RN (offgoing nurse). Report included the following information SBAR, Kardex, Intake/Output, MAR, Recent Results, Cardiac Rhythm NSR, Alarm Parameters  and Dual Neuro Assessment.

## 2020-03-07 NOTE — PROGRESS NOTES
Infectious Diseases Progress Note ANTIBIOTIC SUMMARY: 
Zosyn  3/5 x 1 dose Meropenem 3/6 -- present Subjective: No new symptoms ROS: 
Constitutional: no fever or chills HEENT: no HA Skin: no pruritis Resp: no cough or SOB 
CV: no CP 
GI: no abd pain, N, V, D 
: no dysuria Objective:  
 
Vitals:  
Visit Vitals BP (!) 86/55 Pulse 63 Temp 97.2 °F (36.2 °C) Resp 22 Ht 5' 8\" (1.727 m) Wt 112.5 kg (248 lb 0.3 oz) SpO2 99% Breastfeeding No  
BMI 37.71 kg/m² Tmax:  Temp (24hrs), Av.8 °F (37.1 °C), Min:97.2 °F (36.2 °C), Max:100 °F (37.8 °C) Exam:  General appearance: no distress Neck: supple Chest wall: right Portacath pocket benign Lungs: clear to auscultation bilaterally Heart: regular rate and rhythm; on Levophed Abdomen: soft, non-tender. Bowel sounds normal. No masses,  no organomegaly Skin: no rash Neurologic: Grossly normal 
 
IV Lines: Right Portacath POA Labs:   
Recent Labs 20 
2209 20 
1445 20 
0403 20 
0402 20 
2225 20 
1616 20 
0426 20 
2236 WBC  --  7.1 7.2  --  4.1  --  5.9 6.9 HGB  --  7.2* 7.4*  --  6.1* 7.0* 6.9* 7.3*  
PLT  --  57* 61*  --  52*  --  65* 66*  
*  --   --  108* 109*  --  108* 107* CREA 3.53*  --   --  3.85* 4.00*  --  4.39* 4.60* TBILI  --   --   --  1.2*  --   --  1.0 1.1*  
SGOT  --   --   --  10*  --   --  20 23 AP  --   --   --  57  --   --  62 69 BLOOD CULTURES: 
 3/4 = GNRs in 4 of 4 bottles 3/6 = GNRs in 2 of 4 bottles CT scan abd & pelvis = polycystic disease Assessment:  
 
1. GN bacteremia -- source not idetified: I cannot exclude the possibility of infection of a liver or kidney cyst. However, with positive blood cultures on 3/4 & 3/6, would increase concern for Portacath infection. I would suggest removal of the Portacath. 
  
2. Adenocarcinoma of the esophagus 
  
3. Elevated creatinine -- CKD vs CKD with acute exacerbation 4. Polcystic disease -- multiple cysts in liver and kidneys 
  
5. EMILY 
  
6. Hypothyroidism 
  
7. HTN 
  
8. Glaucoma Plan: 1. Continue Meropenem pending further culture data 2. Would favor removal of the Portacath 3. Repeat blood cultures 3/8 I'll check the patient again on Monday. Please call if problems arise over the weekend. Group will check cultures Mirtha Webb MD

## 2020-03-08 ENCOUNTER — APPOINTMENT (OUTPATIENT)
Dept: INTERVENTIONAL RADIOLOGY/VASCULAR | Age: 80
DRG: 871 | End: 2020-03-08
Payer: MEDICARE

## 2020-03-08 LAB
ANION GAP SERPL CALC-SCNC: 7 MMOL/L (ref 5–15)
BASOPHILS # BLD: 0 K/UL (ref 0–0.1)
BASOPHILS NFR BLD: 0 % (ref 0–1)
BUN SERPL-MCNC: 91 MG/DL (ref 6–20)
BUN/CREAT SERPL: 30 (ref 12–20)
CALCIUM SERPL-MCNC: 8.8 MG/DL (ref 8.5–10.1)
CHLORIDE SERPL-SCNC: 111 MMOL/L (ref 97–108)
CO2 SERPL-SCNC: 25 MMOL/L (ref 21–32)
CREAT SERPL-MCNC: 2.99 MG/DL (ref 0.55–1.02)
DIFFERENTIAL METHOD BLD: ABNORMAL
EOSINOPHIL # BLD: 0 K/UL (ref 0–0.4)
EOSINOPHIL NFR BLD: 0 % (ref 0–7)
ERYTHROCYTE [DISTWIDTH] IN BLOOD BY AUTOMATED COUNT: 18.6 % (ref 11.5–14.5)
GLUCOSE BLD STRIP.AUTO-MCNC: 124 MG/DL (ref 65–100)
GLUCOSE BLD STRIP.AUTO-MCNC: 136 MG/DL (ref 65–100)
GLUCOSE BLD STRIP.AUTO-MCNC: 136 MG/DL (ref 65–100)
GLUCOSE BLD STRIP.AUTO-MCNC: 138 MG/DL (ref 65–100)
GLUCOSE SERPL-MCNC: 126 MG/DL (ref 65–100)
HCT VFR BLD AUTO: 23.2 % (ref 35–47)
HGB BLD-MCNC: 7.5 G/DL (ref 11.5–16)
IMM GRANULOCYTES # BLD AUTO: 0 K/UL
IMM GRANULOCYTES NFR BLD AUTO: 0 %
LYMPHOCYTES # BLD: 0.1 K/UL (ref 0.8–3.5)
LYMPHOCYTES NFR BLD: 1 % (ref 12–49)
MAGNESIUM SERPL-MCNC: 2 MG/DL (ref 1.6–2.4)
MCH RBC QN AUTO: 31.3 PG (ref 26–34)
MCHC RBC AUTO-ENTMCNC: 32.3 G/DL (ref 30–36.5)
MCV RBC AUTO: 96.7 FL (ref 80–99)
MONOCYTES # BLD: 0.6 K/UL (ref 0–1)
MONOCYTES NFR BLD: 7 % (ref 5–13)
NEUTS SEG # BLD: 7.8 K/UL (ref 1.8–8)
NEUTS SEG NFR BLD: 92 % (ref 32–75)
NRBC # BLD: 0 K/UL (ref 0–0.01)
NRBC BLD-RTO: 0 PER 100 WBC
PHOSPHATE SERPL-MCNC: 4.1 MG/DL (ref 2.6–4.7)
PLATELET # BLD AUTO: 85 K/UL (ref 150–400)
PLATELET COMMENTS,PCOM: ABNORMAL
PMV BLD AUTO: 11.8 FL (ref 8.9–12.9)
POTASSIUM SERPL-SCNC: 3.6 MMOL/L (ref 3.5–5.1)
RBC # BLD AUTO: 2.4 M/UL (ref 3.8–5.2)
RBC MORPH BLD: ABNORMAL
RBC MORPH BLD: ABNORMAL
SERVICE CMNT-IMP: ABNORMAL
SODIUM SERPL-SCNC: 143 MMOL/L (ref 136–145)
WBC # BLD AUTO: 8.5 K/UL (ref 3.6–11)

## 2020-03-08 PROCEDURE — 94660 CPAP INITIATION&MGMT: CPT

## 2020-03-08 PROCEDURE — 82962 GLUCOSE BLOOD TEST: CPT

## 2020-03-08 PROCEDURE — C9113 INJ PANTOPRAZOLE SODIUM, VIA: HCPCS | Performed by: NURSE PRACTITIONER

## 2020-03-08 PROCEDURE — 36556 INSERT NON-TUNNEL CV CATH: CPT

## 2020-03-08 PROCEDURE — 77030011893 HC TY CUT DN TRIS -B

## 2020-03-08 PROCEDURE — 74011000258 HC RX REV CODE- 258: Performed by: EMERGENCY MEDICINE

## 2020-03-08 PROCEDURE — 74011000250 HC RX REV CODE- 250: Performed by: EMERGENCY MEDICINE

## 2020-03-08 PROCEDURE — 02HV33Z INSERTION OF INFUSION DEVICE INTO SUPERIOR VENA CAVA, PERCUTANEOUS APPROACH: ICD-10-PCS | Performed by: RADIOLOGY

## 2020-03-08 PROCEDURE — 77030031139 HC SUT VCRL2 J&J -A

## 2020-03-08 PROCEDURE — 74011250636 HC RX REV CODE- 250/636: Performed by: INTERNAL MEDICINE

## 2020-03-08 PROCEDURE — 36589 REMOVAL TUNNELED CV CATH: CPT

## 2020-03-08 PROCEDURE — 74011000258 HC RX REV CODE- 258: Performed by: INTERNAL MEDICINE

## 2020-03-08 PROCEDURE — 87040 BLOOD CULTURE FOR BACTERIA: CPT

## 2020-03-08 PROCEDURE — 77030010507 HC ADH SKN DERMBND J&J -B

## 2020-03-08 PROCEDURE — 74011000250 HC RX REV CODE- 250: Performed by: ANESTHESIOLOGY

## 2020-03-08 PROCEDURE — 84100 ASSAY OF PHOSPHORUS: CPT

## 2020-03-08 PROCEDURE — 85025 COMPLETE CBC W/AUTO DIFF WBC: CPT

## 2020-03-08 PROCEDURE — 74011250636 HC RX REV CODE- 250/636: Performed by: RADIOLOGY

## 2020-03-08 PROCEDURE — 83735 ASSAY OF MAGNESIUM: CPT

## 2020-03-08 PROCEDURE — 5A09357 ASSISTANCE WITH RESPIRATORY VENTILATION, LESS THAN 24 CONSECUTIVE HOURS, CONTINUOUS POSITIVE AIRWAY PRESSURE: ICD-10-PCS | Performed by: INTERNAL MEDICINE

## 2020-03-08 PROCEDURE — 0JPT0WZ REMOVAL OF TOTALLY IMPLANTABLE VASCULAR ACCESS DEVICE FROM TRUNK SUBCUTANEOUS TISSUE AND FASCIA, OPEN APPROACH: ICD-10-PCS | Performed by: RADIOLOGY

## 2020-03-08 PROCEDURE — 74011250636 HC RX REV CODE- 250/636: Performed by: NURSE PRACTITIONER

## 2020-03-08 PROCEDURE — 65610000006 HC RM INTENSIVE CARE

## 2020-03-08 PROCEDURE — 77030002996 HC SUT SLK J&J -A

## 2020-03-08 PROCEDURE — 36415 COLL VENOUS BLD VENIPUNCTURE: CPT

## 2020-03-08 PROCEDURE — 02PYX3Z REMOVAL OF INFUSION DEVICE FROM GREAT VESSEL, EXTERNAL APPROACH: ICD-10-PCS | Performed by: RADIOLOGY

## 2020-03-08 PROCEDURE — 74011250637 HC RX REV CODE- 250/637: Performed by: INTERNAL MEDICINE

## 2020-03-08 PROCEDURE — C1894 INTRO/SHEATH, NON-LASER: HCPCS

## 2020-03-08 PROCEDURE — C1751 CATH, INF, PER/CENT/MIDLINE: HCPCS

## 2020-03-08 PROCEDURE — 80048 BASIC METABOLIC PNL TOTAL CA: CPT

## 2020-03-08 PROCEDURE — 77030002986 HC SUT PROL J&J -A

## 2020-03-08 PROCEDURE — 0JH63XZ INSERTION OF TUNNELED VASCULAR ACCESS DEVICE INTO CHEST SUBCUTANEOUS TISSUE AND FASCIA, PERCUTANEOUS APPROACH: ICD-10-PCS | Performed by: RADIOLOGY

## 2020-03-08 PROCEDURE — 74011000250 HC RX REV CODE- 250: Performed by: NURSE PRACTITIONER

## 2020-03-08 RX ORDER — LIDOCAINE HYDROCHLORIDE AND EPINEPHRINE 10; 10 MG/ML; UG/ML
1.5 INJECTION, SOLUTION INFILTRATION; PERINEURAL ONCE
Status: COMPLETED | OUTPATIENT
Start: 2020-03-08 | End: 2020-03-08

## 2020-03-08 RX ORDER — SODIUM CHLORIDE 9 MG/ML
25 INJECTION, SOLUTION INTRAVENOUS CONTINUOUS
Status: DISCONTINUED | OUTPATIENT
Start: 2020-03-08 | End: 2020-03-08

## 2020-03-08 RX ORDER — SODIUM CHLORIDE 0.9 % (FLUSH) 0.9 %
SYRINGE (ML) INJECTION
Status: COMPLETED
Start: 2020-03-08 | End: 2020-03-08

## 2020-03-08 RX ORDER — MIDAZOLAM HYDROCHLORIDE 1 MG/ML
5 INJECTION, SOLUTION INTRAMUSCULAR; INTRAVENOUS
Status: DISCONTINUED | OUTPATIENT
Start: 2020-03-08 | End: 2020-03-08

## 2020-03-08 RX ORDER — FENTANYL CITRATE 50 UG/ML
100 INJECTION, SOLUTION INTRAMUSCULAR; INTRAVENOUS
Status: DISCONTINUED | OUTPATIENT
Start: 2020-03-08 | End: 2020-03-08

## 2020-03-08 RX ORDER — LIDOCAINE HYDROCHLORIDE 20 MG/ML
20 INJECTION, SOLUTION INFILTRATION; PERINEURAL ONCE
Status: COMPLETED | OUTPATIENT
Start: 2020-03-08 | End: 2020-03-08

## 2020-03-08 RX ADMIN — LIDOCAINE HYDROCHLORIDE,EPINEPHRINE BITARTRATE 10 ML: 10; .01 INJECTION, SOLUTION INFILTRATION; PERINEURAL at 13:02

## 2020-03-08 RX ADMIN — BRIMONIDINE TARTRATE 1 DROP: 2 SOLUTION OPHTHALMIC at 18:18

## 2020-03-08 RX ADMIN — SODIUM CHLORIDE 40 MG: 9 INJECTION INTRAMUSCULAR; INTRAVENOUS; SUBCUTANEOUS at 04:59

## 2020-03-08 RX ADMIN — SODIUM CHLORIDE 25 ML/HR: 900 INJECTION, SOLUTION INTRAVENOUS at 12:28

## 2020-03-08 RX ADMIN — Medication 10 ML: at 21:24

## 2020-03-08 RX ADMIN — FENTANYL CITRATE 25 MCG: 50 INJECTION INTRAMUSCULAR; INTRAVENOUS at 13:19

## 2020-03-08 RX ADMIN — Medication 10 ML: at 20:21

## 2020-03-08 RX ADMIN — DORZOLAMIDE HYDROCHLORIDE 1 DROP: 20 SOLUTION/ DROPS OPHTHALMIC at 18:13

## 2020-03-08 RX ADMIN — MICONAZOLE NITRATE 2 % TOPICAL POWDER: at 09:17

## 2020-03-08 RX ADMIN — ACETAMINOPHEN 650 MG: 325 TABLET ORAL at 18:18

## 2020-03-08 RX ADMIN — Medication 10 ML: at 05:02

## 2020-03-08 RX ADMIN — FENTANYL CITRATE 25 MCG: 50 INJECTION INTRAMUSCULAR; INTRAVENOUS at 12:43

## 2020-03-08 RX ADMIN — BRIMONIDINE TARTRATE 1 DROP: 2 SOLUTION OPHTHALMIC at 21:24

## 2020-03-08 RX ADMIN — TIMOLOL MALEATE 1 DROP: 5 SOLUTION/ DROPS OPHTHALMIC at 18:18

## 2020-03-08 RX ADMIN — NOREPINEPHRINE BITARTRATE 7 MCG/MIN: 1 INJECTION, SOLUTION, CONCENTRATE INTRAVENOUS at 10:53

## 2020-03-08 RX ADMIN — DORZOLAMIDE HYDROCHLORIDE 1 DROP: 20 SOLUTION/ DROPS OPHTHALMIC at 09:16

## 2020-03-08 RX ADMIN — MIDAZOLAM 1 MG: 1 INJECTION INTRAMUSCULAR; INTRAVENOUS at 13:20

## 2020-03-08 RX ADMIN — DORZOLAMIDE HYDROCHLORIDE 1 DROP: 20 SOLUTION/ DROPS OPHTHALMIC at 21:24

## 2020-03-08 RX ADMIN — BRIMONIDINE TARTRATE 1 DROP: 2 SOLUTION OPHTHALMIC at 09:15

## 2020-03-08 RX ADMIN — SODIUM CHLORIDE 40 MG: 9 INJECTION INTRAMUSCULAR; INTRAVENOUS; SUBCUTANEOUS at 18:19

## 2020-03-08 RX ADMIN — CEFTRIAXONE SODIUM 2 G: 2 INJECTION, POWDER, FOR SOLUTION INTRAMUSCULAR; INTRAVENOUS at 18:18

## 2020-03-08 RX ADMIN — LATANOPROST 1 DROP: 50 SOLUTION OPHTHALMIC at 21:24

## 2020-03-08 RX ADMIN — Medication 10 ML: at 05:03

## 2020-03-08 RX ADMIN — Medication 40 ML: at 14:07

## 2020-03-08 RX ADMIN — LEVOTHYROXINE SODIUM 100 MCG: 0.1 TABLET ORAL at 07:16

## 2020-03-08 RX ADMIN — MICONAZOLE NITRATE 2 % TOPICAL POWDER: at 18:19

## 2020-03-08 RX ADMIN — LIDOCAINE HYDROCHLORIDE 10 ML: 20 INJECTION, SOLUTION INFILTRATION; PERINEURAL at 13:30

## 2020-03-08 RX ADMIN — SODIUM CHLORIDE 100 ML/HR: 900 INJECTION, SOLUTION INTRAVENOUS at 18:23

## 2020-03-08 RX ADMIN — Medication 667 MG: at 14:08

## 2020-03-08 RX ADMIN — Medication 667 MG: at 18:18

## 2020-03-08 RX ADMIN — TIMOLOL MALEATE 1 DROP: 5 SOLUTION/ DROPS OPHTHALMIC at 09:16

## 2020-03-08 RX ADMIN — SODIUM CHLORIDE 100 ML/HR: 900 INJECTION, SOLUTION INTRAVENOUS at 05:05

## 2020-03-08 NOTE — CONSULTS
[address] 
[address] [facility] [address] [place], [address] 
[phone] GI CONSULTATION NOTE 
 
 
NAME:  Tito Clay :   1940 MRN:   [id number] Consult Date: 3/8/2020 Chief Complaint: anemia History of Present Illness:  Patient is a 78 y.o. F with esophageal adenocarcinoma and CKD who is seen re: anemia. She was admitted 3/4. She has evidence of GNR bacteremia and is in ICU 2/2 hypotension. Hb on admission 7.3, MCV 96. Ferritin 613. BUN 91, Cr 2.9 today. She tells me she is following with [facility] re: esophageal adenocarcinoma. She states she was on IV chemo and received radiation this fall, and recently was started on oral chemo regimen which she was taking up until this admission. She states Dr. Cirilo Najera removed esophageal stent. She denies any abdominal pain, heartburn, melena or rectal bleeding. No NSAIDs. She is having soft green stools here. Prior evaluation :  
Cscope 2018 : three polyps (6 mm-12 mm), diverticulosis, rectal prolapse EGD May 2019 Esophageal mass starting at 27 cm from the incisors- path adenocarcinoma. Also with large HH. Ulceration in antrum Esophageal stent (fully covered) was placed 2019 Repeat EGD Oct 2019 (after radiation) - esophageal stent in place, no obvious malignancy though bx of proximal esophagus \"glandular mucosa with high-grade dysplasia \" Per recent imagining 3/5 esophageal stent is no longer in place. PMH: 
Past Medical History:  
Diagnosis Date  Abnormal x-ray of abdomen 2019 Bas showed food and irregular stricture above ge junction in addition to large hiatal hernia  2019  Adenocarcinoma of esophagus ([address] 2019  Anemia  Arthritis  Chronic kidney disease Stage II followed by Dr Yelena Saleem [facility]ph[facility]  Diverticulosis  Esophageal dysphagia 2019  H/O colonoscopy with polypectomy   
 benign  History of colon polyps 2018 2013 tubular adenoma  Hypertension  Ill-defined condition   
 pulmonary hypertension  Other ill-defined conditions(799.89)   
 glaucoma and cataracts  Sleep apnea CPAP compliant, as stated 5/20/2019  Thromboembolus ([address] 2015 Right  leg , spontaneous  Thyroid disease   
 hypothyroidism PSH: 
Past Surgical History:  
Procedure Laterality Date  ANAL PRESSURE RECORD  6/6/2019  COLONOSCOPY N/A 6/1/2018 COLONOSCOPY performed by Edmund Jenkins MD at [place] [address]  6/1/2018  HX CATARACT REMOVAL Bilateral 2017  HX HEENT  1984 thyroid biopsy-benign  HX HYSTERECTOMY  IR INSERT TUNL CVC W PORT OVER 5 YEARS  7/19/2019  UPPER GI ENDOSCOPY,BALL DIL,30MM  5/22/2019  UPPER GI ENDOSCOPY,BALL DIL,30MM  5/30/2019  UPPER GI ENDOSCOPY,BIOPSY  5/22/2019  UPPER GI ENDOSCOPY,BIOPSY  5/30/2019  UPPER GI ENDOSCOPY,BIOPSY  10/17/2019 Allergies: 
No Known Allergies Home Medications: 
Prior to Admission Medications Prescriptions Last Dose Informant Patient Reported? Taking? DORZOLAMIDE HCL/TIMOLOL MALEAT (DORZOLAMIDE-TIMOLOL OP) 3/4/2020 at Unknown time  Yes Yes Sig: Apply 1 Drop to eye three (3) times daily. One drop to each eye twice daily MULTIVITS W-FE,OTHER MIN/LUT (CENTRUM SILVER ULTRA WOMEN'S PO) 2/27/2020 at Unknown time  Yes Yes Sig: Take 1 Tab by mouth daily. acetaminophen (TYLENOL EXTRA STRENGTH) 500 mg tablet 3/4/2020 at Unknown time  Yes Yes Sig: Take 1,000 mg by mouth every six (6) hours as needed. Indications: ARTHRITIC PAIN, PAIN  
amLODIPine (NORVASC) 10 mg tablet 3/4/2020 at Unknown time  Yes Yes Sig: Take 10 mg by mouth daily. Indications: HYPERTENSION  
brimonidine (ALPHAGAN) 0.2 % ophthalmic solution 3/4/2020 at Unknown time  Yes Yes Sig: Administer 1 Drop to both eyes three (3) times daily. capecitabine (XELODA) 500 mg tablet   Yes Yes Si,500 mg two (2) times a day. X 14 days then 7 days off  
carvediloL (COREG) 12.5 mg tablet   Yes Yes Sig: Take 12.5 mg by mouth two (2) times daily (with meals). chlorthalidone (HYGROTEN) 25 mg tablet   Yes Yes Sig: Take 25 mg by mouth daily. cloNIDine HCl (CATAPRES) 0.1 mg tablet 3/4/2020 at Unknown time  Yes Yes Sig: Take 0.2 mg by mouth two (2) times a day. ferrous sulfate 324 mg (65 mg iron) tablet   Yes Yes Sig: Take 324 mg by mouth two (2) times daily (with meals). folic acid-vit H9-QTR M75 (FOLBEE) 2.5-25-1 mg tablet 3/4/2020 at Unknown time  Yes Yes Sig: Take 1 Tab by mouth daily. iron bisgly,ps-FA-B-C#12-succ 65 mg-65 mg -1,000 mcg (24) tab 3/4/2020 at Unknown time  Yes Yes Sig: Take 1 Tab by mouth daily. latanoprost (XALATAN) 0.005 % ophthalmic solution 3/4/2020 at Unknown time  Yes Yes Sig: Administer 1 Drop to both eyes nightly. levothyroxine (SYNTHROID) 100 mcg tablet 3/4/2020 at Unknown time  Yes Yes Sig: Take 100 mcg by mouth Daily (before breakfast). Indications: HYPOTHYROIDISM  
losartan (COZAAR) 100 mg tablet 3/4/2020 at Unknown time  Yes Yes Sig: Take 100 mg by mouth daily. Indications: HYPERTENSION  
omeprazole (PRILOSEC OTC) 20 mg tablet   Yes Yes Sig: Take 20 mg by mouth daily. sodium bicarbonate 650 mg tablet   Yes Yes Sig: Take 650 mg by mouth two (2) times a day. Facility-Administered Medications: None Hospital Medications: 
Current Facility-Administered Medications Medication Dose Route Frequency  cefTRIAXone (ROCEPHIN) 2 g in 0.9% sodium chloride (MBP/ADV) 50 mL  2 g IntraVENous Q24H  pantoprazole (PROTONIX) 40 mg in 0.9% sodium chloride 10 mL injection  40 mg IntraVENous Q12H  
 glucose chewable tablet 16 g  4 Tab Oral PRN  
 glucagon (GLUCAGEN) injection 1 mg  1 mg IntraMUSCular PRN  
 dextrose 10% infusion 0-250 mL  0-250 mL IntraVENous PRN  
 insulin lispro (HUMALOG) injection   SubCUTAneous Q6H  
  vasopressin (VASOSTRICT) 20 Units in 0.9% sodium chloride 100 mL infusion  0.04 Units/min IntraVENous CONTINUOUS  
 0.9% sodium chloride infusion  100 mL/hr IntraVENous CONTINUOUS  
 acetaminophen (TYLENOL) tablet 650 mg  650 mg Oral Q4H PRN  
 albuterol-ipratropium (DUO-NEB) 2.5 MG-0.5 MG/3 ML  3 mL Nebulization Q4H PRN  
 brimonidine (ALPHAGAN) 0.2 % ophthalmic solution 1 Drop  1 Drop Both Eyes TID  latanoprost (XALATAN) 0.005 % ophthalmic solution 1 Drop  1 Drop Both Eyes QHS  levothyroxine (SYNTHROID) tablet 100 mcg  100 mcg Oral ACB  sodium chloride (NS) flush 5-40 mL  5-40 mL IntraVENous Q8H  
 sodium chloride (NS) flush 5-40 mL  5-40 mL IntraVENous PRN  
 ondansetron (ZOFRAN) injection 4 mg  4 mg IntraVENous Q4H PRN  
 bisacodyL (DULCOLAX) tablet 5 mg  5 mg Oral DAILY PRN  
 calcium acetate(phosphat bind) (PHOSLO) tablet 667 mg  1 Tab Oral TID WITH MEALS  dorzolamide (TRUSOPT) 2 % ophthalmic solution 1 Drop  1 Drop Both Eyes TID And  
 timolol (TIMOPTIC) 0.5 % ophthalmic solution 1 Drop  1 Drop Both Eyes BID  
 0.9% sodium chloride infusion 250 mL  250 mL IntraVENous PRN  
 miconazole (MICOTIN) 2 % powder   Topical BID  sodium chloride (NS) flush 5-40 mL  5-40 mL IntraVENous Q8H  
 sodium chloride (NS) flush 5-40 mL  5-40 mL IntraVENous PRN  
 NOREPINephrine (LEVOPHED) 8,000 mcg in dextrose 5% 250 mL infusion  2-30 mcg/min IntraVENous TITRATE Social History: 
Social History Tobacco Use  Smoking status: Never Smoker  Smokeless tobacco: Never Used Substance Use Topics  Alcohol use: Not Currently Comment: in her 19's Family History: 
History reviewed. No pertinent family history. Review of Systems: 
 
Constitutional: negative for fever, negative chills, negative weight loss Eyes:   Negative for visual changes ENT:   Negative for sore throat, tongue or lip swelling Respiratory:  Negative for cough, negative dyspnea Cards:  negative for chest pain, palpitations, lower extremity edema GI:   See HPI 
:  negative for frequency, dysuria Integument:  negative for rash and pruritus Heme:  negative for easy bruising and gum/nose bleeding Musculoskel: negative for myalgias,  back pain and muscle weakness Neuro:  negative for headaches, dizziness, vertigo Psych: negative for feelings of anxiety, depression Objective:  
 
Patient Vitals for the past 8 hrs: 
 BP Temp Pulse Resp SpO2  
03/08/20 0900 116/69  66 24 96 % 03/08/20 0800 105/71  69 (!) 32 95 % 03/08/20 0500 106/66  64 28 99 % 03/08/20 0400 100/73 97.5 °F (36.4 °C) 64 26 100 % 03/08/20 0300 118/66  (!) 58 22 100 % 03/08 0701 - 03/08 1900 In: 113.1 [I.V.:113.1] Out: - PHYSICAL EXAM: 
Gen: NAD, alert and oriented x 4 HEENT: PEERLA, EOMI Neck: supple Chest: CTA bilaterally CV: RRR, no m/g/c/r Abd: soft, NT, ND, +BS in all four quadrants; no HSM Ext: no edema Skin: no rash or lesions noted Neuro: Moving all extremities, no gross deficits Data Review Recent Labs 03/08/20 
0404 03/07/20 
0413 WBC 8.5 8.8 HGB 7.5* 7.4* HCT 23.2* 22.7*  
PLT 85* 67* Recent Labs 03/08/20 
0403 03/07/20 
0413  142  
K 3.6 3.5 * 109* CO2 25 24 BUN 91* 102* CREA 2.99* 3.41* * 129* PHOS 4.1 4.2 CA 8.8 8.3* Recent Labs 03/06/20 
0402 SGOT 10* AP 57  
TP 5.5* ALB 2.7*  
GLOB 2.8 Recent Labs 03/07/20 
0413 03/05/20 
2225 INR 1.8* 1.9* PTP 17.6* 18.5* Assessment:  
79 yo F with esophageal adenocarcinoma and CKD who is seen re: anemia. She was admitted 3/4. She has evidence of GNR bacteremia and is in ICU 2/2 hypotension. Anemia is likely multi-factorial including CKD, recent chemotherapy, known esophageal adenocarcinoma which will likely ooze (though current status of malignancy is unclear as she receives oncology care at [facility]). Of note, she does have large, complex hiatal hernia and prior small antral ulceration. She is not having any overt GI bleeding. Plan: - IV PPI q 12 
- Continue supportive care. Transfuse < 7 
- No acute plans for endoscopy. We will re-evaluate tomorrow

## 2020-03-08 NOTE — CONSULTS
[address] Name:  Silas Pham 
MR#:  [id number] :  1940 ACCOUNT #:  [de-identified] DATE OF SERVICE:  2020 LOCATION:  The patient is currently in bed 16 in the ICU. ATTENDING PHYSICIAN:  Cortney Choi MD 
 
CHIEF COMPLAINT:  Shortness of breath. REASON FOR CONSULTATION:  Positive blood cultures. The consultation was requested by Dr. Cortney Choi. The history is obtained by interview of the patient and review of the medical records. HISTORY OF PRESENT ILLNESS:  This patient is a 57-year-old woman with a history of esophageal cancer diagnosed in 2019. By her recollection, she was treated with radiation therapy and has had other procedures done as well at [address] [place] [address], but the details are not clear. She has also had chemotherapy. The patient also has a history of hypothyroidism, hypertension, chronic kidney disease, obstructive sleep apnea syndrome, and glaucoma. The patient has an indwelling right-sided Port-A-Cath. The patient presented on this occasion complaining of shortness of breath for about one week. This has been getting worse. The patient was not aware of having any subjective fever at home or shaking chills. She primarily came to the emergency room because of worsening shortness of breath. At the time of admission to the hospital, blood cultures were drawn at about 11:00 p.m. on 2020. Those blood cultures have now been reported positive, revealing gram-negative rods. The patient has had no abdominal pain, nausea, vomiting, diarrhea, or  symptoms. PAST MEDICAL HISTORY:  Tobacco none, alcohol none. MEDICATIONS PRIOR TO ADMISSION: 
1. Amlodipine 10 mg p.o. daily. 2.  Alphagan 0.2% ophthalmic solution - 1 drop in both eyes three times a day. 3.  Xeloda 1500 mg p.o. b.i.d. - 14 days on and then 7 days off. 
4.  Carvedilol 12.5 mg p.o. b.i.d. 
5.  Chlorthalidone 25 mg p.o. daily. 6.  Clonidine 0.2 mg p.o. b.i.d. 
7.  Dorzolamide-timolol ophthalmic solution - 1 drop in each eye b.i.d. 
8.  Iron sulfate 324 mg p.o. b.i.d. 9.  Folbee - one p.o. daily. 10.  Iron supplement - one p.o. daily. 11.  Xalatan 0.005% ophthalmic solution - 1 drop in each eye nightly. 12.  Levothyroxine 0.1 mg p.o. daily. 13.  Losartan 100 mg p.o. daily. 14.  Centrum Silver Ultram Women's multivitamin. 15.  Omeprazole 20 mg daily. 16.  Sodium bicarbonate 650 mg p.o. b.i.d. ALLERGIES:  NONE KNOWN. ILLNESSES: 
1. Esophageal cancer diagnosed in 04/2019. 
2.  Hypertension. 3.  Chronic kidney disease. 4.  Obstructive sleep apnea syndrome. 5.  Hypothyroidism. 6.  Glaucoma. SURGERY:  Insertion of a right-sided Port-A-Cath. SOCIAL HISTORY:  The patient lives in the [facility] area. Her grandson lives with her. FAMILY HISTORY:  Negative for esophageal cancer. REVIEW OF SYSTEMS: 
CONSTITUTIONAL:  No history of fever, chills, or sweats prior to admission. HEENT:  No headache, visual change, sore throat. LYMPHATIC:  No history of adenopathy. DERMATOLOGIC:  No recent rashes. RESPIRATORY:  No cough, pleuritic chest pain, hemoptysis. CARDIOVASCULAR:  No history of chest pain or heart murmurs. GASTROINTESTINAL:  She seems to swallow well. No odynophagia. No nausea, vomiting, abdominal pain, or diarrhea. GENITOURINARY:  No dysuria, urinary frequency, or change in the color or odor of the urine. MUSCULOSKELETAL:  No myalgias or arthralgias. NEUROLOGIC:  No history of seizure or stroke. PHYSICAL EXAMINATION: 
GENERAL:  Overweight female, in no acute distress. VITAL SIGNS:  Blood pressure is 84/60, heart rate 72, respiratory rate 20, temperature 98.4 degrees Fahrenheit. Weight 235 pounds. HEENT:  Sclerae and conjunctivae benign, oropharynx unremarkable. NECK:  Supple. NODES:  No adenopathy. SKIN:  No rashes. LUNGS:  Clear. CHEST WALL:  The right-side Port-A-Cath pocket is benign. CARDIOVASCULAR:  Regular rate and rhythm with no murmurs. ABDOMEN:  Soft, obese, nontender. No masses. Bowel sounds are present. BACK:  No CVA tenderness. PELVIC:  Exam not done. EXTREMITIES:  No cellulitis. MUSCULOSKELETAL:  Muscles nontender, joints benign. NEUROLOGIC:  Alert and oriented. LABORATORY DATA:  CBC reveals a hemoglobin of 6.1, a white count of 4100, and a platelet count of 71,597. The chemistry data reveals , creatinine 4.39, and normal liver function tests. Urinalysis on admission had 0-4 white cells and 0-5 red cells. MICROBIOLOGY DATA:  Blood culture on the night of 03/04/2020 now revealed gram-negative rods in 4/4 bottles. RADIOLOGY DATA:  Portable chest x-ray showed no infiltrates. CT scan of the chest shows atelectasis and small pleural effusions. There was a large hiatal hernia. There were cystic structures in the kidneys and the liver. IMPRESSION: 
1.  Gram-negative bacteremia - source not identified:  No definite source for gram-negative bacteremia has been identified. Because of this, I am concerned about the possibility of a Port-A-Cath infection. We also should consider an occult intra-abdominal source. 2.  Adenocarcinoma of the esophagus. 3.  Elevated creatinine - chronic kidney disease versus chronic kidney disease with an acute exacerbation. 3.  Obstructive sleep apnea syndrome. 4.  Hypothyroidism. 5.  Hypertension. 6.  Glaucoma. PLAN: 
1. Continue meropenem, pending further identification of the gram-negative mo. 2.  I would also suggest a CT scan of abdomen and pelvis with no IV contrast. 
3.  If no source for bacteremia is found, I would suggest removal of the Port-A-Cath. Thank you very much for allowing us to see this patient with you. Maria Luisa Crisostomo MD 
 
 
[id number] 
D:  03/07/2020 14:50 T:  03/07/2020 20:10 
JOB #:  [id number] CC:  Juan Watkins MD

## 2020-03-08 NOTE — PROGRESS NOTES
Transition of Care Plan: · RUR 24%   GLOS 3.8  LOS  2    
· Dx:  Septic Shock related to Bacteremia, respiratory failure · [address] for hypotension, Levophed started · Nephrology following Renal Failure, ID following cultures 
· PT/OT consults when medically appropriate · CM discussed role in providing support for discharge planning · PCP office appointment, post discharge plan Reason for Admission:   Family noted decreased appetite, decreased activity, lethargy, and general weakness, ABG revealed acute respiratory failure with hypoxia RUR Score:     24% PCP: First and Last name:  Dr. Kenia Glynn Name of Practice:  [facility] Are you a current patient: Yes/No:   Yes Approximate date of last visit:   May, 2019 Do you (patient/family) have any concerns for transition/discharge? None at this time, grandsons live with her. Family lives very close, supportive family. Plan for utilizing home health:   TBD Current Advanced Directive/Advance Care Plan:  No advance care plan on file Transition of Care Plan:       
 
Care Management Interventions PCP Verified by CM: Yes(Dr. Kenia Glynn) Last Visit to PCP: 05/07/19 Palliative Care Criteria Met (RRAT>21 & CHF Dx)?: No 
Transition of Care Consult (CM Consult): Discharge Planning(RUR 24%  RRAT 5/0/0   PT/OT when stable) MyChart Signup: No 
Discharge Durable Medical Equipment: No(Uses cane) Health Maintenance Reviewed: Yes Physical Therapy Consult: No 
Occupational Therapy Consult: No 
Speech Therapy Consult: No 
Current Support Network: Other(Her two Grandsons live with her and help her within the home, Stephen Robison and Melchor ) Caleb Quick is a 78year old to [facility] with lethargy/decreased appetite, she was hypotensive and in respiratory failure. She has adenocarcinoma of esophagus. She is alert and oriented. Verified face sheet/demographics. Payor: Medicare A & B, [employer] Uses CPAP in the home and fredrick Krueger RN, BSN, [facility] 
ED Care Management [phone]

## 2020-03-08 NOTE — PROGRESS NOTES
Report received from jyothi. 0930: rn talked to placido in radiology re: consulting dr shah to remove pt's portacath. 1400: central line placed in angio. Placement confirmed by fluro per dr shah.

## 2020-03-08 NOTE — PROGRESS NOTES
[facility] 
 
ICU TEAM Progress Note Name: Arline Osborn : 1940 MRN: [id number] Date: 3/8/2020 Summary:  
Progress Note: 3/8/2020 Reason for ICU Admission: The patient is a 66-year-old female with a past medical history of adenocarcinoma of the esophagus, anemia, arthritis, chronic kidney disease, diverticulosis, esophageal dysphagia, hypertension, sleep apnea, hypothyroidism who presented to the ED via EMS with a complaints of shortness of breath today, decreased appetite for a week, decreased activity, lethargy and generalized weakness x 2 days. Per documentation family stated that the patient had an attempt at freezing of her tumor last week, but has been slowly declining since then. In the ED an ABG was done that showed patient with Hypoxia on room air. Patient was placed on NC with improvement in oxygenation. Patient has a Mediport in right chest wall. Labs and blood cultures were sent. Blood cultures came back positive with GNR in 4/4 bottles. Suspected source is Mediport. Chest xray with mild congestion and U/A clean. No sputum production. Patient was started on zosyn in the ED, was transferred to the intermediate care unit. While on the unit patient had soft B/P that declined to systolic's of 58-32'L. Hospitalist then consulted critical care for ICU admission. She remains on levophed. Interval events/plan summary:  Continue to titrate vasopressor support with Levophed to maintain MAP greater than 65 mmHg. Currently on 7 MCG /MIN. cultures with patient positive for out of 4 bottles for E. coli resistant to fluoroquinolones. Patient remains on [address] [place] [address]. Plan for IR today for removal of Mediport and placement of new central venous line. GI was consulted for occult blood positive stool with recommendations of twice daily PPI and with no plan for urgent endoscopy.  
 
 
Assessment:  
  
ICU Problems: 
Septic shock related to- Bacteremia + GNR 
 Acute kidney injury on CKD Stage IV, related to ATN due to sepsis and hypotension Acute hypoxic respiratory failure, improving Sleep Apnea Hypothyroidism Acute on chronic anemia, stool positive for occult blood. History of esophageal cancer, gastric ulcers and prior colon polyp removal 
Thrombocytopenia Coagulopathy Hypokalemia - replaced Hx HTN Hx of Right leg thrombus - on no anticoagulation at home PVL negative 3/5/2020 Hx glaucoma and cataracts Hx Esophagus CA 
  
ICU Comprehensive Plan of Care:  
Plans for this Shift:  
 
1. Continue antibiotics with Merrem, following cultures and adjust ABX according to results. ID following. 2. Nephrology consulted and following with IVF with NS at 100 mL/hour per nephrology. Remains on Levophed at 7 MCG/M. Vasopressin off 3. Plan for IR today for removal of Mediport and placement of new central venous line. 4. Continue supplemental O2 as required. Titrate FiO2 to maintain SPO2 greater than 90%. 5. Following H&H. Will transfuse if indicated for hemoglobin less than 7. GI following. Continue twice daily Protonix. 6. Repeat blood cultures today per ID. 7. SSI PRN/Prevent Hypoglycemia 8. Continue Synthroid,  
9. Hold home B/P medications, 
10. Purewick for Strict I&Os, watch for decreased UOP 11. Follow up CT abd/pelvis noted Discussed with RN and Dr. Marissa Florian. 
  
ABCDEF Bundle/Checklist 
Pain Medications: Acetaminophen Target RASS: N/A Sedation Medications: None CAM-ICU:  Negative Mobility: Bedrest 
PT/OT: Will consult once appropriate Restraints: None needed at this time Discussed Plan of Care (goals of care): Yes Addressed Code Status: Full Code 
  
CARDIOVASCULAR Cardiac Gtts: None SBP Goal of: > 90 mmHg MAP Goal of: > 65 mmHg Transfusion Trigger (Hgb): <7 g/dL 
  
RESPIRATORY Vent Goals:  N/A 
DVT Prophylaxis (if no, list reason): SCD's or Sequential Compression Device SPO2 Goal: > 92% Pulmonary toilet: Duo-Nebs  
  
GI/ 
 Ha Catheter Present: No 
GI Prophylaxis: Protonix (pantoprazole) Nutrition: Yes Reg 2gm Na diet IVFs:  Normal saline Bowel Movement: Pending Bowel Regimen: None needed at this time Insulin: SSI PRN 
  
ANTIBIOTICS Antibiotics:  Meropenem 
  
T/L/D Tubes: None Lines: Peripheral IV and Mediport - accessed Drains: None 
  
SPECIAL EQUIPMENT None 
  
DISPOSITION Stay in ICU 
  
 
Active Problem List:  
 
Problem List  Date Reviewed: 3/5/2020 Codes Class * (Principal) Acute respiratory failure (HCC) ICD-10-CM: J96.00 
ICD-9-CM: 518.81 Adenocarcinoma of esophagus (HCC) ICD-10-CM: C15.9 ICD-9-CM: 150.9 Esophageal dysphagia ICD-10-CM: R13.10 ICD-9-CM: 787.20 Abnormal x-ray of abdomen ICD-10-CM: R93.5 ICD-9-CM: 793.6 Overview Signed 5/22/2019 12:56 PM by Luis Fernando Metcalf MD  
  Bas showed food and irregular stricture above ge junction in addition to large hiatal hernia  5.20.2019 History of colon polyps ICD-10-CM: Z86.010 
ICD-9-CM: V12.72 Overview Signed 6/1/2018  7:15 AM by Luis Fernando Metcalf MD  
  2013 tubular adenoma Past Medical History:  
 
 has a past medical history of Abnormal x-ray of abdomen (5/22/2019), [facility] (5/30/2019), Anemia, Arthritis, Chronic kidney disease, Diverticulosis, Esophageal dysphagia (5/22/2019), H/O colonoscopy with polypectomy, History of colon polyps (6/1/2018), Hypertension, Ill-defined condition, Other ill-defined conditions(799.89), Sleep apnea, Thromboembolus ([facility] (2015), and Thyroid disease.  
 
Past Surgical History:  
 
 has a past surgical history that includes hx hysterectomy; hx heent (1984); colonoscopy,jax aleman,snare (6/1/2018); colonoscopy (N/A, 6/1/2018); hx cataract removal (Bilateral, 2017); upper gi endoscopy,biopsy (5/22/2019); upper gi endoscopy,ball dil,30mm (5/22/2019); upper gi endoscopy,ball dil,30mm (5/30/2019); upper gi endoscopy,biopsy (5/30/2019); pr anal pressure record (6/6/2019); ir insert tunl cvc w port over 5 years (7/19/2019); and upper gi endoscopy,biopsy (10/17/2019). Home Medications:  
 
Prior to Admission medications Medication Sig Start Date End Date Taking? Authorizing Provider  
carvediloL (COREG) 12.5 mg tablet Take 12.5 mg by mouth two (2) times daily (with meals). Yes Provider, Historical  
chlorthalidone (HYGROTEN) 25 mg tablet Take 25 mg by mouth daily. Yes Provider, Historical  
ferrous sulfate 324 mg (65 mg iron) tablet Take 324 mg by mouth two (2) times daily (with meals). Yes Provider, Historical  
omeprazole (PRILOSEC OTC) 20 mg tablet Take 20 mg by mouth daily. Yes Provider, Historical  
sodium bicarbonate 650 mg tablet Take 650 mg by mouth two (2) times a day. Yes Provider, Historical  
capecitabine (XELODA) 500 mg tablet 1,500 mg two (2) times a day. X 14 days then 7 days off   Yes Provider, Historical  
cloNIDine HCl (CATAPRES) 0.1 mg tablet Take 0.2 mg by mouth two (2) times a day. Yes Provider, Historical  
folic acid-vit S8-QSY M51 (FOLBEE) 2.5-25-1 mg tablet Take 1 Tab by mouth daily. Yes Provider, Historical  
iron bisgly,ps-FA-B-C#12-succ 65 mg-65 mg -1,000 mcg (24) tab Take 1 Tab by mouth daily. Yes Provider, Historical  
latanoprost (XALATAN) 0.005 % ophthalmic solution Administer 1 Drop to both eyes nightly. Yes Provider, Historical  
amLODIPine (NORVASC) 10 mg tablet Take 10 mg by mouth daily. Indications: HYPERTENSION   Yes Provider, Historical  
losartan (COZAAR) 100 mg tablet Take 100 mg by mouth daily. Indications: HYPERTENSION   Yes Provider, Historical  
MULTIVITS W-FE,OTHER MIN/LUT (CENTRUM SILVER ULTRA WOMEN'S PO) Take 1 Tab by mouth daily. Yes Provider, Historical  
DORZOLAMIDE HCL/TIMOLOL MALEAT (DORZOLAMIDE-TIMOLOL OP) Apply 1 Drop to eye three (3) times daily.  One drop to each eye twice daily    Yes Provider, Historical  
 brimonidine (ALPHAGAN) 0.2 % ophthalmic solution Administer 1 Drop to both eyes three (3) times daily. Yes Provider, Historical  
acetaminophen (TYLENOL EXTRA STRENGTH) 500 mg tablet Take 1,000 mg by mouth every six (6) hours as needed. Indications: ARTHRITIC PAIN, PAIN   Yes Provider, Historical  
levothyroxine (SYNTHROID) 100 mcg tablet Take 100 mcg by mouth Daily (before breakfast). Indications: HYPOTHYROIDISM   Yes Provider, Historical  
 
 
Allergies/Social/Family History: No Known Allergies Social History Tobacco Use  Smoking status: Never Smoker  Smokeless tobacco: Never Used Substance Use Topics  Alcohol use: Not Currently Comment: in her 19's History reviewed. No pertinent family history. Review of Systems:  
 
Patient denies any pain, shortness of breath, wheezing, chest tightness, nausea, vomiting, diarrhea, or abdominal pain. Denies any other acute complaints. Objective:  
Vital Signs: 
Visit Vitals /74 Pulse 66 Temp 97.9 °F (36.6 °C) Resp (!) 33 Ht 5' 8\" (1.727 m) Wt 112.5 kg (248 lb 0.3 oz) SpO2 95% Breastfeeding No  
BMI 37.71 kg/m² O2 Flow Rate (L/min): 2 l/min O2 Device: Nasal cannula Temp (24hrs), Av.9 °F (36.6 °C), Min:97.5 °F (36.4 °C), Max:98.3 °F (36.8 °C) Intake/Output:  
 
Intake/Output Summary (Last 24 hours) at 3/8/2020 1151 Last data filed at 3/8/2020 1000 Gross per 24 hour Intake 2728.23 ml Output 800 ml Net 1928.23 ml Physical Exam: 
 
General:  alert, cooperative, no distress, appears stated age, obese. Eye:  conjunctivae/corneas clear. PERRL, EOM's intact. Neurologic:   Drowsy but oriented, normal mood, follows commands. Neck:  normal and no erythema or exudates noted. Lungs:   Lung sounds coarse Heart:  regular rate and rhythm with frequent PVCs and PACs. Abdomen:  Soft, non-tender. Bowel sounds normal. No masses,  no organomegaly Skin:  Normal, no rash or abnormalities 
  
 
 LABS AND  DATA: Personally reviewed Recent Labs 03/08/20 
0404 03/07/20 
0413 WBC 8.5 8.8 HGB 7.5* 7.4* HCT 23.2* 22.7*  
PLT 85* 67* Recent Labs 03/08/20 
0403 03/07/20 
0413  142  
K 3.6 3.5 * 109* CO2 25 24 BUN 91* 102* CREA 2.99* 3.41* * 129* CA 8.8 8.3*  
MG 2.0 2.1 PHOS 4.1 4.2 Recent Labs 03/06/20 
0402 SGOT 10* AP 57  
TP 5.5* ALB 2.7*  
GLOB 2.8 Recent Labs 03/07/20 
0413 03/05/20 
2225 INR 1.8* 1.9* PTP 17.6* 18.5* Recent Labs 03/06/20 
4628 PHI 7.343* PCO2I 39.1 PO2I 61* FIO2I 28 No results for input(s): CPK, CKMB, TROIQ, BNPP in the last 72 hours. MEDS: Reviewed Chest X-Ray: CXR Results  (Last 48 hours) 03/06/20 1724  XR CHEST PORT Final result Impression:  IMPRESSION: Left pleural effusion and overlying airspace opacity which may  
reflect atelectasis. Trace right pleural effusion. Narrative:  EXAM: XR CHEST PORT INDICATION: respiratory failure COMPARISON: Chest x-ray 3/5/2020 and CT thorax 3/5/2020. FINDINGS: A portable AP radiograph of the chest was obtained at 17:03 hours. The  
patient is on a cardiac monitor. There is left pleural effusion and overlying  
left airspace opacity. There is a trace right pleural effusion appears The right  
lung is clear. Port-A-Cath remains in expected position without significant  
change in position of the tip at the atriocaval junction. The cardiac and  
mediastinal contours and pulmonary vascularity are normal.  The bones and soft  
tissues are grossly within normal limits. ECHO: 3/5/20 · Normal cavity size, systolic function (ejection fraction normal) and diastolic function. Severe concentric hypertrophy. Estimated left ventricular ejection fraction is 60 - 65%. No regional wall motion abnormality noted. Normal left ventricular strain.  
· Image quality for this study was suboptimal. 
 · Mild to moderate tricuspid valve regurgitation is present. · Moderate pulmonary hypertension. · Mildly dilated left atrium. · Aortic valve sclerosis with no significant stenosis. Mild aortic valve regurgitation is present. CRITICAL CARE CONSULTANT NOTE I had a face to face encounter with the patient, reviewed and interpreted patient data including clinical events, labs, images, vital signs, I/O's, and examined patient. I have discussed the case and the plan and management of the patient's care with the consulting services, the bedside nurses and the respiratory therapist.   
 
NOTE OF PERSONAL INVOLVEMENT IN CARE This patient has a high probability of imminent, clinically significant deterioration, which requires the highest level of preparedness to intervene urgently. I participated in the decision-making and personally managed or directed the management of the following life and organ supporting interventions that required my frequent assessment to treat or prevent imminent deterioration. I personally spent 30 minutes of critical care time. This is time spent at this critically ill patient's bedside actively involved in patient care as well as the coordination of care and discussions with the patient's family. This does not include any procedural time which has been billed separately. Norma Lehman, AGACHIQUIP-BC, MSN [facility] Critical Care 
3/8/2020

## 2020-03-08 NOTE — PROGRESS NOTES
Pt arrives via bed to angio department accompanied by ICU RN for Angio procedure. All assessments completed and consent was reviewed. Education given was regarding procedure, conscious sedation, post-procedure care and  management/follow-up. Opportunity for questions was provided and all questions and concerns were addressed. Reviewed sedation plan to include medicating under conscious sedation using versed and fentanyl IV. Reviewed potential side effects with patient and possible interactions with patient's current meds Pt educated on moderate sedation and its purpose; medications and side effects described and patient verbalized understanding. Dr. Romain Robert at bedside in [place] [address] talking with patient about procedure(s). Patient evaluated and assessed for procedure. Consent signed.

## 2020-03-08 NOTE — PROGRESS NOTES
TRANSFER - OUT REPORT: 
 
Verbal report given to GEORGIA Grider(name) on Lavinia Weber  being transferred to ICU 16(unit) for routine progression of care Report consisted of patients Situation, Background, Assessment and  
Recommendations(SBAR). Information from the following report(s) Procedure Summary and MAR was reviewed with the receiving nurse. Lines:  
Triple Lumen Arrow 3-CVL catheter placement lot# [id number] 03/08/20 Right;Upper Neck (Active) Venous Access Device [employer] [facility]#[id number] 07/19/19 Upper chest (subclavicular area, right (Active) Venous Access Device port a cath (power ports) Upper chest (subclavicular area, right (Active) Central Line Being Utilized Yes 3/8/2020 12:00 PM  
Criteria for Appropriate Use Limited/no vessel suitable for conventional peripheral access 3/8/2020 12:00 PM  
Site Assessment Clean, dry, & intact 3/8/2020 12:00 PM  
Date of Last Dressing Change 03/06/20 3/8/2020 12:00 PM  
Dressing Status Clean, dry, & intact 3/8/2020 12:00 PM  
Dressing Type Disk with Chlorhexadine gluconate (CHG) 3/8/2020 12:00 PM  
Action Taken Open ports on tubing capped 3/8/2020 12:00 PM  
Date Accessed (Medial Site) 03/06/20 3/8/2020  4:00 AM  
Access Time (Medial Site) 1720 3/5/2020  5:28 PM  
Access Needle Size (Site #1) 20 G 3/5/2020  5:28 PM  
Access Needle Length (Medial Site) 0.75 inches 3/5/2020  5:28 PM  
Positive Blood Return (Medial Site) Yes 3/8/2020 12:00 PM  
Alcohol Cap Used Yes 3/8/2020 12:00 PM  
   
Peripheral IV 03/04/20 Left External jugular (Active) Site Assessment Clean, dry, & intact 3/8/2020 12:00 PM  
Phlebitis Assessment 0 3/8/2020 12:00 PM  
Infiltration Assessment 0 3/8/2020 12:00 PM  
Dressing Status Clean, dry, & intact 3/8/2020 12:00 PM  
Dressing Type Transparent 3/8/2020 12:00 PM  
Hub Color/Line Status Green;Capped 3/8/2020 12:00 PM  
Action Taken Open ports on tubing capped 3/8/2020 12:00 PM  
 Alcohol Cap Used Yes 3/8/2020 12:00 PM  
  
Right CVL is in good position and may be used per Dr. Laura Segovia. Observe dressing to Right CVL & Right port removal for any bleeding or oozing. Opportunity for questions and clarification was provided. Patient transported with: 
 O2 @ 2 liters Registered Nurse

## 2020-03-08 NOTE — PROGRESS NOTES
Spiritual Care Assessment/Progress Note [address] 
 
 
NAME: Farnaz Jimenez      MRN: [id number] AGE: 78 y.o. SEX: female Adventist Affiliation: Ankur Martin  
Language: [place] 3/8/2020 Spiritual Assessment begun in [facility] 7S2 INTENSIVE CARE through conversation with: 
  
    [x]Patient        [] Family    [] Friend(s) Reason for Consult: Initial/Spiritual assessment, critical care Spiritual beliefs: (Please include comment if needed) [x] Identifies with a alvina tradition: Sabianism    
   [x] Supported by a alvina community:        
   [] Claims no spiritual orientation:       
   [] Seeking spiritual identity:            
   [] Adheres to an individual form of spirituality:       
   [] Not able to assess:                   
 
    
Identified resources for coping:  
   [x] Prayer                           
   [] Music                  [] Guided Imagery 
   [] Family/friends                 [] Pet visits [] Devotional reading                         [] Unknown 
   [] Other:                                          
 
 
Interventions offered during this visit: (See comments for more details) Patient Interventions: Affirmation of emotions/emotional suffering, Catharsis/review of pertinent events in supportive environment, Iconic (affirming the presence of God/Higher Power), Initial/Spiritual assessment, Critical care, Prayer (actual), Prayer (assurance of) Plan of Care: 
 
 [x] Support spiritual and/or cultural needs  
 [] Support AMD and/or advance care planning process    
 [] Support grieving process 
 [] Coordinate Rites and/or Rituals  
 [] Coordination with community clergy [] No spiritual needs identified at this time 
 [] Detailed Plan of Care below (See Comments)  [] Make referral to Music Therapy 
[] Make referral to Pet Therapy    
[] Make referral to Addiction services 
[] Make referral to [facility] 
[] Make referral to Spiritual Care Partner [] No future visits requested       
[x] Follow up visits as needed Comments: Visited Ms Candance Platts in [address] [place] [address] for initial spiritual assessment. Ms Candance Platts was lying quietly in bed; patient's facial affect was flat. Provided active listening as she shared her concerns; stated that she was feeling just a little better than when initially admitted to hospital. 
 
Ms Candance Platts said that she was [place] and her Caodaism community was aware of her hospitalization. She requested that  have a prayer for healing on her behalf; had prayer as requested. Assured patient of ongoing  availability for support. : Rev. Devin Judd. Corina Yanez; [facility], to contact [phone] [address]eno call: [phone]

## 2020-03-08 NOTE — PROGRESS NOTES
1930 Bedside and Verbal shift change report given to CRISTIAN Garcia RN (oncoming nurse) by ROGELIO Lee RN (offgoing nurse). Report included the following information SBAR, Kardex, ED Summary, Intake/Output, MAR and Cardiac Rhythm NSR.

## 2020-03-09 LAB
ANION GAP SERPL CALC-SCNC: 6 MMOL/L (ref 5–15)
BASOPHILS # BLD: 0 K/UL (ref 0–0.1)
BASOPHILS # BLD: 0 K/UL (ref 0–0.1)
BASOPHILS NFR BLD: 0 % (ref 0–1)
BASOPHILS NFR BLD: 0 % (ref 0–1)
BUN SERPL-MCNC: 82 MG/DL (ref 6–20)
BUN/CREAT SERPL: 30 (ref 12–20)
CALCIUM SERPL-MCNC: 8.5 MG/DL (ref 8.5–10.1)
CHLORIDE SERPL-SCNC: 113 MMOL/L (ref 97–108)
CO2 SERPL-SCNC: 25 MMOL/L (ref 21–32)
CREAT SERPL-MCNC: 2.7 MG/DL (ref 0.55–1.02)
DIFFERENTIAL METHOD BLD: ABNORMAL
DIFFERENTIAL METHOD BLD: ABNORMAL
EOSINOPHIL # BLD: 0 K/UL (ref 0–0.4)
EOSINOPHIL # BLD: 0.1 K/UL (ref 0–0.4)
EOSINOPHIL NFR BLD: 1 % (ref 0–7)
EOSINOPHIL NFR BLD: 1 % (ref 0–7)
ERYTHROCYTE [DISTWIDTH] IN BLOOD BY AUTOMATED COUNT: 19 % (ref 11.5–14.5)
ERYTHROCYTE [DISTWIDTH] IN BLOOD BY AUTOMATED COUNT: 19.5 % (ref 11.5–14.5)
FOLATE BLD-MCNC: 539 NG/ML
FOLATE RBC-MCNC: 2668 NG/ML
GLUCOSE BLD STRIP.AUTO-MCNC: 110 MG/DL (ref 65–100)
GLUCOSE BLD STRIP.AUTO-MCNC: 113 MG/DL (ref 65–100)
GLUCOSE BLD STRIP.AUTO-MCNC: 120 MG/DL (ref 65–100)
GLUCOSE BLD STRIP.AUTO-MCNC: 132 MG/DL (ref 65–100)
GLUCOSE BLD STRIP.AUTO-MCNC: 169 MG/DL (ref 65–100)
GLUCOSE SERPL-MCNC: 114 MG/DL (ref 65–100)
HCT VFR BLD AUTO: 20.2 % (ref 34–46.6)
HCT VFR BLD AUTO: 21.2 % (ref 35–47)
HCT VFR BLD AUTO: 25 % (ref 35–47)
HGB BLD-MCNC: 6.7 G/DL (ref 11.5–16)
HGB BLD-MCNC: 8.1 G/DL (ref 11.5–16)
IMM GRANULOCYTES # BLD AUTO: 0 K/UL
IMM GRANULOCYTES # BLD AUTO: 0.1 K/UL (ref 0–0.04)
IMM GRANULOCYTES NFR BLD AUTO: 0 %
IMM GRANULOCYTES NFR BLD AUTO: 2 % (ref 0–0.5)
INR PPP: 1.6 (ref 0.9–1.1)
LYMPHOCYTES # BLD: 0.2 K/UL (ref 0.8–3.5)
LYMPHOCYTES # BLD: 0.4 K/UL (ref 0.8–3.5)
LYMPHOCYTES NFR BLD: 5 % (ref 12–49)
LYMPHOCYTES NFR BLD: 7 % (ref 12–49)
MAGNESIUM SERPL-MCNC: 1.8 MG/DL (ref 1.6–2.4)
MCH RBC QN AUTO: 31.5 PG (ref 26–34)
MCH RBC QN AUTO: 31.8 PG (ref 26–34)
MCHC RBC AUTO-ENTMCNC: 31.6 G/DL (ref 30–36.5)
MCHC RBC AUTO-ENTMCNC: 32.4 G/DL (ref 30–36.5)
MCV RBC AUTO: 100.5 FL (ref 80–99)
MCV RBC AUTO: 97.3 FL (ref 80–99)
MONOCYTES # BLD: 0.5 K/UL (ref 0–1)
MONOCYTES # BLD: 0.8 K/UL (ref 0–1)
MONOCYTES NFR BLD: 10 % (ref 5–13)
MONOCYTES NFR BLD: 12 % (ref 5–13)
NEUTS SEG # BLD: 4.1 K/UL (ref 1.8–8)
NEUTS SEG # BLD: 5 K/UL (ref 1.8–8)
NEUTS SEG NFR BLD: 78 % (ref 32–75)
NEUTS SEG NFR BLD: 84 % (ref 32–75)
NRBC # BLD: 0 K/UL (ref 0–0.01)
NRBC # BLD: 0 K/UL (ref 0–0.01)
NRBC BLD-RTO: 0 PER 100 WBC
NRBC BLD-RTO: 0 PER 100 WBC
PHOSPHATE SERPL-MCNC: 3.7 MG/DL (ref 2.6–4.7)
PLATELET # BLD AUTO: 101 K/UL (ref 150–400)
PLATELET # BLD AUTO: 85 K/UL (ref 150–400)
PMV BLD AUTO: 11.4 FL (ref 8.9–12.9)
PMV BLD AUTO: 11.5 FL (ref 8.9–12.9)
POTASSIUM SERPL-SCNC: 3.7 MMOL/L (ref 3.5–5.1)
PROTHROMBIN TIME: 16.3 SEC (ref 9–11.1)
RBC # BLD AUTO: 2.11 M/UL (ref 3.8–5.2)
RBC # BLD AUTO: 2.57 M/UL (ref 3.8–5.2)
RBC MORPH BLD: ABNORMAL
SERVICE CMNT-IMP: ABNORMAL
SODIUM SERPL-SCNC: 144 MMOL/L (ref 136–145)
WBC # BLD AUTO: 4.8 K/UL (ref 3.6–11)
WBC # BLD AUTO: 6.4 K/UL (ref 3.6–11)

## 2020-03-09 PROCEDURE — 86900 BLOOD TYPING SEROLOGIC ABO: CPT

## 2020-03-09 PROCEDURE — 84100 ASSAY OF PHOSPHORUS: CPT

## 2020-03-09 PROCEDURE — P9016 RBC LEUKOCYTES REDUCED: HCPCS

## 2020-03-09 PROCEDURE — 94660 CPAP INITIATION&MGMT: CPT

## 2020-03-09 PROCEDURE — 82962 GLUCOSE BLOOD TEST: CPT

## 2020-03-09 PROCEDURE — 74011000250 HC RX REV CODE- 250: Performed by: NURSE PRACTITIONER

## 2020-03-09 PROCEDURE — 77030040392 HC DRSG OPTIFOAM MDII -A

## 2020-03-09 PROCEDURE — C9113 INJ PANTOPRAZOLE SODIUM, VIA: HCPCS | Performed by: NURSE PRACTITIONER

## 2020-03-09 PROCEDURE — 85610 PROTHROMBIN TIME: CPT

## 2020-03-09 PROCEDURE — 74011250636 HC RX REV CODE- 250/636: Performed by: INTERNAL MEDICINE

## 2020-03-09 PROCEDURE — 74011000250 HC RX REV CODE- 250: Performed by: INTERNAL MEDICINE

## 2020-03-09 PROCEDURE — 36430 TRANSFUSION BLD/BLD COMPNT: CPT

## 2020-03-09 PROCEDURE — 87177 OVA AND PARASITES SMEARS: CPT

## 2020-03-09 PROCEDURE — P9045 ALBUMIN (HUMAN), 5%, 250 ML: HCPCS | Performed by: NURSE PRACTITIONER

## 2020-03-09 PROCEDURE — 86923 COMPATIBILITY TEST ELECTRIC: CPT

## 2020-03-09 PROCEDURE — 74011250637 HC RX REV CODE- 250/637: Performed by: INTERNAL MEDICINE

## 2020-03-09 PROCEDURE — 85025 COMPLETE CBC W/AUTO DIFF WBC: CPT

## 2020-03-09 PROCEDURE — 83735 ASSAY OF MAGNESIUM: CPT

## 2020-03-09 PROCEDURE — 36415 COLL VENOUS BLD VENIPUNCTURE: CPT

## 2020-03-09 PROCEDURE — 74011636637 HC RX REV CODE- 636/637: Performed by: NURSE PRACTITIONER

## 2020-03-09 PROCEDURE — 65660000000 HC RM CCU STEPDOWN

## 2020-03-09 PROCEDURE — 80048 BASIC METABOLIC PNL TOTAL CA: CPT

## 2020-03-09 PROCEDURE — 77030040361 HC SLV COMPR DVT MDII -B

## 2020-03-09 PROCEDURE — 74011250636 HC RX REV CODE- 250/636: Performed by: NURSE PRACTITIONER

## 2020-03-09 PROCEDURE — 77030005402 HC CATH RAD ART LN KT TELE -B

## 2020-03-09 PROCEDURE — 74011250637 HC RX REV CODE- 250/637: Performed by: NURSE PRACTITIONER

## 2020-03-09 PROCEDURE — 74011000258 HC RX REV CODE- 258: Performed by: INTERNAL MEDICINE

## 2020-03-09 RX ORDER — SODIUM CHLORIDE 9 MG/ML
250 INJECTION, SOLUTION INTRAVENOUS AS NEEDED
Status: DISCONTINUED | OUTPATIENT
Start: 2020-03-09 | End: 2020-03-12 | Stop reason: HOSPADM

## 2020-03-09 RX ORDER — BUMETANIDE 0.25 MG/ML
2 INJECTION INTRAMUSCULAR; INTRAVENOUS ONCE
Status: COMPLETED | OUTPATIENT
Start: 2020-03-09 | End: 2020-03-09

## 2020-03-09 RX ORDER — SODIUM CHLORIDE, SODIUM LACTATE, POTASSIUM CHLORIDE, CALCIUM CHLORIDE 600; 310; 30; 20 MG/100ML; MG/100ML; MG/100ML; MG/100ML
100 INJECTION, SOLUTION INTRAVENOUS CONTINUOUS
Status: DISCONTINUED | OUTPATIENT
Start: 2020-03-09 | End: 2020-03-10

## 2020-03-09 RX ORDER — PANTOPRAZOLE SODIUM 40 MG/1
40 TABLET, DELAYED RELEASE ORAL
Status: DISCONTINUED | OUTPATIENT
Start: 2020-03-09 | End: 2020-03-12 | Stop reason: HOSPADM

## 2020-03-09 RX ORDER — SUCRALFATE 1 G/1
1 TABLET ORAL
Status: DISCONTINUED | OUTPATIENT
Start: 2020-03-09 | End: 2020-03-12 | Stop reason: HOSPADM

## 2020-03-09 RX ORDER — ALBUMIN HUMAN 50 G/1000ML
25 SOLUTION INTRAVENOUS ONCE
Status: COMPLETED | OUTPATIENT
Start: 2020-03-09 | End: 2020-03-09

## 2020-03-09 RX ADMIN — Medication 10 ML: at 23:11

## 2020-03-09 RX ADMIN — Medication 667 MG: at 08:43

## 2020-03-09 RX ADMIN — TIMOLOL MALEATE 1 DROP: 5 SOLUTION/ DROPS OPHTHALMIC at 16:45

## 2020-03-09 RX ADMIN — SODIUM CHLORIDE 40 MG: 9 INJECTION INTRAMUSCULAR; INTRAVENOUS; SUBCUTANEOUS at 04:26

## 2020-03-09 RX ADMIN — TIMOLOL MALEATE 1 DROP: 5 SOLUTION/ DROPS OPHTHALMIC at 08:43

## 2020-03-09 RX ADMIN — SUCRALFATE 1 G: 1 TABLET ORAL at 17:16

## 2020-03-09 RX ADMIN — Medication 667 MG: at 11:51

## 2020-03-09 RX ADMIN — Medication 10 ML: at 05:29

## 2020-03-09 RX ADMIN — DORZOLAMIDE HYDROCHLORIDE 1 DROP: 20 SOLUTION/ DROPS OPHTHALMIC at 17:17

## 2020-03-09 RX ADMIN — Medication 30 ML: at 14:00

## 2020-03-09 RX ADMIN — BRIMONIDINE TARTRATE 1 DROP: 2 SOLUTION OPHTHALMIC at 16:45

## 2020-03-09 RX ADMIN — SODIUM CHLORIDE, SODIUM LACTATE, POTASSIUM CHLORIDE, AND CALCIUM CHLORIDE 100 ML/HR: 600; 310; 30; 20 INJECTION, SOLUTION INTRAVENOUS at 10:45

## 2020-03-09 RX ADMIN — SODIUM CHLORIDE, SODIUM LACTATE, POTASSIUM CHLORIDE, AND CALCIUM CHLORIDE 100 ML/HR: 600; 310; 30; 20 INJECTION, SOLUTION INTRAVENOUS at 20:49

## 2020-03-09 RX ADMIN — BRIMONIDINE TARTRATE 1 DROP: 2 SOLUTION OPHTHALMIC at 08:43

## 2020-03-09 RX ADMIN — DORZOLAMIDE HYDROCHLORIDE 1 DROP: 20 SOLUTION/ DROPS OPHTHALMIC at 21:16

## 2020-03-09 RX ADMIN — SODIUM CHLORIDE 100 ML/HR: 900 INJECTION, SOLUTION INTRAVENOUS at 06:47

## 2020-03-09 RX ADMIN — EPOETIN ALFA-EPBX 10000 UNITS: 10000 INJECTION, SOLUTION INTRAVENOUS; SUBCUTANEOUS at 23:10

## 2020-03-09 RX ADMIN — CEFTRIAXONE SODIUM 2 G: 2 INJECTION, POWDER, FOR SOLUTION INTRAMUSCULAR; INTRAVENOUS at 17:16

## 2020-03-09 RX ADMIN — PANTOPRAZOLE SODIUM 40 MG: 40 TABLET, DELAYED RELEASE ORAL at 17:16

## 2020-03-09 RX ADMIN — BRIMONIDINE TARTRATE 1 DROP: 2 SOLUTION OPHTHALMIC at 21:16

## 2020-03-09 RX ADMIN — MICONAZOLE NITRATE 2 % TOPICAL POWDER: at 08:46

## 2020-03-09 RX ADMIN — LATANOPROST 1 DROP: 50 SOLUTION OPHTHALMIC at 21:16

## 2020-03-09 RX ADMIN — LEVOTHYROXINE SODIUM 100 MCG: 0.1 TABLET ORAL at 07:22

## 2020-03-09 RX ADMIN — INSULIN LISPRO 2 UNITS: 100 INJECTION, SOLUTION INTRAVENOUS; SUBCUTANEOUS at 00:41

## 2020-03-09 RX ADMIN — Medication 667 MG: at 17:16

## 2020-03-09 RX ADMIN — DORZOLAMIDE HYDROCHLORIDE 1 DROP: 20 SOLUTION/ DROPS OPHTHALMIC at 08:43

## 2020-03-09 RX ADMIN — MICONAZOLE NITRATE 2 % TOPICAL POWDER: at 17:18

## 2020-03-09 RX ADMIN — SUCRALFATE 1 G: 1 TABLET ORAL at 11:51

## 2020-03-09 RX ADMIN — BUMETANIDE 2 MG: 0.25 INJECTION INTRAMUSCULAR; INTRAVENOUS at 08:45

## 2020-03-09 RX ADMIN — ALBUMIN (HUMAN) 25 G: 12.5 INJECTION, SOLUTION INTRAVENOUS at 01:12

## 2020-03-09 NOTE — PROGRESS NOTES
NURSING CARE PLANS Problem: Falls - Risk of 
Goal: *Absence of Falls Description Document Tia Manus Fall Risk and appropriate interventions in the flowsheet. Outcome: Progressing Towards Goal 
Note: Fall Risk Interventions: 
Mobility Interventions: Communicate number of staff needed for ambulation/transfer, Patient to call before getting OOB, PT Consult for mobility concerns, PT Consult for assist device competence, Strengthening exercises (ROM-active/passive) Medication Interventions: Evaluate medications/consider consulting pharmacy Elimination Interventions: Call light in reach, Toileting schedule/hourly rounds Problem: Patient Education: Go to Patient Education Activity Goal: Patient/Family Education Outcome: Progressing Towards Goal 
  
Problem: Pressure Injury - Risk of 
Goal: *Prevention of pressure injury Description Document Tomy Scale and appropriate interventions in the flowsheet. Outcome: Progressing Towards Goal 
Note: Pressure Injury Interventions: 
Sensory Interventions: Assess changes in LOC, Assess need for specialty bed, Avoid rigorous massage over bony prominences, Float heels, Keep linens dry and wrinkle-free, Maintain/enhance activity level, Minimize linen layers, Monitor skin under medical devices, Pad between skin to skin, Pressure redistribution bed/mattress (bed type), Turn and reposition approx. every two hours (pillows and wedges if needed) Moisture Interventions: Absorbent underpads, Apply protective barrier, creams and emollients, Assess need for specialty bed, Check for incontinence Q2 hours and as needed, Internal/External urinary devices, Minimize layers Activity Interventions: Assess need for specialty bed, Pressure redistribution bed/mattress(bed type) Mobility Interventions: HOB 30 degrees or less, Pressure redistribution bed/mattress (bed type), Turn and reposition approx. every two hours(pillow and wedges) Nutrition Interventions: Document food/fluid/supplement intake, Discuss nutritional consult with provider Friction and Shear Interventions: Foam dressings/transparent film/skin sealants, HOB 30 degrees or less, Lift sheet, Minimize layers Problem: Patient Education: Go to Patient Education Activity Goal: Patient/Family Education Outcome: Progressing Towards Goal 
  
Problem: Breathing Pattern - Ineffective Goal: *Absence of hypoxia Outcome: Progressing Towards Goal 
Goal: *Use of effective breathing techniques Outcome: Progressing Towards Goal 
Goal: *PALLIATIVE CARE:  Alleviation of Dyspnea Outcome: Progressing Towards Goal 
  
Problem: Patient Education: Go to Patient Education Activity Goal: Patient/Family Education Outcome: Progressing Towards Goal 
  
Problem: Nutrition Deficit Goal: *Optimize nutritional status Outcome: Progressing Towards Goal 
  
Problem: Hypotension Goal: *Blood pressure within specified parameters Outcome: Progressing Towards Goal 
Goal: *Fluid volume balance Outcome: Progressing Towards Goal 
Goal: *Labs within defined limits Outcome: Progressing Towards Goal 
  
Problem: Patient Education: Go to Patient Education Activity Goal: Patient/Family Education Outcome: Progressing Towards Goal

## 2020-03-09 NOTE — PROGRESS NOTES
Suzette Olmstead 272 
217 [facility] [address] [place], [address] 
[phone] GI PROGRESS NOTE Tyrone Carson, [facility] Work Cell: [phone] NAME:   Lavinia Weber :    1940 MRN:    [id number] Assessment/Plan 1. Anemia - likely multifactorial including CKD, recent chemotherapy, known esophageal adenocarcinoma, etc. Hgb 6.7 this AM. No overt GI bleeding.  
- Diet as tolerated - Supportive management per primary team 
- Continue PPI 
- No acute plans for endoscopy - Monitor H&H, transfuse as needed 2. E. Coli bacteremia w/ sepsis - s/p removal of Port-a-cath - Management per primary team 
3. CKD 4. Hx esophageal Ca s/p surgical resection Patient Active Problem List  
Diagnosis Code  History of colon polyps Z86.010  
 Esophageal dysphagia R13.10  Abnormal x-ray of abdomen R93.5  Adenocarcinoma of esophagus (HCC) C15.9  Acute respiratory failure (HCC) J96.00 Subjective:  
 
Feeling better. Denies any abdominal pain. Hgb 6.7 this AM. Per RN, no melena or hematochezia. Objective: VITALS:  
Last 24hrs VS reviewed since prior hospitalist progress note. Most recent are: 
Visit Vitals /78 (BP 1 Location: Right arm, BP Patient Position: At rest) Pulse 73 Temp 98 °F (36.7 °C) Resp 30 Ht 5' 8\" (1.727 m) Wt 115.7 kg (255 lb 1.2 oz) SpO2 100% Breastfeeding No  
BMI 38.78 kg/m² Intake/Output Summary (Last 24 hours) at 3/9/2020 1625 Last data filed at 3/9/2020 1500 Gross per 24 hour Intake 2993.42 ml Output 1925 ml Net 1068.42 ml PHYSICAL EXAM: 
General   well developed, obese, alert, in no acute distress EENT  Normocephalic, Atraumatic, PERRLA, EOMI, sclera clear Respiratory   Clear To Auscultation bilaterally - no wheezes, rales, rhonchi, or crackles Cardiology  Regular Rate and Rythmn  - no murmurs, rubs or gallops Abdominal  Soft, non-tender, non-distended, positive bowel sounds, no hepatosplenomegaly, no palpable mass Neurological  No focal neurological deficits noted Psychological  Oriented x 3. Normal affect. Lab Data Recent Results (from the past 12 hour(s)) MAGNESIUM Collection Time: 03/09/20  4:32 AM  
Result Value Ref Range Magnesium 1.8 1.6 - 2.4 mg/dL PHOSPHORUS Collection Time: 03/09/20  4:32 AM  
Result Value Ref Range Phosphorus 3.7 2.6 - 4.7 MG/DL  
CBC WITH AUTOMATED DIFF Collection Time: 03/09/20  4:32 AM  
Result Value Ref Range WBC 4.8 3.6 - 11.0 K/uL  
 RBC 2.11 (L) 3.80 - 5.20 M/uL HGB 6.7 (L) 11.5 - 16.0 g/dL HCT 21.2 (L) 35.0 - 47.0 % .5 (H) 80.0 - 99.0 FL  
 MCH 31.8 26.0 - 34.0 PG  
 MCHC 31.6 30.0 - 36.5 g/dL  
 RDW 19.0 (H) 11.5 - 14.5 % PLATELET 85 (L) 996 - 400 K/uL MPV 11.4 8.9 - 12.9 FL  
 NRBC 0.0 0  WBC ABSOLUTE NRBC 0.00 0.00 - 0.01 K/uL NEUTROPHILS 84 (H) 32 - 75 % LYMPHOCYTES 5 (L) 12 - 49 % MONOCYTES 10 5 - 13 % EOSINOPHILS 1 0 - 7 % BASOPHILS 0 0 - 1 % IMMATURE GRANULOCYTES 0 %  
 ABS. NEUTROPHILS 4.1 1.8 - 8.0 K/UL  
 ABS. LYMPHOCYTES 0.2 (L) 0.8 - 3.5 K/UL  
 ABS. MONOCYTES 0.5 0.0 - 1.0 K/UL  
 ABS. EOSINOPHILS 0.0 0.0 - 0.4 K/UL  
 ABS. BASOPHILS 0.0 0.0 - 0.1 K/UL  
 ABS. IMM. GRANS. 0.0 K/UL  
 DF MANUAL    
 RBC COMMENTS ANISOCYTOSIS 1+ 
    
 RBC COMMENTS MACROCYTOSIS 1+ 
    
 RBC COMMENTS OVALOCYTES PRESENT 
    
METABOLIC PANEL, BASIC Collection Time: 03/09/20  4:32 AM  
Result Value Ref Range Sodium 144 136 - 145 mmol/L Potassium 3.7 3.5 - 5.1 mmol/L Chloride 113 (H) 97 - 108 mmol/L  
 CO2 25 21 - 32 mmol/L Anion gap 6 5 - 15 mmol/L Glucose 114 (H) 65 - 100 mg/dL BUN 82 (H) 6 - 20 MG/DL Creatinine 2.70 (H) 0.55 - 1.02 MG/DL  
 BUN/Creatinine ratio 30 (H) 12 - 20 GFR est AA 21 (L) >60 ml/min/1.73m2 GFR est non-AA 17 (L) >60 ml/min/1.73m2 Calcium 8.5 8.5 - 10.1 MG/DL PROTHROMBIN TIME + INR  Collection Time: 03/09/20  5:15 AM  
 Result Value Ref Range INR 1.6 (H) 0.9 - 1.1 Prothrombin time 16.3 (H) 9.0 - 11.1 sec TYPE + CROSSMATCH Collection Time: 03/09/20  5:15 AM  
Result Value Ref Range Crossmatch Expiration 03/12/2020 ABO/Rh(D) O POSITIVE Antibody screen NEG Unit number [id number] Blood component type RC LR,1 Unit division 00 Status of unit ISSUED Crossmatch result Compatible GLUCOSE, POC Collection Time: 03/09/20  5:27 AM  
Result Value Ref Range Glucose (POC) 113 (H) 65 - 100 mg/dL Performed by Sharath Arreola GLUCOSE, POC Collection Time: 03/09/20 11:53 AM  
Result Value Ref Range Glucose (POC) 132 (H) 65 - 100 mg/dL Performed by Derek Banegas CBC WITH AUTOMATED DIFF Collection Time: 03/09/20  3:57 PM  
Result Value Ref Range WBC 6.4 3.6 - 11.0 K/uL  
 RBC 2.57 (L) 3.80 - 5.20 M/uL HGB 8.1 (L) 11.5 - 16.0 g/dL HCT 25.0 (L) 35.0 - 47.0 % MCV 97.3 80.0 - 99.0 FL  
 MCH 31.5 26.0 - 34.0 PG  
 MCHC 32.4 30.0 - 36.5 g/dL  
 RDW 19.5 (H) 11.5 - 14.5 % PLATELET 747 (L) 471 - 400 K/uL MPV 11.5 8.9 - 12.9 FL  
 NRBC 0.0 0  WBC ABSOLUTE NRBC 0.00 0.00 - 0.01 K/uL NEUTROPHILS PENDING % LYMPHOCYTES PENDING % MONOCYTES PENDING % EOSINOPHILS PENDING % BASOPHILS PENDING % IMMATURE GRANULOCYTES PENDING %  
 ABS. NEUTROPHILS PENDING K/UL  
 ABS. LYMPHOCYTES PENDING K/UL  
 ABS. MONOCYTES PENDING K/UL  
 ABS. EOSINOPHILS PENDING K/UL  
 ABS. BASOPHILS PENDING K/UL  
 ABS. IMM. GRANS. PENDING K/UL  
 DF PENDING Medications: Reviewed PMH/SH reviewed - no change compared to H&P Mid-Level Provider: Kajal العراقي MD  
Date/Time:  3/9/2020 I have personally reviewed the history and independently examined the patient. I have reviewed the chart and agree with the documentation recorded by the Mid Level Provider, including the assessment, treatment plan, and disposition.  
 
Kajal العراقي MD

## 2020-03-09 NOTE — PROGRESS NOTES
[facility] 
 
ICU TEAM Progress Note Name: Juan Sylvester : 1940 MRN: [id number] Date: 3/9/2020 Summary:  
Progress Note: 3/8/2020 Reason for ICU Admission: The patient is a 28-year-old female with a past medical history of adenocarcinoma of the esophagus, anemia, arthritis, chronic kidney disease, diverticulosis, esophageal dysphagia, hypertension, sleep apnea, hypothyroidism who presented to the ED via EMS with a complaints of shortness of breath today, decreased appetite for a week, decreased activity, lethargy and generalized weakness x 2 days. Per documentation family stated that the patient had an attempt at freezing of her tumor last week, but has been slowly declining since then. In the ED an ABG was done that showed patient with Hypoxia on room air. Patient was placed on NC with improvement in oxygenation. Patient has a Mediport in right chest wall. Labs and blood cultures were sent. Blood cultures came back positive with GNR in 4/4 bottles. Suspected source is Mediport. Chest xray with mild congestion and U/A clean. No sputum production. Patient was started on zosyn in the ED, was transferred to the intermediate care unit. While on the unit patient had soft B/P that declined to systolic's of 34-95'B. Hospitalist then consulted critical care for ICU admission. She remains on levophed. Interval events/plan summary: Hemoglobin slowly trended down to 6.7 this a.m. patient with Hemoccult positive stool. GI following. Patient is on Protonix. Carafate added today. No plan for emergent EGD per GI. Off vasopressor support since earlier this a.m. Antibiotics de-escalated to ceftriaxone yesterday. Mediport was removed yesterday per ID recommendations and new central venous line was placed. Okay to transfer out of ICU later today patient remained stable off Levophed. Assessment:  
  
ICU Problems: 
Septic shock related to- Bacteremia + GNR, improving Acute kidney injury on CKD Stage IV, related to ATN due to sepsis and hypotension, improving Acute hypoxic respiratory failure, improving Sleep Apnea Hypothyroidism Acute on chronic anemia, stool positive for occult blood. History of esophageal cancer, gastric ulcers and prior colon polyp removal 
Thrombocytopenia Coagulopathy Hypokalemia - replaced Hx HTN Hx of Right leg thrombus - on no anticoagulation at home PVL negative 3/5/2020 Hx glaucoma and cataracts Hx Esophagus CA 
  
ICU Comprehensive Plan of Care:  
Plans for this Shift:  
 
1. Continue antibiotics with ceftriaxone de-escalate from [address] [place] [address] Street yesterday. ID following. 2. Nephrology consulted and following with IVF with LR at 100 mL/hour per nephrology. 3. Off vasopressor support. 4. Continue supplemental O2 as required. Titrate FiO2 to maintain SPO2 greater than 90%. Patient currently on 3 L nasal cannula 5. Transfuse 1 unit PRBCs today. Following H&H. Will transfuse if indicated for hemoglobin less than 7. Epogen started today per nephrology 6. GI following. Continue Protonix. Carafate added today. Repeat blood cultures pending per ID. 7. SSI PRN/Prevent Hypoglycemia 8. Continue Synthroid,  
9. Hold home B/P medications, 
10. Purewick for Strict I&Os, watch for decreased UOP 11. Follow up CT abd/pelvis noted Discussed with RN and Dr. Maya Bailey. 
  
ABCDEF Bundle/Checklist 
Pain Medications: Acetaminophen Target RASS: N/A Sedation Medications: None CAM-ICU:  Negative Mobility: Bedrest 
PT/OT: Will consult once appropriate Restraints: None needed at this time Discussed Plan of Care (goals of care): Yes Addressed Code Status: Full Code 
  
CARDIOVASCULAR Cardiac Gtts: None SBP Goal of: > 90 mmHg MAP Goal of: > 65 mmHg Transfusion Trigger (Hgb): <7 g/dL 
  
RESPIRATORY Vent Goals:  N/A 
DVT Prophylaxis (if no, list reason): SCD's or Sequential Compression Device SPO2 Goal: > 92% Pulmonary toilet: Duo-Nebs  
  
 GI/ Ha Catheter Present: No 
GI Prophylaxis: Protonix (pantoprazole) Nutrition: Yes Reg 2gm Na diet IVFs:  Normal saline Bowel Movement: Pending Bowel Regimen: None needed at this time Insulin: SSI PRN 
  
ANTIBIOTICS Antibiotics:  Ceftriaxone  Epogen started today per nephrology  Epogen started today per nephrology. 
  
T/L/D Tubes: None Lines: Peripheral IV, Right IJ Drains: None 
  
SPECIAL EQUIPMENT None 
  
DISPOSITION Stay in ICU 
  
 
Active Problem List:  
 
Problem List  Date Reviewed: 3/5/2020 Codes Class * (Principal) Acute respiratory failure (HCC) ICD-10-CM: J96.00 
ICD-9-CM: 518.81 Adenocarcinoma of esophagus (HCC) ICD-10-CM: C15.9 ICD-9-CM: 150.9 Esophageal dysphagia ICD-10-CM: R13.10 ICD-9-CM: 787.20 Abnormal x-ray of abdomen ICD-10-CM: R93.5 ICD-9-CM: 793.6 Overview Signed 5/22/2019 12:56 PM by Sofie Matthews MD  
  Bas showed food and irregular stricture above ge junction in addition to large hiatal hernia  5.20.2019 History of colon polyps ICD-10-CM: Z86.010 
ICD-9-CM: V12.72 Overview Signed 6/1/2018  7:15 AM by Sofie Matthews MD  
  2013 tubular adenoma Past Medical History:  
 
 has a past medical history of Abnormal x-ray of abdomen (5/22/2019), Adenocarcinoma of esophagus ([facility] [address] (5/30/2019), Anemia, Arthritis, Chronic kidney disease, Diverticulosis, Esophageal dysphagia (5/22/2019), H/O colonoscopy with polypectomy, History of colon polyps (6/1/2018), Hypertension, Ill-defined condition, Other ill-defined conditions(799.89), Sleep apnea, Thromboembolus ([address] (2015), and Thyroid disease.  
 
Past Surgical History:  
 
 has a past surgical history that includes hx hysterectomy; hx heent (1984); colonoscopy,remv lesn,snare (6/1/2018); colonoscopy (N/A, 6/1/2018); hx cataract removal (Bilateral, 2017); upper gi endoscopy,biopsy (5/22/2019); upper gi endoscopy,ball dil,30mm (5/22/2019); upper gi endoscopy,ball dil,30mm (5/30/2019); upper gi endoscopy,biopsy (5/30/2019); pr anal pressure record (6/6/2019); ir insert tunl cvc w port over 5 years (7/19/2019); upper gi endoscopy,biopsy (10/17/2019); ir remove tunl cvad w/o port / pump (3/8/2020); and ir insert non tunl cvc over 5 yrs (3/8/2020). Home Medications:  
 
Prior to Admission medications Medication Sig Start Date End Date Taking? Authorizing Provider  
carvediloL (COREG) 12.5 mg tablet Take 12.5 mg by mouth two (2) times daily (with meals). Yes Provider, Historical  
chlorthalidone (HYGROTEN) 25 mg tablet Take 25 mg by mouth daily. Yes Provider, Historical  
ferrous sulfate 324 mg (65 mg iron) tablet Take 324 mg by mouth two (2) times daily (with meals). Yes Provider, Historical  
omeprazole (PRILOSEC OTC) 20 mg tablet Take 20 mg by mouth daily. Yes Provider, Historical  
sodium bicarbonate 650 mg tablet Take 650 mg by mouth two (2) times a day. Yes Provider, Historical  
capecitabine (XELODA) 500 mg tablet 1,500 mg two (2) times a day. X 14 days then 7 days off   Yes Provider, Historical  
cloNIDine HCl (CATAPRES) 0.1 mg tablet Take 0.2 mg by mouth two (2) times a day. Yes Provider, Historical  
folic acid-vit Q5-AEU E64 (FOLBEE) 2.5-25-1 mg tablet Take 1 Tab by mouth daily. Yes Provider, Historical  
iron bisgly,ps-FA-B-C#12-succ 65 mg-65 mg -1,000 mcg (24) tab Take 1 Tab by mouth daily. Yes Provider, Historical  
latanoprost (XALATAN) 0.005 % ophthalmic solution Administer 1 Drop to both eyes nightly. Yes Provider, Historical  
amLODIPine (NORVASC) 10 mg tablet Take 10 mg by mouth daily. Indications: HYPERTENSION   Yes Provider, Historical  
losartan (COZAAR) 100 mg tablet Take 100 mg by mouth daily. Indications: HYPERTENSION   Yes Provider, Historical  
MULTIVITS W-FE,OTHER MIN/LUT (CENTRUM SILVER ULTRA WOMEN'S PO) Take 1 Tab by mouth daily.    Yes Provider, Historical  
 DORZOLAMIDE HCL/TIMOLOL MALEAT (DORZOLAMIDE-TIMOLOL OP) Apply 1 Drop to eye three (3) times daily. One drop to each eye twice daily    Yes Provider, Historical  
brimonidine (ALPHAGAN) 0.2 % ophthalmic solution Administer 1 Drop to both eyes three (3) times daily. Yes Provider, Historical  
acetaminophen (TYLENOL EXTRA STRENGTH) 500 mg tablet Take 1,000 mg by mouth every six (6) hours as needed. Indications: ARTHRITIC PAIN, PAIN   Yes Provider, Historical  
levothyroxine (SYNTHROID) 100 mcg tablet Take 100 mcg by mouth Daily (before breakfast). Indications: HYPOTHYROIDISM   Yes Provider, Historical  
 
 
Allergies/Social/Family History: No Known Allergies Social History Tobacco Use  Smoking status: Never Smoker  Smokeless tobacco: Never Used Substance Use Topics  Alcohol use: Not Currently Comment: in her 19's History reviewed. No pertinent family history. Review of Systems:  
 
Patient denies any pain, shortness of breath, wheezing, chest tightness, nausea, vomiting, diarrhea, or abdominal pain. Denies any other acute complaints. Objective:  
Vital Signs: 
Visit Vitals /72 Pulse 76 Temp 98 °F (36.7 °C) Resp 23 Ht 5' 8\" (1.727 m) Wt 115.7 kg (255 lb 1.2 oz) SpO2 100% Breastfeeding No  
BMI 38.78 kg/m² O2 Flow Rate (L/min): 2 l/min O2 Device: Nasal cannula Temp (24hrs), Av.8 °F (36.6 °C), Min:97.5 °F (36.4 °C), Max:98.2 °F (36.8 °C) Intake/Output:  
 
Intake/Output Summary (Last 24 hours) at 3/9/2020 1413 Last data filed at 3/9/2020 1200 Gross per 24 hour Intake 2940.82 ml Output 1125 ml Net 1815.82 ml Physical Exam: 
 
General:  alert, cooperative, no distress, appears stated age, obese. Eye:  conjunctivae/corneas clear. PERRL, EOM's intact. Neurologic:   Drowsy but oriented, normal mood, follows commands. Neck:  normal and no erythema or exudates noted. Lungs:   Lung sounds coarse Heart:  regular rate and rhythm with frequent PVCs and PACs. Abdomen:  Soft, non-tender. Bowel sounds normal. No masses,  no organomegaly Skin:  Normal, no rash or abnormalities 
  
 
LABS AND  DATA: Personally reviewed Recent Labs 03/09/20 
0696 03/08/20 
0404 WBC 4.8 8.5 HGB 6.7* 7.5* HCT 21.2* 23.2*  
PLT 85* 85* Recent Labs 03/09/20 
3342 03/08/20 
0403  143  
K 3.7 3.6 * 111* CO2 25 25 BUN 82* 91* CREA 2.70* 2.99* * 126* CA 8.5 8.8 MG 1.8 2.0 PHOS 3.7 4.1 No results for input(s): SGOT, GPT, AP, TBIL, TP, ALB, GLOB, AML, LPSE in the last 72 hours. No lab exists for component: AMYP Recent Labs 03/09/20 
0515 03/07/20 
0413 INR 1.6* 1.8* PTP 16.3* 17.6* No results for input(s): PHI, PCO2I, PO2I, FIO2I in the last 72 hours. No results for input(s): CPK, CKMB, TROIQ, BNPP in the last 72 hours. MEDS: Reviewed Chest X-Ray: CXR Results  (Last 48 hours) None ECHO: 3/5/20 · Normal cavity size, systolic function (ejection fraction normal) and diastolic function. Severe concentric hypertrophy. Estimated left ventricular ejection fraction is 60 - 65%. No regional wall motion abnormality noted. Normal left ventricular strain. · Image quality for this study was suboptimal. 
· Mild to moderate tricuspid valve regurgitation is present. · Moderate pulmonary hypertension. · Mildly dilated left atrium. · Aortic valve sclerosis with no significant stenosis. Mild aortic valve regurgitation is present. CRITICAL CARE CONSULTANT NOTE I had a face to face encounter with the patient, reviewed and interpreted patient data including clinical events, labs, images, vital signs, I/O's, and examined patient.   I have discussed the case and the plan and management of the patient's care with the consulting services, the bedside nurses and the respiratory therapist.   
 
NOTE OF PERSONAL INVOLVEMENT IN CARE  
 This patient has a high probability of imminent, clinically significant deterioration, which requires the highest level of preparedness to intervene urgently. I participated in the decision-making and personally managed or directed the management of the following life and organ supporting interventions that required my frequent assessment to treat or prevent imminent deterioration. I personally spent 30 minutes of critical care time. This is time spent at this critically ill patient's bedside actively involved in patient care as well as the coordination of care and discussions with the patient's family. This does not include any procedural time which has been billed separately. Nura Carranza, IRAM-BC, MSN [facility] 
3/9/2020

## 2020-03-09 NOTE — WOUND CARE
WOCN Note:  
  
Reconsult to assess sacrum. 
  
Chart reviewed. Admitted DX:  Acute respiratory failure Past Medical History: adenocarcinoma of esophagus, hypothyroidism, EMILY, htn, CKD and glaucoma. 
  
Assessment:  
Patient is A&O x 3, communicative and requires assist with repositioning. Bed: sport Patient wearing briefs for incontinence. Patient reports no pain. Heels intact with bogginess and without erythema and offloaded pillows.  
  
1. Sacral/coccyx:  Partial thickness wound from MASD; 6 x 0.7 x 0.1 cm along hypopigmented pink resurfaced area. Dark hyperpigmented dyschromia present. 2. Abdominal fold, moist hyperpigmented rash consistent with Candidiasis. There is a hypopigmented resurfaced pink area within the fold. Recommendations:   
1. Sacrum and buttocks:  Protect skin with hydraguard cream (blue) 2. Abdominal fold:  Twice daily cleanse, dry and apply Miconazole antifungal powder. 
  
Skin Care & Pressure Prevention: 
Minimize layers of linen/pads under patient to optimize support surface. Turn/reposition approximately every 2 hours and offload heels. Manage incontinence / promote continence. 
  
Discussed above plan with patient and Ella HO. 
  
Transition of Care: Plan to follow as needed while admitted to hospital. 
  
TIKA Mena RN [facility] Wound Care Office [phone] Pager [phone]

## 2020-03-09 NOTE — PROGRESS NOTES
Kristyn Florian 1940 
[id number] 
3/9/2020 Transfer out of ICU. Discussed with Dr. Bijan Morillo on hospitalists service who will assume care. EMMA Francois March 9, 2020 
5:39 PM

## 2020-03-09 NOTE — PROGRESS NOTES
Suzette Olmstead 272 
217 [facility] [address] [place], [address] 
[phone] GI PROGRESS NOTE Belkis Ibarra, [facility] Work Cell: [phone] NAME:   Evangelina Velázquez :    1940 MRN:    [id number] Assessment/Plan 1. Anemia - likely multifactorial including CKD, recent chemotherapy, known esophageal adenocarcinoma, etc. Hgb 6.7 this AM. No overt GI bleeding.  
- Diet as tolerated - Supportive management per primary team 
- Continue PPI 
- No acute plans for endoscopy - Monitor H&H, transfuse as needed 2. E. Coli bacteremia w/ sepsis - s/p removal of Port-a-cath - Management per primary team 
3. CKD 4. Hx esophageal Ca s/p surgical resection Patient Active Problem List  
Diagnosis Code  History of colon polyps Z86.010  
 Esophageal dysphagia R13.10  Abnormal x-ray of abdomen R93.5  Adenocarcinoma of esophagus (HCC) C15.9  Acute respiratory failure (HCC) J96.00 Subjective:  
 
Feeling better. Denies any abdominal pain. Hgb 6.7 this AM. Per RN, no melena or hematochezia. Objective: VITALS:  
Last 24hrs VS reviewed since prior hospitalist progress note. Most recent are: 
Visit Vitals /77 (BP 1 Location: Right arm, BP Patient Position: At rest) Pulse 69 Temp 97.9 °F (36.6 °C) Resp (!) 31 Ht 5' 8\" (1.727 m) Wt 115.7 kg (255 lb 1.2 oz) SpO2 100% Breastfeeding No  
BMI 38.78 kg/m² Intake/Output Summary (Last 24 hours) at 3/9/2020 1012 Last data filed at 3/9/2020 0800 Gross per 24 hour Intake 2893.94 ml Output 975 ml Net 1918.94 ml PHYSICAL EXAM: 
General   well developed, obese, alert, in no acute distress EENT  Normocephalic, Atraumatic, PERRLA, EOMI, sclera clear Respiratory   Clear To Auscultation bilaterally - no wheezes, rales, rhonchi, or crackles Cardiology  Regular Rate and Rythmn  - no murmurs, rubs or gallops Abdominal  Soft, non-tender, non-distended, positive bowel sounds, no hepatosplenomegaly, no palpable mass Neurological  No focal neurological deficits noted Psychological  Oriented x 3. Normal affect. Lab Data Recent Results (from the past 12 hour(s)) GLUCOSE, POC Collection Time: 03/09/20 12:38 AM  
Result Value Ref Range Glucose (POC) 169 (H) 65 - 100 mg/dL Performed by Noah Mauro MAGNESIUM Collection Time: 03/09/20  4:32 AM  
Result Value Ref Range Magnesium 1.8 1.6 - 2.4 mg/dL PHOSPHORUS Collection Time: 03/09/20  4:32 AM  
Result Value Ref Range Phosphorus 3.7 2.6 - 4.7 MG/DL  
CBC WITH AUTOMATED DIFF Collection Time: 03/09/20  4:32 AM  
Result Value Ref Range WBC 4.8 3.6 - 11.0 K/uL  
 RBC 2.11 (L) 3.80 - 5.20 M/uL HGB 6.7 (L) 11.5 - 16.0 g/dL HCT 21.2 (L) 35.0 - 47.0 % .5 (H) 80.0 - 99.0 FL  
 MCH 31.8 26.0 - 34.0 PG  
 MCHC 31.6 30.0 - 36.5 g/dL  
 RDW 19.0 (H) 11.5 - 14.5 % PLATELET 85 (L) 567 - 400 K/uL MPV 11.4 8.9 - 12.9 FL  
 NRBC 0.0 0  WBC ABSOLUTE NRBC 0.00 0.00 - 0.01 K/uL NEUTROPHILS 84 (H) 32 - 75 % LYMPHOCYTES 5 (L) 12 - 49 % MONOCYTES 10 5 - 13 % EOSINOPHILS 1 0 - 7 % BASOPHILS 0 0 - 1 % IMMATURE GRANULOCYTES 0 %  
 ABS. NEUTROPHILS 4.1 1.8 - 8.0 K/UL  
 ABS. LYMPHOCYTES 0.2 (L) 0.8 - 3.5 K/UL  
 ABS. MONOCYTES 0.5 0.0 - 1.0 K/UL  
 ABS. EOSINOPHILS 0.0 0.0 - 0.4 K/UL  
 ABS. BASOPHILS 0.0 0.0 - 0.1 K/UL  
 ABS. IMM. GRANS. 0.0 K/UL  
 DF MANUAL    
 RBC COMMENTS ANISOCYTOSIS 1+ 
    
 RBC COMMENTS MACROCYTOSIS 1+ 
    
 RBC COMMENTS OVALOCYTES PRESENT 
    
METABOLIC PANEL, BASIC Collection Time: 03/09/20  4:32 AM  
Result Value Ref Range Sodium 144 136 - 145 mmol/L Potassium 3.7 3.5 - 5.1 mmol/L Chloride 113 (H) 97 - 108 mmol/L  
 CO2 25 21 - 32 mmol/L Anion gap 6 5 - 15 mmol/L Glucose 114 (H) 65 - 100 mg/dL BUN 82 (H) 6 - 20 MG/DL  Creatinine 2.70 (H) 0.55 - 1.02 MG/DL  
 BUN/Creatinine ratio 30 (H) 12 - 20 GFR est AA 21 (L) >60 ml/min/1.73m2 GFR est non-AA 17 (L) >60 ml/min/1.73m2 Calcium 8.5 8.5 - 10.1 MG/DL PROTHROMBIN TIME + INR Collection Time: 03/09/20  5:15 AM  
Result Value Ref Range INR 1.6 (H) 0.9 - 1.1 Prothrombin time 16.3 (H) 9.0 - 11.1 sec TYPE + CROSSMATCH Collection Time: 03/09/20  5:15 AM  
Result Value Ref Range Crossmatch Expiration 03/12/2020 ABO/Rh(D) O POSITIVE Antibody screen NEG Unit number [id number] Blood component type RC LR,1 Unit division 00 Status of unit ISSUED Crossmatch result Compatible GLUCOSE, POC Collection Time: 03/09/20  5:27 AM  
Result Value Ref Range Glucose (POC) 113 (H) 65 - 100 mg/dL Performed by Cynthia Grover Medications: Reviewed PMH/ reviewed - no change compared to H&P Mid-Level Provider: Esme Shepherd NP Date/Time:  3/9/2020

## 2020-03-09 NOTE — PROGRESS NOTES
Nephrology Progress Note Kristyn Florian Date of Admission : 3/4/2020 CC: Follow up for SONIDO Assessment and Plan SONIDO on CKD: 
- 2/2 ATN due to Gram Negative Sepsis, Hypotension ==> resolving   
- changed IVF to LR  
- daily labs 
- wean pressors to  MAP > 65 - One dose of IV bumex post transfusion CKD Stage IV  
- 2/2 ADPKD, HTN  
- baseline Creatinine : 2 mg/ dl  
- followed by Dr Sander Cranker  
  
Hypokalemia: 
- replace PRN  
  
E coli bacteremia w/ sepsis - s/p removal of Port-a cath 
  
Anemia : 
- Multifactorial  
- started KATHY Esophageal Ca Hypothyroidism EMILY Interval History: 
Seen and examined. UOP marginal. Cr better. Has purewick. Denies any abdo pain/N/V/C[PS/OB Port a cath removed yesterday Current Medications: all current  Medications have been eviewed in [facility] Review of Systems: Pertinent items are noted in HPI. Objective: 
Vitals:   
Vitals:  
 03/09/20 0600 03/09/20 0700 03/09/20 0743 03/09/20 0800 BP: (!) 83/54 105/58  108/61 Pulse: 60 60  78 Resp: 20 25  (!) 32 Temp:    97.9 °F (36.6 °C) SpO2: 100% 99%  100% Weight:   115.7 kg (255 lb 1.2 oz) Height:      
 
Intake and Output: 
No intake/output data recorded. 03/07 1901 - 03/09 0700 In: 4373.5 [I.V.:4373.5] Out: 1775 [[id number]] Physical Examination: 
 
General: No distress Neck:  Supple, no mass Resp:  Reduced bibasilar breath sounds CV:  RRR,  no murmur or rub, no LE edema GI:  Soft, NT, + Bowel sounds, no hepatosplenomegaly Neurologic:  Non focal 
Psych:             AAO x 3 appropriate affect Skin:  No Rash :  No cuello [x]    High complexity decision making was performed 
[x]    Patient is at high-risk of decompensation with multiple organ involvement Lab Data Personally Reviewed: I have reviewed all the pertinent labs, microbiology data and radiology studies during assessment. Recent Labs 03/09/20 
1099 03/09/20 
1798 03/08/20 
0403 03/07/20 
0413 NA  --  144 143 142 K  --  3.7 3.6 3.5 CL  --  113* 111* 109* CO2  --  25 25 24 GLU  --  114* 126* 129* BUN  --  82* 91* 102* CREA  --  2.70* 2.99* 3.41* CA  --  8.5 8.8 8.3*  
MG  --  1.8 2.0 2.1 PHOS  --  3.7 4.1 4.2 INR 1.6*  --   --  1.8* Recent Labs 03/09/20 
5182 03/08/20 
0404 03/07/20 
0413 WBC 4.8 8.5 8.8 HGB 6.7* 7.5* 7.4* HCT 21.2* 23.2* 22.7*  
PLT 85* 85* 67* No results found for: SDES Lab Results Component Value Date/Time Culture result:  03/06/2020 09:15 AM  
  MRSA NOT PRESENT. Apparent Staphylococus aureus (not MRSA noted). Culture result:  03/06/2020 09:15 AM  
      Screening of patient nares for MRSA is for surveillance purposes and, if positive, to facilitate isolation considerations in high risk settings. It is not intended for automatic decolonization interventions per se as regimens are not sufficiently effective to warrant routine use. Culture result: (A) 03/06/2020 01:13 AM  
  ESCHERICHIA COLI GROWING IN 2 OF 4 BOTTLES DRAWN , EACH FROM A DIFFERENT SITE. .. LAC AND RT. WRIST Culture result:  03/06/2020 01:13 AM  
  PLEASE REFER TO PREVIOUS BLOOD CULTURE 
[id number], COLLECTED 3/4/20 FOR SENSITIVITIES Culture result: REMAINING BOTTLE(S) HAS/HAVE NO GROWTH SO FAR 03/06/2020 01:13 AM  
 
Recent Results (from the past 24 hour(s)) GLUCOSE, POC Collection Time: 03/08/20 11:24 AM  
Result Value Ref Range Glucose (POC) 124 (H) 65 - 100 mg/dL Performed by Arkeo, POC Collection Time: 03/08/20  6:34 PM  
Result Value Ref Range Glucose (POC) 138 (H) 65 - 100 mg/dL Performed by Arkeo, POC Collection Time: 03/09/20 12:38 AM  
Result Value Ref Range Glucose (POC) 169 (H) 65 - 100 mg/dL Performed by Chelle Lan MAGNESIUM Collection Time: 03/09/20  4:32 AM  
Result Value Ref Range Magnesium 1.8 1.6 - 2.4 mg/dL PHOSPHORUS  Collection Time: 03/09/20  4:32 AM  
 Result Value Ref Range Phosphorus 3.7 2.6 - 4.7 MG/DL  
CBC WITH AUTOMATED DIFF Collection Time: 03/09/20  4:32 AM  
Result Value Ref Range WBC 4.8 3.6 - 11.0 K/uL  
 RBC 2.11 (L) 3.80 - 5.20 M/uL HGB 6.7 (L) 11.5 - 16.0 g/dL HCT 21.2 (L) 35.0 - 47.0 % .5 (H) 80.0 - 99.0 FL  
 MCH 31.8 26.0 - 34.0 PG  
 MCHC 31.6 30.0 - 36.5 g/dL  
 RDW 19.0 (H) 11.5 - 14.5 % PLATELET 85 (L) 776 - 400 K/uL MPV 11.4 8.9 - 12.9 FL  
 NRBC 0.0 0  WBC ABSOLUTE NRBC 0.00 0.00 - 0.01 K/uL NEUTROPHILS 84 (H) 32 - 75 % LYMPHOCYTES 5 (L) 12 - 49 % MONOCYTES 10 5 - 13 % EOSINOPHILS 1 0 - 7 % BASOPHILS 0 0 - 1 % IMMATURE GRANULOCYTES 0 %  
 ABS. NEUTROPHILS 4.1 1.8 - 8.0 K/UL  
 ABS. LYMPHOCYTES 0.2 (L) 0.8 - 3.5 K/UL  
 ABS. MONOCYTES 0.5 0.0 - 1.0 K/UL  
 ABS. EOSINOPHILS 0.0 0.0 - 0.4 K/UL  
 ABS. BASOPHILS 0.0 0.0 - 0.1 K/UL  
 ABS. IMM. GRANS. 0.0 K/UL  
 DF MANUAL    
 RBC COMMENTS ANISOCYTOSIS 1+ 
    
 RBC COMMENTS MACROCYTOSIS 1+ 
    
 RBC COMMENTS OVALOCYTES PRESENT 
    
METABOLIC PANEL, BASIC Collection Time: 03/09/20  4:32 AM  
Result Value Ref Range Sodium 144 136 - 145 mmol/L Potassium 3.7 3.5 - 5.1 mmol/L Chloride 113 (H) 97 - 108 mmol/L  
 CO2 25 21 - 32 mmol/L Anion gap 6 5 - 15 mmol/L Glucose 114 (H) 65 - 100 mg/dL BUN 82 (H) 6 - 20 MG/DL Creatinine 2.70 (H) 0.55 - 1.02 MG/DL  
 BUN/Creatinine ratio 30 (H) 12 - 20 GFR est AA 21 (L) >60 ml/min/1.73m2 GFR est non-AA 17 (L) >60 ml/min/1.73m2 Calcium 8.5 8.5 - 10.1 MG/DL PROTHROMBIN TIME + INR Collection Time: 03/09/20  5:15 AM  
Result Value Ref Range INR 1.6 (H) 0.9 - 1.1 Prothrombin time 16.3 (H) 9.0 - 11.1 sec TYPE + CROSSMATCH Collection Time: 03/09/20  5:15 AM  
Result Value Ref Range Crossmatch Expiration 03/12/2020 ABO/Rh(D) O POSITIVE Antibody screen NEG Unit number [id number] Blood component type RC LR,1 Unit division 00 Status of unit ISSUED Crossmatch result Compatible GLUCOSE, POC Collection Time: 03/09/20  5:27 AM  
Result Value Ref Range Glucose (POC) 113 (H) 65 - 100 mg/dL Performed by Mitzy Walter MD 
[address] [place] [address] [facility] Phone - [phone] Fax - [phone] 
[email or web address]

## 2020-03-09 NOTE — PROGRESS NOTES
1830: TRANSFER - OUT REPORT: 
 
Verbal report given to GEORGIA Chong(name) on Lavinia Weber  being transferred to [facility](unit) for routine progression of care Report consisted of patients Situation, Background, Assessment and  
Recommendations(SBAR). Information from the following report(s) SBAR, MAR and Recent Results was reviewed with the receiving nurse. Lines:  
Triple Lumen Arrow 3-CVL catheter placement lot# [id number] 03/08/20 Right;Upper Neck (Active) Central Line Being Utilized Yes 3/9/2020  4:00 PM  
Criteria for Appropriate Use Hemodynamically unstable, requiring monitoring lines, vasopressors, or volume resuscitation 3/9/2020  4:00 PM  
Site Assessment Clean, dry, & intact 3/9/2020  4:00 PM  
Infiltration Assessment 0 3/9/2020  4:00 PM  
Affected Extremity/Extremities Color distal to insertion site pink (or appropriate for race); Pulses palpable;Range of motion performed 3/9/2020  4:00 PM  
Date of Last Dressing Change 03/09/20 3/9/2020  4:00 PM  
Dressing Status Clean, dry, & intact 3/9/2020  4:00 PM  
Dressing Type Disk with Chlorhexadine gluconate (CHG) 3/9/2020  4:00 PM  
Action Taken Open ports on tubing capped 3/9/2020  4:00 PM  
Proximal Hub Color/Line Status Alayne Bras; Infusing 3/9/2020  4:00 PM  
Positive Blood Return (Medial Site) Yes 3/9/2020  4:00 PM  
Medial Hub Color/Line Status Blue 3/9/2020  4:00 PM  
Positive Blood Return (Lateral Site) Yes 3/9/2020  4:00 PM  
Distal Hub Color/Line Status Brown; Infusing 3/9/2020  4:00 PM  
Positive Blood Return (Site #3) Yes 3/9/2020  4:00 PM  
Alcohol Cap Used Yes 3/9/2020  4:00 PM  
   
Venous Access Device BARD Power Port [id number] 07/19/19 Upper chest (subclavicular area, right (Active) Peripheral IV 03/04/20 Left External jugular (Active) Site Assessment Clean, dry, & intact 3/9/2020  4:00 PM  
Phlebitis Assessment 0 3/9/2020  4:00 PM  
Infiltration Assessment 0 3/9/2020  4:00 PM  
 Dressing Status Clean, dry, & intact 3/9/2020  4:00 PM  
Dressing Type Transparent 3/9/2020  4:00 PM  
Hub Color/Line Status Green;Capped 3/9/2020  4:00 PM  
Action Taken Open ports on tubing capped 3/9/2020  4:00 AM  
Alcohol Cap Used Yes 3/9/2020  4:00 PM  
  
 
Opportunity for questions and clarification was provided. Patient transported with: 
 O2 @ 2 liters Patient-specific medications from Pharmacy Registered Nurse Tech

## 2020-03-10 ENCOUNTER — APPOINTMENT (OUTPATIENT)
Dept: GENERAL RADIOLOGY | Age: 80
DRG: 871 | End: 2020-03-10
Attending: INTERNAL MEDICINE
Payer: MEDICARE

## 2020-03-10 LAB
ABO + RH BLD: NORMAL
ANION GAP SERPL CALC-SCNC: 6 MMOL/L (ref 5–15)
BASOPHILS # BLD: 0 K/UL (ref 0–0.1)
BASOPHILS NFR BLD: 0 % (ref 0–1)
BLD PROD TYP BPU: NORMAL
BLOOD GROUP ANTIBODIES SERPL: NORMAL
BPU ID: NORMAL
BUN SERPL-MCNC: 72 MG/DL (ref 6–20)
BUN/CREAT SERPL: 30 (ref 12–20)
CALCIUM SERPL-MCNC: 8.1 MG/DL (ref 8.5–10.1)
CHLORIDE SERPL-SCNC: 112 MMOL/L (ref 97–108)
CO2 SERPL-SCNC: 27 MMOL/L (ref 21–32)
CREAT SERPL-MCNC: 2.4 MG/DL (ref 0.55–1.02)
CROSSMATCH RESULT,%XM: NORMAL
DIFFERENTIAL METHOD BLD: ABNORMAL
EOSINOPHIL # BLD: 0.1 K/UL (ref 0–0.4)
EOSINOPHIL NFR BLD: 1 % (ref 0–7)
ERYTHROCYTE [DISTWIDTH] IN BLOOD BY AUTOMATED COUNT: 19.4 % (ref 11.5–14.5)
GLUCOSE BLD STRIP.AUTO-MCNC: 107 MG/DL (ref 65–100)
GLUCOSE BLD STRIP.AUTO-MCNC: 108 MG/DL (ref 65–100)
GLUCOSE BLD STRIP.AUTO-MCNC: 132 MG/DL (ref 65–100)
GLUCOSE BLD STRIP.AUTO-MCNC: 188 MG/DL (ref 65–100)
GLUCOSE SERPL-MCNC: 105 MG/DL (ref 65–100)
HCT VFR BLD AUTO: 23.5 % (ref 35–47)
HGB BLD-MCNC: 7.4 G/DL (ref 11.5–16)
IMM GRANULOCYTES # BLD AUTO: 0 K/UL
IMM GRANULOCYTES NFR BLD AUTO: 0 %
LYMPHOCYTES # BLD: 0.4 K/UL (ref 0.8–3.5)
LYMPHOCYTES NFR BLD: 7 % (ref 12–49)
MAGNESIUM SERPL-MCNC: 1.5 MG/DL (ref 1.6–2.4)
MCH RBC QN AUTO: 30.8 PG (ref 26–34)
MCHC RBC AUTO-ENTMCNC: 31.5 G/DL (ref 30–36.5)
MCV RBC AUTO: 97.9 FL (ref 80–99)
MONOCYTES # BLD: 0.3 K/UL (ref 0–1)
MONOCYTES NFR BLD: 6 % (ref 5–13)
NEUTS SEG # BLD: 4.7 K/UL (ref 1.8–8)
NEUTS SEG NFR BLD: 86 % (ref 32–75)
NRBC # BLD: 0 K/UL (ref 0–0.01)
NRBC BLD-RTO: 0 PER 100 WBC
PHOSPHATE SERPL-MCNC: 3 MG/DL (ref 2.6–4.7)
PLATELET # BLD AUTO: 94 K/UL (ref 150–400)
PLATELET COMMENTS,PCOM: ABNORMAL
PMV BLD AUTO: 11.1 FL (ref 8.9–12.9)
POTASSIUM SERPL-SCNC: 3.5 MMOL/L (ref 3.5–5.1)
RBC # BLD AUTO: 2.4 M/UL (ref 3.8–5.2)
RBC MORPH BLD: ABNORMAL
RBC MORPH BLD: ABNORMAL
SERVICE CMNT-IMP: ABNORMAL
SODIUM SERPL-SCNC: 145 MMOL/L (ref 136–145)
SPECIMEN EXP DATE BLD: NORMAL
STATUS OF UNIT,%ST: NORMAL
UNIT DIVISION, %UDIV: 0
WBC # BLD AUTO: 5.5 K/UL (ref 3.6–11)

## 2020-03-10 PROCEDURE — 74011636637 HC RX REV CODE- 636/637: Performed by: NURSE PRACTITIONER

## 2020-03-10 PROCEDURE — 94660 CPAP INITIATION&MGMT: CPT

## 2020-03-10 PROCEDURE — 74011250636 HC RX REV CODE- 250/636: Performed by: INTERNAL MEDICINE

## 2020-03-10 PROCEDURE — 74011250637 HC RX REV CODE- 250/637: Performed by: NURSE PRACTITIONER

## 2020-03-10 PROCEDURE — 80048 BASIC METABOLIC PNL TOTAL CA: CPT

## 2020-03-10 PROCEDURE — 74011250637 HC RX REV CODE- 250/637: Performed by: INTERNAL MEDICINE

## 2020-03-10 PROCEDURE — 94760 N-INVAS EAR/PLS OXIMETRY 1: CPT

## 2020-03-10 PROCEDURE — 65660000000 HC RM CCU STEPDOWN

## 2020-03-10 PROCEDURE — 82962 GLUCOSE BLOOD TEST: CPT

## 2020-03-10 PROCEDURE — 84100 ASSAY OF PHOSPHORUS: CPT

## 2020-03-10 PROCEDURE — 85025 COMPLETE CBC W/AUTO DIFF WBC: CPT

## 2020-03-10 PROCEDURE — 74011000258 HC RX REV CODE- 258: Performed by: INTERNAL MEDICINE

## 2020-03-10 PROCEDURE — 83735 ASSAY OF MAGNESIUM: CPT

## 2020-03-10 PROCEDURE — 36415 COLL VENOUS BLD VENIPUNCTURE: CPT

## 2020-03-10 RX ORDER — DEXTROSE MONOHYDRATE, SODIUM CHLORIDE, SODIUM LACTATE, POTASSIUM CHLORIDE, CALCIUM CHLORIDE 5; 600; 310; 179; 20 G/100ML; MG/100ML; MG/100ML; MG/100ML; MG/100ML
INJECTION, SOLUTION INTRAVENOUS CONTINUOUS
Status: DISCONTINUED | OUTPATIENT
Start: 2020-03-10 | End: 2020-03-11

## 2020-03-10 RX ADMIN — BRIMONIDINE TARTRATE 1 DROP: 2 SOLUTION OPHTHALMIC at 21:57

## 2020-03-10 RX ADMIN — BRIMONIDINE TARTRATE 1 DROP: 2 SOLUTION OPHTHALMIC at 17:48

## 2020-03-10 RX ADMIN — IRON SUCROSE 200 MG: 20 INJECTION, SOLUTION INTRAVENOUS at 12:00

## 2020-03-10 RX ADMIN — Medication 10 ML: at 12:03

## 2020-03-10 RX ADMIN — TIMOLOL MALEATE 1 DROP: 5 SOLUTION/ DROPS OPHTHALMIC at 17:50

## 2020-03-10 RX ADMIN — PANTOPRAZOLE SODIUM 40 MG: 40 TABLET, DELAYED RELEASE ORAL at 17:42

## 2020-03-10 RX ADMIN — Medication 10 ML: at 22:00

## 2020-03-10 RX ADMIN — SUCRALFATE 1 G: 1 TABLET ORAL at 07:29

## 2020-03-10 RX ADMIN — DEXTROSE MONOHYDRATE, SODIUM CHLORIDE, SODIUM LACTATE, POTASSIUM CHLORIDE, CALCIUM CHLORIDE: 5; 600; 310; 179; 20 INJECTION, SOLUTION INTRAVENOUS at 08:10

## 2020-03-10 RX ADMIN — CEFTRIAXONE SODIUM 2 G: 2 INJECTION, POWDER, FOR SOLUTION INTRAMUSCULAR; INTRAVENOUS at 17:41

## 2020-03-10 RX ADMIN — Medication 667 MG: at 17:42

## 2020-03-10 RX ADMIN — PANTOPRAZOLE SODIUM 40 MG: 40 TABLET, DELAYED RELEASE ORAL at 07:29

## 2020-03-10 RX ADMIN — MICONAZOLE NITRATE 2 % TOPICAL POWDER: at 10:02

## 2020-03-10 RX ADMIN — Medication 667 MG: at 11:59

## 2020-03-10 RX ADMIN — DORZOLAMIDE HYDROCHLORIDE 1 DROP: 20 SOLUTION/ DROPS OPHTHALMIC at 17:49

## 2020-03-10 RX ADMIN — DORZOLAMIDE HYDROCHLORIDE 1 DROP: 20 SOLUTION/ DROPS OPHTHALMIC at 21:57

## 2020-03-10 RX ADMIN — SUCRALFATE 1 G: 1 TABLET ORAL at 17:42

## 2020-03-10 RX ADMIN — BRIMONIDINE TARTRATE 1 DROP: 2 SOLUTION OPHTHALMIC at 10:01

## 2020-03-10 RX ADMIN — INSULIN LISPRO 2 UNITS: 100 INJECTION, SOLUTION INTRAVENOUS; SUBCUTANEOUS at 11:59

## 2020-03-10 RX ADMIN — MICONAZOLE NITRATE 2 % TOPICAL POWDER: at 17:48

## 2020-03-10 RX ADMIN — Medication 667 MG: at 08:08

## 2020-03-10 RX ADMIN — LEVOTHYROXINE SODIUM 100 MCG: 0.1 TABLET ORAL at 07:29

## 2020-03-10 RX ADMIN — TIMOLOL MALEATE 1 DROP: 5 SOLUTION/ DROPS OPHTHALMIC at 09:58

## 2020-03-10 RX ADMIN — Medication 10 ML: at 08:16

## 2020-03-10 RX ADMIN — LATANOPROST 1 DROP: 50 SOLUTION OPHTHALMIC at 21:57

## 2020-03-10 RX ADMIN — DORZOLAMIDE HYDROCHLORIDE 1 DROP: 20 SOLUTION/ DROPS OPHTHALMIC at 10:00

## 2020-03-10 NOTE — PROGRESS NOTES
Transition Plan of Care RUR 22% Nephrology following for SONIDO labs improving Sepsis- ID following on Rocephin PT/OT evaluation when appropriate CM provide discharge support Mar Vincent RN CRM Ext [phone]

## 2020-03-10 NOTE — PROGRESS NOTES
[facility]  Hospitalist Group ICU Transfer/Accept Summary [place] [address] [place] 
DATE OF TRANSFER: 3/9/2020 PATIENT ID: Asher Whelan MRN: [id number] YOB: 1940 PRIMARY CARE PROVIDER: Kathrine Florian MD  
DATE OF ADMISSION: 3/4/2020  9:48 PM   
ATTENDING PHYSICIAN: Staci Orta MD 
CONSULTATIONS:  
IP CONSULT TO CARDIOLOGY 
IP CONSULT TO NEPHROLOGY 
IP CONSULT TO INFECTIOUS DISEASES 
IP CONSULT TO INTERVENTIONAL RADIOLOGY 
IP CONSULT TO GASTROENTEROLOGY 
IP CONSULT TO HEMATOLOGY PROCEDURES/SURGERIES:  
* No surgery found * REASON FOR ADMISSION: Acute respiratory failure ([facility] [address] HOSPITAL PROBLEM LIST: 
Patient Active Problem List  
Diagnosis Code  History of colon polyps Z86.010  
 Esophageal dysphagia R13.10  Abnormal x-ray of abdomen R93.5  Adenocarcinoma of esophagus (HCC) C15.9  Acute respiratory failure (HCC) J96.00 Brief HPI and Hospital Course:   
70-year-old woman with past medical history significant for adenocarcinoma of the esophagus, hypothyroidism, obstructive sleep apnea, hypertension, chronic kidney disease, and glaucoma was in her usual state of health came to the hospital because of fatigue and SOB. She was found to have gram negative bacteremia. As she was hypotensive -she was transferred to ICU. While in ICU, she was on pressors which were stopped on 3/9. Patient has E coli bacteremia - on rocephin,port removed,received 1 U PRBC transferred out of ICU. Assessment and Plan: 
Septic shock due to E coli bacteremia:improved 
-Patient is immunocompromised with underlying cancer/chemotherapy 
-Port removed. -Meropenem de escalated to rocephin. - ID following 
 
  #Acute hypoxic respiratory failure:improving 
-She is requiring 3 L of oxygen to keep O2 saturations greater than 90% 
-VQ scan rule out pulmonary embolism. Venous Dopplers negative for DVT -Echo showed LV hypertrophy, normal EF 
  
  
#Acute on chronic renal failure: 
-baseline Cr 2 per nephrology team 
-Acute component due to sepsis/ATN/losartan PTA 
-cr trending down #Esophageal cancer: 
-follows with [facility] 
  
#Hypothyroidism: 
-on synthroid. 
  
#Acute on Chronic anemia: 
-Received 1 U PRBC -hb now >8. -GI following. PHYSICAL EXAMINATION: 
Visit Vitals /69 (BP 1 Location: Right arm, BP Patient Position: At rest) Pulse 69 Temp 98.4 °F (36.9 °C) Resp 26 Ht 5' 8\" (1.727 m) Wt 115.7 kg (255 lb 1.2 oz) SpO2 99% Breastfeeding No  
BMI 38.78 kg/m² General:          Alert, elderly patient HEENT:           Atraumatic,moist mucous membrane,EOMI,anicteric sclera Lungs:             decreased breath sounds at bases. Heart:              Regular  rhythm, S1 ,S 2 wnl Abdomen:       Soft, non-tender. Not distended. Bowel sounds normal 
Extremities:     minimal LE edema Psych:             Not anxious or agitated. Neurologic:      Alert, moves all extremities, hard of hearing,oriented X 2- 3. CODE STATUS: 
X Full Code DNR Partial  
 Comfort Care Signed: Sena Brooks MD 
Date of Service:  3/9/2020 
9:26 PM

## 2020-03-10 NOTE — ROUTINE PROCESS
Bedside and Verbal shift change report given to Melvi (oncoming nurse) by Florence Jiang (offgoing nurse). Report included the following information SBAR, Kardex, ED Summary, Intake/Output, MAR, Recent Results and Cardiac Rhythm Nsr/SinusBrady.

## 2020-03-10 NOTE — PROGRESS NOTES
[address] [facility]  Hospitalist Group Hospitalist Progress Note Geraldine Gamez MD 
Answering service: [phone] OR [phone] from in house phone Date of Service:  3/10/2020 NAME:  Primo Moon :  1940 MRN:  [id number] Admission Summary:  
17-year-old woman with past medical history significant for adenocarcinoma of the esophagus, hypothyroidism, obstructive sleep apnea, hypertension, chronic kidney disease, and glaucoma was in her usual state of health came to the hospital because of fatigue and SOB. She was found to have gram negative bacteremia. As she was hypotensive -she was transferred to ICU. While in ICU, she was on pressors which were stopped on 3/9. Patient has E coli bacteremia - on rocephin,port removed,received 1 U PRBC transferred out of ICU on 3/9. Interval history / Subjective:  
Patient seen sitting in a chair. She feels weak, otherwise alert oriented x4. She does not have any specific complaints today. She is afebrile. Assessment & Plan:  
 
Septic shock due to E coli bacteremia: 
-Shock resolved with fluids and vasopressors 
-Patient is immunocompromised with underlying cancer/chemotherapy 
-Port removed. -Meropenem de escalated to rocephin. - ID following. Blood culture from 3/8 remain negative. ID to decide if she needs prolonged intravenous antibiotic therapy. #Acute hypoxic respiratory failure due to sepsis:improving 
-Currently requiring 2 to 3 L of oxygen via nasal cannula, continue to wean off 
-VQ scan ruled out pulmonary embolism. Venous Dopplers negative for DVT 
-Echo showed LV hypertrophy, normal EF 
-She has also, uses CPAP @ 4 cm of H2O nocturnally. #Acute on chronic renal failure: 
-baseline Cr 2 per nephrology team 
-Acute component due to sepsis/ATN/losartan PTA 
-cr trending down #Esophageal cancer: -Status post stent placement which is removed since 
-Status post cryoablation at [facility]. 
-He follows with [facility] and she is on Xeloda. Seen by oncology here. #Hypothyroidism: 
-on synthroid. #Acute on Chronic anemia, anemia of chronic kidney disease: 
-Received 1 U PRBC -hb now >8. 
-Receiving Epogen. -GI following. #Hypertension, presented with septic shock: Home regimen includes carvedilol 12.5 mg twice daily, chlorthalidone 25 mg daily, clonidine 0.2 mg 2 times daily, amlodipine 10 mg daily and losartan 100 mg daily. She presented with shock as such antihypertensives are on hold, will try to reintroduce as able. Glaucoma: Continue eyedrops Darío: Nocturnal [email or web address] centimeters H2O nocturnally Vascular access: Right IJ triple-lumen and left EJ Code status: Full code DVT prophylaxis: SCD Care Plan discussed with: Patient/Family Anticipated Disposition: Home w/Family Anticipated Discharge: Greater than 48 hours Hospital Problems  Date Reviewed: 3/5/2020 Codes Class Noted POA * (Principal) Acute respiratory failure ([facility] [address] ICD-10-CM: J96.00 
ICD-9-CM: 518.81  3/5/2020 Yes Review of Systems: A comprehensive review of systems was negative except for that written in the HPI. Vital Signs:  
 Last 24hrs VS reviewed since prior progress note. Most recent are: 
Visit Vitals /79 (BP 1 Location: Left arm, BP Patient Position: Sitting) Pulse 66 Temp 98.7 °F (37.1 °C) Resp 20 Ht 5' 8\" (1.727 m) Wt 107.5 kg (237 lb) SpO2 100% Breastfeeding No  
BMI 36.04 kg/m² Intake/Output Summary (Last 24 hours) at 3/10/2020 1619 Last data filed at 3/10/2020 1516 Gross per 24 hour Intake 1923.33 ml Output 2300 ml Net -376.67 ml Physical Examination:  
 
 
     
Constitutional:  Weak, not in distress. ENT:  Oral mucosa moist, oropharynx benign. Resp:  On oxygen via nasal cannula, CTA bilaterally.  No wheezing/rhonchi/rales. No accessory muscle use CV:  Regular rhythm, normal rate, no murmurs, gallops, rubs GI:  Soft, non distended, non tender. normoactive bowel sounds, no hepatosplenomegaly Musculoskeletal:  No edema, warm, 2+ pulses throughout Neurologic:  Moves all extremities. AAOx3, CN II-XII reviewed Psych:  Good insight, Not anxious nor agitated. Data Review:  
 Review and/or order of clinical lab test 
Review and/or order of tests in the radiology section of CPT Review and/or order of tests in the medicine section of CPT Labs:  
 
Recent Labs  
  03/10/20 
0428 03/09/20 
1557 WBC 5.5 6.4 HGB 7.4* 8.1* HCT 23.5* 25.0*  
PLT 94* 101* Recent Labs  
  03/10/20 
0428 03/09/20 
0432 03/08/20 
0403  144 143  
K 3.5 3.7 3.6 * 113* 111* CO2 27 25 25 BUN 72* 82* 91* CREA 2.40* 2.70* 2.99* * 114* 126* CA 8.1* 8.5 8.8 MG 1.5* 1.8 2.0 PHOS 3.0 3.7 4.1 No results for input(s): SGOT, GPT, ALT, AP, TBIL, TBILI, TP, ALB, GLOB, GGT, AML, LPSE in the last 72 hours. No lab exists for component: AMYP, HLPSE Recent Labs 03/09/20 
0515 INR 1.6* PTP 16.3* No results for input(s): FE, TIBC, PSAT, FERR in the last 72 hours. Lab Results Component Value Date/Time Folate 95.7 (H) 03/05/2020 04:26 AM  
  
No results for input(s): PH, PCO2, PO2 in the last 72 hours. No results for input(s): CPK, CKNDX, TROIQ in the last 72 hours. No lab exists for component: CPKMB Lab Results Component Value Date/Time Cholesterol, total 96 03/05/2020 04:26 AM  
 HDL Cholesterol 12 03/05/2020 04:26 AM  
 LDL, calculated 40.8 03/05/2020 04:26 AM  
 Triglyceride 216 (H) 03/05/2020 04:26 AM  
 CHOL/HDL Ratio 8.0 (H) 03/05/2020 04:26 AM  
 
Lab Results Component Value Date/Time  Glucose (POC) 188 (H) 03/10/2020 11:09 AM  
 Glucose (POC) 107 (H) 03/10/2020 06:21 AM  
 Glucose (POC) 110 (H) 03/09/2020 11:18 PM  
 Glucose (POC) 120 (H) 03/09/2020 05:09 PM  
 Glucose (POC) 132 (H) 03/09/2020 11:53 AM  
 
Lab Results Component Value Date/Time Color YELLOW/STRAW 03/04/2020 10:38 PM  
 Appearance CLEAR 03/04/2020 10:38 PM  
 Specific gravity 1.014 03/04/2020 10:38 PM  
 pH (UA) 5.0 03/04/2020 10:38 PM  
 Protein 30 (A) 03/04/2020 10:38 PM  
 Glucose NEGATIVE  03/04/2020 10:38 PM  
 Ketone NEGATIVE  03/04/2020 10:38 PM  
 Bilirubin NEGATIVE  03/04/2020 10:38 PM  
 Urobilinogen 1.0 03/04/2020 10:38 PM  
 Nitrites NEGATIVE  03/04/2020 10:38 PM  
 Leukocyte Esterase NEGATIVE  03/04/2020 10:38 PM  
 Epithelial cells FEW 03/04/2020 10:38 PM  
 Bacteria NEGATIVE  03/04/2020 10:38 PM  
 WBC 0-4 03/04/2020 10:38 PM  
 RBC 0-5 03/04/2020 10:38 PM  
 
 
 
Medications Reviewed:  
 
Current Facility-Administered Medications Medication Dose Route Frequency  dextrose 5% - LR with KCl 20 mEq/L infusion   IntraVENous CONTINUOUS  
 0.9% sodium chloride infusion 250 mL  250 mL IntraVENous PRN  
 epoetin bonnie-epbx (RETACRIT) injection 10,000 Units  10,000 Units SubCUTAneous Q MON, WED & FRI  sucralfate (CARAFATE) tablet 1 g  1 g Oral ACB&D  pantoprazole (PROTONIX) tablet 40 mg  40 mg Oral ACB&D  cefTRIAXone (ROCEPHIN) 2 g in 0.9% sodium chloride (MBP/ADV) 50 mL  2 g IntraVENous Q24H  
 glucose chewable tablet 16 g  4 Tab Oral PRN  
 glucagon (GLUCAGEN) injection 1 mg  1 mg IntraMUSCular PRN  
 dextrose 10% infusion 0-250 mL  0-250 mL IntraVENous PRN  
 insulin lispro (HUMALOG) injection   SubCUTAneous Q6H  
 acetaminophen (TYLENOL) tablet 650 mg  650 mg Oral Q4H PRN  
 albuterol-ipratropium (DUO-NEB) 2.5 MG-0.5 MG/3 ML  3 mL Nebulization Q4H PRN  
 brimonidine (ALPHAGAN) 0.2 % ophthalmic solution 1 Drop  1 Drop Both Eyes TID  latanoprost (XALATAN) 0.005 % ophthalmic solution 1 Drop  1 Drop Both Eyes QHS  levothyroxine (SYNTHROID) tablet 100 mcg  100 mcg Oral ACB  sodium chloride (NS) flush 5-40 mL  5-40 mL IntraVENous Q8H  
 sodium chloride (NS) flush 5-40 mL  5-40 mL IntraVENous PRN  
 ondansetron (ZOFRAN) injection 4 mg  4 mg IntraVENous Q4H PRN  
 bisacodyL (DULCOLAX) tablet 5 mg  5 mg Oral DAILY PRN  
 calcium acetate(phosphat bind) (PHOSLO) tablet 667 mg  1 Tab Oral TID WITH MEALS  dorzolamide (TRUSOPT) 2 % ophthalmic solution 1 Drop  1 Drop Both Eyes TID And  
 timolol (TIMOPTIC) 0.5 % ophthalmic solution 1 Drop  1 Drop Both Eyes BID  
 0.9% sodium chloride infusion 250 mL  250 mL IntraVENous PRN  
 miconazole (MICOTIN) 2 % powder   Topical BID  sodium chloride (NS) flush 5-40 mL  5-40 mL IntraVENous Q8H  
 sodium chloride (NS) flush 5-40 mL  5-40 mL IntraVENous PRN  
 
______________________________________________________________________ EXPECTED LENGTH OF STAY: 3d 19h ACTUAL LENGTH OF STAY:          5 Melissa Rose MD

## 2020-03-10 NOTE — PROGRESS NOTES
Nephrology Progress Note Evangelina Velázquez Date of Admission : 3/4/2020 CC: Follow up for SONIDO Assessment and Plan SONIDO on CKD: 
- 2/2 ATN due to Gram Negative Sepsis, Hypotension ==> resolving   
- continue IVF - reduced rate to 50 cc/ hr  
 
CKD Stage IV  
- 2/2 ADPKD, HTN  
- baseline Creatinine : 2 mg/ dl  
- followed by Dr Imelda Carlisle  
  
Hypokalemia: 
- replace PRN  
  
E coli bacteremia w/ sepsis - s/p removal of Port-a cath 
  
Anemia : 
- Multifactorial  
- started KATHY Esophageal Ca Hypothyroidism EMILY Interval History: 
Seen and examined. Transferred out of ICU. Feeling good . No complaints . Denies any abdo pain/N/V/CP/SOB Current Medications: all current  Medications have been eviewed in [facility] Review of Systems: Pertinent items are noted in HPI. Objective: 
Vitals:   
Vitals:  
 03/09/20 2344 03/10/20 0300 03/10/20 0421 03/10/20 1838 BP:   129/69 Pulse:   73 Resp:   23 Temp:   98.3 °F (36.8 °C) SpO2: 100% 100% 98% 100% Weight:      
Height:      
 
Intake and Output: 
No intake/output data recorded. 03/08 1901 - 03/10 0700 In: 2717.8 [I.V.:2407.8] Out: 1975 [ERCBO:6164] Physical Examination: 
 
General: No distress Neck:  Supple, no mass Resp:  Reduced bibasilar breath sounds CV:  RRR,  no murmur or rub, no LE edema GI:  Soft, NT, + Bowel sounds, no hepatosplenomegaly Neurologic:  Non focal 
Psych:             AAO x 3 appropriate affect Skin:  Pigmentation of both hands :  No cuello [x]    High complexity decision making was performed 
[x]    Patient is at high-risk of decompensation with multiple organ involvement Lab Data Personally Reviewed: I have reviewed all the pertinent labs, microbiology data and radiology studies during assessment. Recent Labs  
  03/10/20 
4231 03/09/20 
0515 03/09/20 
0432 03/08/20 
0403   --  144 143  
K 3.5  --  3.7 3.6 *  --  113* 111* CO2 27  --  25 25 *  --  114* 126* BUN 72*  --  82* 91* CREA 2.40*  --  2.70* 2.99* CA 8.1*  --  8.5 8.8 MG 1.5*  --  1.8 2.0 PHOS 3.0  --  3.7 4.1 INR  --  1.6*  --   --   
 
Recent Labs  
  03/10/20 
0428 03/09/20 
1557 03/09/20 
5182 WBC 5.5 6.4 4.8 HGB 7.4* 8.1* 6.7* HCT 23.5* 25.0* 21.2*  
PLT 94* 101* 85* No results found for: SDES Lab Results Component Value Date/Time Culture result: NO GROWTH 1 DAY 03/08/2020 04:03 AM  
 Culture result:  03/06/2020 09:15 AM  
  MRSA NOT PRESENT. Apparent Staphylococus aureus (not MRSA noted). Culture result:  03/06/2020 09:15 AM  
      Screening of patient nares for MRSA is for surveillance purposes and, if positive, to facilitate isolation considerations in high risk settings. It is not intended for automatic decolonization interventions per se as regimens are not sufficiently effective to warrant routine use. Recent Results (from the past 24 hour(s)) GLUCOSE, POC Collection Time: 03/09/20 11:53 AM  
Result Value Ref Range Glucose (POC) 132 (H) 65 - 100 mg/dL Performed by Tao Taylor CBC WITH AUTOMATED DIFF Collection Time: 03/09/20  3:57 PM  
Result Value Ref Range WBC 6.4 3.6 - 11.0 K/uL  
 RBC 2.57 (L) 3.80 - 5.20 M/uL HGB 8.1 (L) 11.5 - 16.0 g/dL HCT 25.0 (L) 35.0 - 47.0 % MCV 97.3 80.0 - 99.0 FL  
 MCH 31.5 26.0 - 34.0 PG  
 MCHC 32.4 30.0 - 36.5 g/dL  
 RDW 19.5 (H) 11.5 - 14.5 % PLATELET 977 (L) 864 - 400 K/uL MPV 11.5 8.9 - 12.9 FL  
 NRBC 0.0 0  WBC ABSOLUTE NRBC 0.00 0.00 - 0.01 K/uL NEUTROPHILS 78 (H) 32 - 75 % LYMPHOCYTES 7 (L) 12 - 49 % MONOCYTES 12 5 - 13 % EOSINOPHILS 1 0 - 7 % BASOPHILS 0 0 - 1 % IMMATURE GRANULOCYTES 2 (H) 0.0 - 0.5 % ABS. NEUTROPHILS 5.0 1.8 - 8.0 K/UL  
 ABS. LYMPHOCYTES 0.4 (L) 0.8 - 3.5 K/UL  
 ABS. MONOCYTES 0.8 0.0 - 1.0 K/UL  
 ABS. EOSINOPHILS 0.1 0.0 - 0.4 K/UL  
 ABS. BASOPHILS 0.0 0.0 - 0.1 K/UL ABS. IMM. GRANS. 0.1 (H) 0.00 - 0.04 K/UL  
 DF SMEAR SCANNED    
 RBC COMMENTS ANISOCYTOSIS 1+ 
    
 RBC COMMENTS MACROCYTOSIS 
PRESENT 
    
 RBC COMMENTS OVALOCYTES PRESENT 
    
GLUCOSE, POC Collection Time: 03/09/20  5:09 PM  
Result Value Ref Range Glucose (POC) 120 (H) 65 - 100 mg/dL Performed by Sujata Beebe GLUCOSE, POC Collection Time: 03/09/20 11:18 PM  
Result Value Ref Range Glucose (POC) 110 (H) 65 - 100 mg/dL Performed by Mimi Nunes MAGNESIUM Collection Time: 03/10/20  4:28 AM  
Result Value Ref Range Magnesium 1.5 (L) 1.6 - 2.4 mg/dL PHOSPHORUS Collection Time: 03/10/20  4:28 AM  
Result Value Ref Range Phosphorus 3.0 2.6 - 4.7 MG/DL  
CBC WITH AUTOMATED DIFF Collection Time: 03/10/20  4:28 AM  
Result Value Ref Range WBC 5.5 3.6 - 11.0 K/uL  
 RBC 2.40 (L) 3.80 - 5.20 M/uL HGB 7.4 (L) 11.5 - 16.0 g/dL HCT 23.5 (L) 35.0 - 47.0 % MCV 97.9 80.0 - 99.0 FL  
 MCH 30.8 26.0 - 34.0 PG  
 MCHC 31.5 30.0 - 36.5 g/dL  
 RDW 19.4 (H) 11.5 - 14.5 % PLATELET 94 (L) 625 - 400 K/uL MPV 11.1 8.9 - 12.9 FL  
 NRBC 0.0 0  WBC ABSOLUTE NRBC 0.00 0.00 - 0.01 K/uL NEUTROPHILS 86 (H) 32 - 75 % LYMPHOCYTES 7 (L) 12 - 49 % MONOCYTES 6 5 - 13 % EOSINOPHILS 1 0 - 7 % BASOPHILS 0 0 - 1 % IMMATURE GRANULOCYTES 0 %  
 ABS. NEUTROPHILS 4.7 1.8 - 8.0 K/UL  
 ABS. LYMPHOCYTES 0.4 (L) 0.8 - 3.5 K/UL  
 ABS. MONOCYTES 0.3 0.0 - 1.0 K/UL  
 ABS. EOSINOPHILS 0.1 0.0 - 0.4 K/UL  
 ABS. BASOPHILS 0.0 0.0 - 0.1 K/UL  
 ABS. IMM. GRANS. 0.0 K/UL  
 DF MANUAL PLATELET COMMENTS Large Platelets RBC COMMENTS ANISOCYTOSIS 1+ 
    
 RBC COMMENTS OVALOCYTES PRESENT 
    
METABOLIC PANEL, BASIC Collection Time: 03/10/20  4:28 AM  
Result Value Ref Range Sodium 145 136 - 145 mmol/L Potassium 3.5 3.5 - 5.1 mmol/L Chloride 112 (H) 97 - 108 mmol/L  
 CO2 27 21 - 32 mmol/L  Anion gap 6 5 - 15 mmol/L  
 Glucose 105 (H) 65 - 100 mg/dL BUN 72 (H) 6 - 20 MG/DL Creatinine 2.40 (H) 0.55 - 1.02 MG/DL  
 BUN/Creatinine ratio 30 (H) 12 - 20 GFR est AA 24 (L) >60 ml/min/1.73m2 GFR est non-AA 19 (L) >60 ml/min/1.73m2 Calcium 8.1 (L) 8.5 - 10.1 MG/DL  
GLUCOSE, POC Collection Time: 03/10/20  6:21 AM  
Result Value Ref Range Glucose (POC) 107 (H) 65 - 100 mg/dL Performed by Isaiah Walter MD 
[facility]  
[address], [address] [facility] Phone - [phone] Fax - [phone] 
[email or web address]. [email or web address]

## 2020-03-10 NOTE — PROGRESS NOTES
[address] [facility]. 
[address] [place], [address] 
[phone] GI PROGRESS NOTE Patient Name: Hiral Fierro : 1940 MRN: [id number] Admit Date: 3/4/2020 Today's Date: 3/10/2020 Subjective: She is eating slowly, tolerating diet. Denies abdominal pain. Hgb 7.4 this AM, down from 8.1 after receiving one unit PRBCs yesterday. Per RN, no melena or hematochezia. Objective:  
 
Blood pressure 138/78, pulse 72, temperature 98.5 °F (36.9 °C), resp. rate 28, height 5' 8\" (1.727 m), weight 115.7 kg (255 lb 1.2 oz), SpO2 98 %, not currently breastfeeding. Physical Exam: 
General appearance: cooperative, no acute distress Skin: Extremities and face reveal no rashes or jaundice HEENT: Sclerae anicteric. Extra-occular muscles are intact. Cardiovascular: 
Respiratory: Comfortable breathing with no accessory muscle use. GI: Abdomen nondistended, soft, and nontender. Neurological: Gross memory appears intact. Patient is alert and oriented. Psychiatric: Mood appears appropriate with good judgement. No anxiety or agitation. Data Review:   
Recent Results (from the past 24 hour(s)) GLUCOSE, POC Collection Time: 20 11:53 AM  
Result Value Ref Range Glucose (POC) 132 (H) 65 - 100 mg/dL Performed by Derek Banegas CBC WITH AUTOMATED DIFF Collection Time: 20  3:57 PM  
Result Value Ref Range WBC 6.4 3.6 - 11.0 K/uL  
 RBC 2.57 (L) 3.80 - 5.20 M/uL HGB 8.1 (L) 11.5 - 16.0 g/dL HCT 25.0 (L) 35.0 - 47.0 % MCV 97.3 80.0 - 99.0 FL  
 MCH 31.5 26.0 - 34.0 PG  
 MCHC 32.4 30.0 - 36.5 g/dL  
 RDW 19.5 (H) 11.5 - 14.5 % PLATELET 316 (L) 818 - 400 K/uL MPV 11.5 8.9 - 12.9 FL  
 NRBC 0.0 0  WBC ABSOLUTE NRBC 0.00 0.00 - 0.01 K/uL NEUTROPHILS 78 (H) 32 - 75 % LYMPHOCYTES 7 (L) 12 - 49 % MONOCYTES 12 5 - 13 % EOSINOPHILS 1 0 - 7 % BASOPHILS 0 0 - 1 % IMMATURE GRANULOCYTES 2 (H) 0.0 - 0.5 % ABS. NEUTROPHILS 5.0 1.8 - 8.0 K/UL  
 ABS. LYMPHOCYTES 0.4 (L) 0.8 - 3.5 K/UL  
 ABS. MONOCYTES 0.8 0.0 - 1.0 K/UL  
 ABS. EOSINOPHILS 0.1 0.0 - 0.4 K/UL  
 ABS. BASOPHILS 0.0 0.0 - 0.1 K/UL  
 ABS. IMM. GRANS. 0.1 (H) 0.00 - 0.04 K/UL  
 DF SMEAR SCANNED    
 RBC COMMENTS ANISOCYTOSIS 1+ 
    
 RBC COMMENTS MACROCYTOSIS 
PRESENT 
    
 RBC COMMENTS OVALOCYTES PRESENT 
    
GLUCOSE, POC Collection Time: 03/09/20  5:09 PM  
Result Value Ref Range Glucose (POC) 120 (H) 65 - 100 mg/dL Performed by Ti Alba GLUCOSE, POC Collection Time: 03/09/20 11:18 PM  
Result Value Ref Range Glucose (POC) 110 (H) 65 - 100 mg/dL Performed by Trevor Espitia MAGNESIUM Collection Time: 03/10/20  4:28 AM  
Result Value Ref Range Magnesium 1.5 (L) 1.6 - 2.4 mg/dL PHOSPHORUS Collection Time: 03/10/20  4:28 AM  
Result Value Ref Range Phosphorus 3.0 2.6 - 4.7 MG/DL  
CBC WITH AUTOMATED DIFF Collection Time: 03/10/20  4:28 AM  
Result Value Ref Range WBC 5.5 3.6 - 11.0 K/uL  
 RBC 2.40 (L) 3.80 - 5.20 M/uL HGB 7.4 (L) 11.5 - 16.0 g/dL HCT 23.5 (L) 35.0 - 47.0 % MCV 97.9 80.0 - 99.0 FL  
 MCH 30.8 26.0 - 34.0 PG  
 MCHC 31.5 30.0 - 36.5 g/dL  
 RDW 19.4 (H) 11.5 - 14.5 % PLATELET 94 (L) 021 - 400 K/uL MPV 11.1 8.9 - 12.9 FL  
 NRBC 0.0 0  WBC ABSOLUTE NRBC 0.00 0.00 - 0.01 K/uL NEUTROPHILS 86 (H) 32 - 75 % LYMPHOCYTES 7 (L) 12 - 49 % MONOCYTES 6 5 - 13 % EOSINOPHILS 1 0 - 7 % BASOPHILS 0 0 - 1 % IMMATURE GRANULOCYTES 0 %  
 ABS. NEUTROPHILS 4.7 1.8 - 8.0 K/UL  
 ABS. LYMPHOCYTES 0.4 (L) 0.8 - 3.5 K/UL  
 ABS. MONOCYTES 0.3 0.0 - 1.0 K/UL  
 ABS. EOSINOPHILS 0.1 0.0 - 0.4 K/UL  
 ABS. BASOPHILS 0.0 0.0 - 0.1 K/UL  
 ABS. IMM. GRANS. 0.0 K/UL  
 DF MANUAL PLATELET COMMENTS Large Platelets RBC COMMENTS ANISOCYTOSIS 1+ 
    
 RBC COMMENTS OVALOCYTES PRESENT 
    
METABOLIC PANEL, BASIC  
 Collection Time: 03/10/20  4:28 AM  
Result Value Ref Range Sodium 145 136 - 145 mmol/L Potassium 3.5 3.5 - 5.1 mmol/L Chloride 112 (H) 97 - 108 mmol/L  
 CO2 27 21 - 32 mmol/L Anion gap 6 5 - 15 mmol/L Glucose 105 (H) 65 - 100 mg/dL BUN 72 (H) 6 - 20 MG/DL Creatinine 2.40 (H) 0.55 - 1.02 MG/DL  
 BUN/Creatinine ratio 30 (H) 12 - 20 GFR est AA 24 (L) >60 ml/min/1.73m2 GFR est non-AA 19 (L) >60 ml/min/1.73m2 Calcium 8.1 (L) 8.5 - 10.1 MG/DL  
GLUCOSE, POC Collection Time: 03/10/20  6:21 AM  
Result Value Ref Range Glucose (POC) 107 (H) 65 - 100 mg/dL Performed by Janine Yun Assessment / Plan :  
 
1. Anemia - likely multifactorial including CKD, recent chemotherapy, known esophageal adenocarcinoma, etc. Hgb 7.4 this AM. Down from 8.1 post transfusion of 1 unit yesterday. No overt GI bleeding.  
- Diet as tolerated - Supportive management per primary team 
- Continue PPI 
- No acute plans for endoscopy - Monitor H&H, transfuse as needed 
  
2. E. Coli bacteremia w/ sepsis - s/p removal of Port-a-cath - Management per primary team 
3. CKD 4. Hx esophageal Ca s/p surgical resection Patient Active Hospital Problem List:  
Principal Problem: 
  Acute respiratory failure ([address] (3/5/2020)

## 2020-03-10 NOTE — ROUTINE PROCESS
Bedside and Verbal shift change report given to michelle (oncoming nurse) by Geronimo Osorio (offgoing nurse). Report included the following information SBAR, Kardex, Recent Results, Med Rec Status and Cardiac Rhythm NSR/ PVC/PAC.

## 2020-03-10 NOTE — PROGRESS NOTES
Infectious Diseases Progress Note ANTIBIOTIC SUMMARY: 
Zosyn  3/5 x 1 dose Meropenem 3/6 -- 3/7 Rocephin 3/7 -- present Right Portacath removed 3/8/2020 Subjective: She feels better. No new symptoms. No neck or back pain. Objective:  
 
Vitals:  
Visit Vitals /69 (BP 1 Location: Right arm, BP Patient Position: At rest) Pulse 69 Temp 98.4 °F (36.9 °C) Resp 26 Ht 5' 8\" (1.727 m) Wt 115.7 kg (255 lb 1.2 oz) SpO2 99% Breastfeeding No  
BMI 38.78 kg/m² Tmax:  Temp (24hrs), Av.9 °F (36.6 °C), Min:97.5 °F (36.4 °C), Max:98.4 °F (36.9 °C) Exam:  General appearance: no distress Chest wall: right Portacath removed 3/8 Lungs: clear to auscultation bilaterally Heart: irregular rate and rhythm Abdomen: soft, non-tender. Bowel sounds normal.  
Skin: no rash Neurologic: Grossly normal 
 
IV Lines: Right Portacath POA Labs:   
Recent Labs 20 
1557 20 
1158 20 
0404 20 
0403 20 
0413 20 
2209 WBC 6.4 4.8 8.5  --  8.8  --   
HGB 8.1* 6.7* 7.5*  --  7.4*  --   
* 85* 85*  --  67*  --   
BUN  --  82*  --  91* 102* 110* CREA  --  2.70*  --  2.99* 3.41* 3.53* BLOOD CULTURES: 
 3/4 = E coli in 4 of 4 bottles 3/6 = E coli in 2 of 4 bottles 3/8 = NGSF 
 
CT scan abd & pelvis = polycystic disease Assessment: 1. E coli bacteremia -- possibly due to Portacath infection -- Portacath removed 3/8/2020: Stable 
  
2. Adenocarcinoma of the esophagus 
  
3. Elevated creatinine -- CKD vs CKD with acute exacerbation 4. Polcystic disease -- multiple cysts in liver and kidneys 
  
5. EMILY 
  
6. Hypothyroidism 
  
7. HTN 
  
8. Glaucoma Plan: 1. Continue Damaris Pillai MD

## 2020-03-10 NOTE — CONSULTS
Omar Loera Name:  Angie Mendez 
MR#:  [id number] :  1940 ACCOUNT #:  [de-identified] DATE OF SERVICE:  03/10/2020 HISTORY OF PRESENT ILLNESS:  The patient is a 71-year-old woman with a history of adenocarcinoma of the esophagus, who is seen after admission to [facility] with shortness of breath. This patient is followed by Dr. Geneva Bonilla, and he has recently started oral Xeloda single-agent chemotherapy for her persisting cancer. She has also had a recent [place] [facility] [place]  Several days prior to this hospitalization, she developed shortness of breath, in the absence of cough, fever, hemoptysis. She was admitted to [facility] for further evaluation and treatment on . PAST MEDICAL HISTORY:  Includes HER2-positive adenocarcinoma of the esophagus, diagnosed in 2019. She had EUS performed in 2019 as well as EGD and stent placement at that time. PET scan in 2019 failed to demonstrate metastatic disease. She underwent radiation therapy to the distal esophagus at [facility] in between 2019 and 2019, and also received weekly Taxol and carboplatin with this. CT scan of the chest dated 10/15/2019 demonstrated mediastinal adenopathy as well as infiltration into the mediastinum with tumor. She had a biopsy in 2019 confirming persistent adenocarcinoma at 22 cm. She has had 2 cryotherapy procedures performed. Past medical history also includes chronic kidney disease, pulmonary hypertension, sleep apnea, hypertension, glaucoma, anemia. PAST SURGICAL HISTORY:  Includes multiple EGDs, cataract surgery, colonoscopy, hysterectomy. ALLERGIES:  NO KNOWN DRUG ALLERGIES. FAMILY HISTORY:  Negative for malignancy. REVIEW OF SYSTEMS:  As noted above, otherwise noncontributory. PHYSICAL EXAMINATION: 
GENERAL:  She is a pleasant overweight woman in no apparent distress. VITAL SIGNS:  Temp 98, pulse 72, /78, respirations 28, O2 sat 98% on 2 L. HEENT:  Nonicteric sclerae. NECK:  Supple. No lymphadenopathy noted. LUNGS:  Reveal diminished breath sounds in the bases. CARDIAC:  Regular rate and rhythm. No murmur. ABDOMEN:  Soft, nontender. No hepatosplenomegaly noted. EXTREMITIES:  No edema. DIAGNOSTIC DATA:  Chest x-ray shows left pleural effusion and overlying air space opacity. Trace right pleural effusion. CT of the abdomen and pelvis demonstrates polycystic kidney disease with hepatic and bilateral renal cysts. No evidence of local or systemic recurrent cancer on this unenhanced CT scan was identified. LABORATORIES:  Hemoglobin was 6.7, white count 4.8, platelet count 85. BUN and creatinine were 110 and 3.5 upon admission. Blood cultures drawn this admission identified E-coli. Her Port-A-Cath was removed on 03/08. She has remained afebrile throughout this admission. IMPRESSION: 
1. Locally persistent adenocarcinoma of the esophagus. The patient has had a recent cryoablation procedure performed by Dr. Carmen Morales at [facility].  We have a phone call in to determine if there were any complications from that procedure. The patient is currently receiving single-agent Xeloda under Dr. Vicki osorio and appeared to have tolerated that drug well, up until this admission. CT scans performed this admission were done without intravenous contrast, but failed to show obvious metastatic disease. She should have her Xeloda held during the hospital stay nonetheless, as she is being treated for Escherichia coli bacteremia and is still enjoying a normal white blood cell count. 2.  Anemia. The patient was found to have guaiac-positive stool on 03/07. She has been transfused this admission, she could benefit from intravenous iron. She should have daily labs performed and transfused accordingly.   She is also receiving Retacrit at present, and she will continue that medication. I will update her chart with any news from Dr. Maile Camejo at [facility] if there were any problems with the recent cryotherapy procedure. Ebony Ryder MD 
 
 
[id number] 
D:  03/10/2020 10:58 
T:  03/10/2020 12:20 
JOB #:  [id number]

## 2020-03-11 ENCOUNTER — APPOINTMENT (OUTPATIENT)
Dept: GENERAL RADIOLOGY | Age: 80
DRG: 871 | End: 2020-03-11
Attending: INTERNAL MEDICINE
Payer: MEDICARE

## 2020-03-11 LAB
ANION GAP SERPL CALC-SCNC: 6 MMOL/L (ref 5–15)
BACTERIA SPEC CULT: ABNORMAL
BASOPHILS # BLD: 0 K/UL (ref 0–0.1)
BASOPHILS NFR BLD: 0 % (ref 0–1)
BUN SERPL-MCNC: 60 MG/DL (ref 6–20)
BUN/CREAT SERPL: 27 (ref 12–20)
CALCIUM SERPL-MCNC: 8.4 MG/DL (ref 8.5–10.1)
CHLORIDE SERPL-SCNC: 112 MMOL/L (ref 97–108)
CO2 SERPL-SCNC: 28 MMOL/L (ref 21–32)
CREAT SERPL-MCNC: 2.19 MG/DL (ref 0.55–1.02)
DIFFERENTIAL METHOD BLD: ABNORMAL
EOSINOPHIL # BLD: 0.1 K/UL (ref 0–0.4)
EOSINOPHIL NFR BLD: 2 % (ref 0–7)
ERYTHROCYTE [DISTWIDTH] IN BLOOD BY AUTOMATED COUNT: 19.4 % (ref 11.5–14.5)
GLUCOSE BLD STRIP.AUTO-MCNC: 118 MG/DL (ref 65–100)
GLUCOSE BLD STRIP.AUTO-MCNC: 124 MG/DL (ref 65–100)
GLUCOSE BLD STRIP.AUTO-MCNC: 147 MG/DL (ref 65–100)
GLUCOSE SERPL-MCNC: 124 MG/DL (ref 65–100)
HCT VFR BLD AUTO: 24.3 % (ref 35–47)
HGB BLD-MCNC: 7.6 G/DL (ref 11.5–16)
IMM GRANULOCYTES # BLD AUTO: 0.2 K/UL (ref 0–0.04)
IMM GRANULOCYTES NFR BLD AUTO: 3 % (ref 0–0.5)
LYMPHOCYTES # BLD: 0.6 K/UL (ref 0.8–3.5)
LYMPHOCYTES NFR BLD: 9 % (ref 12–49)
MAGNESIUM SERPL-MCNC: 1.5 MG/DL (ref 1.6–2.4)
MCH RBC QN AUTO: 30.6 PG (ref 26–34)
MCHC RBC AUTO-ENTMCNC: 31.3 G/DL (ref 30–36.5)
MCV RBC AUTO: 98 FL (ref 80–99)
MONOCYTES # BLD: 0.8 K/UL (ref 0–1)
MONOCYTES NFR BLD: 12 % (ref 5–13)
NEUTS SEG # BLD: 4.7 K/UL (ref 1.8–8)
NEUTS SEG NFR BLD: 74 % (ref 32–75)
NRBC # BLD: 0 K/UL (ref 0–0.01)
NRBC BLD-RTO: 0 PER 100 WBC
PHOSPHATE SERPL-MCNC: 2.6 MG/DL (ref 2.6–4.7)
PLATELET # BLD AUTO: 113 K/UL (ref 150–400)
PMV BLD AUTO: 11.3 FL (ref 8.9–12.9)
POTASSIUM SERPL-SCNC: 3.6 MMOL/L (ref 3.5–5.1)
RBC # BLD AUTO: 2.48 M/UL (ref 3.8–5.2)
RBC MORPH BLD: ABNORMAL
SERVICE CMNT-IMP: ABNORMAL
SODIUM SERPL-SCNC: 146 MMOL/L (ref 136–145)
WBC # BLD AUTO: 6.4 K/UL (ref 3.6–11)

## 2020-03-11 PROCEDURE — 77010033678 HC OXYGEN DAILY

## 2020-03-11 PROCEDURE — 94760 N-INVAS EAR/PLS OXIMETRY 1: CPT

## 2020-03-11 PROCEDURE — 74011250637 HC RX REV CODE- 250/637: Performed by: INTERNAL MEDICINE

## 2020-03-11 PROCEDURE — 82962 GLUCOSE BLOOD TEST: CPT

## 2020-03-11 PROCEDURE — 74011250637 HC RX REV CODE- 250/637: Performed by: NURSE PRACTITIONER

## 2020-03-11 PROCEDURE — 74011250636 HC RX REV CODE- 250/636: Performed by: INTERNAL MEDICINE

## 2020-03-11 PROCEDURE — 97535 SELF CARE MNGMENT TRAINING: CPT

## 2020-03-11 PROCEDURE — 74011636320 HC RX REV CODE- 636/320: Performed by: RADIOLOGY

## 2020-03-11 PROCEDURE — 97165 OT EVAL LOW COMPLEX 30 MIN: CPT

## 2020-03-11 PROCEDURE — 97161 PT EVAL LOW COMPLEX 20 MIN: CPT

## 2020-03-11 PROCEDURE — 74011000258 HC RX REV CODE- 258: Performed by: INTERNAL MEDICINE

## 2020-03-11 PROCEDURE — 94660 CPAP INITIATION&MGMT: CPT

## 2020-03-11 PROCEDURE — 84100 ASSAY OF PHOSPHORUS: CPT

## 2020-03-11 PROCEDURE — 85025 COMPLETE CBC W/AUTO DIFF WBC: CPT

## 2020-03-11 PROCEDURE — 83735 ASSAY OF MAGNESIUM: CPT

## 2020-03-11 PROCEDURE — 97116 GAIT TRAINING THERAPY: CPT

## 2020-03-11 PROCEDURE — 80048 BASIC METABOLIC PNL TOTAL CA: CPT

## 2020-03-11 PROCEDURE — 36415 COLL VENOUS BLD VENIPUNCTURE: CPT

## 2020-03-11 PROCEDURE — 74220 X-RAY XM ESOPHAGUS 1CNTRST: CPT

## 2020-03-11 PROCEDURE — 65270000032 HC RM SEMIPRIVATE

## 2020-03-11 RX ORDER — MAGNESIUM SULFATE 100 %
4 CRYSTALS MISCELLANEOUS AS NEEDED
Status: DISCONTINUED | OUTPATIENT
Start: 2020-03-11 | End: 2020-03-12 | Stop reason: HOSPADM

## 2020-03-11 RX ORDER — DEXTROSE MONOHYDRATE 100 MG/ML
0-250 INJECTION, SOLUTION INTRAVENOUS AS NEEDED
Status: DISCONTINUED | OUTPATIENT
Start: 2020-03-11 | End: 2020-03-12 | Stop reason: HOSPADM

## 2020-03-11 RX ORDER — BACITRACIN 500 UNIT/G
1 PACKET (EA) TOPICAL AS NEEDED
Status: DISCONTINUED | OUTPATIENT
Start: 2020-03-11 | End: 2020-03-12 | Stop reason: HOSPADM

## 2020-03-11 RX ORDER — INSULIN LISPRO 100 [IU]/ML
INJECTION, SOLUTION INTRAVENOUS; SUBCUTANEOUS
Status: DISCONTINUED | OUTPATIENT
Start: 2020-03-11 | End: 2020-03-12 | Stop reason: HOSPADM

## 2020-03-11 RX ADMIN — Medication 10 ML: at 22:47

## 2020-03-11 RX ADMIN — DORZOLAMIDE HYDROCHLORIDE 1 DROP: 20 SOLUTION/ DROPS OPHTHALMIC at 18:46

## 2020-03-11 RX ADMIN — EPOETIN ALFA-EPBX 10000 UNITS: 10000 INJECTION, SOLUTION INTRAVENOUS; SUBCUTANEOUS at 22:44

## 2020-03-11 RX ADMIN — SUCRALFATE 1 G: 1 TABLET ORAL at 06:32

## 2020-03-11 RX ADMIN — DORZOLAMIDE HYDROCHLORIDE 1 DROP: 20 SOLUTION/ DROPS OPHTHALMIC at 23:31

## 2020-03-11 RX ADMIN — LEVOTHYROXINE SODIUM 100 MCG: 0.1 TABLET ORAL at 06:32

## 2020-03-11 RX ADMIN — PANTOPRAZOLE SODIUM 40 MG: 40 TABLET, DELAYED RELEASE ORAL at 18:06

## 2020-03-11 RX ADMIN — CEFTRIAXONE SODIUM 2 G: 2 INJECTION, POWDER, FOR SOLUTION INTRAMUSCULAR; INTRAVENOUS at 18:43

## 2020-03-11 RX ADMIN — DEXTROSE MONOHYDRATE, SODIUM CHLORIDE, SODIUM LACTATE, POTASSIUM CHLORIDE, CALCIUM CHLORIDE: 5; 600; 310; 179; 20 INJECTION, SOLUTION INTRAVENOUS at 05:42

## 2020-03-11 RX ADMIN — Medication 10 ML: at 06:00

## 2020-03-11 RX ADMIN — Medication 10 ML: at 18:12

## 2020-03-11 RX ADMIN — LATANOPROST 1 DROP: 50 SOLUTION OPHTHALMIC at 23:31

## 2020-03-11 RX ADMIN — Medication 667 MG: at 09:24

## 2020-03-11 RX ADMIN — MICONAZOLE NITRATE 2 % TOPICAL POWDER: at 09:26

## 2020-03-11 RX ADMIN — Medication 10 ML: at 22:46

## 2020-03-11 RX ADMIN — Medication 667 MG: at 18:07

## 2020-03-11 RX ADMIN — DORZOLAMIDE HYDROCHLORIDE 1 DROP: 20 SOLUTION/ DROPS OPHTHALMIC at 09:27

## 2020-03-11 RX ADMIN — TIMOLOL MALEATE 1 DROP: 5 SOLUTION/ DROPS OPHTHALMIC at 09:27

## 2020-03-11 RX ADMIN — TIMOLOL MALEATE 1 DROP: 5 SOLUTION/ DROPS OPHTHALMIC at 19:18

## 2020-03-11 RX ADMIN — BRIMONIDINE TARTRATE 1 DROP: 2 SOLUTION OPHTHALMIC at 23:30

## 2020-03-11 RX ADMIN — SUCRALFATE 1 G: 1 TABLET ORAL at 18:06

## 2020-03-11 RX ADMIN — PANTOPRAZOLE SODIUM 40 MG: 40 TABLET, DELAYED RELEASE ORAL at 06:32

## 2020-03-11 RX ADMIN — BRIMONIDINE TARTRATE 1 DROP: 2 SOLUTION OPHTHALMIC at 18:10

## 2020-03-11 RX ADMIN — IOHEXOL 100 ML: 350 INJECTION, SOLUTION INTRAVENOUS at 08:00

## 2020-03-11 RX ADMIN — BRIMONIDINE TARTRATE 1 DROP: 2 SOLUTION OPHTHALMIC at 09:26

## 2020-03-11 NOTE — PROGRESS NOTES
TRANSFER - OUT REPORT: 
 
Verbal report given to Dagmar(name) on Earlene Lamp  being transferred to (unit) for routine progression of care Report consisted of patients Situation, Background, Assessment and  
Recommendations(SBAR). Information from the following report(s) SBAR, Procedure Summary, Intake/Output and MAR was reviewed with the receiving nurse. Lines:  
Triple Lumen Arrow 3-CVL catheter placement lot# [id number] 03/08/20 Right;Upper Neck (Active) Central Line Being Utilized Yes 3/10/2020 11:07 PM  
Criteria for Appropriate Use Limited/no vessel suitable for conventional peripheral access 3/10/2020  8:06 PM  
Site Assessment Clean, dry, & intact 3/10/2020  8:06 PM  
Infiltration Assessment 0 3/10/2020  8:06 PM  
Affected Extremity/Extremities Color distal to insertion site pink (or appropriate for race) 3/10/2020  8:06 PM  
Date of Last Dressing Change 03/09/20 3/10/2020  8:10 AM  
Dressing Status Clean, dry, & intact 3/10/2020  8:06 PM  
Dressing Type Transparent;Disk with Chlorhexadine gluconate (CHG) 3/10/2020  8:06 PM  
Action Taken Open ports on tubing capped 3/10/2020  8:06 PM  
Proximal Hub Color/Line Status Infusing 3/10/2020  8:06 PM  
Positive Blood Return (Medial Site) Yes 3/10/2020  8:06 PM  
Medial Hub Color/Line Status Blue 3/10/2020  8:06 PM  
Positive Blood Return (Lateral Site) Yes 3/10/2020  8:06 PM  
Distal Hub Color/Line Status Capped 3/10/2020  8:06 PM  
Positive Blood Return (Site #3) Yes 3/10/2020  8:06 PM  
Alcohol Cap Used Yes 3/10/2020  8:06 PM  
   
Venous Access Device [facility]#[id number] 07/19/19 Upper chest (subclavicular area, right (Active) Peripheral IV 03/04/20 Left External jugular (Active) Site Assessment Clean, dry, & intact 3/11/2020  4:53 AM  
Phlebitis Assessment 0 3/11/2020  4:53 AM  
Infiltration Assessment 0 3/11/2020  4:53 AM  
Dressing Status Clean, dry, & intact 3/11/2020  4:53 AM  
 Dressing Type Transparent 3/10/2020  8:06 PM  
Hub Color/Line Status Capped 3/10/2020  8:06 PM  
Action Taken Open ports on tubing capped 3/10/2020  3:34 PM  
Alcohol Cap Used Yes 3/10/2020  8:06 PM  
  
 
Opportunity for questions and clarification was provided. Patient transported with: 
 [facility]

## 2020-03-11 NOTE — PROGRESS NOTES
Problem: Mobility Impaired (Adult and Pediatric) Goal: *Acute Goals and Plan of Care (Insert Text) Description: FUNCTIONAL STATUS PRIOR TO ADMISSION: Patient was modified independent using a single point cane PRN for functional mobility. HOME SUPPORT PRIOR TO ADMISSION: The patient lived with her two grandsons (both work) but did not require assist. Also has supportive daughter in law and son. Physical Therapy Goals Initiated 3/11/2020 1. Patient will move from supine to sit and sit to supine , scoot up and down and roll side to side in bed with modified independence within 7 day(s). 2.  Patient will transfer from bed to chair and chair to bed with modified independence using the least restrictive device within 7 day(s). 3.  Patient will perform sit to stand with modified independence within 7 day(s). 4.  Patient will ambulate with modified independence for 150 feet with the least restrictive device within 7 day(s). 5.  Patient will ascend/descend 4 stairs with right handrail(s) with contact guard assist within 7 day(s). 6.  Patient will improve Tinetti score by 4-5 points within 7 days. Outcome: Progressing Towards Goal 
 PHYSICAL THERAPY EVALUATION Patient: Jasmin Judge (2 y.o. female) Date: 3/11/2020 Primary Diagnosis: Acute respiratory failure ([facility] [J96.00] Precautions:   Fall ASSESSMENT Based on the objective data described below, the patient presents with decreased endurance, generalized weakness, impaired balance, and overall decline from baseline following admission for acute respiratory failure. Patient now on room air and maintained sats >90% during gait. Gait characterized by increased trunk sway and shuffling with decreased step length. Multiple mild LOB episodes (primarily posteriorly), requiring min A to steady.  Patient uses a cane PRN at home, and anticipate she would benefit from cane vs RW trial next session to assess endurance for further activity. If she has good social support and is able to improve her tolerance for gait, she could d/c home with HHPT. If not progressing well, and continues to be a high fall risk may need a short SNF stay. Current Level of Function Impacting Discharge (mobility/balance): min A for gait and transfers Functional Outcome Measure: The patient scored 13/28 on the Tinetti outcome measure which is indicative of high fall risk. Other factors to consider for discharge: previously independent, new CHF? Patient will benefit from skilled therapy intervention to address the above noted impairments. PLAN : 
Recommendations and Planned Interventions: bed mobility training, transfer training, gait training, therapeutic exercises, neuromuscular re-education, edema management/control, patient and family training/education, and therapeutic activities Frequency/Duration: Patient will be followed by physical therapy:  5 times a week to address goals. Recommendation for discharge: (in order for the patient to meet his/her long term goals) To be determined: hopeful for home with HHPT at d/c if good social support and progress with acute PT, if not progressing or lack of support at d/c then SNF This discharge recommendation: 
Has not yet been discussed the attending provider and/or case management IF patient discharges home will need the following DME: to be determined (TBD) SUBJECTIVE:  
Patient stated I'll do much better at home.  OBJECTIVE DATA SUMMARY:  
HISTORY:   
Past Medical History:  
Diagnosis Date Abnormal x-ray of abdomen 5/22/2019 Bas showed food and irregular stricture above ge junction in addition to large hiatal hernia  5.20.2019 Adenocarcinoma of esophagus ([address] 5/30/2019 Anemia Arthritis Chronic kidney disease Stage II followed by Dr Artemio Brody Nephrology Diverticulosis Esophageal dysphagia 5/22/2019 H/O colonoscopy with polypectomy benign History of colon polyps 6/1/2018 2013 tubular adenoma Hypertension Ill-defined condition   
 pulmonary hypertension Other ill-defined conditions(799.89)   
 glaucoma and cataracts Sleep apnea CPAP compliant, as stated 5/20/2019 Thromboembolus ([facility] 2015 Right  leg , spontaneous Thyroid disease   
 hypothyroidism Past Surgical History:  
Procedure Laterality Date ANAL PRESSURE RECORD  6/6/2019 COLONOSCOPY N/A 6/1/2018 COLONOSCOPY performed by Joanna Lauren MD at [facility]  6/1/2018 HX CATARACT REMOVAL Bilateral 2017 [address] [place] [address] thyroid biopsy-benign HX HYSTERECTOMY    
 IR INSERT NON TUNL CVC OVER 5 YRS  3/8/2020 IR INSERT TUNL CVC W PORT OVER 5 YEARS  7/19/2019 IR REMOVE TUNL CVAD W/O PORT / PUMP  3/8/2020 UPPER GI ENDOSCOPY,BALL DIL,30MM  5/22/2019 UPPER GI ENDOSCOPY,BALL DIL,30MM  5/30/2019 UPPER GI ENDOSCOPY,BIOPSY  5/22/2019 UPPER GI ENDOSCOPY,BIOPSY  5/30/2019 UPPER GI ENDOSCOPY,BIOPSY  10/17/2019 Personal factors and/or comorbidities impacting plan of care: PMH Home Situation Home Environment: Private residence # Steps to Enter: 4 Rails to Enter: Yes Hand Rails : Bilateral 
One/Two Story Residence: One story Living Alone: No 
Support Systems: Family member(s)(2 grandsons live with her) Patient Expects to be Discharged to[de-identified] Private residence Current DME Used/Available at Home: Cane, straight, Walker, rolling, Tub transfer bench, Grab bars Tub or Shower Type: Tub/Shower combination EXAMINATION/PRESENTATION/DECISION MAKING:  
Critical Behavior: 
Neurologic State: Alert Orientation Level: Oriented X4 Cognition: Appropriate for age attention/concentration Safety/Judgement: Decreased insight into deficits Hearing: Auditory Auditory Impairment: Hard of hearing, left side Range Of Motion: 
AROM: Generally decreased, functional 
 Strength:   
Strength: Generally decreased, functional 
Tone & Sensation:  
Tone: Normal 
Sensation: Intact(however reports \"tenderness\" in BLE) Coordination: 
Coordination: Generally decreased, functional 
Vision:  
Corrective Lenses: Glasses Functional Mobility: 
Bed Mobility: 
Supine to Sit: Stand-by assistance Scooting: Contact guard assistance Transfers: 
Sit to Stand: Minimum assistance Stand to Sit: Contact guard assistance Bed to Chair: Minimum assistance Balance:  
Sitting: Intact; Without support Sitting - Static: Fair (occasional) Sitting - Dynamic: Fair (occasional) Standing: Impaired; Without support Standing - Static: Fair Standing - Dynamic : Fair Ambulation/Gait Training: 
Assistive Device: Gait belt(HHA R) Ambulation - Level of Assistance: Minimal assistance Gait Abnormalities: Decreased step clearance;Shuffling gait;Trunk sway increased Base of Support: Widened Stance: Weight shift Speed/Jacki: Slow Step Length: Right shortened;Left shortened Functional Measure: 
Tinetti test: 
 
Sitting Balance: 1 Arises: 1 Attempts to Rise: 2 Immediate Standing Balance: 0 Standing Balance: 0 Nudged: 0 Eyes Closed: 0 Turn 360 Degrees - Continuous/Discontinuous: 0 Turn 360 Degrees - Steady/Unsteady: 0 Sitting Down: 1 Balance Score: 5 Balance total score Indication of Gait: 1 
R Step Length/Height: 1 L Step Length/Height: 1 
R Foot Clearance: 1 L Foot Clearance: 1 Step Symmetry: 1 Step Continuity: 1 Path: 1 Trunk: 0 Walking Time: 0 Gait Score: 8 Gait total score Total Score: 13/28 Overall total score Tinetti Tool Score Risk of Falls 
<19 = High Fall Risk 19-24 = Moderate Fall Risk 25-28 = Low Fall Risk Tinetti ME. Performance-Oriented Assessment of Mobility Problems in Elderly Patients. [address]; [id number]. (Scoring Description: PT Bulletin Feb. 10, 1993) Older adults: Moody Alex et al, 2009; n = [address] elderly evaluated with ABC, CHANTALE, ADL, and IADL) · Mean CHANTALE score for males aged 69-68 years = 26.21(3.40) · Mean CHANTALE score for females age 69-68 years = 25.16(4.30) · Mean CHANTALE score for males over 80 years = 23.29(6.02) · Mean CHANTALE score for females over 80 years = 17.20(8.32) Physical Therapy Evaluation Charge Determination History Examination Presentation Decision-Making HIGH Complexity :3+ comorbidities / personal factors will impact the outcome/ POC  HIGH Complexity : 4+ Standardized tests and measures addressing body structure, function, activity limitation and / or participation in recreation  LOW Complexity : Stable, uncomplicated  Other outcome measures Tinetti 13/28  MEDIUM Based on the above components, the patient evaluation is determined to be of the following complexity level: LOW Pain Rating: 
Denied pain Activity Tolerance:  
Good Please refer to the flowsheet for vital signs taken during this treatment. After treatment patient left in no apparent distress:  
Sitting in chair, Heels elevated for pressure relief, and Call bell within reach COMMUNICATION/EDUCATION:  
The patients plan of care was discussed with: Occupational therapist and Registered nurse. Fall prevention education was provided and the patient/caregiver indicated understanding., Patient/family have participated as able in goal setting and plan of care. , and Patient/family agree to work toward stated goals and plan of care. Thank you for this referral. 
Frieda Salazar, PT, DPT Time Calculation: 20 mins

## 2020-03-11 NOTE — PROGRESS NOTES
Problem: Falls - Risk of 
Goal: *Absence of Falls Description: Document Adolfo Green Fall Risk and appropriate interventions in the flowsheet. Outcome: Progressing Towards Goal 
Note: Fall Risk Interventions: 
Mobility Interventions: Patient to call before getting OOB Medication Interventions: Patient to call before getting OOB Elimination Interventions: Patient to call for help with toileting needs Problem: Patient Education: Go to Patient Education Activity Goal: Patient/Family Education Outcome: Progressing Towards Goal 
  
Problem: Pressure Injury - Risk of 
Goal: *Prevention of pressure injury Description: Document Tomy Scale and appropriate interventions in the flowsheet. Outcome: Progressing Towards Goal 
Note: Pressure Injury Interventions: 
Sensory Interventions: Minimize linen layers Moisture Interventions: Internal/External urinary devices Activity Interventions: PT/OT evaluation Mobility Interventions: PT/OT evaluation Nutrition Interventions: Document food/fluid/supplement intake Friction and Shear Interventions: Lift sheet, Minimize layers Problem: Patient Education: Go to Patient Education Activity Goal: Patient/Family Education Outcome: Progressing Towards Goal 
  
Problem: Breathing Pattern - Ineffective Goal: *Absence of hypoxia Outcome: Progressing Towards Goal 
  
Problem: Hypotension Goal: *Blood pressure within specified parameters Outcome: Progressing Towards Goal

## 2020-03-11 NOTE — PROGRESS NOTES
NUTRITION Consult received for general nutrition management and supplements. Pt already being followed by this service. Diet resumed today - again placed on restrictive diet (cardiac, 2 gm Na+). Nutrition needs to be encouraged and not restricted given esophageal CA. Will change to regular, SANNA. Pt states she can't drink the Glucerna, doesn't like. Ensure typically makes her stomach hurt. Willing to try Streemio and Boardganics. Will add to dinner and breakfast and follow up with acceptance tomorrow. Miranda Nguyen RD Estimated Nutrition Needs:  
Kcals/day: 1900 Kcals/day(-2200 kcal/day) Protein: 90 g(1.2 gm/kg d/t CA - with caution d/t CKD) Fluid: 1900 ml(or per nephrology) Based On: Kcal/kg - specify (Comment)(25-30 kcal/kg) Weight Used: Other (comment)(Adj BW of 74.4 kg) Recent Labs  
  03/11/20 
0158 03/10/20 
0428 03/09/20 
9668 * 105* 114* BUN 60* 72* 82* CREA 2.19* 2.40* 2.70* * 145 144  
K 3.6 3.5 3.7 * 112* 113* CO2 28 27 25  
CA 8.4* 8.1* 8.5 PHOS 2.6 3.0 3.7 MG 1.5* 1.5* 1.8 Recent Labs  
  03/11/20 
0541 03/10/20 
2309 03/10/20 
1635 03/10/20 
1109 03/10/20 
1684 03/09/20 
2318 03/09/20 
1709 03/09/20 
1153 03/09/20 
6902 03/09/20 
0038 03/08/20 
1834 GLUCPOC 124* 132* 108* 188* 107* 110* 120* 132* 113* 169* 138* Lab Results Component Value Date/Time  Hemoglobin A1c 6.1 (H) 03/05/2020 04:26 AM

## 2020-03-11 NOTE — PROGRESS NOTES
Physical Therapy 3/11/2020 Order received, chart reviewed. Patient politely declined to participate in PT evaluation due to \"feeling weak,\" and not having eaten since last night (NPO for barium swallow this AM). Agreeable for PT f/u later today after lunch. Thank you.  
 
Rosey Weston, PT, DPT

## 2020-03-11 NOTE — PROGRESS NOTES
Orders received, chart reviewed and patient evaluated by occupational therapy. Pending progression with skilled acute occupational therapy, recommend: 
Occupational therapy at least 2 days/week in the home AND ensure assist and/or supervision for safety with IADL tasks pending functional progression (if this cannot be provided, recommend acute rehab stay to maximize patient safety and independence with ADL transfers and tasks). Recommend with nursing patient to complete as able in order to maintain strength, endurance and independence: OOB to chair 3x/day with 1 assist and functional mobility to the bedside commode with 1 assist. Thank you for your assistance. Full evaluation to follow.

## 2020-03-11 NOTE — PROGRESS NOTES
Problem: Self Care Deficits Care Plan (Adult) Goal: *Acute Goals and Plan of Care (Insert Text) Description:  
FUNCTIONAL STATUS PRIOR TO ADMISSION: Patient was modified independent using a cane for functional mobility. Patient lived at home with her 2 grandsons and stated she was largely IND for ADL and IADL tasks. HOME SUPPORT: The patient lived alone with 2 grandsons to provide assistance as needed. Occupational Therapy Goals Initiated 3/11/2020 1. Patient will perform lower body dressing with supervision/set-up within 7 day(s). 2.  Patient will perform bathing with supervision/set-up within 7 day(s). 3.  Patient will perform grooming with supervision/set-up within 7 day(s). 4.  Patient will perform toilet transfers with supervision/set-up within 7 day(s). 5.  Patient will perform all aspects of toileting with supervision/set-up within 7 day(s). 6.  Patient will participate in upper extremity therapeutic exercise/activities with supervision/set-up for 5 minutes within 7 day(s). 7.  Patient will utilize energy conservation techniques during functional activities with verbal cues within 7 day(s). Outcome: Progressing Towards Goal 
 OCCUPATIONAL THERAPY EVALUATION Patient: Vipul Pappas (17 y.o. female) Date: 3/11/2020 Primary Diagnosis: Acute respiratory failure ([facility] [J96.00] Precautions: Fall ASSESSMENT Based on the objective data described below, the patient presents with generalized weakness, decreased endurance (on 1L NC O2 with some SOB however O2 sats >92% with minimal activity), and decreased activity tolerance s/p admission for acute respiratory failure due to sepsis. PTA, patient was largely MOD I for ADL and IADL tasks using a cane for functional mobility as needed and living with her 2 grandsons.  Patient required increased time to perform all simulated ADL tasks today however required largely  MIN A for LB dressing and stand pivot transfer from bed > chair. Patient hopeful to return home. Recommend HHOT and A for IADLs post discharge to maximize patient safety and independence with ADL transfers and tasks. If this cannot be provided, recommend acute rehab stay. Current Level of Function Impacting Discharge (ADLs/self-care): MIN A Functional Outcome Measure: The patient scored 45/100 on the Barthel Index outcome measure which is indicative of 55% ADL impairment. Other factors to consider for discharge: O2 
  
Patient will benefit from skilled therapy intervention to address the above noted impairments. PLAN : 
Recommendations and Planned Interventions: self care training, functional mobility training, therapeutic exercise, balance training, therapeutic activities, endurance activities, patient education, home safety training, and family training/education Frequency/Duration: Patient will be followed by occupational therapy 5 times a week to address goals. Recommendation for discharge: (in order for the patient to meet his/her long term goals) Occupational therapy at least 2 days/week in the home AND ensure assist and/or supervision for safety with IADLs pending functional progression (may need rehab if this support cannot be provided) This discharge recommendation: 
Has not yet been discussed the attending provider and/or case management IF patient discharges home will need the following DME: TBD SUBJECTIVE:  
Patient stated Cyrusjuarez Shin can I go home? [employer] OBJECTIVE DATA SUMMARY:  
HISTORY:  
Past Medical History:  
Diagnosis Date Abnormal x-ray of abdomen 5/22/2019 Bas showed food and irregular stricture above ge junction in addition to large hiatal hernia  5.20.2019 Adenocarcinoma of esophagus ([address] 5/30/2019 Anemia Arthritis Chronic kidney disease Stage II followed by Dr Antonio Bailey Nephrology Diverticulosis Esophageal dysphagia 5/22/2019 H/O colonoscopy with polypectomy   
 benign History of colon polyps 6/1/2018 2013 tubular adenoma Hypertension Ill-defined condition   
 pulmonary hypertension Other ill-defined conditions(799.89)   
 glaucoma and cataracts Sleep apnea CPAP compliant, as stated 5/20/2019 Thromboembolus ([facility] 2015 Right  leg , spontaneous Thyroid disease   
 hypothyroidism Past Surgical History:  
Procedure Laterality Date ANAL PRESSURE RECORD  6/6/2019 COLONOSCOPY N/A 6/1/2018 COLONOSCOPY performed by Nadya Wagner MD at [facility]  6/1/2018 HX CATARACT REMOVAL Bilateral 2017 [address] [place] [address] thyroid biopsy-benign HX HYSTERECTOMY    
 IR INSERT NON TUNL CVC OVER 5 YRS  3/8/2020 IR INSERT TUNL CVC W PORT OVER 5 YEARS  7/19/2019 IR REMOVE TUNL CVAD W/O PORT / PUMP  3/8/2020 UPPER GI ENDOSCOPY,BALL DIL,30MM  5/22/2019 UPPER GI ENDOSCOPY,BALL DIL,30MM  5/30/2019 UPPER GI ENDOSCOPY,BIOPSY  5/22/2019 UPPER GI ENDOSCOPY,BIOPSY  5/30/2019 UPPER GI ENDOSCOPY,BIOPSY  10/17/2019 Expanded or extensive additional review of patient history:  
 
Home Situation Home Environment: Private residence # Steps to Enter: 4 Rails to Enter: Yes Hand Rails : Bilateral 
One/Two Story Residence: One story Living Alone: No 
Support Systems: Family member(s)(2 grandsons live with her) Patient Expects to be Discharged to[de-identified] Private residence Current DME Used/Available at Home: Cane, straight, Walker, rolling, Tub transfer bench, Grab bars Tub or Shower Type: Tub/Shower combination EXAMINATION OF PERFORMANCE DEFICITS: 
Cognitive/Behavioral Status: 
Neurologic State: Alert Orientation Level: Oriented X4 Cognition: Appropriate for age attention/concentration Perception: Appears intact Perseveration: No perseveration noted Safety/Judgement: Decreased insight into deficits Skin: abrasions on lower legs; blisters on upper and lower lips Edema: BLE Hearing: Auditory Auditory Impairment: Hard of hearing, left side Vision/Perceptual:   
    
    
    
  
    
    
Corrective Lenses: Glasses Range of Motion: BUE 
AROM: Generally decreased, functional 
  
  
  
  
  
  
  
 
Strength: BUE Strength: Generally decreased, functional 
  
  
  
  
 
Coordination: 
Coordination: Generally decreased, functional 
Fine Motor Skills-Upper: Left Intact; Right Intact Gross Motor Skills-Upper: Left Intact; Right Intact Tone & Sensation: [facility] [place] Tone: Normal 
Sensation: Intact(however reports \"tenderness\" in BLE) Balance: 
Sitting: Without support; Impaired Sitting - Static: Fair (occasional) Sitting - Dynamic: Fair (occasional) Standing: Impaired; Without support Standing - Static: Fair Standing - Dynamic : Fair;Constant support Functional Mobility and Transfers for ADLs: 
Bed Mobility: 
Supine to Sit: Minimum assistance Scooting: Contact guard assistance Transfers: 
Sit to Stand: Minimum assistance Stand to Sit: Minimum assistance Bed to Chair: Minimum assistance(stand pivot transfer) ADL Assessment: 
Feeding: Setup;Supervision(reports some difficulty opening small containers) Oral Facial Hygiene/Grooming: Minimum assistance(infer standing for balance) Bathing: Minimum assistance(infer increased time for washing BLE) Upper Body Dressing: Setup;Supervision(infer 2* BUE ROM) Lower Body Dressing: Minimum assistance(infer increased time and A for pants up/over hips) Toileting: Moderate assistance(using Purewick at this time; infer A for clothing management) ADL Intervention and task modifications: 
  
 
  
 
  
 
  
 
  
 
Lower Body Dressing Assistance Underpants: Minimum assistance Socks: Contact guard assistance; Compensatory technique training Cognitive Retraining Safety/Judgement: Decreased insight into deficits Functional Measure: 
Barthel Index: 
 
Bathin Bladder: 5 Bowels: 5 Groomin Dressing: 10 Feedin Mobility: 0 Stairs: 0 Toilet Use: 5 Transfer (Bed to Chair and Back): 10 Total: 45/100 The Barthel ADL Index: Guidelines 1. The index should be used as a record of what a patient does, not as a record of what a patient could do. 2. The main aim is to establish degree of independence from any help, physical or verbal, however minor and for whatever reason. 3. The need for supervision renders the patient not independent. 4. A patient's performance should be established using the best available evidence. Asking the patient, friends/relatives and nurses are the usual sources, but direct observation and common sense are also important. However direct testing is not needed. 5. Usually the patient's performance over the preceding 24-48 hours is important, but occasionally longer periods will be relevant. 6. Middle categories imply that the patient supplies over 50 per cent of the effort. 7. Use of aids to be independent is allowed. María Muniz., Barthel, D.W. ([id number]). Functional evaluation: the Barthel Index. [facility] (14)2. [facility] pari ROBERTO Rogers, Abdullahi Selvin., [facility] [place], [address] [place] [address] (). Measuring the change indisability after inpatient rehabilitation; comparison of the responsiveness of the Barthel Index and Functional [place] Measure. Journal of Neurology, Neurosurgery, and Psychiatry, 66(4), 906-368. Mariano Ortiz, N.J.A, SANTIAGO Rossi.DISHA, & Jammie Kearns MFredoA. (2004.) Assessment of post-stroke quality of life in cost-effectiveness studies: The usefulness of the Barthel Index and the EuroQoL-5D. [facility], 13, [phone] Occupational Therapy Evaluation Charge Determination History Examination Decision-Making LOW Complexity : Brief history review  MEDIUM Complexity : 3-5 performance deficits relating to physical, cognitive , or psychosocial skils that result in activity limitations and / or participation restrictions MEDIUM Complexity : Patient may present with comorbidities that affect occupational performnce. Miniml to moderate modification of tasks or assistance (eg, physical or verbal ) with assesment(s) is necessary to enable patient to complete evaluation Based on the above components, the patient evaluation is determined to be of the following complexity level: LOW Activity Tolerance:  
Fair and requires rest breaks Please refer to the flowsheet for vital signs taken during this treatment. After treatment patient left in no apparent distress:   
Sitting in chair COMMUNICATION/EDUCATION:  
The patients plan of care was discussed with: Physical therapist and Registered nurse. Home safety education was provided and the patient/caregiver indicated understanding., Patient/family have participated as able in goal setting and plan of care. , and Patient/family agree to work toward stated goals and plan of care. This patients plan of care is appropriate for delegation to [facility]. Thank you for this referral. 
Abbey Perkins Time Calculation: 24 mins

## 2020-03-11 NOTE — PROGRESS NOTES
Omar Loera Name:  Alcides Villavicencio 
MR#:  [id number] :  1940 ACCOUNT #:  [de-identified] DATE OF SERVICE:  03/10/2020 HISTORY OF PRESENT ILLNESS:  The patient is a 80-year-old woman with a history of adenocarcinoma of the esophagus, who is seen after admission to [facility] with shortness of breath. This patient is followed by Dr. Montana Oliva, and he has recently started oral Xeloda single-agent chemotherapy for her persisting cancer. She has also had a recent cryoablation procedure performed by Dr. Eduardo Finnegan at [facility].  Several days prior to this hospitalization, she developed shortness of breath, in the absence of cough, fever, hemoptysis. She was admitted to [facility] for further evaluation and treatment on . SUBJECTIVE: She feels better; denies pain or dyspnea; weak; bedridden; not eating much? No N/V or diarrhea PAST MEDICAL HISTORY:  Includes HER2-positive adenocarcinoma of the esophagus, diagnosed in 2019. She had EUS performed in 2019 as well as EGD and stent placement at that time. PET scan in 2019 failed to demonstrate metastatic disease. She underwent radiation therapy to the distal esophagus at [address] in between 2019 and 2019, and also received weekly Taxol and carboplatin with this. CT scan of the chest dated 10/15/2019 demonstrated mediastinal adenopathy as well as infiltration into the mediastinum with tumor. She had a biopsy in 2019 confirming persistent adenocarcinoma at 22 cm. She has had 2 cryotherapy procedures performed. Past medical history also includes chronic kidney disease, pulmonary hypertension, sleep apnea, hypertension, glaucoma, anemia. PAST SURGICAL HISTORY:  Includes multiple EGDs, cataract surgery, colonoscopy, hysterectomy. ALLERGIES:  NO KNOWN DRUG ALLERGIES. FAMILY HISTORY:  Negative for malignancy. REVIEW OF SYSTEMS:  As noted above, otherwise noncontributory. PHYSICAL EXAMINATION: 
GENERAL:  She is a pleasant overweight woman in no apparent distress. VITAL SIGNS:  Temp 98, pulse 72, /78, respirations 28, O2 sat 98% on 2 L. HEENT:  Nonicteric sclerae. NECK:  Supple. No lymphadenopathy noted. LUNGS:  Reveal diminished breath sounds in the bases. CARDIAC:  Regular rate and rhythm. No murmur. ABDOMEN:  Soft, nontender. No hepatosplenomegaly noted. EXTREMITIES:  No edema. IMPRESSION: 
1. Locally persistent adenocarcinoma of the esophagus. The patient has had a recent cryoablation procedure performed by Dr. Alber Christian at [facility].  She was admitted with ecoli sepsis. She feels better on antibiotics 2. Anemia. Hgb 7.6 up from 7/4 yest. She is on erythropoietin support; unclear if actively bleeding 3. CKD - creat 2.4 4. Poor performance status; appears very weak and immobile. Will see how much she improves with antibiotics. Viridiana Chou. MD [id number] 
D:  03/10/2020 10:58 
T:  03/10/2020 12:20 
JOB #:  [id number]

## 2020-03-11 NOTE — PROGRESS NOTES
[address] [facility] Hospitalist Progress Note Megan Angulo MD 
Answering service: [phone] OR [phone] from in house phone Date of Service:  3/11/2020 NAME:  Dagoberto Jiménez :  1940 MRN:  [id number] Admission Summary:  
35-year-old woman with past medical history significant for adenocarcinoma of the esophagus, hypothyroidism, obstructive sleep apnea, hypertension, chronic kidney disease, and glaucoma was in her usual state of health came to the hospital because of fatigue and SOB. She was found to have gram negative bacteremia. As she was hypotensive -she was transferred to ICU. While in ICU, she was on pressors which were stopped on 3/9. Patient has E coli bacteremia - on rocephin,port removed,received 1 U PRBC transferred out of ICU on 3/9. Interval history / Subjective:  
Patient seen sitting in a chair.  
-No complaints Assessment & Plan:  
 
Septic shock due to E coli bacteremia: 
-Shock resolved with fluids and vasopressors 
-Patient is immunocompromised with underlying cancer/chemotherapy 
-Port removed. -Meropenem de escalated to rocephin. - ID following. Blood culture from 3/8 remain negative. ID to decide if she needs prolonged intravenous antibiotic therapy. #Acute hypoxic respiratory failure due to sepsis:improving 
-Currently requiring 2 to 3 L of oxygen via nasal cannula, continue to wean off 
-VQ scan ruled out pulmonary embolism. Venous Dopplers negative for DVT 
-Echo showed LV hypertrophy, normal EF 
-She has also, uses CPAP @ 4 cm of H2O nocturnally. #Acute on chronic renal failure: 
-baseline Cr 2 per nephrology team 
-Acute component due to sepsis/ATN/losartan PTA 
-cr trending down #Esophageal cancer: 
-Status post stent placement which is removed since 
-Status post cryoablation at [facility]. -He follows with [facility] and she is on Xeloda. Seen by oncology here. -Modified barium swallow showed no evidence of leak or tracheoesophageal fistula but showed large hiatal hernia. GI on board. #Hypothyroidism: 
-on synthroid. #Acute on Chronic anemia, anemia of chronic kidney disease: 
-Received 1 U PRBC -hb now >8. 
-Receiving Epogen. -GI following. #Hypertension, presented with septic shock: Home regimen includes carvedilol 12.5 mg twice daily, chlorthalidone 25 mg daily, clonidine 0.2 mg 2 times daily, amlodipine 10 mg daily and losartan 100 mg daily. She presented with shock as such antihypertensives are on hold, will try to reintroduce as able. Glaucoma: Continue eyedrops Darío: Nocturnal [email or web address] centimeters H2O nocturnally Vascular access: Right IJ triple-lumen and left EJ Code status: Full code DVT prophylaxis: SCD Care Plan discussed with: Patient/Family Anticipated Disposition: Home w/Family Anticipated Discharge: Greater than 48 hours Hospital Problems  Date Reviewed: 3/5/2020 Codes Class Noted POA * (Principal) Acute respiratory failure ([facility] [address] ICD-10-CM: J96.00 
ICD-9-CM: 518.81  3/5/2020 Yes Review of Systems: A comprehensive review of systems was negative except for that written in the HPI. Vital Signs:  
 Last 24hrs VS reviewed since prior progress note. Most recent are: 
Visit Vitals /46 (BP 1 Location: Right arm) Pulse 68 Temp 97.8 °F (36.6 °C) Resp 18 Ht 5' 8\" (1.727 m) Wt 111.6 kg (246 lb) SpO2 98% Breastfeeding No  
BMI 37.40 kg/m² Intake/Output Summary (Last 24 hours) at 3/11/2020 1135 Last data filed at 3/11/2020 6582 Gross per 24 hour Intake 1575.83 ml Output 2650 ml Net -1074.17 ml Physical Examination:  
 
 
     
Constitutional:  Weak, not in distress. ENT:  Oral mucosa moist, oropharynx benign. Resp:  On oxygen via nasal cannula, CTA bilaterally.  No wheezing/rhonchi/rales. No accessory muscle use CV:  Regular rhythm, normal rate, no murmurs, gallops, rubs GI:  Soft, non distended, non tender. normoactive bowel sounds, no hepatosplenomegaly Musculoskeletal:  No edema, warm, 2+ pulses throughout Neurologic:  Moves all extremities. AAOx3, CN II-XII reviewed Psych:  Good insight, Not anxious nor agitated. Data Review:  
 Review and/or order of clinical lab test 
Review and/or order of tests in the radiology section of CPT Review and/or order of tests in the medicine section of CPT Labs:  
 
Recent Labs  
  03/11/20 
0158 03/10/20 
1298 WBC 6.4 5.5 HGB 7.6* 7.4* HCT 24.3* 23.5*  
* 94* Recent Labs  
  03/11/20 
0158 03/10/20 
0428 03/09/20 
5120 * 145 144  
K 3.6 3.5 3.7 * 112* 113* CO2 28 27 25 BUN 60* 72* 82* CREA 2.19* 2.40* 2.70* * 105* 114* CA 8.4* 8.1* 8.5 MG 1.5* 1.5* 1.8 PHOS 2.6 3.0 3.7 No results for input(s): SGOT, GPT, ALT, AP, TBIL, TBILI, TP, ALB, GLOB, GGT, AML, LPSE in the last 72 hours. No lab exists for component: AMYP, HLPSE Recent Labs 03/09/20 
0515 INR 1.6* PTP 16.3* No results for input(s): FE, TIBC, PSAT, FERR in the last 72 hours. Lab Results Component Value Date/Time Folate 95.7 (H) 03/05/2020 04:26 AM  
  
No results for input(s): PH, PCO2, PO2 in the last 72 hours. No results for input(s): CPK, CKNDX, TROIQ in the last 72 hours. No lab exists for component: CPKMB Lab Results Component Value Date/Time Cholesterol, total 96 03/05/2020 04:26 AM  
 HDL Cholesterol 12 03/05/2020 04:26 AM  
 LDL, calculated 40.8 03/05/2020 04:26 AM  
 Triglyceride 216 (H) 03/05/2020 04:26 AM  
 CHOL/HDL Ratio 8.0 (H) 03/05/2020 04:26 AM  
 
Lab Results Component Value Date/Time  Glucose (POC) 124 (H) 03/11/2020 05:41 AM  
 Glucose (POC) 132 (H) 03/10/2020 11:09 PM  
 Glucose (POC) 108 (H) 03/10/2020 04:35 PM  
 Glucose (POC) 188 (H) 03/10/2020 11:09 AM  
 Glucose (POC) 107 (H) 03/10/2020 06:21 AM  
 
Lab Results Component Value Date/Time Color YELLOW/STRAW 03/04/2020 10:38 PM  
 Appearance CLEAR 03/04/2020 10:38 PM  
 Specific gravity 1.014 03/04/2020 10:38 PM  
 pH (UA) 5.0 03/04/2020 10:38 PM  
 Protein 30 (A) 03/04/2020 10:38 PM  
 Glucose NEGATIVE  03/04/2020 10:38 PM  
 Ketone NEGATIVE  03/04/2020 10:38 PM  
 Bilirubin NEGATIVE  03/04/2020 10:38 PM  
 Urobilinogen 1.0 03/04/2020 10:38 PM  
 Nitrites NEGATIVE  03/04/2020 10:38 PM  
 Leukocyte Esterase NEGATIVE  03/04/2020 10:38 PM  
 Epithelial cells FEW 03/04/2020 10:38 PM  
 Bacteria NEGATIVE  03/04/2020 10:38 PM  
 WBC 0-4 03/04/2020 10:38 PM  
 RBC 0-5 03/04/2020 10:38 PM  
 
 
 
Medications Reviewed:  
 
Current Facility-Administered Medications Medication Dose Route Frequency  alteplase (CATHFLO) 1 mg in sterile water (preservative free) 1 mL injection  1 mg InterCATHeter PRN  
 bacitracin 500 unit/gram packet 1 Packet  1 Packet Topical PRN  
 dextrose 5% - LR with KCl 20 mEq/L infusion   IntraVENous CONTINUOUS  
 0.9% sodium chloride infusion 250 mL  250 mL IntraVENous PRN  
 epoetin bonnie-epbx (RETACRIT) injection 10,000 Units  10,000 Units SubCUTAneous Q MON, WED & FRI  sucralfate (CARAFATE) tablet 1 g  1 g Oral ACB&D  pantoprazole (PROTONIX) tablet 40 mg  40 mg Oral ACB&D  cefTRIAXone (ROCEPHIN) 2 g in 0.9% sodium chloride (MBP/ADV) 50 mL  2 g IntraVENous Q24H  
 glucose chewable tablet 16 g  4 Tab Oral PRN  
 glucagon (GLUCAGEN) injection 1 mg  1 mg IntraMUSCular PRN  
 dextrose 10% infusion 0-250 mL  0-250 mL IntraVENous PRN  
 insulin lispro (HUMALOG) injection   SubCUTAneous Q6H  
 acetaminophen (TYLENOL) tablet 650 mg  650 mg Oral Q4H PRN  
 albuterol-ipratropium (DUO-NEB) 2.5 MG-0.5 MG/3 ML  3 mL Nebulization Q4H PRN  
 brimonidine (ALPHAGAN) 0.2 % ophthalmic solution 1 Drop  1 Drop Both Eyes TID  latanoprost (XALATAN) 0.005 % ophthalmic solution 1 Drop  1 Drop Both Eyes QHS  levothyroxine (SYNTHROID) tablet 100 mcg  100 mcg Oral ACB  sodium chloride (NS) flush 5-40 mL  5-40 mL IntraVENous Q8H  
 sodium chloride (NS) flush 5-40 mL  5-40 mL IntraVENous PRN  
 ondansetron (ZOFRAN) injection 4 mg  4 mg IntraVENous Q4H PRN  
 bisacodyL (DULCOLAX) tablet 5 mg  5 mg Oral DAILY PRN  
 calcium acetate(phosphat bind) (PHOSLO) tablet 667 mg  1 Tab Oral TID WITH MEALS  dorzolamide (TRUSOPT) 2 % ophthalmic solution 1 Drop  1 Drop Both Eyes TID And  
 timolol (TIMOPTIC) 0.5 % ophthalmic solution 1 Drop  1 Drop Both Eyes BID  
 0.9% sodium chloride infusion 250 mL  250 mL IntraVENous PRN  
 miconazole (MICOTIN) 2 % powder   Topical BID  sodium chloride (NS) flush 5-40 mL  5-40 mL IntraVENous Q8H  
 sodium chloride (NS) flush 5-40 mL  5-40 mL IntraVENous PRN  
 
______________________________________________________________________ EXPECTED LENGTH OF STAY: 3d 19h ACTUAL LENGTH OF STAY:          6 Jann Cantu MD

## 2020-03-11 NOTE — PROGRESS NOTES
Bedside shift change report given to Suzette Corona 1323 (oncoming nurse) by Miranda Riddle (offgoing nurse). Report included the following information SBAR, Kardex, Intake/Output, MAR and Cardiac Rhythm NSR.

## 2020-03-11 NOTE — PROGRESS NOTES
Infectious Diseases Progress Note ANTIBIOTIC SUMMARY: 
Zosyn  3/5 x 1 dose Meropenem 3/6 -- 3/7 Rocephin 3/7 -- present Right Portacath removed 3/8/2020 Subjective: No new symptoms or localizing complaints Objective:  
 
Vitals:  
Visit Vitals /83 (BP 1 Location: Right arm, BP Patient Position: At rest) Pulse 72 Temp 98.6 °F (37 °C) Resp 20 Ht 5' 8\" (1.727 m) Wt 107.5 kg (237 lb) SpO2 100% Breastfeeding No  
BMI 36.04 kg/m² Tmax:  Temp (24hrs), Av.5 °F (36.9 °C), Min:97.7 °F (36.5 °C), Max:99.2 °F (37.3 °C) Exam:  General appearance: no distress Chest wall: right Portacath removed 3/8 Lungs: clear to auscultation bilaterally Heart: irregular rate and rhythm Abdomen: soft, non-tender. Bowel sounds normal.  
Skin: no rash Neurologic: Grossly normal 
 
IV Lines: Right Portacath POA Labs:   
Recent Labs  
  03/10/20 
3075 20 
1557 20 
8984 20 
0404 20 
0403 WBC 5.5 6.4 4.8 8.5  --   
HGB 7.4* 8.1* 6.7* 7.5*  --   
PLT 94* 101* 85* 85*  --   
BUN 72*  --  82*  --  91* CREA 2.40*  --  2.70*  --  2.99* BLOOD CULTURES: 
 3/4 = E coli in 4 of 4 bottles 3/6 = E coli in 2 of 4 bottles 3/8 = NGSF 
 
CT scan abd & pelvis = polycystic disease Assessment: 1. E coli bacteremia -- possibly due to Portacath infection -- Portacath removed 3/8/2020: Stable 
  
2. Adenocarcinoma of the esophagus 
  
3. Elevated creatinine -- CKD vs CKD with acute exacerbation 4. Polcystic disease -- multiple cysts in liver and kidneys 
  
5. MEILY 
  
6. Hypothyroidism 
  
7. HTN 
  
8. Glaucoma Plan: 1. Continue Damaris Singh MD

## 2020-03-11 NOTE — PROGRESS NOTES
[address] 
[address] [facility] [address] [place], [address] 
[phone] GI PROGRESS NOTE Constantino Corral, [facility] Work Cell: [phone] NAME:   Greg Lemus :    1940 MRN:    [id number] Assessment/Plan 1. Anemia - likely multifactorial including CKD, recent chemotherapy, known esophageal adenocarcinoma, etc. Hgb relatively stable. No overt GI bleeding.  
- Diet as tolerated - Supportive management per primary team 
- Continue PPI 
- No acute plans for endoscopy - Monitor H&H, transfuse as needed - Heme/Onc following  
  
2. E. Coli bacteremia w/ sepsis - s/p removal of Port-a-cath - Management per primary team 
3. CKD 4. Hx esophageal Ca s/p surgical resection - esophogram w/ large HH and no evidence of leak. Patient Active Problem List  
Diagnosis Code  History of colon polyps Z86.010  
 Esophageal dysphagia R13.10  Abnormal x-ray of abdomen R93.5  Adenocarcinoma of esophagus (HCC) C15.9  Acute respiratory failure (HCC) J96.00 Subjective:  
 
Feeling better. Denies any abdominal pain. Tolerating diet w/o difficulty. Hgb stable. No overt bleeding. Objective: VITALS:  
Last 24hrs VS reviewed since prior hospitalist progress note. Most recent are: 
Visit Vitals /46 (BP 1 Location: Right arm) Pulse 68 Temp 97.8 °F (36.6 °C) Resp 18 Ht 5' 8\" (1.727 m) Wt 111.6 kg (246 lb) SpO2 98% Breastfeeding No  
BMI 37.40 kg/m² Intake/Output Summary (Last 24 hours) at 3/11/2020 1120 Last data filed at 3/11/2020 0828 Gross per 24 hour Intake 1575.83 ml Output 2650 ml Net -1074.17 ml PHYSICAL EXAM: 
General   well developed, obese, alert, in no acute distress EENT  Normocephalic, Atraumatic, PERRLA, EOMI, sclera clear Respiratory   Clear To Auscultation bilaterally - no wheezes, rales, rhonchi, or crackles Cardiology  Regular Rate and Rythmn  - no murmurs, rubs or gallops Abdominal  Soft, non-tender, non-distended, positive bowel sounds, no hepatosplenomegaly, no palpable mass Neurological  No focal neurological deficits noted Psychological  Oriented x 3. Normal affect. Lab Data Recent Results (from the past 12 hour(s)) MAGNESIUM Collection Time: 03/11/20  1:58 AM  
Result Value Ref Range Magnesium 1.5 (L) 1.6 - 2.4 mg/dL PHOSPHORUS Collection Time: 03/11/20  1:58 AM  
Result Value Ref Range Phosphorus 2.6 2.6 - 4.7 MG/DL  
CBC WITH AUTOMATED DIFF Collection Time: 03/11/20  1:58 AM  
Result Value Ref Range WBC 6.4 3.6 - 11.0 K/uL  
 RBC 2.48 (L) 3.80 - 5.20 M/uL HGB 7.6 (L) 11.5 - 16.0 g/dL HCT 24.3 (L) 35.0 - 47.0 % MCV 98.0 80.0 - 99.0 FL  
 MCH 30.6 26.0 - 34.0 PG  
 MCHC 31.3 30.0 - 36.5 g/dL  
 RDW 19.4 (H) 11.5 - 14.5 % PLATELET 747 (L) 308 - 400 K/uL MPV 11.3 8.9 - 12.9 FL  
 NRBC 0.0 0  WBC ABSOLUTE NRBC 0.00 0.00 - 0.01 K/uL NEUTROPHILS 74 32 - 75 % LYMPHOCYTES 9 (L) 12 - 49 % MONOCYTES 12 5 - 13 % EOSINOPHILS 2 0 - 7 % BASOPHILS 0 0 - 1 % IMMATURE GRANULOCYTES 3 (H) 0.0 - 0.5 % ABS. NEUTROPHILS 4.7 1.8 - 8.0 K/UL  
 ABS. LYMPHOCYTES 0.6 (L) 0.8 - 3.5 K/UL  
 ABS. MONOCYTES 0.8 0.0 - 1.0 K/UL  
 ABS. EOSINOPHILS 0.1 0.0 - 0.4 K/UL  
 ABS. BASOPHILS 0.0 0.0 - 0.1 K/UL  
 ABS. IMM. GRANS. 0.2 (H) 0.00 - 0.04 K/UL  
 DF SMEAR SCANNED    
 RBC COMMENTS ANISOCYTOSIS 1+ 
    
 RBC COMMENTS MACROCYTOSIS 1+ 
    
 RBC COMMENTS MICROCYTOSIS 2+ 
    
 RBC COMMENTS HYPOCHROMIA 1+ METABOLIC PANEL, BASIC Collection Time: 03/11/20  1:58 AM  
Result Value Ref Range Sodium 146 (H) 136 - 145 mmol/L Potassium 3.6 3.5 - 5.1 mmol/L Chloride 112 (H) 97 - 108 mmol/L  
 CO2 28 21 - 32 mmol/L Anion gap 6 5 - 15 mmol/L Glucose 124 (H) 65 - 100 mg/dL BUN 60 (H) 6 - 20 MG/DL Creatinine 2.19 (H) 0.55 - 1.02 MG/DL  
 BUN/Creatinine ratio 27 (H) 12 - 20 GFR est AA 26 (L) >60 ml/min/1.73m2 GFR est non-AA 22 (L) >60 ml/min/1.73m2 Calcium 8.4 (L) 8.5 - 10.1 MG/DL  
GLUCOSE, POC Collection Time: 03/11/20  5:41 AM  
Result Value Ref Range Glucose (POC) 124 (H) 65 - 100 mg/dL Performed by Darline Rizzo Medications: Reviewed PMH/ reviewed - no change compared to H&P Mid-Level Provider: Benita Gonzalez NP Date/Time:  3/11/2020 I have personally reviewed the history and independently examined the patient. I have reviewed the chart and agree with the documentation recorded by the Mid Level Provider, including the assessment, treatment plan, and disposition.  
 
 
Nilson Castro MD

## 2020-03-12 VITALS
BODY MASS INDEX: 37.28 KG/M2 | HEART RATE: 82 BPM | DIASTOLIC BLOOD PRESSURE: 63 MMHG | SYSTOLIC BLOOD PRESSURE: 132 MMHG | RESPIRATION RATE: 18 BRPM | HEIGHT: 68 IN | WEIGHT: 246 LBS | TEMPERATURE: 98.6 F | OXYGEN SATURATION: 96 %

## 2020-03-12 PROBLEM — R78.81 E COLI BACTEREMIA: Status: ACTIVE | Noted: 2020-03-12

## 2020-03-12 PROBLEM — N17.9 AKI (ACUTE KIDNEY INJURY) (HCC): Status: ACTIVE | Noted: 2020-03-12

## 2020-03-12 PROBLEM — R65.21 SEPTIC SHOCK (HCC): Status: ACTIVE | Noted: 2020-03-12

## 2020-03-12 PROBLEM — A41.9 SEPTIC SHOCK (HCC): Status: ACTIVE | Noted: 2020-03-12

## 2020-03-12 PROBLEM — B96.20 E COLI BACTEREMIA: Status: ACTIVE | Noted: 2020-03-12

## 2020-03-12 LAB
ANION GAP SERPL CALC-SCNC: 2 MMOL/L (ref 5–15)
BASOPHILS # BLD: 0 K/UL (ref 0–0.1)
BASOPHILS NFR BLD: 0 % (ref 0–1)
BUN SERPL-MCNC: 52 MG/DL (ref 6–20)
BUN/CREAT SERPL: 27 (ref 12–20)
CALCIUM SERPL-MCNC: 8.3 MG/DL (ref 8.5–10.1)
CHLORIDE SERPL-SCNC: 112 MMOL/L (ref 97–108)
CO2 SERPL-SCNC: 31 MMOL/L (ref 21–32)
CREAT SERPL-MCNC: 1.92 MG/DL (ref 0.55–1.02)
DIFFERENTIAL METHOD BLD: ABNORMAL
EOSINOPHIL # BLD: 0.1 K/UL (ref 0–0.4)
EOSINOPHIL NFR BLD: 1 % (ref 0–7)
ERYTHROCYTE [DISTWIDTH] IN BLOOD BY AUTOMATED COUNT: 19.9 % (ref 11.5–14.5)
GLUCOSE BLD STRIP.AUTO-MCNC: 151 MG/DL (ref 65–100)
GLUCOSE BLD STRIP.AUTO-MCNC: 96 MG/DL (ref 65–100)
GLUCOSE SERPL-MCNC: 105 MG/DL (ref 65–100)
HCT VFR BLD AUTO: 25.5 % (ref 35–47)
HGB BLD-MCNC: 8 G/DL (ref 11.5–16)
IMM GRANULOCYTES # BLD AUTO: 0.1 K/UL (ref 0–0.04)
IMM GRANULOCYTES NFR BLD AUTO: 2 % (ref 0–0.5)
LYMPHOCYTES # BLD: 0.6 K/UL (ref 0.8–3.5)
LYMPHOCYTES NFR BLD: 8 % (ref 12–49)
MAGNESIUM SERPL-MCNC: 1.4 MG/DL (ref 1.6–2.4)
MCH RBC QN AUTO: 31 PG (ref 26–34)
MCHC RBC AUTO-ENTMCNC: 31.4 G/DL (ref 30–36.5)
MCV RBC AUTO: 98.8 FL (ref 80–99)
MONOCYTES # BLD: 0.8 K/UL (ref 0–1)
MONOCYTES NFR BLD: 11 % (ref 5–13)
NEUTS SEG # BLD: 5.4 K/UL (ref 1.8–8)
NEUTS SEG NFR BLD: 78 % (ref 32–75)
NRBC # BLD: 0 K/UL (ref 0–0.01)
NRBC BLD-RTO: 0 PER 100 WBC
O+P SPEC MICRO: NORMAL
O+P STL CONC: NORMAL
PHOSPHATE SERPL-MCNC: 2.1 MG/DL (ref 2.6–4.7)
PLATELET # BLD AUTO: 134 K/UL (ref 150–400)
PMV BLD AUTO: 11.1 FL (ref 8.9–12.9)
POTASSIUM SERPL-SCNC: 4 MMOL/L (ref 3.5–5.1)
RBC # BLD AUTO: 2.58 M/UL (ref 3.8–5.2)
SERVICE CMNT-IMP: ABNORMAL
SERVICE CMNT-IMP: NORMAL
SODIUM SERPL-SCNC: 145 MMOL/L (ref 136–145)
SPECIMEN SOURCE: NORMAL
WBC # BLD AUTO: 7 K/UL (ref 3.6–11)

## 2020-03-12 PROCEDURE — 82962 GLUCOSE BLOOD TEST: CPT

## 2020-03-12 PROCEDURE — 74011250637 HC RX REV CODE- 250/637: Performed by: INTERNAL MEDICINE

## 2020-03-12 PROCEDURE — 74011250636 HC RX REV CODE- 250/636: Performed by: INTERNAL MEDICINE

## 2020-03-12 PROCEDURE — 36591 DRAW BLOOD OFF VENOUS DEVICE: CPT

## 2020-03-12 PROCEDURE — 97530 THERAPEUTIC ACTIVITIES: CPT

## 2020-03-12 PROCEDURE — 36415 COLL VENOUS BLD VENIPUNCTURE: CPT

## 2020-03-12 PROCEDURE — 80048 BASIC METABOLIC PNL TOTAL CA: CPT

## 2020-03-12 PROCEDURE — 83735 ASSAY OF MAGNESIUM: CPT

## 2020-03-12 PROCEDURE — 85025 COMPLETE CBC W/AUTO DIFF WBC: CPT

## 2020-03-12 PROCEDURE — 97116 GAIT TRAINING THERAPY: CPT

## 2020-03-12 PROCEDURE — 74011250637 HC RX REV CODE- 250/637: Performed by: NURSE PRACTITIONER

## 2020-03-12 PROCEDURE — 84100 ASSAY OF PHOSPHORUS: CPT

## 2020-03-12 RX ORDER — MAGNESIUM SULFATE HEPTAHYDRATE 40 MG/ML
2 INJECTION, SOLUTION INTRAVENOUS ONCE
Status: COMPLETED | OUTPATIENT
Start: 2020-03-12 | End: 2020-03-12

## 2020-03-12 RX ADMIN — Medication 667 MG: at 11:35

## 2020-03-12 RX ADMIN — Medication 10 ML: at 07:04

## 2020-03-12 RX ADMIN — MAGNESIUM SULFATE HEPTAHYDRATE 2 G: 40 INJECTION, SOLUTION INTRAVENOUS at 11:20

## 2020-03-12 RX ADMIN — MICONAZOLE NITRATE 2 % TOPICAL POWDER: at 08:41

## 2020-03-12 RX ADMIN — Medication 667 MG: at 08:40

## 2020-03-12 RX ADMIN — PANTOPRAZOLE SODIUM 40 MG: 40 TABLET, DELAYED RELEASE ORAL at 07:03

## 2020-03-12 RX ADMIN — LEVOTHYROXINE SODIUM 100 MCG: 0.1 TABLET ORAL at 07:03

## 2020-03-12 RX ADMIN — SUCRALFATE 1 G: 1 TABLET ORAL at 07:03

## 2020-03-12 RX ADMIN — DORZOLAMIDE HYDROCHLORIDE 1 DROP: 20 SOLUTION/ DROPS OPHTHALMIC at 08:40

## 2020-03-12 RX ADMIN — TIMOLOL MALEATE 1 DROP: 5 SOLUTION/ DROPS OPHTHALMIC at 08:40

## 2020-03-12 RX ADMIN — BRIMONIDINE TARTRATE 1 DROP: 2 SOLUTION OPHTHALMIC at 08:40

## 2020-03-12 NOTE — PROGRESS NOTES
Problem: Falls - Risk of 
Goal: *Absence of Falls Description: Document Adrienne Davenport Fall Risk and appropriate interventions in the flowsheet. Outcome: Progressing Towards Goal 
Note: Fall Risk Interventions: 
Mobility Interventions: Patient to call before getting OOB Medication Interventions: Patient to call before getting OOB Elimination Interventions: Patient to call for help with toileting needs Problem: Patient Education: Go to Patient Education Activity Goal: Patient/Family Education Outcome: Progressing Towards Goal 
  
Problem: Pressure Injury - Risk of 
Goal: *Prevention of pressure injury Description: Document Tomy Scale and appropriate interventions in the flowsheet. Outcome: Progressing Towards Goal 
Note: Pressure Injury Interventions: 
Sensory Interventions: Maintain/enhance activity level Moisture Interventions: Absorbent underpads, Apply protective barrier, creams and emollients Activity Interventions: Increase time out of bed, Chair cushion Mobility Interventions: Pressure redistribution bed/mattress (bed type), HOB 30 degrees or less, Float heels Nutrition Interventions: Document food/fluid/supplement intake, Discuss nutritional consult with provider, Offer support with meals,snacks and hydration Friction and Shear Interventions: HOB 30 degrees or less, Lift sheet, Minimize layers Problem: Patient Education: Go to Patient Education Activity Goal: Patient/Family Education Outcome: Progressing Towards Goal 
  
Problem: Breathing Pattern - Ineffective Goal: *Absence of hypoxia Outcome: Progressing Towards Goal 
Goal: *Use of effective breathing techniques Outcome: Progressing Towards Goal 
Goal: *PALLIATIVE CARE:  Alleviation of Dyspnea Outcome: Progressing Towards Goal 
  
Problem: Patient Education: Go to Patient Education Activity Goal: Patient/Family Education Outcome: Progressing Towards Goal 
  
Problem: Nutrition Deficit Goal: *Optimize nutritional status Outcome: Progressing Towards Goal 
  
Problem: Hypotension Goal: *Blood pressure within specified parameters Outcome: Progressing Towards Goal 
Goal: *Fluid volume balance Outcome: Progressing Towards Goal 
Goal: *Labs within defined limits Outcome: Progressing Towards Goal 
  
Problem: Patient Education: Go to Patient Education Activity Goal: Patient/Family Education Outcome: Progressing Towards Goal 
  
Problem: Patient Education: Go to Patient Education Activity Goal: Patient/Family Education Outcome: Progressing Towards Goal 
  
Problem: Patient Education: Go to Patient Education Activity Goal: Patient/Family Education Outcome: Progressing Towards Goal

## 2020-03-12 NOTE — PROGRESS NOTES
Pharmacist Discharge Medication Reconciliation Discharging Provider: Dr Judith Mahoney Discharge Medications:  
Current Discharge Medication List  
  
 
START taking these medications Details  
cefTRIAXone 2 gram 2 g, ADDaptor 1 Device IVPB 2 g by IntraVENous route every twenty-four (24) hours for 10 days. Qty: 10 Dose, Refills: 0 CONTINUE these medications which have NOT CHANGED Details  
ferrous sulfate 324 mg (65 mg iron) tablet Take 324 mg by mouth two (2) times daily (with meals). omeprazole (PRILOSEC OTC) 20 mg tablet Take 20 mg by mouth daily. sodium bicarbonate 650 mg tablet Take 650 mg by mouth two (2) times a day. capecitabine (XELODA) 500 mg tablet 1,500 mg two (2) times a day. X 14 days then 7 days off  
  
folic acid-vit U2-SPL U37 (FOLBEE) 2.5-25-1 mg tablet Take 1 Tab by mouth daily. iron bisgly,ps-FA-B-C#12-succ 65 mg-65 mg -1,000 mcg (24) tab Take 1 Tab by mouth daily. latanoprost (XALATAN) 0.005 % ophthalmic solution Administer 1 Drop to both eyes nightly. MULTIVITS W-FE,OTHER MIN/LUT (CENTRUM SILVER ULTRA WOMEN'S PO) Take 1 Tab by mouth daily. DORZOLAMIDE HCL/TIMOLOL MALEAT (DORZOLAMIDE-TIMOLOL OP) Apply 1 Drop to eye three (3) times daily. One drop to each eye twice daily   
  
brimonidine (ALPHAGAN) 0.2 % ophthalmic solution Administer 1 Drop to both eyes three (3) times daily. acetaminophen (TYLENOL EXTRA STRENGTH) 500 mg tablet Take 1,000 mg by mouth every six (6) hours as needed. Indications: ARTHRITIC PAIN, PAIN  
  
levothyroxine (SYNTHROID) 100 mcg tablet Take 100 mcg by mouth Daily (before breakfast). Indications: HYPOTHYROIDISM  
  
  
 
STOP taking these medications  
  
 carvediloL (COREG) 12.5 mg tablet Comments:  
Reason for Stopping:   
   
 chlorthalidone (HYGROTEN) 25 mg tablet Comments:  
Reason for Stopping:   
   
 cloNIDine HCl (CATAPRES) 0.1 mg tablet Comments:  
Reason for Stopping: amLODIPine (NORVASC) 10 mg tablet Comments:  
Reason for Stopping:   
   
 losartan (COZAAR) 100 mg tablet Comments:  
Reason for Stopping:   
   
  
 
 
The patient's chart, MAR and AVS were reviewed by Nisa Gagnon.

## 2020-03-12 NOTE — PROGRESS NOTES
DAYO: Home with IV abx at [facility]. Plans for discharge hopefully today. MIRIAN spoke with Dr. Smita Silverio and received completed [facility] Infusion Order form from Dr. Tata Macias. CM faxed document to [facility] coordinator Glenda Diehl). CM met with patient and family and discussed plans for d/c home and follow-up with [facility]. Patient gives preference for [place] location (at [facility]) as she/family live in [place], [place]. CM called Sarina Eisenmenger ([facility] Schedule Coord) at [phone]. 
 
1:39p  CM received call from [facility] [phone]). Patient is scheduled for  Infusion tx tomorrow at [facility] location at [facility] and Sunday at  [facility]  at [place] [address]. Appts have been placed on  Connect care and will be reflected on discharge instructions. CM wnt to patient's room to provide updates and family had left the room. CM called the daughter and she will be here at 3pm to transport patient. CM spoke with Dr. Smita Silverio and advised that plans had been completed for patient  Schedule for IV ABX treatments at the infusion center had been completed. discharge. Daughter asked that Dr. Smita Silverio call Juel Cheadle (son) at [phone]. Information given to Dr. Smita Silverio.  
Harrison Andre, BSW, CRM

## 2020-03-12 NOTE — PROGRESS NOTES
NUTRITION education brief Pt visited for follow-up but plans for d/c today with [facility] for abx. Pt reports not liking Boost Pudding but seemed interested in trial of Ensure Clear. Name/picture added to discharge paperwork for pt to try at home. Also reviewed tips for increasing energy and protein with nutrient dense foods. Handouts provided. Pt with no questions at this time. Plan to follow with full reassessment if discharge delayed.   
Solo Bowei RD

## 2020-03-12 NOTE — PROGRESS NOTES
Infectious Diseases Progress Note ANTIBIOTIC SUMMARY: 
Zosyn  3/5 x 1 dose Meropenem 3/6 -- 3/7 Rocephin 3/7 -- present Right Portacath removed 3/8/2020 Subjective: She feels better Objective:  
 
Vitals:  
Visit Vitals BP (!) 158/92 (BP 1 Location: Left arm, BP Patient Position: At rest) Pulse 79 Temp 97.4 °F (36.3 °C) Resp 18 Ht 5' 8\" (1.727 m) Wt 111.6 kg (246 lb) SpO2 95% Breastfeeding No  
BMI 37.40 kg/m² Tmax:  Temp (24hrs), Av.1 °F (36.7 °C), Min:97.4 °F (36.3 °C), Max:98.6 °F (37 °C) Exam:  General appearance: no distress Chest wall: right Portacath removed 3/8 Lungs: clear to auscultation bilaterally Heart: irregular rate and rhythm Abdomen: soft, non-tender. Bowel sounds normal.  
Skin: no rash Neurologic: Grossly normal 
 
IV Lines: Right Portacath POA Labs:   
Recent Labs  
  20 
0158 03/10/20 
9169 20 
1557 20 
9673 WBC 6.4 5.5 6.4 4.8 HGB 7.6* 7.4* 8.1* 6.7*  
* 94* 101* 85* BUN 60* 72*  --  82* CREA 2.19* 2.40*  --  2.70* BLOOD CULTURES: 
 3/4 = E coli in 4 of 4 bottles 3/6 = E coli in 2 of 4 bottles 3/8 = NGSF 
 
CT scan abd & pelvis = polycystic disease Assessment: 1. E coli bacteremia -- possibly due to Portacath infection -- Portacath removed 3/8/2020: Stable 
  
2. Adenocarcinoma of the esophagus 
  
3. Elevated creatinine -- CKD vs CKD with acute exacerbation 4. Polcystic disease -- multiple cysts in liver and kidneys 
  
5. EMILY 
  
6. Hypothyroidism 
  
7. HTN 
  
8. Glaucoma Plan: 1. Continue Damaris Hummel MD

## 2020-03-12 NOTE — PROGRESS NOTES
Hematology-Oncology Progress Note Dagoberto Jiménez 1940 
[id number] 
3/12/2020 Follow-up for: esophageal cancer [x]        Chart notes since last visit reviewed 
 []        Medications reviewed for allergies and interactions Case discussed with the following:         []            
               []        Nursing Staff  
                                                                      []        Pathologist 
                                                                      []        FAMILY Subjective:  
 
Spoke with patient who complains of: pt is feeling better, swallowing normally, no c/o pain Objective:  
 
Patient Vitals for the past 24 hrs: 
 BP Temp Pulse Resp SpO2  
03/12/20 0845 132/63 98.6 °F (37 °C) 82 18 96 % 03/12/20 0135 134/89 97.6 °F (36.4 °C) 81 18 97 % 03/11/20 2048 (!) 158/92 97.4 °F (36.3 °C) 79 18 95 % 03/11/20 1552 122/72 98.6 °F (37 °C) 63 18 99 % 03/11/20 1209 132/79 98.1 °F (36.7 °C) 62 18 94 % REVIEW OF SYSTEMS:   
Constitutional: negative fever, negative chills, negative weight loss Eyes:   negative visual changes ENT:   negative sore throat, tongue or lip swelling Respiratory:  negative cough, negative dyspnea Cards:  negative for chest pain, palpitations, lower extremity edema GI:   negative for nausea, vomiting, diarrhea, and abdominal pain Neuro:  negative for headaches, dizziness, vertigo 
[]                        Full ROS o/w normal/non contributor Constitutional:  Patient looks []        Sick [x]        Frail 
[]        Better                                                
[]        Depressed HEENT:  [x]   NC                         []   AT               []    ALOPECIA Eyes: [x]   Normal               []    Icteric Oropharynx:             [x]   Dry Mucositis: []    None                 Grade: []        I  [x]   lips Neck:   [x]   Supple                  []  Rigid JVD:    []   ABSENT       []   PRESENT Lymphadenopathy:   []   None Noted            []   PRESENT Chest: 
[x]   Clear               []    Rhonchi                      Dec'd @     []  Right Base           []   Left Base CV:             [x]   Regular              []  Irregular               []   Tachy                []   Murmur Abdominal:   [x]    Soft              []   NON-tender               []   Tender BS:    []   ABSENT                   []   PRESENT Liver:     []  NON-palp                  []   EDGE- palp Spleen: []   NON-palp                   []  EDGE - palp Mass:   []   ABSENT                          []  PRESENT Extr:    []  Lymphedema             []   Cyanosis      []  Clubbing Edema:     [x]   NONE       []   PRESENT Skin:  Intact [x]           Purpura [] Rash: []   ABSENT       []  PRESENT Neuro: 
[x]        Normal 
[]        Confused Available labs reviewed: 
Labs:   
Recent Results (from the past 24 hour(s)) GLUCOSE, POC Collection Time: 03/11/20  4:41 PM  
Result Value Ref Range Glucose (POC) 118 (H) 65 - 100 mg/dL Performed by Mike Duarte (PCT) GLUCOSE, POC Collection Time: 03/11/20 10:52 PM  
Result Value Ref Range Glucose (POC) 147 (H) 65 - 100 mg/dL Performed by Lucia Boles MAGNESIUM Collection Time: 03/12/20  2:39 AM  
Result Value Ref Range Magnesium 1.4 (L) 1.6 - 2.4 mg/dL PHOSPHORUS Collection Time: 03/12/20  2:39 AM  
Result Value Ref Range Phosphorus 2.1 (L) 2.6 - 4.7 MG/DL  
CBC WITH AUTOMATED DIFF Collection Time: 03/12/20  2:39 AM  
Result Value Ref Range WBC 7.0 3.6 - 11.0 K/uL  
 RBC 2.58 (L) 3.80 - 5.20 M/uL HGB 8.0 (L) 11.5 - 16.0 g/dL HCT 25.5 (L) 35.0 - 47.0 % MCV 98.8 80.0 - 99.0 FL  
 MCH 31.0 26.0 - 34.0 PG  
 MCHC 31.4 30.0 - 36.5 g/dL  
 RDW 19.9 (H) 11.5 - 14.5 % PLATELET 587 (L) 355 - 400 K/uL MPV 11.1 8.9 - 12.9 FL  
 NRBC 0.0 0  WBC ABSOLUTE NRBC 0.00 0.00 - 0.01 K/uL NEUTROPHILS 78 (H) 32 - 75 % LYMPHOCYTES 8 (L) 12 - 49 % MONOCYTES 11 5 - 13 % EOSINOPHILS 1 0 - 7 % BASOPHILS 0 0 - 1 % IMMATURE GRANULOCYTES 2 (H) 0.0 - 0.5 % ABS. NEUTROPHILS 5.4 1.8 - 8.0 K/UL  
 ABS. LYMPHOCYTES 0.6 (L) 0.8 - 3.5 K/UL  
 ABS. MONOCYTES 0.8 0.0 - 1.0 K/UL  
 ABS. EOSINOPHILS 0.1 0.0 - 0.4 K/UL  
 ABS. BASOPHILS 0.0 0.0 - 0.1 K/UL  
 ABS. IMM. GRANS. 0.1 (H) 0.00 - 0.04 K/UL  
 DF AUTOMATED METABOLIC PANEL, BASIC Collection Time: 03/12/20  2:39 AM  
Result Value Ref Range Sodium 145 136 - 145 mmol/L Potassium 4.0 3.5 - 5.1 mmol/L Chloride 112 (H) 97 - 108 mmol/L  
 CO2 31 21 - 32 mmol/L Anion gap 2 (L) 5 - 15 mmol/L Glucose 105 (H) 65 - 100 mg/dL BUN 52 (H) 6 - 20 MG/DL Creatinine 1.92 (H) 0.55 - 1.02 MG/DL  
 BUN/Creatinine ratio 27 (H) 12 - 20 GFR est AA 31 (L) >60 ml/min/1.73m2 GFR est non-AA 25 (L) >60 ml/min/1.73m2 Calcium 8.3 (L) 8.5 - 10.1 MG/DL  
GLUCOSE, POC Collection Time: 03/12/20  7:18 AM  
Result Value Ref Range Glucose (POC) 96 65 - 100 mg/dL Performed by [facility] Available Xrays reviewed: 
 
Chemotherapy monitored and toxicities assessed: 
 
Assessment and Plan 1. Esophageal carcinoma. .. pt had cryotherapy to locally persistent/ residual esoph. Lesion at [facility] last month and did well with it ( I confirmed with Dr. Yuliya Sawyer). .. barium swallow does NOT show a leak. .. she was on Xeloda prior to admission (salvage chemo per Dr. Ignacia Parker). .. she has mild mucositis on lips from this ,, will Hold this med during admit 2. Anemia. Secondary to chemo/ckd/gi bleed. . hgb is 8 today, will continue procrit and monitor hgb daily 3. E-coli bacteremia. .. continue kwame Hodges MD

## 2020-03-12 NOTE — PROGRESS NOTES
Nephrology Progress Note Michael Markham Date of Admission : 3/4/2020 CC: Follow up for SONIDO Assessment and Plan SONIDO on CKD: 
- 2/2 ATN due to Gram Negative Sepsis, Hypotension ==> resolved 
- daily labs CKD Stage IV  
- 2/2 ADPKD, HTN  
- baseline Creatinine : 2 mg/ dl  
- followed by Dr Mariano Mccormick HypoMg : 
- replacement ordered  
  
E coli bacteremia w/ sepsis - s/p removal of Port-a cath - abx per ID 
  
Anemia : 
- Multifactorial  
- continue KATHY 
- heme following Esophageal Ca Hypothyroidism EMILY Interval History: 
Seen and examined. . No complaints . Denies any abdo pain/N/V/CP/SOB Current Medications: all current  Medications have been eviewed in [facility] Review of Systems: Pertinent items are noted in HPI. Objective: 
Vitals:   
Vitals:  
 03/11/20 1552 03/11/20 2048 03/12/20 0135 03/12/20 0845 BP: 122/72 (!) 158/92 134/89 132/63 Pulse: 63 79 81 82 Resp: 18 18 18 18 Temp: 98.6 °F (37 °C) 97.4 °F (36.3 °C) 97.6 °F (36.4 °C) 98.6 °F (37 °C) SpO2: 99% 95% 97% 96% Weight:      
Height:      
 
Intake and Output: 
No intake/output data recorded. 03/10 1901 - 03/12 0700 In: 1035.8 [I.V.:1035.8] Out: 2000 [[id number]] Physical Examination: 
 
General: No distress Neck:  Supple, no mass Resp:  Reduced bibasilar breath sounds CV:  RRR,  no murmur or rub, no LE edema GI:  Soft, NT, + Bowel sounds, no hepatosplenomegaly Neurologic:  Non focal 
Psych:             AAO x 3 appropriate affect Skin:  Pigmentation of both hands :  No cuello [x]    High complexity decision making was performed 
[x]    Patient is at high-risk of decompensation with multiple organ involvement Lab Data Personally Reviewed: I have reviewed all the pertinent labs, microbiology data and radiology studies during assessment. Recent Labs  
  03/12/20 
0239 03/11/20 
0158 03/10/20 
2344  146* 145  
K 4.0 3.6 3.5 * 112* 112* CO2 31 28 27 * 124* 105* BUN 52* 60* 72* CREA 1.92* 2.19* 2.40* CA 8.3* 8.4* 8.1*  
MG 1.4* 1.5* 1.5* PHOS 2.1* 2.6 3.0 Recent Labs  
  03/12/20 
0239 03/11/20 
0158 03/10/20 
0820 WBC 7.0 6.4 5.5 HGB 8.0* 7.6* 7.4* HCT 25.5* 24.3* 23.5*  
* 113* 94* No results found for: SDES Lab Results Component Value Date/Time Culture result: NO GROWTH 3 DAYS 03/08/2020 04:03 AM  
 Culture result:  03/06/2020 09:15 AM  
  MRSA NOT PRESENT. Apparent Staphylococus aureus (not MRSA noted). Culture result:  03/06/2020 09:15 AM  
      Screening of patient nares for MRSA is for surveillance purposes and, if positive, to facilitate isolation considerations in high risk settings. It is not intended for automatic decolonization interventions per se as regimens are not sufficiently effective to warrant routine use. Recent Results (from the past 24 hour(s)) GLUCOSE, POC Collection Time: 03/11/20  4:41 PM  
Result Value Ref Range Glucose (POC) 118 (H) 65 - 100 mg/dL Performed by Arslan Woods ([facility]) GLUCOSE, POC Collection Time: 03/11/20 10:52 PM  
Result Value Ref Range Glucose (POC) 147 (H) 65 - 100 mg/dL Performed by Jasmine Mora MAGNESIUM Collection Time: 03/12/20  2:39 AM  
Result Value Ref Range Magnesium 1.4 (L) 1.6 - 2.4 mg/dL PHOSPHORUS Collection Time: 03/12/20  2:39 AM  
Result Value Ref Range Phosphorus 2.1 (L) 2.6 - 4.7 MG/DL  
CBC WITH AUTOMATED DIFF Collection Time: 03/12/20  2:39 AM  
Result Value Ref Range WBC 7.0 3.6 - 11.0 K/uL  
 RBC 2.58 (L) 3.80 - 5.20 M/uL HGB 8.0 (L) 11.5 - 16.0 g/dL HCT 25.5 (L) 35.0 - 47.0 % MCV 98.8 80.0 - 99.0 FL  
 MCH 31.0 26.0 - 34.0 PG  
 MCHC 31.4 30.0 - 36.5 g/dL  
 RDW 19.9 (H) 11.5 - 14.5 % PLATELET 763 (L) 656 - 400 K/uL MPV 11.1 8.9 - 12.9 FL  
 NRBC 0.0 0  WBC ABSOLUTE NRBC 0.00 0.00 - 0.01 K/uL NEUTROPHILS 78 (H) 32 - 75 % LYMPHOCYTES 8 (L) 12 - 49 % MONOCYTES 11 5 - 13 % EOSINOPHILS 1 0 - 7 % BASOPHILS 0 0 - 1 % IMMATURE GRANULOCYTES 2 (H) 0.0 - 0.5 % ABS. NEUTROPHILS 5.4 1.8 - 8.0 K/UL  
 ABS. LYMPHOCYTES 0.6 (L) 0.8 - 3.5 K/UL  
 ABS. MONOCYTES 0.8 0.0 - 1.0 K/UL  
 ABS. EOSINOPHILS 0.1 0.0 - 0.4 K/UL  
 ABS. BASOPHILS 0.0 0.0 - 0.1 K/UL  
 ABS. IMM. GRANS. 0.1 (H) 0.00 - 0.04 K/UL  
 DF AUTOMATED METABOLIC PANEL, BASIC Collection Time: 03/12/20  2:39 AM  
Result Value Ref Range Sodium 145 136 - 145 mmol/L Potassium 4.0 3.5 - 5.1 mmol/L Chloride 112 (H) 97 - 108 mmol/L  
 CO2 31 21 - 32 mmol/L Anion gap 2 (L) 5 - 15 mmol/L Glucose 105 (H) 65 - 100 mg/dL BUN 52 (H) 6 - 20 MG/DL Creatinine 1.92 (H) 0.55 - 1.02 MG/DL  
 BUN/Creatinine ratio 27 (H) 12 - 20 GFR est AA 31 (L) >60 ml/min/1.73m2 GFR est non-AA 25 (L) >60 ml/min/1.73m2 Calcium 8.3 (L) 8.5 - 10.1 MG/DL  
GLUCOSE, POC Collection Time: 03/12/20  7:18 AM  
Result Value Ref Range Glucose (POC) 96 65 - 100 mg/dL Performed by Mitzy Odom MD 
[address] [place] [address] [facility] Phone - [phone] Fax - [phone] 
www. [facility]

## 2020-03-12 NOTE — DISCHARGE SUMMARY
Discharge Summary PATIENT ID: Jasmin Judge MRN: [id number] YOB: 1940 DATE OF ADMISSION: 3/4/2020  9:48 PM   
DATE OF DISCHARGE: 03/12/2020 PRIMARY CARE PROVIDER: Valarie Mark MD  
 
DISCHARGING PROVIDER: Saba Collado MD   
To contact this individual call [phone] and ask the  to page. If unavailable ask to be transferred the Adult Hospitalist Department. CONSULTATIONS: IP CONSULT TO CARDIOLOGY 
IP CONSULT TO NEPHROLOGY 
IP CONSULT TO INFECTIOUS DISEASES 
IP CONSULT TO INTERVENTIONAL RADIOLOGY 
IP CONSULT TO GASTROENTEROLOGY 
IP CONSULT TO HEMATOLOGY PROCEDURES/SURGERIES: * No surgery found * ADMITTING DIAGNOSES & HOSPITAL COURSE:  
Septic Shock - resolved E Coli bacteremia Potential port infection Acute hypoxic respiratory failure - resolved Acute on chronic renal failure - resolved 77-year-old woman with past medical history significant for adenocarcinoma of the esophagus, hypothyroidism, obstructive sleep apnea, hypertension, chronic kidney disease, and glaucoma was in her usual state of health came to the hospital because of fatigue and SOB. She was found to have gram negative bacteremia. As she was hypotensive -she was transferred to ICU. Admitted on 3/5, and transferred out of ICU on 3/9. Originally was on meropenem which was de-escalated to CTX based on culture data. Her port was removed on 3/8, and cultures remain negative from that date. Hospital stay was complicated by acute hypoxic respiratory failure, SONIDO, both of which have resolved, and were likely associated with sepsis. She will need to complete 2 week course of antibiotics from 3/8--3/22. Dr. Marlon Puga recommends [facility] vs. Maxx catheter. Discussed with patient, and she would like to try [facility] as another indwelling catheter would increase her risk of further bacteremia and infections.  She will need to have close follow up with her oncologist.  
 
 DISCHARGE DIAGNOSES / PLAN:   
 
Septic shock (resolved) due to E coli bacteremia: 
-Shock resolved with fluids and vasopressors - since resolved. -Patient is immunocompromised with underlying cancer/chemotherapy 
-Port removed - unable to rule in or out port infection. -Meropenem de escalated to rocephin (Sensitive). - ID following. Blood culture from 3/8 remain negative. 
- Dr. Ceasar Bosworth following, set up [facility] for total 14 day course from 3/8--3/22 
- IF any access issues with [facility], will need JESSIKA catheter - this can be done with IR without admission to the hospital  
  
Acute hypoxic respiratory failure due to sepsis: improving 
-O2 weaned off, now on room air.  
-VQ scan ruled out pulmonary embolism.  Venous Dopplers negative for DVT 
-Echo showed LV hypertrophy, normal EF 
-She has also, uses CPAP @ 4 cm of H2O nocturnally. 
  
Acute on chronic renal failure - resolved.  
-baseline Cr 2 per nephrology team 
-Acute component due to sepsis/ATN/losartan PTA - resolved. - Cr back to baseline Esophageal cancer 
-Status post stent placement which is removed since 
-Status post cryoablation at [facility]. 
-He follows with [facility] and she is on Xeloda. Seen by oncology here. F/U with Dr. Bell Hillman outpatient  
-Modified barium swallow showed no evidence of leak or tracheoesophageal fistula but showed large hiatal hernia. GI on board. 
  
Hypothyroidism 
-on synthroid. 
  
Acute on Chronic anemia, anemia of chronic kidney disease 
-Received 1 U PRBC -hb now >8. 
-Receiving Epogen. -GI following, no plans for any scopes inpatient. -F/U with heme/onc 
  
Hypertension, presented with septic shock: Home regimen includes carvedilol 12.5 mg twice daily, chlorthalidone 25 mg daily, clonidine 0.2 mg 2 times daily, amlodipine 10 mg daily and losartan 100 mg daily. She presented with shock as such antihypertensives are on hold, will try to reintroduce as able. - BP stable. Will continue to hold antihypertensives on DC. To follow up closely with PCP.  
  
Glaucoma: Continue eyedrops Darío: Nocturnal [email or web address] centimeters H2O nocturnally 
   
 
ADDITIONAL CARE RECOMMENDATIONS:  
- Please take all medications as prescribed. Note changes as below. ** HOLD all your antihypertensives, your blood pressures have been low while in the hospital. You will need to closely follow this with your PCP and resume them as appropriate. ** Continue CTX for 14 days per Dr. Jason Pichardo order ** Hold your Xeloda until you see your oncologist in clinic.  
- Please make sure to follow up with your primary care physician within 1-2 weeks of discharge for hospital follow up. You also need to follow up with Dr. Teresita Sutton  
- Continue to monitor for fevers, fatigue, shortness of breath, chest pain and come back to the emergency room for any severe symptoms.  
- Please get up slowly from a seated or laying position, avoid falls. - Avoid tobacco, alcohol and other illicit drug use. PENDING TEST RESULTS:  
At the time of discharge the following test results are still pending: None FOLLOW UP APPOINTMENTS:   
Follow-up Information Follow up With Specialties Details Why Contact Info Celine Ribeiro MD [facility]   [address] [place] [address] 
[phone] Elayne Harris MD Hematology and Oncology, Hematology, Oncology In 1 week Call to make an appointment  [address] [facility] 
[phone] DIET: Regular Diet and Resume previous diet ACTIVITY: Activity as tolerated WOUND CARE: None EQUIPMENT needed: None DISCHARGE MEDICATIONS: 
Current Discharge Medication List  
  
START taking these medications Details  
cefTRIAXone 2 gram 2 g, ADDaptor 1 Device IVPB 2 g by IntraVENous route every twenty-four (24) hours for 10 days. Qty: 10 Dose, Refills: 0 CONTINUE these medications which have NOT CHANGED Details  
ferrous sulfate 324 mg (65 mg iron) tablet Take 324 mg by mouth two (2) times daily (with meals). omeprazole (PRILOSEC OTC) 20 mg tablet Take 20 mg by mouth daily. sodium bicarbonate 650 mg tablet Take 650 mg by mouth two (2) times a day. capecitabine (XELODA) 500 mg tablet 1,500 mg two (2) times a day. X 14 days then 7 days off  
  
folic acid-vit D3-OEU B67 (FOLBEE) 2.5-25-1 mg tablet Take 1 Tab by mouth daily. iron bisgly,ps-FA-B-C#12-succ 65 mg-65 mg -1,000 mcg (24) tab Take 1 Tab by mouth daily. latanoprost (XALATAN) 0.005 % ophthalmic solution Administer 1 Drop to both eyes nightly. MULTIVITS W-FE,OTHER MIN/LUT (CENTRUM SILVER ULTRA WOMEN'S PO) Take 1 Tab by mouth daily. DORZOLAMIDE HCL/TIMOLOL MALEAT (DORZOLAMIDE-TIMOLOL OP) Apply 1 Drop to eye three (3) times daily. One drop to each eye twice daily   
  
brimonidine (ALPHAGAN) 0.2 % ophthalmic solution Administer 1 Drop to both eyes three (3) times daily. acetaminophen (TYLENOL EXTRA STRENGTH) 500 mg tablet Take 1,000 mg by mouth every six (6) hours as needed. Indications: ARTHRITIC PAIN, PAIN  
  
levothyroxine (SYNTHROID) 100 mcg tablet Take 100 mcg by mouth Daily (before breakfast). Indications: HYPOTHYROIDISM  
  
  
STOP taking these medications  
  
 carvediloL (COREG) 12.5 mg tablet Comments:  
Reason for Stopping:   
   
 chlorthalidone (HYGROTEN) 25 mg tablet Comments:  
Reason for Stopping:   
   
 cloNIDine HCl (CATAPRES) 0.1 mg tablet Comments:  
Reason for Stopping:   
   
 amLODIPine (NORVASC) 10 mg tablet Comments:  
Reason for Stopping:   
   
 losartan (COZAAR) 100 mg tablet Comments:  
Reason for Stopping:   
   
  
 
 
 
NOTIFY YOUR PHYSICIAN FOR ANY OF THE FOLLOWING:  
Fever over 101 degrees for 24 hours.   
Chest pain, shortness of breath, fever, chills, nausea, vomiting, diarrhea, change in mentation, falling, weakness, bleeding. Severe pain or pain not relieved by medications. Or, any other signs or symptoms that you may have questions about. DISPOSITION: 
X  Home With: 
 OT  PT  HH  RN  
  
 Long term SNF/Inpatient Rehab Independent/assisted living Hospice Other:  
 
 
PATIENT CONDITION AT DISCHARGE:  
 
Functional status Poor Deconditioned X Independent Cognition X Lucid Forgetful Dementia Catheters/lines (plus indication) Ha PICC   
 PEG   
X None Code status X Full code DNR   
 
PHYSICAL EXAMINATION AT DISCHARGE: 
Visit Vitals /63 Pulse 82 Temp 98.6 °F (37 °C) Resp 18 Ht 5' 8\" (1.727 m) Wt 111.6 kg (246 lb) SpO2 96% Breastfeeding No  
BMI 37.40 kg/m² Gen: NAD, awake in bed HEENT: NC/AT, sclera anicteric, PERRL, EOMI 
CV: RRR no m/r/g, IJ quad lumen Resp: CTA b/l no increased work of breathing Abd: NT/ND, normal bowel sounds Ext: 2+ pulses, no edema Skin: No rashes CHRONIC MEDICAL DIAGNOSES: 
Problem List as of 3/12/2020 Date Reviewed: 3/5/2020 Codes Class Noted - Resolved * (Principal) [facility] ICD-10-CM: J96.00 
ICD-9-CM: 518.81  3/5/2020 - Present Adenocarcinoma of esophagus (HCC) ICD-10-CM: C15.9 ICD-9-CM: 150.9  5/30/2019 - Present Esophageal dysphagia ICD-10-CM: R13.10 ICD-9-CM: 787.20  5/22/2019 - Present Abnormal x-ray of abdomen ICD-10-CM: R93.5 ICD-9-CM: 793.6  5/22/2019 - Present Overview Signed 5/22/2019 12:56 PM by Kendra Alford MD  
  Bas showed food and irregular stricture above ge junction in addition to large hiatal hernia  5.20.2019 History of colon polyps ICD-10-CM: Z86.010 
ICD-9-CM: V12.72  6/1/2018 - Present Overview Signed 6/1/2018  7:15 AM by Kendra Alford MD  
  2013 tubular adenoma  RESOLVED: Screen for colon cancer ICD-10-CM: Z12.11 
 ICD-9-CM: V76.51  3/14/2013 - 9/30/2019 Greater than 30 minutes were spent with the patient on counseling and coordination of care Signed:  
Regine Berry MD 
3/12/2020 
1:52 PM

## 2020-03-12 NOTE — DISCHARGE INSTRUCTIONS
Dear Merline Paterson,    Thank you for choosing [address] [place] [address] for your healthcare needs. We strive to provide EXCELLENT care to you and your family. In an effort to explain clearly why you were here in the hospital, I've also written a very brief summary below. Other details including formal diagnosis, medication changes, and follow up appointment recommendations can also be found in this packet. You were admitted for fatigue and shortness of breath due to sepsis and E coli bacteremia for which you were started IV antibiotics, IV blood pressure medications, oxygen. You also received care from specialist physicians in the following specialties:  [address]  IP CONSULT TO NEPHROLOGY  IP CONSULT TO INFECTIOUS DISEASES  IP CONSULT TO INTERVENTIONAL RADIOLOGY  IP CONSULT TO GASTROENTEROLOGY  IP CONSULT TO HEMATOLOGY    Here are the updates to your medication list:  ** HOLD all your antihypertensives, your blood pressures have been low while in the hospital. Danella Meter will need to closely follow this with your PCP and resume them as appropriate. ** Continue Ceftriaxone antibiotics for 14 days per Dr. Baldemar Caputo order at the [place]  ** Hold your Xeloda until you see your oncologist in clinic. Remember that it is important for you to take your medications exactly as they are prescribed. It is helpful to keep a list of your medication with the names, dosages, and times to be taken in your wallet. Additionally,   - Please make sure to follow up with your primary care physician within 1-2 weeks of discharge for hospital follow up. You also need to follow up with Dr. Kalpesh Francis   - Continue to monitor for fevers, fatigue, shortness of breath, chest pain and come back to the emergency room for any severe symptoms.   - Please get up slowly from a seated or laying position, avoid falls. - Avoid tobacco, alcohol and other illicit drug use.    - If there are any issues administering the Ceftriaxone, you should go to the ER or call the interventional radiologist as you will need a JESSIKA catheter for long term antibiotics. This usually can be set up quickly, and hopefully would not require admission to the hospital     Make sure to also see your primary care doctor for follow-up. Bring these papers with you and be sure to review your medication list with your doctor. I cannot stress the importance of follow up enough. I've included the information for your follow-up appointments below: Follow-up Information     Follow up With Specialties Details Why Radha Reynoso 42, [address], MD [facility]   [address] [place] [address]  [phone]      Mark Casas MD Hematology and Oncology, Hematology, Oncology In 1 week Call to make an appointment  [address]  [phone]            At this time, the following test results are still pending: None  Again, please follow-up these results with your primary care provider. Should you have any fever over 101 degrees for 24 hours, chest pain, shortness of breath, fever, chills, nausea, vomiting, diarrhea, change in mentation, falling, weakness, bleeding, or worsening pain, please seek medical attention immediately. Finally, as your discharging physician, you may be receiving a survey regarding my care. I would greatly value and appreciate your input in the survey as we strive for excellence. If you have any questions, I can be reached at [phone].   Thank you so much again for allowing me to care for you at [facility].    Respectfully yours,  Maggie Palmer MD

## 2020-03-12 NOTE — PROGRESS NOTES
Hospital follow-up PCP transitional care appointment has been scheduled with Dr. Ivet Rincon for Wednesday, 3/18/20 at 2:15 p.m. Pending patient discharge.   Mariano Ayala, Care Management Specialist.

## 2020-03-12 NOTE — PROGRESS NOTES
DAYO: 
 
CM spoke with Dr. Maria Guadalupe Escboedo re plans for patient d/c today/tomorrow. Patient will be on IV rocephin 2gms 1x/day. Dr Maria Guadalupe Escobedo has spoken with patient and Dr Carmencita Muller and per her conversations, patient is agreeable to going to the [facility]. CM faxed the [facility] infusion form to Dr. Carmencita Muller' office for completion and requested that it be faxed back to this CM. Upon receipt, CM will call the [facility] coordinator to discuss scheduling.   
Ирина Kenny, BSW, CRM

## 2020-03-13 ENCOUNTER — PATIENT OUTREACH (OUTPATIENT)
Dept: CASE MANAGEMENT | Age: 80
End: 2020-03-13

## 2020-03-13 ENCOUNTER — HOSPITAL ENCOUNTER (OUTPATIENT)
Dept: INFUSION THERAPY | Age: 80
Discharge: HOME | End: 2020-03-13
Payer: MEDICARE

## 2020-03-13 VITALS
HEART RATE: 78 BPM | RESPIRATION RATE: 18 BRPM | DIASTOLIC BLOOD PRESSURE: 93 MMHG | TEMPERATURE: 98.6 F | SYSTOLIC BLOOD PRESSURE: 137 MMHG

## 2020-03-13 LAB
BACTERIA SPEC CULT: NORMAL
SERVICE CMNT-IMP: NORMAL

## 2020-03-13 PROCEDURE — 74011250636 HC RX REV CODE- 250/636: Performed by: INTERNAL MEDICINE

## 2020-03-13 PROCEDURE — 74011000258 HC RX REV CODE- 258: Performed by: INTERNAL MEDICINE

## 2020-03-13 PROCEDURE — 96365 THER/PROPH/DIAG IV INF INIT: CPT

## 2020-03-13 RX ADMIN — CEFTRIAXONE SODIUM 2 G: 2 INJECTION, POWDER, FOR SOLUTION INTRAMUSCULAR; INTRAVENOUS at 17:16

## 2020-03-13 NOTE — PROGRESS NOTES
Hospital Discharge Follow-Up Date/Time:  3/13/2020 12:04 PM 
 
Alejandro Guillen RN, CHFN, [facility] Care transitions nurse [phone] [facility] Juan Dodson Coordination Team 
 
Patient was admitted to [facility] on 3/5 and discharged on 3/12/2020  for acute respiratory failure, sepsis, hematology care - hypotension associated with sepsis. . The physician discharge summary was available at the time of outreach. Patient was contacted within 1 business days of discharge. Call to and reached pt - who said she wanted to be home / and she does not need home health. \"I have all I need\". She has family staying with her - Top Challenges reviewed with the provider Acute respiratory failure Sepsis -  
Currently in tx for esophageal cancer - Pt to go to [facility] for IV antibiotics for 10 days - arrangements made Maxx catheter removed - r/o source of infection PT recommended continued home therapy - pt said she did not need it/ or [facility] nursing Mgmt by nephrology - CKD/ SONIDO; infectious disease - Dr. Jayshree Whitlock heme onc - Dr. Pierre Cagle - ongoing chemo - esophageal cancer - Anemia - Pt is stoic - discussed with her on call providers 24/7 and to please reach out if worse than better or temp elevation. Protection from flu / virus discussed - proactive screening of visitors, etc. 
 
Advance Care Planning:  
Does patient have an Advance Directive:  not on file; education provided  
  
  
70-year-old woman with past medical history significant for adenocarcinoma of the esophagus, hypothyroidism, obstructive sleep apnea, hypertension, chronic kidney disease, and glaucoma  came to the hospital because of fatigue and SOB. She was found to have gram negative bacteremia. As she was hypotensive -she was transferred to ICU 3/5.  
 and transferred out of ICU on 3/9. Her port was removed on 3/8, and cultures remain negative from that date.  Hospital stay was complicated by acute hypoxic respiratory failure, SONIDO,  She will need to complete 2 week course of antibiotics from 3/8--3/22. Dr. Javier Landeros recommends [facility] vs. Maxx catheter. Discussed with patient, and she would like to try [facility] as another indwelling catheter would increase her risk of further bacteremia and infections. Heart Failure Note Do you have a Scale:    yes How often do you weigh:  Daily - q am - discussed Daily Weight (document daily weights in flowsheets):   Decrease Weight 235 lbs. Zone:(Pt Reported)  green  
________________________________________ 
EF: 3/5/2020 EF 60-65% Type of HF:   HFpEF Result status: Final result · Normal cavity size, systolic function (ejection fraction normal) and diastolic function. Severe concentric hypertrophy. Estimated left ventricular ejection fraction is 60 - 65%. No regional wall motion abnormality noted. Normal left ventricular strain. · Image quality for this study was suboptimal. 
· Mild to moderate tricuspid valve regurgitation is present. · Moderate pulmonary hypertension. · Mildly dilated left atrium. · Aortic valve sclerosis with no significant stenosis. Mild aortic valve regurgitation is present.  
_________________________________ Cardiac Device present: none Heart Failure Medications: Betablocker /oh h old at d/c d/t hypotension - Method of communication with provider :chart routing, staff message, phone Was this a readmission? no  
Patient stated reason for the readmission: n/a Care Transition Nurse (CTN) contacted the patient by telephone to perform post hospital discharge assessment. Verified name and  with patient as identifiers. Provided introduction to self, and explanation of the CTN role. Patient received hospital discharge instructions. CTN reviewed discharge instructions and red flags with patient who verbalized understanding.  Patient given an opportunity to ask questions and does not have any further questions or concerns at this time. The patient agrees to contact the PCP office for questions related to their healthcare. CTN provided contact information for future reference. Disease Specific:  Acute respiratory failure, sepsis, hypotension; ; CKD/ SONIDO - chemo for esophageal cancer tx realm - Patients top risk factors for readmission:  functional physical ability, lack of knowledge about disease, medical condition, medication management, stages of grief, utilization of services Home Health orders at discharge: none PT recommended home therapy - pt declined Home Health company: none Date of initial visit: n/a Durable Medical Equipment ordered at discharge: none Pt had home support equipment [address]: none Durable Medical Equipment received: n/a Medication(s):  
New Medications at Discharge: Ceftriaxone 2 grams - [facility] infusion 2 grams IV every day for 10 days Changed Medications at Discharge: none Discontinued Medications at Discharge: Amlodipine, Hygroten, Clonidine, Coreg, Losartan - on hold d/t hypotension - (septic shock) Medication reconciliation was performed with patient, who verbalizes understanding of administration of home medications. There were no barriers to obtaining medications identified at this time. Referral to Pharm D needed: no  
 
[facility] follow up appointment(s):  
Future Appointments Date Time Provider Kiana Simeon 3/13/2020  4:00 PM [place] INFUSION NURSE 4 [facility]  
3/14/2020 10:00 AM G1 [facility]  
3/15/2020 10:00 AM G2 [facility] Non-[facility] follow up appointment(s): Pt called Dr. Andrew Ferreira - awaiting pcp appt next week - [facility]:  information provided as a resource Goals  Attends follow-up appointments as directed. 3/13/2020 Pt to go to [address] [place] [address] for 10 days - consecutive for IV antibiotics. If venous access difficult - reconsideration for replacement Maxx. Pt called pcp - Dr. Jocelin Nickerson - for appt next week - for follow up Pt needs heme onc follow up appt Dr. Adilene García - [phone] - in one week - esophageal cancer and tx follow up. 
 
ttk  Knowledge and adherence of prescribed medication (ie. action, side effects, missed dose, etc.).   
  3/13/2020 Ms. Horacio Nash had Maxx catheter removed - concerns re: bacterial sepsis source - She is to go to [facility] - [phone] daily for 10 more days for  IV Ceftriaxone 2 grams - Pt states she has a way to get there - Her bp therapies are on hold - hypotension in hospital - sepsis - Amlodipine, Hygroten, Clonidine, Coreg, Losartan Pt has bp cuff - encouraged to monitor and call if bp going up to normal -  
ttk  Supportive resources in place to maintain patient in the community (ie. Home Health, DME equipment, refer to, medication assistant plan, etc.)   
  3/13/2020 PT evaluation in the hospital recommended home PT - pt declined/ said she does not need. \"I have everything I need at home\". Discussed with pt that we could add HH nursing/ or PT - if needed. ttk 
 
  
  
 
ttk 
____________________________________________________________________________________ CM note re: [address] [place] [address] CM met with patient and family and discussed plans for d/c home and follow-up with [facility]. Patient gives preference for [place] location (at [address]) as she/family live in [facility], [address] called Priti Yao ([address]) at [phone]. 
  
1:39p  CM received call from [facility] SERVICES [phone]).    
Patient is scheduled for  Infusion tx tomorrow at [facility], Sat and Sunday at [facility] at [facility]. Appts have been placed on  Connect care and will be reflected on discharge instructions. 
  
Schedule for IV ABX treatments at the infusion center had been completed. discharge. Daughter asked that Dr. Myrna Reynolds call Tracie Clay (son) at [phone]. Information given to Dr. Myrna Reynolds. RUKHSANA Lau, CRM 
____________________________________________________________________________________________ DISCHARGE DIAGNOSES / PLAN:   
  
Septic shock (resolved) due to E coli bacteremia: 
-Shock resolved with fluids and vasopressors - since resolved. -Patient is immunocompromised with underlying cancer/chemotherapy 
-Port removed. -Meropenem de escalated to rocephin (Sensitive). - ID following.  Blood culture from 3/8 remain negative. 
- Dr. Adams Goss following, set up [facility] for total 14 day course from 3/8--3/22 
- IF any access issues with [facility], will need JESSIKA catheter - this can be done with IR without admission to the hospital  
  
Acute hypoxic respiratory failure due to sepsis: improving 
-O2 weaned off, now on room air.  
-VQ scan ruled out pulmonary embolism.  Venous Dopplers negative for DVT 
-Echo showed LV hypertrophy, normal EF 
-She has also, uses CPAP @ 4 cm of H2O nocturnally. 
  
Acute on chronic renal failure - resolved.  
-baseline Cr 2 per nephrology team 
-Acute component due to sepsis/ATN/losartan PTA - resolved. - Cr back to baseline  
  
Esophageal cancer 
-Status post stent placement which is removed since 
-Status post cryoablation at [facility]. 
-He follows with [facility] and she is on Xeloda.  Seen by oncology here. F/U with Dr. Ro Spann outpatient  
-Modified barium swallow showed no evidence of leak or tracheoesophageal fistula but showed large hiatal hernia.  GI on board. 
  
Hypothyroidism 
-on synthroid. 
  
Acute on Chronic anemia, anemia of chronic kidney disease 
-Received 1 U PRBC -hb now >8. 
-Receiving Epogen. -GI following, no plans for any scopes inpatient.   
-F/U with heme/onc 
  
Hypertension, presented with septic shock: Home regimen includes carvedilol 12.5 mg twice daily, chlorthalidone 25 mg daily, clonidine 0.2 mg 2 times daily, amlodipine 10 mg daily and losartan 100 mg daily.  She presented with shock as such antihypertensives are on hold, will try to reintroduce as able. - BP stable. Will continue to hold antihypertensives on DC. To follow up closely with PCP.  
  
Glaucoma: Continue eyedrops Darío: Nocturnal [email or web address] centimeters H2O nocturnally 3/12/2020 -  GI consultation - note Assessment/Plan 1. Anemia - likely multifactorial including CKD, recent chemotherapy, known esophageal adenocarcinoma, etc. Hgb relatively stable. No overt GI bleeding.  
- Diet as tolerated - Supportive management per primary team 
- Continue PPI 
- No acute plans for endoscopy - Monitor H&H, transfuse as needed - Heme/Onc following - WILL SEE PRN, CALL AS NEEDED. 
  
2. E. Coli bacteremia w/ sepsis - s/p removal of Port-a-cath - Management per primary team 
3. CKD 4. Hx esophageal Ca s/p surgical resection - esophogram w/ large HH and no evidence of leak.   
 
___________________________________________ Dr. Adriano Walker - heme onc note 3/12/2020 Assessment and Plan 1. Esophageal carcinoma. .. pt had cryotherapy to locally persistent/ residual esoph. Lesion at [facility] last month and did well with it ( I confirmed with Dr. Meaghan Hobbs). .. barium swallow does NOT show a leak. .. she was on Xeloda prior to admission (salvage chemo per Dr. Honore Holstein). .. she has mild mucositis on lips from this ,, will Hold this med during admit 
  
2. Anemia. Secondary to chemo/ckd/gi bleed. . hgb is 8 today, will continue procrit and monitor hgb daily 
  
3. E-coli bacteremia. .. continue rocephin per ID 
  
Nadia Figueroa MD 
________________________________________________________________________ Physical therapy recommended home therapy - pt declined - said she doesn't need it - she has family with her.  
 
charlene

## 2020-03-14 ENCOUNTER — HOSPITAL ENCOUNTER (OUTPATIENT)
Dept: INFUSION THERAPY | Age: 80
Discharge: HOME | End: 2020-03-14
Payer: MEDICARE

## 2020-03-14 VITALS
HEART RATE: 70 BPM | DIASTOLIC BLOOD PRESSURE: 92 MMHG | RESPIRATION RATE: 18 BRPM | TEMPERATURE: 98.3 F | SYSTOLIC BLOOD PRESSURE: 140 MMHG

## 2020-03-14 PROCEDURE — 96365 THER/PROPH/DIAG IV INF INIT: CPT

## 2020-03-14 PROCEDURE — 74011000258 HC RX REV CODE- 258: Performed by: INTERNAL MEDICINE

## 2020-03-14 PROCEDURE — 74011250636 HC RX REV CODE- 250/636: Performed by: INTERNAL MEDICINE

## 2020-03-14 RX ORDER — SODIUM CHLORIDE 0.9 % (FLUSH) 0.9 %
5-10 SYRINGE (ML) INJECTION AS NEEDED
Status: DISCONTINUED | OUTPATIENT
Start: 2020-03-14 | End: 2020-03-15 | Stop reason: HOSPADM

## 2020-03-14 RX ORDER — HEPARIN 100 UNIT/ML
500 SYRINGE INTRAVENOUS AS NEEDED
Status: DISCONTINUED | OUTPATIENT
Start: 2020-03-14 | End: 2020-03-15 | Stop reason: HOSPADM

## 2020-03-14 RX ADMIN — CEFTRIAXONE SODIUM 2 G: 2 INJECTION, POWDER, FOR SOLUTION INTRAMUSCULAR; INTRAVENOUS at 10:37

## 2020-03-14 NOTE — PROGRESS NOTES
[facility] Outpatient Infusion Center Note: 
1000Pt arrived at [facility] ambulatory and in no distress for ***. Assessment ***, no new complaints voiced. Medications received: 
*** 
 
*** Tolerated treatment well, no adverse reaction noted. D/Cd from [facility] ambulatory and in no distress accompanied by ***.   Next appt ***

## 2020-03-14 NOTE — PROGRESS NOTES
Outpatient Infusion Center Progress Note 
 
1000 Pt admit to [facility] for Rocephin infusion ambulatory in stable condition. Assessment completed. No new concerns voiced. PIV started in right wrist after 3 attempts, good blood return throughout infusion. Iv removed at end of infusion per family request as patient is high risk for infection Visit Vitals BP (!) 140/92 Pulse 70 Temp 98.3 °F (36.8 °C) Resp 18 Medications: 
Medications Administered   
 cefTRIAXone (ROCEPHIN) 2 g in 0.9% sodium chloride (MBP/ADV) 50 mL Admin Date 
03/14/2020 Action New Bag Dose 
2 g Rate 
100 mL/hr Route IntraVENous Administered By 
Greta Kramer, RN  
  
  
  
 
 
1130 Pt tolerated treatment well. D/c home ambulatory in no distress. Pt aware of next appointment scheduled for 3-.

## 2020-03-14 NOTE — PROGRESS NOTES
[facility] Visit Note 26 Pt arrived at [facility] via wc and in no distress for IV Abx. Assessment completed, no new complaints voiced. IV established in right hand. Pt family reports MD requested not to leave IV in due to increase risk of infection. Visit Vitals BP (!) 137/93 Pulse 78 Temp 98.6 °F (37 °C) Resp 18 Medications received: 
Medications Administered   
 cefTRIAXone (ROCEPHIN) 2 g in 0.9% sodium chloride (MBP/ADV) 50 mL Admin Date 
03/13/2020 Action New Bag Dose 
2 g Rate 
100 mL/hr Route IntraVENous Administered By 
Tamara Moe, RN  
  
  
  
 
 
3297 Care assumed from BERKLEY Rhodes RN Tolerated treatment well, no adverse reaction noted. IV d/c'd. D/Cd from [facility] via wc and in no distress accompanied by family. Next appt 3/14 @ [facility].

## 2020-03-15 ENCOUNTER — HOSPITAL ENCOUNTER (OUTPATIENT)
Dept: INFUSION THERAPY | Age: 80
Discharge: HOME | End: 2020-03-15
Payer: MEDICARE

## 2020-03-15 VITALS
OXYGEN SATURATION: 97 % | SYSTOLIC BLOOD PRESSURE: 140 MMHG | TEMPERATURE: 98.2 F | HEART RATE: 74 BPM | DIASTOLIC BLOOD PRESSURE: 92 MMHG | RESPIRATION RATE: 18 BRPM

## 2020-03-15 NOTE — PROGRESS NOTES
3/15 @ 1000 am: Patient did not receive Rocephin today due to unable to establish PIV. This RN attempted 4 times and Judi RN attempted 3, we stopped at 2 times each but patient and family requested that we try again. Patient has very limited access options, instructed patient's daughter to call [place] first thing tomorrow morning to see if patient can come sooner and discuss with MD alternative options. 3/15 @ 1130am: spoke to charge nurse Bharathi Benavides in Adult ED at [facility], they advised to not send patient to ED due to the tent up for coronavirus and patient's immunocompromised status. 3/15 @ 1130am: patient did not have any future appointments, no PSR here, I was able to add patient onto [place] at 330pm (patient's daughter stated she was told yesterday her appt on Monday 3/16 was at 4pm at [facility]). Also instructed patient's daughter to call Dr. Damian Tijerina first thing in the morning to see if a PICC line was an option. Phone number to Dr. Damian Tijerina and Tavares Castellanos provided to patient's daughter.

## 2020-03-16 ENCOUNTER — HOSPITAL ENCOUNTER (OUTPATIENT)
Dept: INFUSION THERAPY | Age: 80
Discharge: HOME | End: 2020-03-16
Payer: MEDICARE

## 2020-03-16 VITALS
HEART RATE: 68 BPM | RESPIRATION RATE: 18 BRPM | TEMPERATURE: 97.8 F | SYSTOLIC BLOOD PRESSURE: 165 MMHG | OXYGEN SATURATION: 98 % | DIASTOLIC BLOOD PRESSURE: 94 MMHG

## 2020-03-16 PROCEDURE — 74011000258 HC RX REV CODE- 258: Performed by: INTERNAL MEDICINE

## 2020-03-16 PROCEDURE — 96365 THER/PROPH/DIAG IV INF INIT: CPT

## 2020-03-16 PROCEDURE — 74011250636 HC RX REV CODE- 250/636: Performed by: INTERNAL MEDICINE

## 2020-03-16 RX ADMIN — CEFTRIAXONE SODIUM 2 G: 2 INJECTION, POWDER, FOR SOLUTION INTRAMUSCULAR; INTRAVENOUS at 16:05

## 2020-03-16 NOTE — PROGRESS NOTES
8000 [facility] Visit Note 1555 Pt arrived at [facility] ambulatory and in no distress for IV Abx. Assessment completed, no new complaints voiced. IV established in left hand. Visit Vitals BP (!) 165/94 Pulse 68 Temp 97.8 °F (36.6 °C) Resp 18 SpO2 98% Medications received: 
Medications Administered   
 cefTRIAXone (ROCEPHIN) 2 g in 0.9% sodium chloride (MBP/ADV) 50 mL Admin Date 
03/16/2020 Action New Bag Dose 
2 g Rate 
100 mL/hr Route IntraVENous Administered By 
Baldev Bender, RN  
  
  
  
 
 
5812 Tolerated treatment well, no adverse reaction noted. IV d/c'd. D/Cd from [facility] ambulatory and in no distress accompanied by family. Next appt 3/17

## 2020-03-17 ENCOUNTER — HOSPITAL ENCOUNTER (OUTPATIENT)
Dept: INFUSION THERAPY | Age: 80
Discharge: HOME | End: 2020-03-17
Payer: MEDICARE

## 2020-03-17 VITALS
RESPIRATION RATE: 18 BRPM | OXYGEN SATURATION: 97 % | HEART RATE: 73 BPM | SYSTOLIC BLOOD PRESSURE: 176 MMHG | TEMPERATURE: 98.1 F | DIASTOLIC BLOOD PRESSURE: 106 MMHG

## 2020-03-17 PROCEDURE — 74011000258 HC RX REV CODE- 258

## 2020-03-17 PROCEDURE — 96365 THER/PROPH/DIAG IV INF INIT: CPT

## 2020-03-17 PROCEDURE — 74011250636 HC RX REV CODE- 250/636

## 2020-03-17 RX ADMIN — CEFTRIAXONE SODIUM 2 G: 2 INJECTION, POWDER, FOR SOLUTION INTRAMUSCULAR; INTRAVENOUS at 16:24

## 2020-03-17 NOTE — CDMP QUERY
Patient admitted with Sepsis. Documentation reflects E. Coli bacteremia w/sepsis s/p removal of port-a cath by nephrology documented on PN 3/12/20 . If possible, please document in the progress notes and discharge summary if Port a cath infection was: 
 
? Port-a Cath infection treated and resolved ? Port-a Cath infection confirmed after study ? Port-a Cath infection ruled out after study ? Other, please specify The medical record reflects the following: 
  Risk Factors: 79 y/o female Hx Ca of esophagus, HTN, CKD ,  
  Clinical Indicators: 3/17/20 ICU PN \"Patient is immunocompromised with underlying cancer/chemotherapy and has hypotension, blood cultures grew gram-negative rods 4 out of 4 bottles\" 3/5/20 ID PN \"GN bacteremia -- source not identified with concerns including Portacath infection vs occult abdominal source vs ???\". 
3/6/20 ID PN \"1. GN bacteremia -- source not idetified: I cannot exclude the possibility of infection of a liver or kidney cyst. However, with positive blood cultures on 3/4 & 3/6, would increase concern for Portacath infection. I would suggest removal of the Portacath\". Treatment: transfer to higher level of care CCU, intensivist consult, Nephrology consult, Oncology consult, ID consult, IV ABX Meropenem, fluid resuscitation NS at 100ml/hr, scheduled removal of port-a cath. 3/8/20 IR PN IMPRESSION: Successful removal of right internal jugular Port-A-Cath. Cheryle J Rilee RN,BSN Clinical Documentation Integrity [phone]

## 2020-03-18 ENCOUNTER — HOSPITAL ENCOUNTER (OUTPATIENT)
Dept: INFUSION THERAPY | Age: 80
Discharge: HOME | End: 2020-03-18
Payer: MEDICARE

## 2020-03-18 LAB
ALBUMIN SERPL-MCNC: 2.9 G/DL (ref 3.5–5)
ALBUMIN/GLOB SERPL: 0.8 {RATIO} (ref 1.1–2.2)
ALP SERPL-CCNC: 109 U/L (ref 45–117)
ALT SERPL-CCNC: 20 U/L (ref 12–78)
ANION GAP SERPL CALC-SCNC: 3 MMOL/L (ref 5–15)
AST SERPL-CCNC: 20 U/L (ref 15–37)
BASOPHILS # BLD: 0 K/UL (ref 0–0.1)
BASOPHILS NFR BLD: 0 % (ref 0–1)
BILIRUB SERPL-MCNC: 0.5 MG/DL (ref 0.2–1)
BUN SERPL-MCNC: 24 MG/DL (ref 6–20)
BUN/CREAT SERPL: 13 (ref 12–20)
CALCIUM SERPL-MCNC: 8.3 MG/DL (ref 8.5–10.1)
CHLORIDE SERPL-SCNC: 108 MMOL/L (ref 97–108)
CO2 SERPL-SCNC: 31 MMOL/L (ref 21–32)
CREAT SERPL-MCNC: 1.89 MG/DL (ref 0.55–1.02)
DIFFERENTIAL METHOD BLD: ABNORMAL
EOSINOPHIL # BLD: 0.1 K/UL (ref 0–0.4)
EOSINOPHIL NFR BLD: 1 % (ref 0–7)
ERYTHROCYTE [DISTWIDTH] IN BLOOD BY AUTOMATED COUNT: 21.2 % (ref 11.5–14.5)
GLOBULIN SER CALC-MCNC: 3.6 G/DL (ref 2–4)
GLUCOSE SERPL-MCNC: 98 MG/DL (ref 65–100)
HCT VFR BLD AUTO: 26.2 % (ref 35–47)
HGB BLD-MCNC: 8.2 G/DL (ref 11.5–16)
IMM GRANULOCYTES # BLD AUTO: 0 K/UL (ref 0–0.04)
IMM GRANULOCYTES NFR BLD AUTO: 0 % (ref 0–0.5)
LYMPHOCYTES # BLD: 0.5 K/UL (ref 0.8–3.5)
LYMPHOCYTES NFR BLD: 11 % (ref 12–49)
MCH RBC QN AUTO: 31.8 PG (ref 26–34)
MCHC RBC AUTO-ENTMCNC: 31.3 G/DL (ref 30–36.5)
MCV RBC AUTO: 101.6 FL (ref 80–99)
MONOCYTES # BLD: 0.5 K/UL (ref 0–1)
MONOCYTES NFR BLD: 11 % (ref 5–13)
NEUTS SEG # BLD: 3.8 K/UL (ref 1.8–8)
NEUTS SEG NFR BLD: 77 % (ref 32–75)
NRBC # BLD: 0 K/UL (ref 0–0.01)
NRBC BLD-RTO: 0 PER 100 WBC
PLATELET # BLD AUTO: 114 K/UL (ref 150–400)
PMV BLD AUTO: 10.8 FL (ref 8.9–12.9)
POTASSIUM SERPL-SCNC: 3.5 MMOL/L (ref 3.5–5.1)
PROT SERPL-MCNC: 6.5 G/DL (ref 6.4–8.2)
RBC # BLD AUTO: 2.58 M/UL (ref 3.8–5.2)
RBC MORPH BLD: ABNORMAL
RBC MORPH BLD: ABNORMAL
SODIUM SERPL-SCNC: 142 MMOL/L (ref 136–145)
WBC # BLD AUTO: 4.9 K/UL (ref 3.6–11)

## 2020-03-18 PROCEDURE — 74011000258 HC RX REV CODE- 258: Performed by: INTERNAL MEDICINE

## 2020-03-18 PROCEDURE — 80053 COMPREHEN METABOLIC PANEL: CPT

## 2020-03-18 PROCEDURE — 96365 THER/PROPH/DIAG IV INF INIT: CPT

## 2020-03-18 PROCEDURE — 85025 COMPLETE CBC W/AUTO DIFF WBC: CPT

## 2020-03-18 PROCEDURE — 36415 COLL VENOUS BLD VENIPUNCTURE: CPT

## 2020-03-18 PROCEDURE — 74011250636 HC RX REV CODE- 250/636: Performed by: INTERNAL MEDICINE

## 2020-03-18 RX ORDER — SODIUM CHLORIDE 0.9 % (FLUSH) 0.9 %
5-10 SYRINGE (ML) INJECTION AS NEEDED
Status: DISCONTINUED | OUTPATIENT
Start: 2020-03-18 | End: 2020-03-19 | Stop reason: HOSPADM

## 2020-03-18 RX ORDER — SODIUM CHLORIDE 9 MG/ML
25 INJECTION, SOLUTION INTRAVENOUS AS NEEDED
Status: DISCONTINUED | OUTPATIENT
Start: 2020-03-18 | End: 2020-03-19 | Stop reason: HOSPADM

## 2020-03-18 RX ADMIN — CEFTRIAXONE SODIUM 2 G: 2 INJECTION, POWDER, FOR SOLUTION INTRAMUSCULAR; INTRAVENOUS at 16:24

## 2020-03-18 NOTE — PROGRESS NOTES
8000 [facility] Visit Note 1610 Pt arrived at [facility] via wc and in no distress for IV Abx. Assessment completed, no new complaints voiced. IV established in right hand. Unable to pull labs with IV start. Labs obtained on third attempt. Recent Results (from the past 12 hour(s)) CBC WITH AUTOMATED DIFF Collection Time: 03/18/20  4:32 PM  
Result Value Ref Range WBC 4.9 3.6 - 11.0 K/uL  
 RBC 2.58 (L) 3.80 - 5.20 M/uL HGB 8.2 (L) 11.5 - 16.0 g/dL HCT 26.2 (L) 35.0 - 47.0 % .6 (H) 80.0 - 99.0 FL  
 MCH 31.8 26.0 - 34.0 PG  
 MCHC 31.3 30.0 - 36.5 g/dL RDW 21.2 (H) 11.5 - 14.5 % PLATELET 484 (L) 015 - 400 K/uL MPV 10.8 8.9 - 12.9 FL  
 NRBC 0.0 0  WBC ABSOLUTE NRBC 0.00 0.00 - 0.01 K/uL NEUTROPHILS 77 (H) 32 - 75 % LYMPHOCYTES 11 (L) 12 - 49 % MONOCYTES 11 5 - 13 % EOSINOPHILS 1 0 - 7 % BASOPHILS 0 0 - 1 % IMMATURE GRANULOCYTES 0 0.0 - 0.5 % ABS. NEUTROPHILS 3.8 1.8 - 8.0 K/UL  
 ABS. LYMPHOCYTES 0.5 (L) 0.8 - 3.5 K/UL  
 ABS. MONOCYTES 0.5 0.0 - 1.0 K/UL  
 ABS. EOSINOPHILS 0.1 0.0 - 0.4 K/UL  
 ABS. BASOPHILS 0.0 0.0 - 0.1 K/UL  
 ABS. IMM. GRANS. 0.0 0.00 - 0.04 K/UL  
 DF AUTOMATED    
 RBC COMMENTS MACROCYTOSIS 1+ 
    
 RBC COMMENTS ANISOCYTOSIS 
2+ METABOLIC PANEL, COMPREHENSIVE Collection Time: 03/18/20  4:32 PM  
Result Value Ref Range Sodium 142 136 - 145 mmol/L Potassium 3.5 3.5 - 5.1 mmol/L Chloride 108 97 - 108 mmol/L  
 CO2 31 21 - 32 mmol/L Anion gap 3 (L) 5 - 15 mmol/L Glucose 98 65 - 100 mg/dL BUN 24 (H) 6 - 20 MG/DL Creatinine 1.89 (H) 0.55 - 1.02 MG/DL  
 BUN/Creatinine ratio 13 12 - 20 GFR est AA 31 (L) >60 ml/min/1.73m2 GFR est non-AA 26 (L) >60 ml/min/1.73m2 Calcium 8.3 (L) 8.5 - 10.1 MG/DL Bilirubin, total 0.5 0.2 - 1.0 MG/DL  
 ALT (SGPT) 20 12 - 78 U/L  
 AST (SGOT) 20 15 - 37 U/L Alk. phosphatase 109 45 - 117 U/L Protein, total 6.5 6.4 - 8.2 g/dL Albumin 2.9 (L) 3.5 - 5.0 g/dL Globulin 3.6 2.0 - 4.0 g/dL A-G Ratio 0.8 (L) 1.1 - 2.2 Medications received: 
Medications Administered   
 cefTRIAXone (ROCEPHIN) 2 g in 0.9% sodium chloride (MBP/ADV) 50 mL Admin Date 
03/18/2020 Action New Bag Dose 
2 g Rate 
100 mL/hr Route IntraVENous Administered By 
Rajinder Jon RN  
  
  
  
 
 
5364 Tolerated treatment well, no adverse reaction noted. D/Cd from [facility] via wc and in no distress accompanied by family. Next appt 3/19

## 2020-03-19 ENCOUNTER — HOSPITAL ENCOUNTER (OUTPATIENT)
Dept: INFUSION THERAPY | Age: 80
Discharge: HOME | End: 2020-03-19
Payer: MEDICARE

## 2020-03-19 VITALS
HEART RATE: 82 BPM | SYSTOLIC BLOOD PRESSURE: 176 MMHG | DIASTOLIC BLOOD PRESSURE: 107 MMHG | OXYGEN SATURATION: 95 % | RESPIRATION RATE: 18 BRPM | TEMPERATURE: 98.9 F

## 2020-03-19 PROCEDURE — 74011250636 HC RX REV CODE- 250/636: Performed by: INTERNAL MEDICINE

## 2020-03-19 PROCEDURE — 96365 THER/PROPH/DIAG IV INF INIT: CPT

## 2020-03-19 PROCEDURE — 74011000258 HC RX REV CODE- 258: Performed by: INTERNAL MEDICINE

## 2020-03-19 RX ORDER — SODIUM CHLORIDE 0.9 % (FLUSH) 0.9 %
10-40 SYRINGE (ML) INJECTION AS NEEDED
Status: DISCONTINUED | OUTPATIENT
Start: 2020-03-19 | End: 2020-03-20 | Stop reason: HOSPADM

## 2020-03-19 RX ADMIN — CEFTRIAXONE SODIUM 2 G: 2 INJECTION, POWDER, FOR SOLUTION INTRAMUSCULAR; INTRAVENOUS at 16:16

## 2020-03-19 RX ADMIN — Medication 10 ML: at 16:46

## 2020-03-19 NOTE — PROGRESS NOTES
Pt arrived to [facility] in wheelchair in no acute distress at 1600 for antibiotics.  Assessment unremarkable except. IV established in L wrist without issue and positive blood return noted. Visit Vitals BP (!) 176/107 (BP 1 Location: Left arm, BP Patient Position: Sitting) Pulse 82 Temp 98.9 °F (37.2 °C) Resp 18 SpO2 95% The following medications administered: 
Rocephin 2g IV over 30 minutes Pt tolerated treatment well.  IV flushed per policy and removed, 2x2 and coban placed.  Pt discharged in wheelchair in no acute distress at 1650, accompanied by family. Next appointment 3/20/20 at 1600.

## 2020-03-20 ENCOUNTER — HOSPITAL ENCOUNTER (OUTPATIENT)
Dept: INFUSION THERAPY | Age: 80
Discharge: HOME | End: 2020-03-20
Payer: MEDICARE

## 2020-03-20 VITALS
HEART RATE: 75 BPM | TEMPERATURE: 98 F | RESPIRATION RATE: 16 BRPM | DIASTOLIC BLOOD PRESSURE: 91 MMHG | SYSTOLIC BLOOD PRESSURE: 144 MMHG | OXYGEN SATURATION: 97 %

## 2020-03-20 PROCEDURE — 74011000258 HC RX REV CODE- 258: Performed by: INTERNAL MEDICINE

## 2020-03-20 PROCEDURE — 96365 THER/PROPH/DIAG IV INF INIT: CPT

## 2020-03-20 PROCEDURE — 74011250636 HC RX REV CODE- 250/636: Performed by: INTERNAL MEDICINE

## 2020-03-20 RX ORDER — SODIUM CHLORIDE 0.9 % (FLUSH) 0.9 %
10 SYRINGE (ML) INJECTION AS NEEDED
Status: DISCONTINUED | OUTPATIENT
Start: 2020-03-20 | End: 2020-03-21 | Stop reason: HOSPADM

## 2020-03-20 RX ADMIN — CEFTRIAXONE SODIUM 2 G: 2 INJECTION, POWDER, FOR SOLUTION INTRAMUSCULAR; INTRAVENOUS at 16:05

## 2020-03-20 RX ADMIN — Medication 10 ML: at 16:35

## 2020-03-20 NOTE — PROGRESS NOTES
111 [facility] VISIT NOTE 
 
1600 Pt arrived at [facility] ambulatory and in no distress for IV Rocephin. Patient denies any complaints today. She re-started her BP meds yesterday. Blood pressure (!) 162/106, pulse 79, temperature 98 °F (36.7 °C), resp. rate 16, SpO2 97 %. PIV placed in left [facility] without difficulty. Medications received: 
Rocephin IV Tolerated treatment well, no adverse reaction noted. PIV flushed and removed. 1640 D/C'd from [facility] ambulatory and in no distress accompanied by her daughter.  Next appointment is 3/21/20 at 8am.

## 2020-03-21 ENCOUNTER — HOSPITAL ENCOUNTER (OUTPATIENT)
Dept: INFUSION THERAPY | Age: 80
Discharge: HOME | End: 2020-03-21
Payer: MEDICARE

## 2020-03-21 VITALS
RESPIRATION RATE: 18 BRPM | HEART RATE: 69 BPM | DIASTOLIC BLOOD PRESSURE: 93 MMHG | SYSTOLIC BLOOD PRESSURE: 137 MMHG | TEMPERATURE: 97.8 F

## 2020-03-21 PROCEDURE — 74011000258 HC RX REV CODE- 258: Performed by: INTERNAL MEDICINE

## 2020-03-21 PROCEDURE — 74011250636 HC RX REV CODE- 250/636: Performed by: INTERNAL MEDICINE

## 2020-03-21 PROCEDURE — 96365 THER/PROPH/DIAG IV INF INIT: CPT

## 2020-03-21 RX ADMIN — CEFTRIAXONE SODIUM 2 G: 2 INJECTION, POWDER, FOR SOLUTION INTRAMUSCULAR; INTRAVENOUS at 08:30

## 2020-03-21 NOTE — PROGRESS NOTES
Outpatient Infusion Center Progress Note 0810 Pt admit to [facility] for daily antibiotics in a wheelchair accompanied by her daughter in stable condition. Assessment completed. No new concerns voiced. Peripheral IV established in the RIGHT hand with positive blood return. Medications initiated. Visit Vitals BP (!) 142/108 (BP 1 Location: Right arm, BP Patient Position: Sitting) Comment: patient reports she was taken off all BP medications. Pulse (!) 125 Temp 97.8 °F (36.6 °C) Resp 18 Breastfeeding No  
 
 
Medications Administered   
 cefTRIAXone (ROCEPHIN) 2 g in 0.9% sodium chloride (MBP/ADV) 50 mL Admin Date 
03/21/2020 Action New Bag Dose 
2 g Rate 
100 mL/hr Route IntraVENous Administered By 
Vinita Aden 5421 Pt tolerated treatment well. Peripheral IV maintained positive blood return throughout the course of treatment and was removed at the conclusion of therapy. D/c home ambulatory in no distress. Pt aware of next appointment scheduled for 03/22/20.  
 
Su Ocampo RN

## 2020-03-22 ENCOUNTER — HOSPITAL ENCOUNTER (OUTPATIENT)
Dept: INFUSION THERAPY | Age: 80
Discharge: HOME | End: 2020-03-22
Payer: MEDICARE

## 2020-03-22 VITALS
HEART RATE: 75 BPM | SYSTOLIC BLOOD PRESSURE: 132 MMHG | RESPIRATION RATE: 18 BRPM | TEMPERATURE: 97.9 F | DIASTOLIC BLOOD PRESSURE: 88 MMHG

## 2020-03-22 PROCEDURE — 74011250636 HC RX REV CODE- 250/636: Performed by: INTERNAL MEDICINE

## 2020-03-22 PROCEDURE — 96365 THER/PROPH/DIAG IV INF INIT: CPT

## 2020-03-22 PROCEDURE — 74011000258 HC RX REV CODE- 258: Performed by: INTERNAL MEDICINE

## 2020-03-22 RX ORDER — SODIUM CHLORIDE 0.9 % (FLUSH) 0.9 %
5-10 SYRINGE (ML) INJECTION AS NEEDED
Status: DISCONTINUED | OUTPATIENT
Start: 2020-03-22 | End: 2020-03-23 | Stop reason: HOSPADM

## 2020-03-22 RX ORDER — HEPARIN 100 UNIT/ML
500 SYRINGE INTRAVENOUS AS NEEDED
Status: DISCONTINUED | OUTPATIENT
Start: 2020-03-22 | End: 2020-03-23 | Stop reason: HOSPADM

## 2020-03-22 RX ADMIN — CEFTRIAXONE SODIUM 2 G: 2 INJECTION, POWDER, FOR SOLUTION INTRAMUSCULAR; INTRAVENOUS at 11:13

## 2020-03-22 NOTE — PROGRESS NOTES
Outpatient Infusion Center Progress Note 1100 Pt admit to [facility] for daily antibiotics in a wheelchair accompanied by her daughter in stable condition. Assessment completed. No new concerns voiced. Patient stuck peripherally for IV access, which was obtained. Positive blood return. Medications administered as ordered. Patient tolerated well. IV removed at the conclusion of therapy. Patient and daughter left in stable condition. They are aware of the next appointment in [facility] tomorrow. Visit Vitals /88 Pulse 75 Temp 97.9 °F (36.6 °C) Resp 18 Breastfeeding No  
 
Medications Administered   
 cefTRIAXone (ROCEPHIN) 2 g in 0.9% sodium chloride (MBP/ADV) 50 mL Admin Date 
03/22/2020 Action New Bag Dose 
2 g Rate 
100 mL/hr Route IntraVENous Administered By Kole Arroyo, GEOR[place] Youssef RN

## 2020-03-23 ENCOUNTER — HOSPITAL ENCOUNTER (OUTPATIENT)
Dept: INFUSION THERAPY | Age: 80
Discharge: HOME | End: 2020-03-23
Payer: MEDICARE

## 2020-03-23 VITALS
HEART RATE: 80 BPM | SYSTOLIC BLOOD PRESSURE: 156 MMHG | OXYGEN SATURATION: 94 % | RESPIRATION RATE: 16 BRPM | DIASTOLIC BLOOD PRESSURE: 101 MMHG | TEMPERATURE: 98.2 F

## 2020-03-23 PROCEDURE — 96365 THER/PROPH/DIAG IV INF INIT: CPT

## 2020-03-23 PROCEDURE — 74011000258 HC RX REV CODE- 258: Performed by: INTERNAL MEDICINE

## 2020-03-23 PROCEDURE — 74011250636 HC RX REV CODE- 250/636: Performed by: INTERNAL MEDICINE

## 2020-03-23 RX ORDER — SODIUM CHLORIDE 0.9 % (FLUSH) 0.9 %
10-40 SYRINGE (ML) INJECTION AS NEEDED
Status: DISCONTINUED | OUTPATIENT
Start: 2020-03-23 | End: 2020-03-24 | Stop reason: HOSPADM

## 2020-03-23 RX ADMIN — Medication 10 ML: at 16:25

## 2020-03-23 RX ADMIN — CEFTRIAXONE SODIUM 2 G: 2 INJECTION, POWDER, FOR SOLUTION INTRAMUSCULAR; INTRAVENOUS at 16:30

## 2020-03-23 RX ADMIN — Medication 20 ML: at 16:59

## 2020-03-23 NOTE — PROGRESS NOTES
Pt arrived to [facility] in wheelchair for Rocephin in no acute distress at 1615.  Assessment unremarkable except generalized weakness and elevated BP. #24g PIV established in left AC site by Mara Garrett RN without issue and positive blood return noted. Patient Vitals for the past 12 hrs: 
 Temp Pulse Resp BP SpO2  
03/23/20 1703    (!) 156/101   
03/23/20 1616 98.2 °F (36.8 °C) 80 16 (!) 159/100 94 % The following medications administered: 
Rocephin 2 grams IV over 30 minutes Pt tolerated treatment well.  No adverse reaction noted. IV flushed per policy and removed, 2x2 and coban placed.  Pt discharged in wheelchair in no acute distress at 1705, accompanied by family member. Treatments completed, no further appointments scheduled at this time.

## 2020-05-04 ENCOUNTER — HOSPITAL ENCOUNTER (OUTPATIENT)
Dept: CT IMAGING | Age: 80
End: 2020-05-04
Attending: INTERNAL MEDICINE
Payer: MEDICARE

## 2020-05-04 ENCOUNTER — HOSPITAL ENCOUNTER (OUTPATIENT)
Dept: CT IMAGING | Age: 80
Discharge: HOME | End: 2020-05-04
Attending: INTERNAL MEDICINE
Payer: MEDICARE

## 2020-05-04 DIAGNOSIS — Z51.11 ENCOUNTER FOR ANTINEOPLASTIC CHEMOTHERAPY: ICD-10-CM

## 2020-05-04 DIAGNOSIS — N18.9 ANEMIA IN CKD (CHRONIC KIDNEY DISEASE): ICD-10-CM

## 2020-05-04 DIAGNOSIS — D64.9 ANEMIA, UNSPECIFIED: ICD-10-CM

## 2020-05-04 DIAGNOSIS — R53.83 OTHER FATIGUE: ICD-10-CM

## 2020-05-04 DIAGNOSIS — D63.1 ANEMIA IN CKD (CHRONIC KIDNEY DISEASE): ICD-10-CM

## 2020-05-04 DIAGNOSIS — C15.3 MALIGNANT NEOPLASM OF UPPER THIRD OF ESOPHAGUS (HCC): ICD-10-CM

## 2020-05-04 DIAGNOSIS — R63.0 ANOREXIA: ICD-10-CM

## 2020-05-04 DIAGNOSIS — N18.30 CKD (CHRONIC KIDNEY DISEASE), STAGE III (HCC): ICD-10-CM

## 2020-05-04 PROCEDURE — 74011000255 HC RX REV CODE- 255: Performed by: INTERNAL MEDICINE

## 2020-05-04 PROCEDURE — 74176 CT ABD & PELVIS W/O CONTRAST: CPT

## 2020-05-04 PROCEDURE — 71250 CT THORAX DX C-: CPT

## 2020-05-04 RX ORDER — BARIUM SULFATE 20 MG/ML
900 SUSPENSION ORAL
Status: COMPLETED | OUTPATIENT
Start: 2020-05-04 | End: 2020-05-04

## 2020-05-04 RX ADMIN — BARIUM SULFATE 900 ML: 21 SUSPENSION ORAL at 09:37

## 2020-05-19 ENCOUNTER — HOSPITAL ENCOUNTER (OUTPATIENT)
Dept: PET IMAGING | Age: 80
Discharge: HOME | End: 2020-05-19
Attending: INTERNAL MEDICINE
Payer: MEDICARE

## 2020-05-19 VITALS — BODY MASS INDEX: 33.33 KG/M2 | WEIGHT: 225 LBS | HEIGHT: 69 IN

## 2020-05-19 DIAGNOSIS — C15.3 MALIGNANT NEOPLASM OF CERVICAL ESOPHAGUS (HCC): ICD-10-CM

## 2020-05-19 DIAGNOSIS — D63.1 ANEMIA OF CHRONIC RENAL FAILURE: ICD-10-CM

## 2020-05-19 DIAGNOSIS — N18.9 ANEMIA OF CHRONIC RENAL FAILURE: ICD-10-CM

## 2020-05-19 PROCEDURE — A9552 F18 FDG: HCPCS

## 2020-05-19 RX ORDER — SODIUM CHLORIDE 0.9 % (FLUSH) 0.9 %
10 SYRINGE (ML) INJECTION
Status: COMPLETED | OUTPATIENT
Start: 2020-05-19 | End: 2020-05-19

## 2020-05-19 RX ADMIN — Medication 10 ML: at 08:50

## 2020-06-01 ENCOUNTER — HOSPITAL ENCOUNTER (INPATIENT)
Age: 80
LOS: 10 days | Discharge: HOME HEALTH | DRG: 682 | End: 2020-06-11
Attending: INTERNAL MEDICINE | Admitting: INTERNAL MEDICINE
Payer: MEDICARE

## 2020-06-01 DIAGNOSIS — A09 DIARRHEA OF INFECTIOUS ORIGIN: Primary | ICD-10-CM

## 2020-06-01 PROBLEM — N18.9 ACUTE ON CHRONIC RENAL FAILURE (HCC): Status: ACTIVE | Noted: 2020-06-01

## 2020-06-01 PROBLEM — N17.9 ACUTE ON CHRONIC RENAL FAILURE (HCC): Status: ACTIVE | Noted: 2020-06-01

## 2020-06-01 PROCEDURE — 65270000029 HC RM PRIVATE

## 2020-06-01 PROCEDURE — 74011000250 HC RX REV CODE- 250: Performed by: INTERNAL MEDICINE

## 2020-06-01 PROCEDURE — 74011250637 HC RX REV CODE- 250/637: Performed by: INTERNAL MEDICINE

## 2020-06-01 RX ORDER — DIPHENOXYLATE HYDROCHLORIDE AND ATROPINE SULFATE 2.5; .025 MG/1; MG/1
1 TABLET ORAL
Status: DISCONTINUED | OUTPATIENT
Start: 2020-06-01 | End: 2020-06-03

## 2020-06-01 RX ORDER — LEVOTHYROXINE SODIUM 100 UG/1
100 TABLET ORAL
Status: DISCONTINUED | OUTPATIENT
Start: 2020-06-02 | End: 2020-06-11 | Stop reason: HOSPADM

## 2020-06-01 RX ORDER — BRIMONIDINE TARTRATE 2 MG/ML
1 SOLUTION/ DROPS OPHTHALMIC 3 TIMES DAILY
Status: DISCONTINUED | OUTPATIENT
Start: 2020-06-01 | End: 2020-06-11 | Stop reason: HOSPADM

## 2020-06-01 RX ORDER — LATANOPROST 50 UG/ML
1 SOLUTION/ DROPS OPHTHALMIC
Status: DISCONTINUED | OUTPATIENT
Start: 2020-06-01 | End: 2020-06-11 | Stop reason: HOSPADM

## 2020-06-01 RX ORDER — LORAZEPAM 2 MG/ML
1 INJECTION INTRAMUSCULAR
Status: DISCONTINUED | OUTPATIENT
Start: 2020-06-01 | End: 2020-06-11 | Stop reason: HOSPADM

## 2020-06-01 RX ORDER — ACETAMINOPHEN 325 MG/1
650 TABLET ORAL
Status: DISCONTINUED | OUTPATIENT
Start: 2020-06-01 | End: 2020-06-11 | Stop reason: HOSPADM

## 2020-06-01 RX ORDER — ONDANSETRON 2 MG/ML
4 INJECTION INTRAMUSCULAR; INTRAVENOUS
Status: DISCONTINUED | OUTPATIENT
Start: 2020-06-01 | End: 2020-06-11 | Stop reason: HOSPADM

## 2020-06-01 RX ORDER — LANOLIN ALCOHOL/MO/W.PET/CERES
325 CREAM (GRAM) TOPICAL 2 TIMES DAILY WITH MEALS
Status: DISCONTINUED | OUTPATIENT
Start: 2020-06-01 | End: 2020-06-11 | Stop reason: HOSPADM

## 2020-06-01 RX ORDER — FAMOTIDINE 20 MG/1
20 TABLET, FILM COATED ORAL 2 TIMES DAILY
Status: DISCONTINUED | OUTPATIENT
Start: 2020-06-01 | End: 2020-06-02

## 2020-06-01 RX ORDER — POTASSIUM CHLORIDE 750 MG/1
20 TABLET, FILM COATED, EXTENDED RELEASE ORAL 2 TIMES DAILY
Status: DISCONTINUED | OUTPATIENT
Start: 2020-06-02 | End: 2020-06-02

## 2020-06-01 RX ORDER — SODIUM CHLORIDE AND POTASSIUM CHLORIDE .9; .15 G/100ML; G/100ML
SOLUTION INTRAVENOUS CONTINUOUS
Status: DISPENSED | OUTPATIENT
Start: 2020-06-01 | End: 2020-06-02

## 2020-06-01 RX ORDER — SODIUM CHLORIDE AND POTASSIUM CHLORIDE .9; .15 G/100ML; G/100ML
SOLUTION INTRAVENOUS CONTINUOUS
Status: DISCONTINUED | OUTPATIENT
Start: 2020-06-01 | End: 2020-06-01

## 2020-06-01 RX ORDER — SODIUM BICARBONATE 650 MG/1
650 TABLET ORAL 2 TIMES DAILY
Status: DISCONTINUED | OUTPATIENT
Start: 2020-06-01 | End: 2020-06-03

## 2020-06-01 RX ORDER — ENOXAPARIN SODIUM 100 MG/ML
30 INJECTION SUBCUTANEOUS EVERY 24 HOURS
Status: DISCONTINUED | OUTPATIENT
Start: 2020-06-01 | End: 2020-06-02

## 2020-06-01 RX ORDER — SODIUM CHLORIDE 450 MG/100ML
100 INJECTION, SOLUTION INTRAVENOUS CONTINUOUS
Status: DISCONTINUED | OUTPATIENT
Start: 2020-06-02 | End: 2020-06-02

## 2020-06-01 RX ORDER — DORZOLAMIDE HYDROCHLORIDE AND TIMOLOL MALEATE 20; 5 MG/ML; MG/ML
1 SOLUTION/ DROPS OPHTHALMIC 2 TIMES DAILY
Status: DISCONTINUED | OUTPATIENT
Start: 2020-06-01 | End: 2020-06-11 | Stop reason: HOSPADM

## 2020-06-01 RX ORDER — POTASSIUM CHLORIDE 750 MG/1
40 TABLET, FILM COATED, EXTENDED RELEASE ORAL
Status: COMPLETED | OUTPATIENT
Start: 2020-06-01 | End: 2020-06-01

## 2020-06-01 RX ORDER — POTASSIUM CHLORIDE 750 MG/1
20 TABLET, FILM COATED, EXTENDED RELEASE ORAL ONCE
Status: COMPLETED | OUTPATIENT
Start: 2020-06-01 | End: 2020-06-01

## 2020-06-01 RX ADMIN — POTASSIUM CHLORIDE 20 MEQ: 750 TABLET, FILM COATED, EXTENDED RELEASE ORAL at 23:00

## 2020-06-01 RX ADMIN — FERROUS SULFATE TAB 325 MG (65 MG ELEMENTAL FE) 325 MG: 325 (65 FE) TAB at 19:00

## 2020-06-01 RX ADMIN — POTASSIUM CHLORIDE 40 MEQ: 750 TABLET, FILM COATED, EXTENDED RELEASE ORAL at 19:01

## 2020-06-01 RX ADMIN — DORZOLAMIDE HYDROCHLORIDE AND TIMOLOL MALEATE 1 DROP: 20; 5 SOLUTION/ DROPS OPHTHALMIC at 23:06

## 2020-06-01 RX ADMIN — SODIUM BICARBONATE 650 MG: 650 TABLET ORAL at 19:00

## 2020-06-01 RX ADMIN — LATANOPROST 1 DROP: 50 SOLUTION OPHTHALMIC at 23:06

## 2020-06-01 RX ADMIN — FAMOTIDINE 20 MG: 20 TABLET, FILM COATED ORAL at 19:00

## 2020-06-01 RX ADMIN — BRIMONIDINE TARTRATE 1 DROP: 2 SOLUTION OPHTHALMIC at 23:05

## 2020-06-01 NOTE — H&P
Oncology History and Physical    Chief Complaint: Acute on chronic renal failure ([address] [N17.9, N18.9]     HPI:     Pt was on Xeloda single agent for esophageal cancer and had ablation per  Dr. Ed Carlisle 2/28/20. She had started C4 of Xeloda at max dose(1500 mg BID  14/21days)  per renal function  3/2/20 but stopped 3/4/20 when she was admitted  To [facility] for septicemia. She was discharged 3/12/20 and  [facility] until 3/23/20. Pt resumed Xeloda 1500 mg BID 3/30-4/12/20. Pt completed last 14 day cycle of Xeloda 5/24/20. She arrived to clinic 5/29/20 reporting several days of diarrhea, nausea and poor appetite. Pt was given hydration and anti-emetics in clinic on 5/29/20 and returned to clinic today for further hydration and re-evaluation.      Patient Active Problem List    Diagnosis Date Noted    Acute on chronic renal failure ([address] 06/01/2020    Septic shock ([address] 03/12/2020    E coli bacteremia 03/12/2020    SONIDO (acute kidney injury) ([address] 03/12/2020    Acute respiratory failure ([address] 03/05/2020    Adenocarcinoma of esophagus ([address] 05/30/2019    Esophageal dysphagia 05/22/2019    Abnormal x-ray of abdomen 05/22/2019    History of colon polyps 06/01/2018     Past Medical History:   Diagnosis Date    Abnormal x-ray of abdomen 5/22/2019    Bas showed food and irregular stricture above ge junction in addition to large hiatal hernia  5.20.2019    Adenocarcinoma of esophagus ([address] 5/30/2019    Anemia     Arthritis     Chronic kidney disease     Stage II followed by Dr Willard Briceño Nephrology     Diverticulosis     Esophageal dysphagia 5/22/2019    H/O colonoscopy with polypectomy     benign    History of colon polyps 6/1/2018    2013 tubular adenoma     Hypertension     Ill-defined condition     pulmonary hypertension    Other ill-defined conditions(799.89)     glaucoma and cataracts    Sleep apnea     CPAP compliant, as stated 5/20/2019    Thromboembolus ([address] 2015 Right  leg , spontaneous    Thyroid disease     hypothyroidism      Past Surgical History:   Procedure Laterality Date    ANAL PRESSURE RECORD  6/6/2019    COLONOSCOPY N/A 6/1/2018    COLONOSCOPY performed by Cullen Jacobs MD at [address] [place] [address]  6/1/2018         HX CATARACT REMOVAL Bilateral 2017    HX HEENT  1984    thyroid biopsy-benign    HX HYSTERECTOMY      IR INSERT NON TUNL CVC OVER 5 YRS  3/8/2020    IR INSERT TUNL CVC W PORT OVER 5 YEARS  7/19/2019    IR REMOVE TUNL CVAD W/O PORT / PUMP  3/8/2020    UPPER GI ENDOSCOPY,BALL DIL,30MM  5/22/2019         UPPER GI ENDOSCOPY,BALL DIL,30MM  5/30/2019         UPPER GI ENDOSCOPY,BIOPSY  5/22/2019         UPPER GI ENDOSCOPY,BIOPSY  5/30/2019         UPPER GI ENDOSCOPY,BIOPSY  10/17/2019           Current Facility-Administered Medications   Medication Dose Route Frequency    acetaminophen (TYLENOL) tablet 650 mg  650 mg Oral Q6H PRN    brimonidine (ALPHAGAN) 0.2 % ophthalmic solution 1 Drop  1 Drop Both Eyes TID    . PHARMACY TO SUBSTITUTE PER PROTOCOL (Reordered from: DORZOLAMIDE HCL/TIMOLOL MALEAT (DORZOLAMIDE-TIMOLOL OP))    Per Protocol    . PHARMACY TO SUBSTITUTE PER PROTOCOL (Reordered from: ferrous sulfate 324 mg (65 mg iron) tablet)    Per Protocol    . PHARMACY TO SUBSTITUTE PER PROTOCOL (Reordered from: folic acid-vit Q9-DFC F34 (FOLBEE) 2.5-25-1 mg tablet)    Per Protocol    latanoprost (XALATAN) 0.005 % ophthalmic solution 1 Drop  1 Drop Both Eyes QHS    [START ON 6/2/2020] levothyroxine (SYNTHROID) tablet 100 mcg  100 mcg Oral ACB    sodium bicarbonate tablet 650 mg  650 mg Oral BID    LORazepam (ATIVAN) injection 1 mg  1 mg IntraVENous Q4H PRN    0.9% sodium chloride with KCl 20 mEq/L infusion   IntraVENous CONTINUOUS    ondansetron (ZOFRAN) injection 4 mg  4 mg IntraVENous Q4H PRN    enoxaparin (LOVENOX) injection 30 mg  30 mg SubCUTAneous Q24H    [START ON 6/2/2020] potassium chloride SR (KLOR-CON 10) tablet 20 mEq  20 mEq Oral BID    potassium chloride SR (KLOR-CON 10) tablet 40 mEq  40 mEq Oral NOW    diphenoxylate-atropine (LOMOTIL) tablet 1 Tab  1 Tab Oral QID PRN    famotidine (PEPCID) tablet 20 mg  20 mg Oral BID     Current Outpatient Medications   Medication Sig    ferrous sulfate 324 mg (65 mg iron) tablet Take 324 mg by mouth two (2) times daily (with meals).  omeprazole (PRILOSEC OTC) 20 mg tablet Take 20 mg by mouth daily.  sodium bicarbonate 650 mg tablet Take 650 mg by mouth two (2) times a day.  capecitabine (XELODA) 500 mg tablet 1,500 mg two (2) times a day. X 14 days then 7 days off    folic acid-vit N4-RYZ M73 (FOLBEE) 2.5-25-1 mg tablet Take 1 Tab by mouth daily.  iron bisgly,ps-FA-B-C#12-succ 65 mg-65 mg -1,000 mcg (24) tab Take 1 Tab by mouth daily.  latanoprost (XALATAN) 0.005 % ophthalmic solution Administer 1 Drop to both eyes nightly.  MULTIVITS W-FE,OTHER MIN/LUT (CENTRUM SILVER ULTRA WOMEN'S PO) Take 1 Tab by mouth daily.  DORZOLAMIDE HCL/TIMOLOL MALEAT (DORZOLAMIDE-TIMOLOL OP) Apply 1 Drop to eye three (3) times daily. One drop to each eye twice daily     brimonidine (ALPHAGAN) 0.2 % ophthalmic solution Administer 1 Drop to both eyes three (3) times daily.  acetaminophen (TYLENOL EXTRA STRENGTH) 500 mg tablet Take 1,000 mg by mouth every six (6) hours as needed. Indications: ARTHRITIC PAIN, PAIN    levothyroxine (SYNTHROID) 100 mcg tablet Take 100 mcg by mouth Daily (before breakfast). Indications: HYPOTHYROIDISM      No Known Allergies   Social History     Tobacco Use    Smoking status: Never Smoker    Smokeless tobacco: Never Used   Substance Use Topics    Alcohol use: Not Currently     Comment: in her 19's       No family history on file. Review of Systems:   Constitutional No fevers, chills, night sweats.  Increasing weakness and wt loss over past 2 months   Allergic/Immunologic No recent allergic reactions   Eyes No significant visual difficulties. No diplopia. ENMT No problems with hearing, no sore throat, no sinus drainage. Endocrine No hot flashes or night sweats. No cold intolerance, polyuria, or polydipsia   Hematologic/Lymphatic No easy bruising or bleeding. The patient denies any tender or palpable lymph nodes   Breasts No abnormal masses of breast, nipple discharge or pain. Respiratory No dyspnea on exertion, orthopnea, chest pain, cough or hemoptysis. Cardiovascular No anginal chest pain, irregular heart beat, tachycardia, palpitations or orthopnea. Gastrointestinal Nausea starting about 1 week ago, increasingly poor appetite, diarrhea starting about 1 wek ago with end of Xeloda cycle. Pt reports some improvement in diarrhea with Lomotil over the weekend   Genitourinary (M) No hematuria, dysuria, increased frequency, urgency, hesitancy or incontinence. Musculoskeletal No joint pain, swelling or redness. No decreased range of motion. Integumentary No chronic rashes, inflammation, ulcerations, pruritus, petechiae, purpura, ecchymoses, or skin changes. Neurologic No headache, blurred vision, and no areas of focal weakness or numbness. Normal gait. No sensory problems. Psychiatric No insomnia, depression, nereyda or mood swings. No psychotropic drugs. Physical Exam:  Constitutional Alert, cooperative, oriented. Mood and affect appropriate. Appears close to chronological age. Well nourished. Well developed. Head Normocephalic; no scars   Eyes Conjunctivae and sclerae are clear and without icterus. Pupils are reactive and equal.   ENMT Sinuses are nontender. No oral exudates, ulcers, masses, thrush or mucositis. Oropharynx clear. Tongue normal.   Neck Supple without masses or thyromegaly. No jugular venous distension. Hematologic/Lymphatic No petechiae or purpura. No tender or palpable lymph nodes in the cervical, supraclavicular, axillary or inguinal area.    Respiratory Lungs are clear to auscultation without rhonchi or wheezing. Cardiovascular Regular rate and rhythm of heart without murmurs, gallops or rubs. Chest / Line Site Chest is symmetric with no chest wall deformities. Abdomen Non-tender, non-distended, no masses, ascites or hepatosplenomegaly. Good bowel sounds. No guarding or rebound tenderness. No pulsatile masses. Musculoskeletal Requires assistance with transfer to [facility]   Extremities No visible deformities, no cyanosis, clubbing. 2+ bilateral LE edema   Skin No rashes, scars, or lesions suggestive of malignancy. No petechiae, purpura, or ecchymoses. No excoriations. Neurologic No sensory or motor deficits, normal cerebellar function, normal gait, cranial nerves intact. Psychiatric Alert and oriented times three. Coherent speech. Verbalizes understanding of our discussions today. Labs:     5/29/20: Creat 3.5 (baseline 2.49)                 K+ 2.5 (pt took 20 meq KCL BID)    6/1: creat 4.3  K+ 2.4      Impression and Plan:    Acute on chronic renal failure: admit for gentle hydration and further evaluation. Renal US in AM    Hypokalemia: 40 meq KCL po now with stat BMP 4 hrs after. Attending Addendum:  I have seen and examined Ms Joao Jain and agree with Shiv COLEMAN's assessment and plan as stated above.         [address] office  [address]  Phone [phone]  Fax [phone]

## 2020-06-01 NOTE — PROGRESS NOTES
General Surgery End of Shift Nursing Note    Bedside shift change report given to Jazmin RN (oncoming nurse) by Lynn Rapp RN (offgoing nurse). Report included the following information SBAR, Procedure Summary, Intake/Output, MAR and Recent Results. Shift worked:   7a-7p   Summary of shift:    Received Pt as admission at 1807 this afternoon. Pt is resting in bed quietly. Issues for physician to address:   None     Number times ambulated in hallway past shift: 0    Number of times OOB to chair past shift: 0    Pain Management:  Current medication: None  Patient states pain is manageable on current pain medication: YES    GI:    Current diet:  DIET REGULAR 20GM Protein  DIET NUTRITIONAL SUPPLEMENTS All Meals; Ensure [place] current diet: YES  Passing flatus: YES  Last Bowel Movement: today   Appearance: unk      Patient Safety:    Falls Score: 2  Bed Alarm On? No  Sitter?  No    Tracey Louis

## 2020-06-02 ENCOUNTER — APPOINTMENT (OUTPATIENT)
Dept: GENERAL RADIOLOGY | Age: 80
DRG: 682 | End: 2020-06-02
Attending: ANESTHESIOLOGY
Payer: MEDICARE

## 2020-06-02 ENCOUNTER — APPOINTMENT (OUTPATIENT)
Dept: ULTRASOUND IMAGING | Age: 80
DRG: 682 | End: 2020-06-02
Attending: INTERNAL MEDICINE
Payer: MEDICARE

## 2020-06-02 PROBLEM — E43 SEVERE PROTEIN-CALORIE MALNUTRITION (HCC): Status: ACTIVE | Noted: 2020-06-02

## 2020-06-02 LAB
ALBUMIN SERPL-MCNC: 2.7 G/DL (ref 3.5–5)
ALBUMIN/GLOB SERPL: 0.9 {RATIO} (ref 1.1–2.2)
ALP SERPL-CCNC: 60 U/L (ref 45–117)
ALT SERPL-CCNC: 11 U/L (ref 12–78)
ANION GAP SERPL CALC-SCNC: 11 MMOL/L (ref 5–15)
ANION GAP SERPL CALC-SCNC: 9 MMOL/L (ref 5–15)
APPEARANCE UR: ABNORMAL
AST SERPL-CCNC: 13 U/L (ref 15–37)
BACTERIA URNS QL MICRO: NEGATIVE /HPF
BASOPHILS # BLD: 0 K/UL (ref 0–0.1)
BASOPHILS NFR BLD: 0 % (ref 0–1)
BILIRUB SERPL-MCNC: 0.6 MG/DL (ref 0.2–1)
BILIRUB UR QL CFM: NEGATIVE
BUN SERPL-MCNC: 101 MG/DL (ref 6–20)
BUN SERPL-MCNC: 107 MG/DL (ref 6–20)
BUN/CREAT SERPL: 24 (ref 12–20)
BUN/CREAT SERPL: 25 (ref 12–20)
CALCIUM SERPL-MCNC: 8 MG/DL (ref 8.5–10.1)
CALCIUM SERPL-MCNC: 8 MG/DL (ref 8.5–10.1)
CHLORIDE SERPL-SCNC: 106 MMOL/L (ref 97–108)
CHLORIDE SERPL-SCNC: 108 MMOL/L (ref 97–108)
CO2 SERPL-SCNC: 19 MMOL/L (ref 21–32)
CO2 SERPL-SCNC: 20 MMOL/L (ref 21–32)
COLOR UR: ABNORMAL
COMMENT, HOLDF: NORMAL
CREAT SERPL-MCNC: 4.05 MG/DL (ref 0.55–1.02)
CREAT SERPL-MCNC: 4.39 MG/DL (ref 0.55–1.02)
CREAT UR-MCNC: 97 MG/DL
DIFFERENTIAL METHOD BLD: ABNORMAL
EOSINOPHIL # BLD: 0 K/UL (ref 0–0.4)
EOSINOPHIL NFR BLD: 0 % (ref 0–7)
EPITH CASTS URNS QL MICRO: ABNORMAL /LPF
ERYTHROCYTE [DISTWIDTH] IN BLOOD BY AUTOMATED COUNT: 19 % (ref 11.5–14.5)
GLOBULIN SER CALC-MCNC: 3 G/DL (ref 2–4)
GLUCOSE SERPL-MCNC: 103 MG/DL (ref 65–100)
GLUCOSE SERPL-MCNC: 111 MG/DL (ref 65–100)
GLUCOSE UR STRIP.AUTO-MCNC: NEGATIVE MG/DL
HCT VFR BLD AUTO: 29.7 % (ref 35–47)
HGB BLD-MCNC: 10.7 G/DL (ref 11.5–16)
HGB UR QL STRIP: NEGATIVE
IMM GRANULOCYTES # BLD AUTO: 0 K/UL (ref 0–0.04)
IMM GRANULOCYTES NFR BLD AUTO: 0 % (ref 0–0.5)
KETONES UR QL STRIP.AUTO: NEGATIVE MG/DL
LEUKOCYTE ESTERASE UR QL STRIP.AUTO: NEGATIVE
LYMPHOCYTES # BLD: 0.5 K/UL (ref 0.8–3.5)
LYMPHOCYTES NFR BLD: 7 % (ref 12–49)
MAGNESIUM SERPL-MCNC: 2 MG/DL (ref 1.6–2.4)
MCH RBC QN AUTO: 34.4 PG (ref 26–34)
MCHC RBC AUTO-ENTMCNC: 36 G/DL (ref 30–36.5)
MCV RBC AUTO: 95.5 FL (ref 80–99)
MONOCYTES # BLD: 1.1 K/UL (ref 0–1)
MONOCYTES NFR BLD: 16 % (ref 5–13)
NEUTS BAND NFR BLD MANUAL: 1 %
NEUTS SEG # BLD: 5.5 K/UL (ref 1.8–8)
NEUTS SEG NFR BLD: 76 % (ref 32–75)
NITRITE UR QL STRIP.AUTO: NEGATIVE
NRBC # BLD: 0 K/UL (ref 0–0.01)
NRBC BLD-RTO: 0 PER 100 WBC
PH UR STRIP: 5.5 [PH] (ref 5–8)
PHOSPHATE SERPL-MCNC: 4.7 MG/DL (ref 2.6–4.7)
PLATELET # BLD AUTO: 189 K/UL (ref 150–400)
PMV BLD AUTO: 9.8 FL (ref 8.9–12.9)
POTASSIUM SERPL-SCNC: 2.5 MMOL/L (ref 3.5–5.1)
POTASSIUM SERPL-SCNC: 3.3 MMOL/L (ref 3.5–5.1)
PROT SERPL-MCNC: 5.7 G/DL (ref 6.4–8.2)
PROT UR STRIP-MCNC: ABNORMAL MG/DL
RBC # BLD AUTO: 3.11 M/UL (ref 3.8–5.2)
RBC #/AREA URNS HPF: ABNORMAL /HPF (ref 0–5)
RBC MORPH BLD: ABNORMAL
RBC MORPH BLD: ABNORMAL
SAMPLES BEING HELD,HOLD: NORMAL
SODIUM SERPL-SCNC: 136 MMOL/L (ref 136–145)
SODIUM SERPL-SCNC: 137 MMOL/L (ref 136–145)
SP GR UR REFRACTOMETRY: 1.01 (ref 1–1.03)
UA: UC IF INDICATED,UAUC: ABNORMAL
UROBILINOGEN UR QL STRIP.AUTO: 0.2 EU/DL (ref 0.2–1)
WBC # BLD AUTO: 7.1 K/UL (ref 3.6–11)
WBC URNS QL MICRO: ABNORMAL /HPF (ref 0–4)

## 2020-06-02 PROCEDURE — 84100 ASSAY OF PHOSPHORUS: CPT

## 2020-06-02 PROCEDURE — 76770 US EXAM ABDO BACK WALL COMP: CPT

## 2020-06-02 PROCEDURE — 71045 X-RAY EXAM CHEST 1 VIEW: CPT

## 2020-06-02 PROCEDURE — 74011250636 HC RX REV CODE- 250/636: Performed by: INTERNAL MEDICINE

## 2020-06-02 PROCEDURE — 36415 COLL VENOUS BLD VENIPUNCTURE: CPT

## 2020-06-02 PROCEDURE — 74011250637 HC RX REV CODE- 250/637: Performed by: INTERNAL MEDICINE

## 2020-06-02 PROCEDURE — 83735 ASSAY OF MAGNESIUM: CPT

## 2020-06-02 PROCEDURE — 02HV33Z INSERTION OF INFUSION DEVICE INTO SUPERIOR VENA CAVA, PERCUTANEOUS APPROACH: ICD-10-PCS | Performed by: ANESTHESIOLOGY

## 2020-06-02 PROCEDURE — 74011250637 HC RX REV CODE- 250/637: Performed by: HOSPITALIST

## 2020-06-02 PROCEDURE — 36556 INSERT NON-TUNNEL CV CATH: CPT

## 2020-06-02 PROCEDURE — 82436 ASSAY OF URINE CHLORIDE: CPT

## 2020-06-02 PROCEDURE — 84300 ASSAY OF URINE SODIUM: CPT

## 2020-06-02 PROCEDURE — 85025 COMPLETE CBC W/AUTO DIFF WBC: CPT

## 2020-06-02 PROCEDURE — C1751 CATH, INF, PER/CENT/MIDLINE: HCPCS

## 2020-06-02 PROCEDURE — 80053 COMPREHEN METABOLIC PANEL: CPT

## 2020-06-02 PROCEDURE — 82570 ASSAY OF URINE CREATININE: CPT

## 2020-06-02 PROCEDURE — 81001 URINALYSIS AUTO W/SCOPE: CPT

## 2020-06-02 PROCEDURE — 74011000258 HC RX REV CODE- 258: Performed by: INTERNAL MEDICINE

## 2020-06-02 PROCEDURE — 65270000029 HC RM PRIVATE

## 2020-06-02 RX ORDER — POTASSIUM CHLORIDE 750 MG/1
40 TABLET, FILM COATED, EXTENDED RELEASE ORAL
Status: COMPLETED | OUTPATIENT
Start: 2020-06-02 | End: 2020-06-02

## 2020-06-02 RX ORDER — LOPERAMIDE HYDROCHLORIDE 2 MG/1
2 CAPSULE ORAL AS NEEDED
Status: DISCONTINUED | OUTPATIENT
Start: 2020-06-02 | End: 2020-06-11 | Stop reason: HOSPADM

## 2020-06-02 RX ORDER — FAMOTIDINE 20 MG/1
20 TABLET, FILM COATED ORAL
Status: DISCONTINUED | OUTPATIENT
Start: 2020-06-03 | End: 2020-06-11 | Stop reason: HOSPADM

## 2020-06-02 RX ORDER — SODIUM CHLORIDE AND POTASSIUM CHLORIDE .9; .15 G/100ML; G/100ML
SOLUTION INTRAVENOUS CONTINUOUS
Status: DISPENSED | OUTPATIENT
Start: 2020-06-02 | End: 2020-06-02

## 2020-06-02 RX ORDER — POTASSIUM CHLORIDE 20 MEQ/1
40 TABLET, EXTENDED RELEASE ORAL
Status: COMPLETED | OUTPATIENT
Start: 2020-06-02 | End: 2020-06-02

## 2020-06-02 RX ORDER — POTASSIUM CHLORIDE 750 MG/1
40 TABLET, FILM COATED, EXTENDED RELEASE ORAL ONCE
Status: COMPLETED | OUTPATIENT
Start: 2020-06-02 | End: 2020-06-02

## 2020-06-02 RX ORDER — SODIUM CHLORIDE 450 MG/100ML
150 INJECTION, SOLUTION INTRAVENOUS CONTINUOUS
Status: DISCONTINUED | OUTPATIENT
Start: 2020-06-02 | End: 2020-06-03

## 2020-06-02 RX ORDER — HEPARIN SODIUM 5000 [USP'U]/ML
5000 INJECTION, SOLUTION INTRAVENOUS; SUBCUTANEOUS EVERY 8 HOURS
Status: DISCONTINUED | OUTPATIENT
Start: 2020-06-02 | End: 2020-06-09

## 2020-06-02 RX ORDER — SODIUM CHLORIDE AND POTASSIUM CHLORIDE .9; .15 G/100ML; G/100ML
SOLUTION INTRAVENOUS CONTINUOUS
Status: DISCONTINUED | OUTPATIENT
Start: 2020-06-02 | End: 2020-06-02 | Stop reason: CLARIF

## 2020-06-02 RX ORDER — POTASSIUM CHLORIDE 750 MG/1
20 TABLET, FILM COATED, EXTENDED RELEASE ORAL
Status: DISCONTINUED | OUTPATIENT
Start: 2020-06-02 | End: 2020-06-02 | Stop reason: SDUPTHER

## 2020-06-02 RX ORDER — POTASSIUM CHLORIDE 750 MG/1
40 TABLET, FILM COATED, EXTENDED RELEASE ORAL 2 TIMES DAILY
Status: DISCONTINUED | OUTPATIENT
Start: 2020-06-02 | End: 2020-06-02

## 2020-06-02 RX ADMIN — POTASSIUM CHLORIDE 40 MEQ: 1500 TABLET, EXTENDED RELEASE ORAL at 03:33

## 2020-06-02 RX ADMIN — SODIUM BICARBONATE 650 MG: 650 TABLET ORAL at 19:01

## 2020-06-02 RX ADMIN — DORZOLAMIDE HYDROCHLORIDE AND TIMOLOL MALEATE 1 DROP: 20; 5 SOLUTION/ DROPS OPHTHALMIC at 19:02

## 2020-06-02 RX ADMIN — LATANOPROST 1 DROP: 50 SOLUTION OPHTHALMIC at 21:28

## 2020-06-02 RX ADMIN — FERROUS SULFATE TAB 325 MG (65 MG ELEMENTAL FE) 325 MG: 325 (65 FE) TAB at 16:52

## 2020-06-02 RX ADMIN — POTASSIUM CHLORIDE AND SODIUM CHLORIDE: 900; 150 INJECTION, SOLUTION INTRAVENOUS at 10:00

## 2020-06-02 RX ADMIN — ASCORBIC ACID, THIAMINE MONONITRATE,RIBOFLAVIN, NIACINAMIDE, PYRIDOXINE HYDROCHLORIDE, FOLIC ACID, CYANOCOBALAMIN, BIOTIN, CALCIUM PANTOTHENATE, 1 CAPSULE: 100; 1.5; 1.7; 20; 10; 1; 6000; 150000; 5 CAPSULE, LIQUID FILLED ORAL at 08:47

## 2020-06-02 RX ADMIN — POTASSIUM CHLORIDE 20 MEQ: 750 TABLET, FILM COATED, EXTENDED RELEASE ORAL at 08:47

## 2020-06-02 RX ADMIN — SODIUM BICARBONATE 650 MG: 650 TABLET ORAL at 08:47

## 2020-06-02 RX ADMIN — HEPARIN SODIUM 5000 UNITS: 5000 INJECTION INTRAVENOUS; SUBCUTANEOUS at 15:26

## 2020-06-02 RX ADMIN — POTASSIUM CHLORIDE 40 MEQ: 750 TABLET, FILM COATED, EXTENDED RELEASE ORAL at 10:37

## 2020-06-02 RX ADMIN — BRIMONIDINE TARTRATE 1 DROP: 2 SOLUTION OPHTHALMIC at 09:39

## 2020-06-02 RX ADMIN — HEPARIN SODIUM 5000 UNITS: 5000 INJECTION INTRAVENOUS; SUBCUTANEOUS at 23:17

## 2020-06-02 RX ADMIN — FAMOTIDINE 20 MG: 20 TABLET, FILM COATED ORAL at 08:47

## 2020-06-02 RX ADMIN — FERROUS SULFATE TAB 325 MG (65 MG ELEMENTAL FE) 325 MG: 325 (65 FE) TAB at 08:48

## 2020-06-02 RX ADMIN — LEVOTHYROXINE SODIUM 100 MCG: 0.1 TABLET ORAL at 06:07

## 2020-06-02 RX ADMIN — POTASSIUM CHLORIDE 40 MEQ: 750 TABLET, FILM COATED, EXTENDED RELEASE ORAL at 16:52

## 2020-06-02 RX ADMIN — BRIMONIDINE TARTRATE 1 DROP: 2 SOLUTION OPHTHALMIC at 15:28

## 2020-06-02 RX ADMIN — BRIMONIDINE TARTRATE 1 DROP: 2 SOLUTION OPHTHALMIC at 21:29

## 2020-06-02 RX ADMIN — POTASSIUM CHLORIDE AND SODIUM CHLORIDE: 900; 150 INJECTION, SOLUTION INTRAVENOUS at 03:36

## 2020-06-02 RX ADMIN — DORZOLAMIDE HYDROCHLORIDE AND TIMOLOL MALEATE 1 DROP: 20; 5 SOLUTION/ DROPS OPHTHALMIC at 09:39

## 2020-06-02 RX ADMIN — SODIUM CHLORIDE 100 ML/HR: 450 INJECTION, SOLUTION INTRAVENOUS at 23:18

## 2020-06-02 NOTE — PROGRESS NOTES
Patient taken to OR holding for insertion of central line, Accompany by [employer] pool nurse Marine oLpez RN.  Patient was connected to heart monitor, BP cuff and 02 via NC @ 2 LPM

## 2020-06-02 NOTE — PHYSICIAN ADVISORY
Letter of Status Determination:   Recommend hospitalization status upgraded from   INPATIENT  to INPATIENT  Status     Pt Name:  Chauncey Kahn   MR#   72 [facility] # [id number] /  [id number]  Payor: Tonya West / Plan: [place]ton [place] / Product Type: Medicare /    ADAIR#  [id number]   [address] [place] [address]  2180/01  @ [facility]   Hospitalization date  6/1/2020  5:09 PM   Current Attending Physician  Della Watkins MD   Principal diagnosis  Acute on chronic renal failure ([facility] [N17.9, N18.9]   Clinicals  78 y.o. y.o  female hospitalized with above diagnosis    Chauncey Kahn is a 78 y.o.  female who has been admitted to the hospital for for abnormal labs. She has a hx of CKD IV, ADPKD, baseline Cr around 2, esophageal cancer. Recent admission to [facility] for SONIDO and sepsis for E. Coli bacteremia. Her Cr was 1.9 upon d/c. She finished up her abx and resumed Xeloda on 3/30. She presented to oncology clinic w/ nausea, diarrhea and poor po pintake for several days. Her labs revealed a K of 2.4 and Cr of 4. She was admitted and  started on IVF. [place] ([facility]) criteria   Does  NOT apply    STATUS DETERMINATION      This patient is at high risk of adverse events and deterioration based on documented clinical data, comorbid conditions and current acute care course. Ms. Chauncey Kahn is expected to meet Inpatient Admission status criteria in accordance with CMS regulation Section 43 .3. Specifically, due to medical necessity the patient's stay is expected to exceed Two Midnights. It is our recommendation that this patient's hospitalization status should be upgraded from  INPATIENT to INPATIENT status. The final decision of the patient's hospitalization status depends on the attending physician's judgment.     Additional comments     Payor: Tonya West / Plan: VA MEDICARE PART A & B / Product Type: Medicare /           [facility]el [facility] AND [facility] - THE [facility]  [address] [facility].  Omar Mckenzie  T: [phone]    renee [email or web address]

## 2020-06-02 NOTE — PROGRESS NOTES
Multiple attempts have been made to collect a urine sample and have been unsuccessful due to urge incontinence or bowel being mixed with the sample. I will be straight-cathing the patient shortly to obtain sample after her bladder fills. Unable to straight cath patient. Passed information on to Patti pantoja RN for PM shift collection.      Yoly Saavedra RN

## 2020-06-02 NOTE — PROGRESS NOTES
4 nurses attempted to start IV on patient and were unsuccessful. Charge nurse notified supervisor to have Rapid Response Nurse to start IV.

## 2020-06-02 NOTE — CONSULTS
NEPHROLOGY CONSULT NOTE     Patient: Jeannine Loya MRN: [id number]  PCP: Navi Macias MD   :     1940  Age:   78 y.o. Sex:  female      Referring physician: Cassy Mcconnell MD  Reason for consultation: 78 y.o. female with Acute on chronic renal failure ([facility] [X98.9, J14.8] complicated by SONIDO   Admission Date: 2020  5:09 PM  LOS: 0 days      ASSESSMENT and PLAN :   SONIDO on CKD:  - suspect 2/2 volume depletion from GI losses + Xeloda  - agree with IVF  - Renal U/S ordered  - repeat labs this evening and again in AM    Hypovolemia:  - IVF as ordered    CKD Stage IV   - 2/2 ADPKD, HTN   - baseline Creatinine : 2 mg/ dl   - followed by Dr Quang Hester      Hypokalemia:  - IV and po repletion ordred  - repeat labs this evening  - check mag level in AM     Chronic Anemia     Esophageal Ca   Hypothyroidism   EMILY     Active Problems / Assessment AAActive  : Active Problems:    Acute on chronic renal failure ([facility] (2020)         Subjective:   HPI: Jeannine Loya is a 78 y.o.  female who has been admitted to the hospital for for abnormal labs. She has a hx of CKD IV, ADPKD, baseline Cr around 2, esophageal cancer. Recent admission to [facility] for SONIDO and sepsis for E. Coli bacteremia. Her Cr was 1.9 upon d/c. She finished up her abx and resumed Xeloda on 3/30. She presented to oncology clinic w/ nausea, diarrhea and poor po pintake for several days. Her labs revealed a K of 2.4 and Cr of 4. She was admitted and  started on IVF.      Past Medical Hx:   Past Medical History:   Diagnosis Date    Abnormal x-ray of abdomen 2019    Bas showed food and irregular stricture above ge junction in addition to large hiatal hernia  2019    Ad[facility] ([facility] 2019    Anemia     Arthritis     Chronic kidney disease     Stage II followed by Dr Lakia Rodriguez Nephrology     Diverticulosis     Esophageal dysphagia 2019    H/O colonoscopy with polypectomy     benign  History of colon polyps 6/1/2018    2013 tubular adenoma     Hypertension     Ill-defined condition     pulmonary hypertension    Other ill-defined conditions(799.89)     glaucoma and cataracts    Sleep apnea     CPAP compliant, as stated 5/20/2019    Thromboembolus ([facility] 2015    Right  leg , spontaneous    Thyroid disease     hypothyroidism        Past Surgical Hx:     Past Surgical History:   Procedure Laterality Date    ANAL PRESSURE RECORD  6/6/2019    COLONOSCOPY N/A 6/1/2018    COLONOSCOPY performed by Marya Bledsoe MD at [place] [address]  6/1/2018         HX CATARACT REMOVAL Bilateral 2017    HX HEENT  1984    thyroid biopsy-benign    HX HYSTERECTOMY      IR INSERT NON TUNL CVC OVER 5 YRS  3/8/2020    IR INSERT TUNL CVC W PORT OVER 5 YEARS  7/19/2019    IR REMOVE TUNL CVAD W/O PORT / PUMP  3/8/2020    UPPER GI ENDOSCOPY,BALL DIL,30MM  5/22/2019         UPPER GI ENDOSCOPY,BALL DIL,30MM  5/30/2019         UPPER GI ENDOSCOPY,BIOPSY  5/22/2019         UPPER GI ENDOSCOPY,BIOPSY  5/30/2019         UPPER GI ENDOSCOPY,BIOPSY  10/17/2019            Medications:  Prior to Admission medications    Medication Sig Start Date End Date Taking? Authorizing Provider   ferrous sulfate 324 mg (65 mg iron) tablet Take 324 mg by mouth two (2) times daily (with meals). Yes Provider, Historical   omeprazole (PRILOSEC OTC) 20 mg tablet Take 20 mg by mouth daily. Yes Provider, Historical   sodium bicarbonate 650 mg tablet Take 650 mg by mouth two (2) times a day. Yes Provider, Historical   folic acid-vit L9-AWJ G22 (FOLBEE) 2.5-25-1 mg tablet Take 1 Tab by mouth daily. Yes Provider, Historical   iron bisgly,ps-FA-B-C#12-succ 65 mg-65 mg -1,000 mcg (24) tab Take 1 Tab by mouth daily. Yes Provider, Historical   latanoprost (XALATAN) 0.005 % ophthalmic solution Administer 1 Drop to both eyes nightly.    Yes Provider, Historical   MULTIVITS W-FE,OTHER MIN/LUT (CENTRUM SILVER ULTRA WOMEN'S PO) Take 1 Tab by mouth daily. Yes Provider, Historical   DORZOLAMIDE HCL/TIMOLOL MALEAT (DORZOLAMIDE-TIMOLOL OP) Apply 1 Drop to eye three (3) times daily. One drop to each eye twice daily    Yes Provider, Historical   brimonidine (ALPHAGAN) 0.2 % ophthalmic solution Administer 1 Drop to both eyes three (3) times daily. Yes Provider, Historical   levothyroxine (SYNTHROID) 100 mcg tablet Take 100 mcg by mouth Daily (before breakfast). Indications: HYPOTHYROIDISM   Yes Provider, Historical   capecitabine (XELODA) 500 mg tablet 1,500 mg two (2) times a day. X 14 days then 7 days off    Provider, Historical   acetaminophen (TYLENOL EXTRA STRENGTH) 500 mg tablet Take 1,000 mg by mouth every six (6) hours as needed. Indications: ARTHRITIC PAIN, PAIN    Provider, Historical       No Known Allergies    Social Hx:  reports that she has never smoked. She has never used smokeless tobacco. She reports previous alcohol use. She reports that she does not use drugs. No family history on file. Review of Systems:  A twelve point review of system was performed today. Pertinent positives and negatives are mentioned in the HPI. The reminder of the ROS is negative and noncontributory. Objective:    Vitals:    Vitals:    06/01/20 1807 06/01/20 1825   BP:  107/70   Pulse:  70   Resp:  20   Temp:  98.1 °F (36.7 °C)   SpO2:  98%   Weight: 88.6 kg (195 lb 5.2 oz)    Height: 5' 8\" (1.727 m)      I&O's:  No intake/output data recorded. Visit Vitals  /70   Pulse 70   Temp 98.1 °F (36.7 °C)   Resp 20   Ht 5' 8\" (1.727 m)   Wt 88.6 kg (195 lb 5.2 oz)   SpO2 98%   Breastfeeding No   BMI 29.70 kg/m²       Physical Exam:  General:Alert, No distress,   HEENT: Eyes are PERRL. Conjunctiva without pallor ,erythema. The sclerae without icterus.  .   Neck:Supple,no mass palpable  Lungs : Clears to auscultation Bilaterally, Normal respiratory effort  CVS: RRR, S1 S2 normal, No rub, no LE edema  Abdomen: Soft, Non tender, No hepatosplenomegaly, bowel sounds present  Extremities: No cyanosis, No clubbing  Skin: No rash or lesions. Lymph nodes: No palpable nodes  MS: No joint swelling, erythema, warmth  Neurologic: non focal, AAO x 3  Psych: normal affect    Laboratory Results:    Lab Results   Component Value Date    BUN 24 (H) 03/18/2020     03/18/2020    K 3.5 03/18/2020     03/18/2020    CO2 31 03/18/2020       Lab Results   Component Value Date    BUN 24 (H) 03/18/2020    BUN 52 (H) 03/12/2020    BUN 60 (H) 03/11/2020    BUN 72 (H) 03/10/2020    BUN 82 (H) 03/09/2020    K 3.5 03/18/2020    K 4.0 03/12/2020    K 3.6 03/11/2020    K 3.5 03/10/2020    K 3.7 03/09/2020       Lab Results   Component Value Date    WBC 4.9 03/18/2020    RBC 2.58 (L) 03/18/2020    HGB 8.2 (L) 03/18/2020    HCT 26.2 (L) 03/18/2020    .6 (H) 03/18/2020    MCH 31.8 03/18/2020    RDW 21.2 (H) 03/18/2020     (L) 03/18/2020       Lab Results   Component Value Date    PHOS 2.1 (L) 03/12/2020       Urine dipstick:   Lab Results   Component Value Date/Time    Color YELLOW/STRAW 03/04/2020 10:38 PM    Appearance CLEAR 03/04/2020 10:38 PM    Specific gravity 1.014 03/04/2020 10:38 PM    pH (UA) 5.0 03/04/2020 10:38 PM    Protein 30 (A) 03/04/2020 10:38 PM    Glucose NEGATIVE  03/04/2020 10:38 PM    Ketone NEGATIVE  03/04/2020 10:38 PM    Bilirubin NEGATIVE  03/04/2020 10:38 PM    Urobilinogen 1.0 03/04/2020 10:38 PM    Nitrites NEGATIVE  03/04/2020 10:38 PM    Leukocyte Esterase NEGATIVE  03/04/2020 10:38 PM    Epithelial cells FEW 03/04/2020 10:38 PM    Bacteria NEGATIVE  03/04/2020 10:38 PM    WBC 0-4 03/04/2020 10:38 PM    RBC 0-5 03/04/2020 10:38 PM       I have reviewed the following: All pertinent labs, microbiology data, radiology imaging for my assessment              Thank you for allowing us to participate in the care of this patient. We will follow patient.  Please dont hesitate to call with any questions    Harrison Morgan Marilyn Mcwilliams MD  6/1/2020        [facility] [address] [place] [address]  Phone - [phone]   Fax - [phone]  [email or web address]

## 2020-06-02 NOTE — OP NOTES
Central Line Procedure Note    Indication: Inadequate venous access    Risks, benefits, alternatives explained and patient agrees to proceed. Patient positioned in Trendelenburg. 7-Step Sterility Protocol followed. (cap, mask sterile gown, sterile gloves, large sterile sheet, hand hygiene, 2% chlorhexidine for cutaneous antisepsis)  5 mL 1% Lidocaine placed at insertion site. Right internal jugular cannulated x 2 attempt(s) utilizing ultrasound guidance. Catheter secured & Biopatch applied. Sterile Tegaderm placed. CXR pending.     Care turned over to covering Attending MD.

## 2020-06-02 NOTE — PROGRESS NOTES
On call provider Conchetta Nyhan notified that patient does not have IV access after attempts by 6 nurses. Provider stated if unable to get peripheral IV patient needs Central line.

## 2020-06-02 NOTE — PROGRESS NOTES
Problem: Falls - Risk of  Goal: *Absence of Falls  Description: Document [place] Flow Fall Risk and appropriate interventions in the flowsheet. Outcome: Progressing Towards Goal  Note: Fall Risk Interventions:  Mobility Interventions: Patient to call before getting OOB         Medication Interventions: Patient to call before getting OOB                   Problem: Pressure Injury - Risk of  Goal: *Prevention of pressure injury  Description: Document Tomy Scale and appropriate interventions in the flowsheet.   Outcome: Progressing Towards Goal  Note: Pressure Injury Interventions:                      Nutrition Interventions: Document food/fluid/supplement intake

## 2020-06-02 NOTE — PROGRESS NOTES
-Hematology / Oncology ([facility]) -  -Primary Oncologist- Dr. Hyman Spatz   -[facility]- \" I am tired but a bit better\"    -S-  States she feels a little better today but very fatigued, had diarrhea after bfast this am, has lost ~30lb over last  Month. No nausea today or abdominal pain    -O-      Patient Vitals for the past 24 hrs:   Temp Pulse Resp BP SpO2   06/02/20 0750 97.4 °F (36.3 °C) 62 18 111/70 100 %   06/02/20 0301 97.5 °F (36.4 °C) 68 17 95/73 100 %   06/01/20 2335 97.7 °F (36.5 °C) 68 16 95/66 96 %   06/01/20 1825 98.1 °F (36.7 °C) 70 20 107/70 98 %     06/02 0701 - 06/02 1900  In: 356.7 [I.V.:356.7]  Out: -   Review of Systems:   Constitutional No fevers, chills, night sweats. Increasing weakness and wt loss over past 2 months   Allergic/Immunologic No recent allergic reactions   Eyes No significant visual difficulties. No diplopia. ENMT No problems with hearing, no sore throat, no sinus drainage. Endocrine No hot flashes or night sweats. No cold intolerance, polyuria, or polydipsia   Hematologic/Lymphatic No easy bruising or bleeding. The patient denies any tender or palpable lymph nodes   Breasts No abnormal masses of breast, nipple discharge or pain. Respiratory No dyspnea on exertion, orthopnea, chest pain, cough or hemoptysis. Cardiovascular No anginal chest pain, irregular heart beat, tachycardia, palpitations or orthopnea. Gastrointestinal Nausea starting about 1 week ago, increasingly poor appetite, diarrhea starting about 1 wek ago with end of Xeloda cycle. Pt reports some improvement in diarrhea with Lomotil over the weekend   Genitourinary (M) No hematuria, dysuria, increased frequency, urgency, hesitancy or incontinence. Musculoskeletal No joint pain, swelling or redness. No decreased range of motion. Integumentary No chronic rashes, inflammation, ulcerations, pruritus, petechiae, purpura, ecchymoses, or skin changes.    Neurologic No headache, blurred vision, and no areas of focal weakness or numbness. Normal gait. No sensory problems. Psychiatric No insomnia, depression, nereyda or mood swings. No psychotropic drugs.         Physical Exam:  Constitutional Alert, cooperative, oriented. Mood and affect appropriate. Appears close to chronological age. Well nourished. Well developed. Head Normocephalic; no scars   Eyes Conjunctivae and sclerae are clear and without icterus. Pupils are reactive and equal.   ENMT Sinuses are nontender. No oral exudates, ulcers, masses, thrush or mucositis. Oropharynx clear. Tongue normal.   Neck Supple without masses or thyromegaly. No jugular venous distension. Hematologic/Lymphatic No petechiae or purpura. No tender or palpable lymph nodes in the cervical, supraclavicular, axillary or inguinal area. Respiratory Lungs are clear to auscultation without rhonchi or wheezing. Cardiovascular Regular rate and rhythm of heart without murmurs, gallops or rubs. Chest / Line Site Chest is symmetric with no chest wall deformities. Abdomen Non-tender, non-distended, no masses, ascites or hepatosplenomegaly. Good bowel sounds. No guarding or rebound tenderness. No pulsatile masses. Musculoskeletal Requires assistance with transfer to [facility]   Extremities No visible deformities, no cyanosis, clubbing. 2+ bilateral LE edema   Skin No rashes, scars, or lesions suggestive of malignancy. No petechiae, purpura, or ecchymoses. No excoriations. Neurologic No sensory or motor deficits, normal cerebellar function, normal gait, cranial nerves intact. Psychiatric Alert and oriented times three. Coherent speech. Verbalizes understanding of our discussions today.        -Labs-    Recent Labs     06/02/20  0009   WBC 7.1   HGB 10.7*      ANEU 5.5      K 2.5*   *   *   CREA 4.39*   ALT 11*   TBILI 0.6   AP 60   CA 8.0*   MG 2.0   PHOS 4.7           Pt was on Xeloda single agent for esophageal cancer and had ablation per  Dr. Justina Couch 2/28/20.  She had started C4 of Xeloda at max dose(1500 mg BID  14/21days)  per renal function  3/2/20 but stopped 3/4/20 when she was admitted  To [facility] for septicemia. She was discharged 3/12/20 and  received IV antibiotics in [facility] until 3/23/20.       Pt resumed Xeloda 1500 mg BID 3/30-4/12/20.       Pt completed last 14 day cycle of Xeloda 5/24/20. She arrived to clinic 5/29/20 reporting several days of diarrhea, nausea and poor appetite. Pt was given hydration and anti-emetics in clinic on 5/29/20 and returned to clinic today for further hydration and re-evaluation.      -Assessment + Plan-     *) SONIDO on CKD:  - Admitted from clinic at end of day yesterday when she presented with Cr 4.3 on 6/1. Baseline Cr ~2.0  - Suspected pre renal due to diarrhea from Xeloda therapy that was completed 5/24/20  - Appreciate Nephrology's assistance  - Hydration per renal  - Renal us and renal electrolytes pending  - will order urinalysis and culture   - Cr around midnight was 4.3, had line issues and had to have IJ placed for access.   - repeat bmp ordered for 1pm    *) Xeloda induced Diarrhea:  - Cont Lomotil scheduled and will add Imodium prn  - cont IVF    *)Hypokalemia, due to GI losses:  - K 2.5 today, getting 40meq KCL oral now   - Mag WNL    *) Esophageal cancer:  - sp ablation and adjuvant Xeloda that concluded 5/24    *) Chemo induced anemia:  - hbg 10.7

## 2020-06-02 NOTE — PROGRESS NOTES
Nutrition Assessment:    RECOMMENDATIONS:   RD to liberalize pt to Regular diet (was on 20g Protein restriction)  RD to change Ensure Enlive to Ensure Clear TID and Magic Cup BID per pt request    DIETITIANS INTERVENTIONS/PLAN:   Liberalize diet  Adjust PO supplements  Monitor appetite/PO intake    ASSESSMENT:     Meets Criteria for Acute Malnutrition   [x] Severe Malnutrition, as evidenced by:   [] Moderate muscle wasting, loss of subcutaneous fat   [x] Nutritional intake of <50% of recommended intake for >5 days   [x] Weight loss of >1-2% in 1 week, >5% in 1 month, >7.5% in 3 months, or >10% in 6 months   [] Moderate-severe edema   []Moderate Malnutrition, as evidenced by:   [] Mild muscle wasting, loss of subcutaneous fat   [] Nutritional intake <75% of recommended intake for >1 week   [] Weight loss of 1-2% in 1 week, 5% in 1 month, 7.5% in 3 months, or 10% in 6 months   [] Mild edema      Pt admitted with acute on chronic renal failure. PMH: dysphagia, CKD stage 2, HTN, esophageal CA. Chart reviewed, pt lying in bed awake and alert. MST triggered for wt loss and poor appetite. Pt reports a 30+lb wt loss over the past 1-2 months. This is confirmed with bed wt and previous admission in March of 2020. She states that her appetite disappeared at the beginning of May and she 'wasn't eating at all'. She had some on and off issues with nausea and diarrhea (which have improved since admission) but she was advised by her physician to follow a BRAT diet. She reports she couldn't even get/keep the [employer] down. I let her know she has ensure ordered, however pt states it 'goes right through me'. She is willing to try ensure clear and magic cup.  K being repleted, would monitor phos and Mag for refeeding syndrome. Pt asking for fortified popsicle supplement, but unfortunately we don't carry a supplement in that form.       SUBJECTIVE/OBJECTIVE:   \"I wasn't eating anything at all\"   Diet Order: Regular(20g Protein + Ensure TID )  % Eaten:  No data found. Pertinent Medications:nephrocap, pepcid, iron, KCl; [email or web address])      Chemistries:  Lab Results   Component Value Date/Time    Sodium 137 06/02/2020 01:24 PM    Potassium 3.3 (L) 06/02/2020 01:24 PM    Chloride 108 06/02/2020 01:24 PM    CO2 20 (L) 06/02/2020 01:24 PM    Anion gap 9 06/02/2020 01:24 PM    Glucose 111 (H) 06/02/2020 01:24 PM     (H) 06/02/2020 01:24 PM    Creatinine 4.05 (H) 06/02/2020 01:24 PM    BUN/Creatinine ratio 25 (H) 06/02/2020 01:24 PM    GFR est AA 13 (L) 06/02/2020 01:24 PM    GFR est non-AA 11 (L) 06/02/2020 01:24 PM    Calcium 8.0 (L) 06/02/2020 01:24 PM    Alk. phosphatase 60 06/02/2020 12:09 AM    Protein, total 5.7 (L) 06/02/2020 12:09 AM    Albumin 2.7 (L) 06/02/2020 12:09 AM    Globulin 3.0 06/02/2020 12:09 AM    A-G Ratio 0.9 (L) 06/02/2020 12:09 AM    ALT (SGPT) 11 (L) 06/02/2020 12:09 AM      Anthropometrics: Height: 5' 8\" (172.7 cm) Weight: 88.6 kg (195 lb 5.2 oz)  [] standing scale     [x]bed scale    []stated   []unknown    IBW (%IBW):   ( ) UBW (%UBW): 105.2 kg (232 lb) (84.19 %)    BMI: Body mass index is 29.7 kg/m². This BMI is indicative of:  []Underweight   []Normal   [x]Overweight   [] Obesity   [] Extreme Obesity (BMI>40)  Estimated Nutrition Needs (Based on): 1850 Kcals/day(MSJ 1409 x 1.3) , 89 g(-106 (1-1.2gPro/kg) ) Protein  Carbohydrate: At Least 130 g/day  Fluids: 1800 mL/day    Last BM: 6/1   []Active     []Hyperactive  [x]Hypoactive       [] Absent   BS  Skin:    [x] Intact   [] Incision  [] Breakdown   [] DTI   [] Tears/Excoriation/Abrasion  [x]Edema(trace-BLE) [] Other:    Wt Readings from Last 30 Encounters:   06/02/20 88.6 kg (195 lb 5.2 oz)   05/19/20 102.1 kg (225 lb)   03/11/20 111.6 kg (246 lb)   10/17/19 105.3 kg (232 lb 3 oz)   07/19/19 114.3 kg (252 lb)   07/05/19 112.5 kg (248 lb)   06/26/19 117 kg (258 lb)   06/05/19 117 kg (258 lb)   05/30/19 117.1 kg (258 lb 2 oz)   05/22/19 118.4 kg (261 lb 1 oz)   06/12/18 131.5 kg (290 lb)   06/01/18 135.2 kg (298 lb)   03/14/13 129.4 kg (285 lb 6 oz)      NUTRITION DIAGNOSES:   Problem:  Malnutrition      Etiology: related to poor appetite, nausea and diarrhea      Signs/Symptoms: as evidenced by 16% wt loss in the past 1-2 months, minimal PO intake x 2-3 weeks. NUTRITION INTERVENTIONS:  Meals/Snacks: General/healthful diet, Modify diet/texture/consistency/nutrients   Supplements: Commercial supplement              GOAL:   Pt will consume >50% of meals/supplements in 2-4 days.      Cultural, Cheondoism, or Ethnic Dietary Needs: None     LEARNING NEEDS (Diet, Food/Nutrient-Drug Interaction):    [x] None Identified   [] Identified and Education Provided/Documented   [] Identified and Pt declined/was not appropriate      [x] Interdisciplinary Care Plan Reviewed/Documented    [x] Participated in Discharge Planning: TBD   [] Interdisciplinary Rounds     NUTRITION RISK:    [x] High              [] Moderate           []  Low  []  Minimal/Uncompromised    PT SEEN FOR:    []  MD Consult: []Calorie Count      []Diabetic Diet Education        []Diet Education     []Electrolyte Management     []General Nutrition Management and Supplements     []Management of Tube Feeding     []TPN Recommendations    [x]  RN Referral:  [x]MST score >=2     []Enteral/Parenteral Nutrition PTA     []Pregnant: Gestational DM or Multigestation   []  Low BMI  []  Re-Screen   []  LOS   []  NPO/clears x 5 days   []  New TF/TPN    Stephani Mckeon RD, [id number]   Pager [phone]  Weekend Pager [phone]

## 2020-06-02 NOTE — PROGRESS NOTES
Patient returned back from OR holding with insertion of right IJ triple lumen. Patient tolerated procedure well without any difficulty noted.

## 2020-06-02 NOTE — PROGRESS NOTES
Nephrology Progress Note  [address] [facility]  [facility], Ariela Issau, [address]  Phone - [phone]         Fax - [phone]    Patient: Oj Gardner    YOB: 1940    Date- 6/2/2020        Admit Date: 6/1/2020     CC: Follow up for acute kidney injury         IMPRESSION & PLAN:   SONIDO on CKD:  - suspect 2/2 volume depletion from GI losses + Xeloda  -Continue IV fluids with KCl  -Follow renal ultrasound  -Repeat BMP this afternoon to follow-up potassium level    Hypokalemia  KCl 60 M EQ p.o. this morning  Check BMP in the afternoon    History of metabolic acidosis    CKD Stage IV   - 2/2 ADPKD, HTN   - baseline Creatinine : 2 mg/ dl      Chronic Anemia     Esophageal Ca   Hypothyroidism   EMILY       Subjective: Interval History:   -Creatinine remains high  Potassium low  Blood pressure stable    ROS:-Complaint of diarrhea   no c/o sob,  No c/o chest pain,   No c/o  vomiting, No c/o  fever. Objective:   Visit Vitals  /70   Pulse 62   Temp 97.4 °F (36.3 °C)   Resp 18   Ht 5' 8\" (1.727 m)   Wt 88.6 kg (195 lb 5.2 oz)   SpO2 100%   Breastfeeding No   BMI 29.70 kg/m²     Last 3 Recorded Weights in this Encounter    06/01/20 1807   Weight: 88.6 kg (195 lb 5.2 oz)     No intake/output data recorded. Intake/Output Summary (Last 24 hours) at 6/2/2020 9904  Last data filed at 6/2/2020 0710  Gross per 24 hour   Intake 356.67 ml   Output --   Net 356.67 ml      Physical exam:   GEN: NAD  NECK- Supple, no mass  RESP: Clear b/l, no wheezing  CVS: S1,S2  RRR  ABDO: soft , non tender, No mass  NEURO: normal speech, non focal  EXT: No Edema   Discussed with patient and the nurse  Chart reviewed. Pertinent Notes reviewed.      Data Review :  Recent Labs     06/02/20  0009      K 2.5*      CO2 19*   *   CREA 4.39*   *   CA 8.0*   MG 2.0   PHOS 4.7     Recent Labs     06/02/20  0009   WBC 7.1   HGB 10.7*   HCT 29.7*        No results for input(s): FE, TIBC, PSAT, FERR in the last 72 hours. No results for input(s): CPK, CKNDX, TROIQ in the last 72 hours. No lab exists for component: CPKMB  Lab Results   Component Value Date/Time    Color YELLOW/STRAW 03/04/2020 10:38 PM    Appearance CLEAR 03/04/2020 10:38 PM    Specific gravity 1.014 03/04/2020 10:38 PM    pH (UA) 5.0 03/04/2020 10:38 PM    Protein 30 (A) 03/04/2020 10:38 PM    Glucose NEGATIVE  03/04/2020 10:38 PM    Ketone NEGATIVE  03/04/2020 10:38 PM    Bilirubin NEGATIVE  03/04/2020 10:38 PM    Urobilinogen 1.0 03/04/2020 10:38 PM    Nitrites NEGATIVE  03/04/2020 10:38 PM    Leukocyte Esterase NEGATIVE  03/04/2020 10:38 PM    Epithelial cells FEW 03/04/2020 10:38 PM    Bacteria NEGATIVE  03/04/2020 10:38 PM    WBC 0-4 03/04/2020 10:38 PM    RBC 0-5 03/04/2020 10:38 PM     Lab Results   Component Value Date/Time    Culture result: NO GROWTH 5 DAYS 03/08/2020 04:03 AM    Culture result:  03/06/2020 09:15 AM     MRSA NOT PRESENT. Apparent Staphylococus aureus (not MRSA noted). Culture result:  03/06/2020 09:15 AM         Screening of patient nares for MRSA is for surveillance purposes and, if positive, to facilitate isolation considerations in high risk settings. It is not intended for automatic decolonization interventions per se as regimens are not sufficiently effective to warrant routine use. No results found for: SDES  No results found for: Beatrice Zheng  Results from [place] encounter on 06/01/20   XR CHEST PORT    Narrative INDICATION: Central line placement    COMPARISON: March 11, 2020    FINDINGS: AP portable imaging of the chest performed at 3:15 AM demonstrates a  stable cardiomediastinal silhouette. Lung volumes are low. There is mild  bibasilar atelectasis. Right internal jugular central venous catheter tip  overlies the cavoatrial junction.  No pneumothorax or pleural effusion is  evident. No significant osseous abnormalities are seen. Impression IMPRESSION: Right internal jugular central venous catheter appears to be in  satisfactory position. No evidence of placement related complication. Low lung  volumes with bibasilar atelectasis. Medication list  reviewed  No current facility-administered medications on file prior to encounter. Current Outpatient Medications on File Prior to Encounter   Medication Sig Dispense Refill    ferrous sulfate 324 mg (65 mg iron) tablet Take 324 mg by mouth two (2) times daily (with meals).  omeprazole (PRILOSEC OTC) 20 mg tablet Take 20 mg by mouth daily.  sodium bicarbonate 650 mg tablet Take 650 mg by mouth two (2) times a day.  folic acid-vit G7-PJS D80 (FOLBEE) 2.5-25-1 mg tablet Take 1 Tab by mouth daily.  iron bisgly,ps-FA-B-C#12-succ 65 mg-65 mg -1,000 mcg (24) tab Take 1 Tab by mouth daily.  latanoprost (XALATAN) 0.005 % ophthalmic solution Administer 1 Drop to both eyes nightly.  MULTIVITS W-FE,OTHER MIN/LUT (CENTRUM SILVER ULTRA WOMEN'S PO) Take 1 Tab by mouth daily.  DORZOLAMIDE HCL/TIMOLOL MALEAT (DORZOLAMIDE-TIMOLOL OP) Apply 1 Drop to eye three (3) times daily. One drop to each eye twice daily       brimonidine (ALPHAGAN) 0.2 % ophthalmic solution Administer 1 Drop to both eyes three (3) times daily.  levothyroxine (SYNTHROID) 100 mcg tablet Take 100 mcg by mouth Daily (before breakfast). Indications: HYPOTHYROIDISM      capecitabine (XELODA) 500 mg tablet 1,500 mg two (2) times a day. X 14 days then 7 days off      acetaminophen (TYLENOL EXTRA STRENGTH) 500 mg tablet Take 1,000 mg by mouth every six (6) hours as needed. Indications: ARTHRITIC PAIN, PAIN                         Fernanda Wheatley MD  [facility]   [email or web address]

## 2020-06-02 NOTE — PROGRESS NOTES
General Surgery End of Shift Nursing Note    Bedside shift change report given to Alpesh Lee RN (oncoming nurse) by Dewayne Fuller (offgoing nurse). Report included the following information SBAR, Kardex, Intake/Output, MAR and Recent Results.         Luisa Go

## 2020-06-03 ENCOUNTER — APPOINTMENT (OUTPATIENT)
Dept: GENERAL RADIOLOGY | Age: 80
DRG: 682 | End: 2020-06-03
Attending: PHYSICIAN ASSISTANT
Payer: MEDICARE

## 2020-06-03 PROBLEM — R19.7 DIARRHEA: Status: ACTIVE | Noted: 2020-06-03

## 2020-06-03 LAB
ALBUMIN SERPL-MCNC: 2.4 G/DL (ref 3.5–5)
ALBUMIN/GLOB SERPL: 0.9 {RATIO} (ref 1.1–2.2)
ALP SERPL-CCNC: 59 U/L (ref 45–117)
ALT SERPL-CCNC: 12 U/L (ref 12–78)
ANION GAP SERPL CALC-SCNC: 12 MMOL/L (ref 5–15)
AST SERPL-CCNC: 13 U/L (ref 15–37)
BASOPHILS # BLD: 0 K/UL (ref 0–0.1)
BASOPHILS NFR BLD: 0 % (ref 0–1)
BILIRUB SERPL-MCNC: 0.5 MG/DL (ref 0.2–1)
BUN SERPL-MCNC: 106 MG/DL (ref 6–20)
BUN/CREAT SERPL: 27 (ref 12–20)
CALCIUM SERPL-MCNC: 7.9 MG/DL (ref 8.5–10.1)
CHLORIDE SERPL-SCNC: 113 MMOL/L (ref 97–108)
CHLORIDE UR-SCNC: 24 MMOL/L
CO2 SERPL-SCNC: 15 MMOL/L (ref 21–32)
CREAT SERPL-MCNC: 3.93 MG/DL (ref 0.55–1.02)
DIFFERENTIAL METHOD BLD: ABNORMAL
EOSINOPHIL # BLD: 0 K/UL (ref 0–0.4)
EOSINOPHIL NFR BLD: 0 % (ref 0–7)
ERYTHROCYTE [DISTWIDTH] IN BLOOD BY AUTOMATED COUNT: 19.3 % (ref 11.5–14.5)
GLOBULIN SER CALC-MCNC: 2.6 G/DL (ref 2–4)
GLUCOSE SERPL-MCNC: 100 MG/DL (ref 65–100)
HCT VFR BLD AUTO: 29 % (ref 35–47)
HGB BLD-MCNC: 10.1 G/DL (ref 11.5–16)
IMM GRANULOCYTES # BLD AUTO: 0 K/UL (ref 0–0.04)
IMM GRANULOCYTES NFR BLD AUTO: 0 % (ref 0–0.5)
LYMPHOCYTES # BLD: 0.6 K/UL (ref 0.8–3.5)
LYMPHOCYTES NFR BLD: 8 % (ref 12–49)
MAGNESIUM SERPL-MCNC: 2.1 MG/DL (ref 1.6–2.4)
MCH RBC QN AUTO: 33.8 PG (ref 26–34)
MCHC RBC AUTO-ENTMCNC: 34.8 G/DL (ref 30–36.5)
MCV RBC AUTO: 97 FL (ref 80–99)
MONOCYTES # BLD: 0.8 K/UL (ref 0–1)
MONOCYTES NFR BLD: 11 % (ref 5–13)
NEUTS SEG # BLD: 6 K/UL (ref 1.8–8)
NEUTS SEG NFR BLD: 81 % (ref 32–75)
NRBC # BLD: 0.02 K/UL (ref 0–0.01)
NRBC BLD-RTO: 0.3 PER 100 WBC
PHOSPHATE SERPL-MCNC: 4.5 MG/DL (ref 2.6–4.7)
PLATELET # BLD AUTO: 186 K/UL (ref 150–400)
PMV BLD AUTO: 9.7 FL (ref 8.9–12.9)
POTASSIUM SERPL-SCNC: 3.5 MMOL/L (ref 3.5–5.1)
PROT SERPL-MCNC: 5 G/DL (ref 6.4–8.2)
RBC # BLD AUTO: 2.99 M/UL (ref 3.8–5.2)
RBC MORPH BLD: ABNORMAL
RBC MORPH BLD: ABNORMAL
SODIUM SERPL-SCNC: 140 MMOL/L (ref 136–145)
SODIUM UR-SCNC: 18 MMOL/L
WBC # BLD AUTO: 7.4 K/UL (ref 3.6–11)

## 2020-06-03 PROCEDURE — 74011250637 HC RX REV CODE- 250/637: Performed by: INTERNAL MEDICINE

## 2020-06-03 PROCEDURE — 74011250636 HC RX REV CODE- 250/636: Performed by: INTERNAL MEDICINE

## 2020-06-03 PROCEDURE — 84100 ASSAY OF PHOSPHORUS: CPT

## 2020-06-03 PROCEDURE — 85025 COMPLETE CBC W/AUTO DIFF WBC: CPT

## 2020-06-03 PROCEDURE — 97530 THERAPEUTIC ACTIVITIES: CPT | Performed by: PHYSICAL THERAPIST

## 2020-06-03 PROCEDURE — 65270000015 HC RM PRIVATE ONCOLOGY

## 2020-06-03 PROCEDURE — 97161 PT EVAL LOW COMPLEX 20 MIN: CPT | Performed by: PHYSICAL THERAPIST

## 2020-06-03 PROCEDURE — 74018 RADEX ABDOMEN 1 VIEW: CPT

## 2020-06-03 PROCEDURE — 80053 COMPREHEN METABOLIC PANEL: CPT

## 2020-06-03 PROCEDURE — 74011250637 HC RX REV CODE- 250/637: Performed by: PHYSICIAN ASSISTANT

## 2020-06-03 PROCEDURE — 83735 ASSAY OF MAGNESIUM: CPT

## 2020-06-03 PROCEDURE — 97165 OT EVAL LOW COMPLEX 30 MIN: CPT | Performed by: OCCUPATIONAL THERAPIST

## 2020-06-03 PROCEDURE — 36415 COLL VENOUS BLD VENIPUNCTURE: CPT

## 2020-06-03 RX ORDER — SODIUM BICARBONATE 650 MG/1
650 TABLET ORAL 3 TIMES DAILY
Status: DISCONTINUED | OUTPATIENT
Start: 2020-06-03 | End: 2020-06-07

## 2020-06-03 RX ORDER — SODIUM CHLORIDE 0.9 % (FLUSH) 0.9 %
SYRINGE (ML) INJECTION
Status: COMPLETED
Start: 2020-06-03 | End: 2020-06-03

## 2020-06-03 RX ORDER — DIPHENOXYLATE HYDROCHLORIDE AND ATROPINE SULFATE 2.5; .025 MG/1; MG/1
1 TABLET ORAL 4 TIMES DAILY
Status: DISCONTINUED | OUTPATIENT
Start: 2020-06-03 | End: 2020-06-09

## 2020-06-03 RX ORDER — POTASSIUM CHLORIDE 750 MG/1
40 TABLET, FILM COATED, EXTENDED RELEASE ORAL
Status: COMPLETED | OUTPATIENT
Start: 2020-06-03 | End: 2020-06-03

## 2020-06-03 RX ORDER — SODIUM CHLORIDE, SODIUM LACTATE, POTASSIUM CHLORIDE, CALCIUM CHLORIDE 600; 310; 30; 20 MG/100ML; MG/100ML; MG/100ML; MG/100ML
150 INJECTION, SOLUTION INTRAVENOUS CONTINUOUS
Status: DISCONTINUED | OUTPATIENT
Start: 2020-06-03 | End: 2020-06-05

## 2020-06-03 RX ADMIN — SODIUM BICARBONATE 650 MG: 650 TABLET ORAL at 09:36

## 2020-06-03 RX ADMIN — BRIMONIDINE TARTRATE 1 DROP: 2 SOLUTION OPHTHALMIC at 16:40

## 2020-06-03 RX ADMIN — HEPARIN SODIUM 5000 UNITS: 5000 INJECTION INTRAVENOUS; SUBCUTANEOUS at 22:15

## 2020-06-03 RX ADMIN — FERROUS SULFATE TAB 325 MG (65 MG ELEMENTAL FE) 325 MG: 325 (65 FE) TAB at 09:36

## 2020-06-03 RX ADMIN — LEVOTHYROXINE SODIUM 100 MCG: 0.1 TABLET ORAL at 06:41

## 2020-06-03 RX ADMIN — SODIUM BICARBONATE 650 MG: 650 TABLET ORAL at 16:37

## 2020-06-03 RX ADMIN — DIPHENOXYLATE HYDROCHLORIDE AND ATROPINE SULFATE 1 TABLET: 2.5; .025 TABLET ORAL at 16:37

## 2020-06-03 RX ADMIN — DORZOLAMIDE HYDROCHLORIDE AND TIMOLOL MALEATE 1 DROP: 20; 5 SOLUTION/ DROPS OPHTHALMIC at 09:37

## 2020-06-03 RX ADMIN — ASCORBIC ACID, THIAMINE MONONITRATE,RIBOFLAVIN, NIACINAMIDE, PYRIDOXINE HYDROCHLORIDE, FOLIC ACID, CYANOCOBALAMIN, BIOTIN, CALCIUM PANTOTHENATE, 1 CAPSULE: 100; 1.5; 1.7; 20; 10; 1; 6000; 150000; 5 CAPSULE, LIQUID FILLED ORAL at 09:36

## 2020-06-03 RX ADMIN — POTASSIUM CHLORIDE 40 MEQ: 750 TABLET, FILM COATED, EXTENDED RELEASE ORAL at 12:59

## 2020-06-03 RX ADMIN — FERROUS SULFATE TAB 325 MG (65 MG ELEMENTAL FE) 325 MG: 325 (65 FE) TAB at 16:37

## 2020-06-03 RX ADMIN — LOPERAMIDE HYDROCHLORIDE 2 MG: 2 CAPSULE ORAL at 11:13

## 2020-06-03 RX ADMIN — POTASSIUM CHLORIDE 40 MEQ: 750 TABLET, FILM COATED, EXTENDED RELEASE ORAL at 09:36

## 2020-06-03 RX ADMIN — SODIUM CHLORIDE, SODIUM LACTATE, POTASSIUM CHLORIDE, AND CALCIUM CHLORIDE 150 ML/HR: 600; 310; 30; 20 INJECTION, SOLUTION INTRAVENOUS at 20:20

## 2020-06-03 RX ADMIN — DIPHENOXYLATE HYDROCHLORIDE AND ATROPINE SULFATE 1 TABLET: 2.5; .025 TABLET ORAL at 18:00

## 2020-06-03 RX ADMIN — FAMOTIDINE 20 MG: 20 TABLET, FILM COATED ORAL at 06:41

## 2020-06-03 RX ADMIN — BRIMONIDINE TARTRATE 1 DROP: 2 SOLUTION OPHTHALMIC at 09:37

## 2020-06-03 RX ADMIN — SODIUM CHLORIDE, SODIUM LACTATE, POTASSIUM CHLORIDE, AND CALCIUM CHLORIDE 150 ML/HR: 600; 310; 30; 20 INJECTION, SOLUTION INTRAVENOUS at 13:00

## 2020-06-03 RX ADMIN — HEPARIN SODIUM 5000 UNITS: 5000 INJECTION INTRAVENOUS; SUBCUTANEOUS at 16:36

## 2020-06-03 RX ADMIN — SODIUM BICARBONATE 650 MG: 650 TABLET ORAL at 22:15

## 2020-06-03 RX ADMIN — HEPARIN SODIUM 5000 UNITS: 5000 INJECTION INTRAVENOUS; SUBCUTANEOUS at 06:41

## 2020-06-03 RX ADMIN — LATANOPROST 1 DROP: 50 SOLUTION OPHTHALMIC at 22:20

## 2020-06-03 RX ADMIN — Medication: at 10:00

## 2020-06-03 RX ADMIN — BRIMONIDINE TARTRATE 1 DROP: 2 SOLUTION OPHTHALMIC at 22:19

## 2020-06-03 RX ADMIN — DIPHENOXYLATE HYDROCHLORIDE AND ATROPINE SULFATE 1 TABLET: 2.5; .025 TABLET ORAL at 22:15

## 2020-06-03 NOTE — PROGRESS NOTES
Nephrology Progress Note  [address] [facility]  [facility], Jane Griggs, [address]  Phone - [phone]         Fax - [phone]    Patient: Aba Borrego    YOB: 1940    Date- 6/3/2020        Admit Date: 6/1/2020     CC: Follow up for SONIDO          IMPRESSION & PLAN:   SONIDO  on CKD:  - suspect 2/2 volume depletion from GI losses + Xeloda-- no hydro on renal usg  -change ivf to LR  -no indication for RRT    hypokalemia  kcl 40 meq po this am and 40 meq in afternoon    History of metabolic acidosis  Continue po bicarb    DIARRHEA due to XELODA  Agree with GI consult    CKD Stage IV -cystic kidney dis  - 2/2 ADPKD, HTN   - baseline Creatinine : 2 mg/ dl      Chronic Anemia     Esophageal Ca   Hypothyroidism   EMILY       Subjective: Interval History:   -SONIDO /CR UNCHANGED  K IMPROVED  ACIDOSIS WORSE  Blood pressure stable    ROS:-Complaint of diarrhea   No c/o sob,  No c/o chest pain,   No c/o  vomiting, No c/o  fever. Objective:   Visit Vitals  /58   Pulse 78   Temp 98 °F (36.7 °C)   Resp 18   Ht 5' 8\" (1.727 m)   Wt 88.6 kg (195 lb 5.2 oz)   SpO2 100%   Breastfeeding No   BMI 29.70 kg/m²     Last 3 Recorded Weights in this Encounter    06/01/20 1807 06/02/20 1445   Weight: 88.6 kg (195 lb 5.2 oz) 88.6 kg (195 lb 5.2 oz)     06/01 1901 - 06/03 0700  In: 769.2 [I.V.:769.2]  Out: 300 [Urine:300]    Intake/Output Summary (Last 24 hours) at 6/3/2020 1246  Last data filed at 6/3/2020 0940  Gross per 24 hour   Intake 1455 ml   Output 250 ml   Net 1205 ml      Physical exam:   GEN: NAD  NECK- Supple, no mass  RESP: CLEAR b/l, no wheezing  CVS: S1,S2  RRR  ABDO:soft, NT, No mass  NEURO: normal speech, non focal  EXT: No Edema   Discussed with patient and the nurse  Chart reviewed. Pertinent Notes reviewed.      Data Review :  Recent Labs     06/03/20  0255 06/02/20  1373 06/02/20  0009    137 136   K 3.5 3.3* 2.5*   * 108 106   CO2 15* 20* 19*   * 101* 107*   CREA 3.93* 4.05* 4.39*    111* 103*   CA 7.9* 8.0* 8.0*   MG 2.1  --  2.0   PHOS 4.5  --  4.7     Recent Labs     06/03/20  0255 06/02/20  0009   WBC 7.4 7.1   HGB 10.1* 10.7*   HCT 29.0* 29.7*    189     No results for input(s): FE, TIBC, PSAT, FERR in the last 72 hours. No results for input(s): CPK, CKNDX, TROIQ in the last 72 hours. No lab exists for component: CPKMB  Lab Results   Component Value Date/Time    Color YELLOW/STRAW 06/02/2020 10:29 PM    Appearance TURBID (A) 06/02/2020 10:29 PM    Specific gravity 1.015 06/02/2020 10:29 PM    pH (UA) 5.5 06/02/2020 10:29 PM    Protein TRACE (A) 06/02/2020 10:29 PM    Glucose Negative 06/02/2020 10:29 PM    Ketone Negative 06/02/2020 10:29 PM    Bilirubin NEGATIVE  03/04/2020 10:38 PM    Urobilinogen 0.2 06/02/2020 10:29 PM    Nitrites Negative 06/02/2020 10:29 PM    Leukocyte Esterase Negative 06/02/2020 10:29 PM    Epithelial cells FEW 06/02/2020 10:29 PM    Bacteria Negative 06/02/2020 10:29 PM    WBC 0-4 06/02/2020 10:29 PM    RBC 0-5 06/02/2020 10:29 PM     Lab Results   Component Value Date/Time    Culture result: NO GROWTH 5 DAYS 03/08/2020 04:03 AM    Culture result:  03/06/2020 09:15 AM     MRSA NOT PRESENT. Apparent Staphylococus aureus (not MRSA noted). Culture result:  03/06/2020 09:15 AM         Screening of patient nares for MRSA is for surveillance purposes and, if positive, to facilitate isolation considerations in high risk settings. It is not intended for automatic decolonization interventions per se as regimens are not sufficiently effective to warrant routine use.      No results found for: SDES  Lab Results   Component Value Date/Time    Sodium,urine random 18 06/02/2020 10:29 PM    Creatinine, urine 97.00 06/02/2020 10:29 PM     Results from Hospital Encounter encounter on 06/01/20   XR CHEST PORT    Narrative INDICATION: Central line placement    COMPARISON: March 11, 2020    FINDINGS: AP portable imaging of the chest performed at 3:15 AM demonstrates a  stable cardiomediastinal silhouette. Lung volumes are low. There is mild  bibasilar atelectasis. Right internal jugular central venous catheter tip  overlies the cavoatrial junction. No pneumothorax or pleural effusion is  evident. No significant osseous abnormalities are seen. Impression IMPRESSION: Right internal jugular central venous catheter appears to be in  satisfactory position. No evidence of placement related complication. Low lung  volumes with bibasilar atelectasis. Medication list  reviewed  No current facility-administered medications on file prior to encounter. Current Outpatient Medications on File Prior to Encounter   Medication Sig Dispense Refill    ferrous sulfate 324 mg (65 mg iron) tablet Take 324 mg by mouth two (2) times daily (with meals).  omeprazole (PRILOSEC OTC) 20 mg tablet Take 20 mg by mouth daily.  sodium bicarbonate 650 mg tablet Take 650 mg by mouth two (2) times a day.  folic acid-vit Q4-NOO J89 (FOLBEE) 2.5-25-1 mg tablet Take 1 Tab by mouth daily.  iron bisgly,ps-FA-B-C#12-succ 65 mg-65 mg -1,000 mcg (24) tab Take 1 Tab by mouth daily.  latanoprost (XALATAN) 0.005 % ophthalmic solution Administer 1 Drop to both eyes nightly.  MULTIVITS W-FE,OTHER MIN/LUT (CENTRUM SILVER ULTRA WOMEN'S PO) Take 1 Tab by mouth daily.  DORZOLAMIDE HCL/TIMOLOL MALEAT (DORZOLAMIDE-TIMOLOL OP) Apply 1 Drop to eye three (3) times daily. One drop to each eye twice daily       brimonidine (ALPHAGAN) 0.2 % ophthalmic solution Administer 1 Drop to both eyes three (3) times daily.  levothyroxine (SYNTHROID) 100 mcg tablet Take 100 mcg by mouth Daily (before breakfast). Indications: HYPOTHYROIDISM      capecitabine (XELODA) 500 mg tablet 1,500 mg two (2) times a day.  X 14 days then 7 days off      acetaminophen (TYLENOL EXTRA STRENGTH) 500 mg tablet Take 1,000 mg by mouth every six (6) hours as needed. Indications: ARTHRITIC PAIN, PAIN                         Lyudmila Lynn MD  [facility]   [email or web address]

## 2020-06-03 NOTE — PROGRESS NOTES
SPEECH THERAPY SCREENING:  SERVICES ARE NOT INDICATED AT THIS TIME    [facility] triggered for speech therapy based on results obtained during the nursing admission assessment. The patients chart was reviewed and the patient is not appropriate for a skilled therapy evaluation at this time. Please consult speech therapy if any therapy needs arise. Thank you.     Leonel Avendaño, SLP

## 2020-06-03 NOTE — PROGRESS NOTES
Problem: Self Care Deficits Care Plan (Adult)  Goal: *Acute Goals and Plan of Care (Insert Text)  Description:     FUNCTIONAL STATUS PRIOR TO ADMISSION: Patient was independent to mod I for ADLs and ambulating with a cane. HOME SUPPORT: The patient lived with her 2 grandsons who work in the afternoon. Occupational Therapy Goals  Initiated 6/3/2020  1. Patient will perform grooming standing at sink with supervision/set-up within 7 day(s). 2.  Patient will perform sponge bathing with supervision/set-up within 7 day(s). 3.  Patient will perform lower body dressing with supervision/set-up within 7 day(s). 4.  Patient will perform toilet transfers with supervision/set-up within 7 day(s). 5.  Patient will perform all aspects of toileting with supervision/set-up within 7 day(s). Outcome: Progressing Towards Goal    OCCUPATIONAL THERAPY EVALUATION  Patient: Juliet Cantrell (44 y.o. female)  Date: 6/3/2020  Primary Diagnosis: [facility] [N17.9, N18.9]        Precautions:   Fall    ASSESSMENT  Based on the objective data described below, the patient presents with GW, decreased activity tolerance, decreased safety awareness and decreased standing balance which is impairing her functional independence. The patient is functioning below her independent to mod I baseline, now performing ADLs at an independent to mod A level and is supervision to [place] [id number] for functional mobility. The patient will benefit from acute OT intervention and will hopefully continue to make steady progress and be able to return home with [facility] OT, PT and supervision after discharge. Functional Outcome Measure: The patient scored 40/100 on the Barthel Index outcome measure which is indicative of a 60% impairment in function.           PLAN :  Recommendations and Planned Interventions: self care training, functional mobility training, therapeutic exercise, balance training, therapeutic activities, endurance activities, patient education, home safety training, and family training/education    Frequency/Duration: Patient will be followed by occupational therapy 3 times a week to address goals. Recommendation for discharge: (in order for the patient to meet his/her long term goals)  Occupational therapy at least 2 days/week in the home AND ensure assist and/or supervision for safety with ADLs and functional mobility. Equipment recommendations for successful discharge (if) home: TBD pending progress.         OBJECTIVE DATA SUMMARY:   HISTORY:   Past Medical History:   Diagnosis Date    Abnormal x-ray of abdomen 5/22/2019    Bas showed food and irregular stricture above ge junction in addition to large hiatal hernia  5.20.2019    Adenocarcinoma of esophagus ([address] 5/30/2019    Anemia     Arthritis     Chronic kidney disease     Stage II followed by Dr Zoltan Diaz Neph[facility]     Diverticulosis     Esophageal dysphagia 5/22/2019    H/O colonoscopy with polypectomy     benign    History of colon polyps 6/1/2018    2013 tubular adenoma     Hypertension     Ill-defined condition     pulmonary hypertension    Other ill-defined conditions(799.89)     glaucoma and cataracts    Sleep apnea     CPAP compliant, as stated 5/20/2019    Thromboembolus ([address] 2015    Right  leg , spontaneous    Thyroid disease     hypothyroidism     Past Surgical History:   Procedure Laterality Date    ANAL PRESSURE RECORD  6/6/2019    COLONOSCOPY N/A 6/1/2018    COLONOSCOPY performed by Maryann Conroy MD at [address]  6/1/2018         HX CATARACT REMOVAL Bilateral 2017    HX HEENT  1984    thyroid biopsy-benign    HX HYSTERECTOMY      IR INSERT NON TUNL CVC OVER 5 YRS  3/8/2020    IR INSERT TUNL CVC W PORT OVER 5 YEARS  7/19/2019    IR REMOVE TUNL CVAD W/O PORT / PUMP  3/8/2020    UPPER GI ENDOSCOPY,BALL DIL,30MM  5/22/2019         UPPER GI ENDOSCOPY,BALL DIL,30MM  5/30/2019         UPPER GI ENDOSCOPY,BIOPSY  5/22/2019 UPPER GI ENDOSCOPY,BIOPSY  5/30/2019         UPPER GI ENDOSCOPY,BIOPSY  10/17/2019            Expanded or extensive additional review of patient history:     Home Situation  Home Environment: Private residence  # Steps to Enter: 4  Rails to Enter: Yes  Hand Rails : Bilateral  Wheelchair Ramp: No  One/Two Story Residence: One story  Living Alone: No  Support Systems: Child(nadeem)  Patient Expects to be Discharged to[de-identified] Private residence  Current DME Used/Available at Home: Cane, straight, Tub transfer bench  Tub or Shower Type: Tub/Shower combination    Hand dominance: Right    EXAMINATION OF PERFORMANCE DEFICITS:  Cognitive/Behavioral Status:  Neurologic State: Alert  Orientation Level: Oriented X4                  Hearing: Auditory  Auditory Impairment: Hard of hearing, left side    Vision/Perceptual:    Acuity: Within Defined Limits    Corrective Lenses: Reading glasses    Range of Motion:  AROM: Generally decreased, functional    Strength:  Strength: Generally decreased, functional    Coordination:     Fine Motor Skills-Upper: Left Intact; Right Intact    Gross Motor Skills-Upper: Left Intact; Right Intact    Tone & Sensation:   Normal tone  Sensation intact       Balance:  Sitting: Intact  Standing: Impaired  Standing - Static: Constant support;Good  Standing - Dynamic : Constant support; Fair    Functional Mobility and Transfers for ADLs:  Bed Mobility:  Rolling: Supervision; Additional time  Supine to Sit: Stand-by assistance; Additional time  Sit to Supine: Stand-by assistance; Additional time  Scooting: Stand-by assistance; Additional time  Side stepped to [facility] with RW and CGA. Transfers:  Sit to Stand: Contact guard assistance; Additional time  Stand to Sit: Contact guard assistance    ADL Assessment:  Feeding: Independent    Oral Facial Hygiene/Grooming: Contact guard assistance    Bathing: Minimum assistance    Upper Body Dressing: Supervision;Setup    Lower Body Dressing: Minimum assistance    Toileting: Moderate assistance           Functional Measure:  Barthel Index:    Bathin  Bladder: 10  Bowels: 0  Groomin  Dressin  Feeding: 10  Mobility: 0  Stairs: 0  Toilet Use: 5  Transfer (Bed to Chair and Back): 10  Total: 40/100        Percentage of impairment   0%   1-19%   20-39%   40-59%   60-79%   80-99%   100%   Barthel Score 0-100 100 99-80 79-60 59-40 20-39 1-19   0     The Barthel ADL Index: Guidelines  1. The index should be used as a record of what a patient does, not as a record of what a patient could do. 2. The main aim is to establish degree of independence from any help, physical or verbal, however minor and for whatever reason. 3. The need for supervision renders the patient not independent. 4. A patient's performance should be established using the best available evidence. Asking the patient, friends/relatives and nurses are the usual sources, but direct observation and common sense are also important. However direct testing is not needed. 5. Usually the patient's performance over the preceding 24-48 hours is important, but occasionally longer periods will be relevant. 6. Middle categories imply that the patient supplies over 50 per cent of the effort. 7. Use of aids to be independent is allowed. Javy Last., Barthel, D.W. (8812). Functional evaluation: the Barthel Index. [facility] (14)2. ROBERTO Lu, Vida Ha., Ari Paredes., [facility], [address] [place] [address] (). Measuring the change indisability after inpatient rehabilitation; comparison of the responsiveness of the Barthel Index and Functional [place] Measure. Journal of Neurology, Neurosurgery, and Psychiatry, 66(4), 876-160. Kash Calloway, N.J.A, YUE Rossi.DUSTIN.M, & Tristen Demarco M.A. (2004.) Assessment of post-stroke quality of life in cost-effectiveness studies: The usefulness of the Barthel Index and the EuroQoL-5D.  Quality of Life Research, 13, 110-18        Activity Tolerance:   Fair  Please refer to the flowsheet for vital signs taken during this treatment. After treatment patient left in no apparent distress:    Supine in bed and Call bell within reach    COMMUNICATION/EDUCATION:   The patients plan of care was discussed with: Physical Therapist and Registered Nurse. Home safety education was provided and the patient/caregiver indicated understanding., Patient/family have participated as able in goal setting and plan of care. , and Patient/family agree to work toward stated goals and plan of care. This patients plan of care is appropriate for delegation to [facility].     Thank you for this referral.  Julien Brice, OTR/L  Time Calculation: 15 mins

## 2020-06-03 NOTE — PROGRESS NOTES
General Surgery End of Shift Nursing Note    Bedside shift change report given to Javier Dunlap RN (oncoming nurse) by Susie Rose RN (offgoing nurse). Report included the following information SBAR, Kardex and MAR. Shift worked:   [id number]   Summary of shift:    Uneventful shift. Patient had several episodes of loose black stools. I tried several times to get a urine sample and was unable to do so. Patient had no complaints of pain and ate well for breakfast and lunch but had no appetite for dinner. Patient received all order Potassium. Son updated on patients day with her permission.     Issues for physician to address:   None      Number times ambulated in hallway past shift: 0    Number of times OOB to chair past shift: 0    Pain Management:  Current medication: See MAR   Patient states pain is manageable on current pain medication: YES    GI:    Current diet:  DIET REGULAR  DIET NUTRITIONAL SUPPLEMENTS Breakfast, Lunch, Dinner; Ensure Clear  DIET NUTRITIONAL SUPPLEMENTS Lunch, Dinner; Magic Cups    Tolerating current diet: YES  Passing flatus: YES  Last Bowel Movement: today   Appearance: loose, black- Patient notes that these loose dark stools have been happening since May 4th       Patient Safety:    Falls Score: 3      Cole Anderson, [place]

## 2020-06-03 NOTE — PROGRESS NOTES
General Surgery End of Shift Nursing Note    Bedside shift change report given to Snehal Llanos RN (oncoming nurse) by Tyrell Crabtree (offgoing nurse). Report included the following information SBAR, Kardex, Intake/Output, MAR and Recent Results.         True Hinton

## 2020-06-03 NOTE — PROGRESS NOTES
Problem: Mobility Impaired (Adult and Pediatric)  Goal: *Acute Goals and Plan of Care (Insert Text)  Description: FUNCTIONAL STATUS PRIOR TO ADMISSION: Patient was modified independent using a single point cane for functional mobility. Reports she was very independent prior. Has some vision loss from glaucoma. HOME SUPPORT PRIOR TO ADMISSION: The patient lived with son and grandson to assist her as needed. Physical Therapy Goals  Initiated 6/3/2020  1. Patient will move from supine to sit and sit to supine  in bed with modified independence within 7 day(s). 2.  Patient will transfer from bed to chair and chair to bed with modified independence using the least restrictive device within 7 day(s). 3.  Patient will perform sit to stand with modified independence within 7 day(s). 4.  Patient will ambulate with modified independence for 200 feet with the least restrictive device within 7 day(s). 5.  Patient will ascend/descend 4 stairs with 1 handrail(s) with modified independence within 7 day(s). Outcome: Progressing Towards Goal   PHYSICAL THERAPY EVALUATION  Patient: Adan Clark (55 y.o. female)  Date: 6/3/2020  Primary Diagnosis: Acute on chronic renal failure ([facility] [address] [N17.9, N18.9]        Precautions:   Fall      ASSESSMENT  Based on the objective data described below, the patient presents with decreased functional mobility from baseline level of function. Patient limited by loose stool, decreased strength, gait and balance instability and impaired activity tolerance. Patient currently needing SBA for bed mobility and CGA for transfers. Able to take a few steps to [facility] with RW and CGA with additional time. Unable to progress further secondary to continued loose stool. Anticipate she will be safe to DC home with [facility] PT and family support pending medical stability/progression.       Other factors to consider for discharge: at risk for falls, below functional baseline     Patient will benefit from skilled therapy intervention to address the above noted impairments. PLAN :  Recommendations and Planned Interventions: bed mobility training, transfer training, gait training, therapeutic exercises, neuromuscular re-education, patient and family training/education, and therapeutic activities      Frequency/Duration: Patient will be followed by physical therapy:  3 times a week to address goals. Recommendation for discharge: (in order for the patient to meet his/her long term goals)  Physical therapy at least 2 days/week in the home AND ensure assist and/or supervision for safety with mobility       IF patient discharges home will need the following DME: rolling walker         SUBJECTIVE:   Patient stated I could do everything for myself before I got sick.     OBJECTIVE DATA SUMMARY:   HISTORY:    Past Medical History:   Diagnosis Date    Abnormal x-ray of abdomen 5/22/2019    Bas showed food and irregular stricture above ge junction in addition to large hiatal hernia  5.20.2019    Adenocarcinoma of esophagus ([address] 5/30/2019    Anemia     Arthritis     Chronic kidney disease     Stage II followed by Dr Vero Connelly Nephrology     Diverticulosis     Esophageal dysphagia 5/22/2019    H/O colonoscopy with polypectomy     benign    History of colon polyps 6/1/2018    2013 tubular adenoma     Hypertension     Ill-defined condition     pulmonary hypertension    Other ill-defined conditions(799.89)     glaucoma and cataracts    Sleep apnea     CPAP compliant, as stated 5/20/2019    Thromboembolus ([address] 2015    Right  leg , spontaneous    Thyroid disease     hypothyroidism     Past Surgical History:   Procedure Laterality Date    ANAL PRESSURE RECORD  6/6/2019    COLONOSCOPY N/A 6/1/2018    COLONOSCOPY performed by Horace Callahan MD at [address]  6/1/2018         HX CATARACT REMOVAL Bilateral 2017    HX HEENT  1984    thyroid biopsy-benign    HX HYSTERECTOMY      IR INSERT NON TUNL CVC OVER 5 YRS  3/8/2020    IR INSERT TUNL CVC W PORT OVER 5 YEARS  7/19/2019    IR REMOVE TUNL CVAD W/O PORT / PUMP  3/8/2020    UPPER GI ENDOSCOPY,BALL DIL,30MM  5/22/2019         UPPER GI ENDOSCOPY,BALL DIL,30MM  5/30/2019         UPPER GI ENDOSCOPY,BIOPSY  5/22/2019         UPPER GI ENDOSCOPY,BIOPSY  5/30/2019         UPPER GI ENDOSCOPY,BIOPSY  10/17/2019            Personal factors and/or comorbidities impacting plan of care:     Home Situation  Home Environment: Private residence  # Steps to Enter: 4  Rails to Enter: Yes  Hand Rails : Bilateral  Wheelchair Ramp: No  One/Two Story Residence: One story  Living Alone: No  Support Systems: Child(nadeem)  Patient Expects to be Discharged to[de-identified] Private residence  Current DME Used/Available at Home: Cane, straight, Tub transfer bench  Tub or Shower Type: Tub/Shower combination    EXAMINATION/PRESENTATION/DECISION MAKING:   Critical Behavior:  Neurologic State: Alert  Orientation Level: Oriented X4        Hearing: Auditory  Auditory Impairment: Hard of hearing, left side    Range Of Motion:  AROM: Generally decreased, functional                       Strength:    Strength: Generally decreased, functional       Functional Mobility:  Bed Mobility:  Rolling: Supervision; Additional time  Supine to Sit: Stand-by assistance; Additional time  Sit to Supine: Stand-by assistance; Additional time  Scooting: Stand-by assistance; Additional time  Transfers:  Sit to Stand: Contact guard assistance; Additional time  Stand to Sit: Contact guard assistance                       Balance:   Sitting: Intact  Standing: Impaired  Standing - Static: Constant support;Good  Standing - Dynamic : Constant support; Fair  Ambulation/Gait Training:  Distance (ft): 5 Feet (ft)(steps to [facility][1])  Assistive Device: Gait belt;Walker, rolling  Ambulation - Level of Assistance: Contact guard assistance     Gait Description (WDL): Exceptions to [facility]           Base of Support: Center of gravity altered; Widened Speed/Jacki: Slow  Step Length: Left shortened;Right shortened       Pain Rating:  No c/o pain    Activity Tolerance:   Fair and requires rest breaks  Please refer to the flowsheet for vital signs taken during this treatment. After treatment patient left in no apparent distress:   Supine in bed, Call bell within reach, and Side rails x 3    COMMUNICATION/EDUCATION:   The patients plan of care was discussed with: Physical therapist, Occupational therapist, and Registered nurse. Fall prevention education was provided and the patient/caregiver indicated understanding., Patient/family have participated as able in goal setting and plan of care. , and Patient/family agree to work toward stated goals and plan of care.     Thank you for this referral.  Patrick Quintana, PT, DPT   Time Calculation: 20 mins

## 2020-06-03 NOTE — PROGRESS NOTES
-Hematology / Oncology ([facility]) -  -Primary Oncologist- Dr. Yury Yuan   -[facility]- \" I am tired but a bit better\"    Discussed with Dr Radha Buck feeling very weak and having loose stools after eating, had dose of imodium this am  -O-      Patient Vitals for the past 24 hrs:   Temp Pulse Resp BP SpO2   06/03/20 1105 98 °F (36.7 °C) 78 18 112/58 100 %   06/03/20 0711 98.3 °F (36.8 °C) 76 18 100/65 100 %   06/03/20 0257 98.1 °F (36.7 °C) 73 18 98/61 100 %   06/02/20 2319 97.4 °F (36.3 °C) 75 18 92/68 100 %   06/1940 97.6 °F (36.4 °C) 76 18 100/68 100 %   06/02/20 1903 97.7 °F (36.5 °C) 73 18 104/68 97 %   06/02/20 1530 97.6 °F (36.4 °C) 62 17 100/76 100 %   06/02/20 1310 97.4 °F (36.3 °C) 71 16 99/67 100 %     06/03 0701 - 06/03 1900  In: 1042.5 [I.V.:1042.5]  Out: -   Review of Systems:   Constitutional No fevers, chills, night sweats. Increasing weakness and wt loss over past 2 months   Allergic/Immunologic No recent allergic reactions   Eyes No significant visual difficulties. No diplopia. ENMT No problems with hearing, no sore throat, no sinus drainage. Endocrine No hot flashes or night sweats. No cold intolerance, polyuria, or polydipsia   Hematologic/Lymphatic No easy bruising or bleeding. The patient denies any tender or palpable lymph nodes   Breasts No abnormal masses of breast, nipple discharge or pain. Respiratory No dyspnea on exertion, orthopnea, chest pain, cough or hemoptysis. Cardiovascular No anginal chest pain, irregular heart beat, tachycardia, palpitations or orthopnea. Gastrointestinal Nausea starting about 1 week ago, increasingly poor appetite, diarrhea starting about 1 wek ago with end of Xeloda cycle. Pt reports some improvement in diarrhea with Lomotil over the weekend   Genitourinary (M) No hematuria, dysuria, increased frequency, urgency, hesitancy or incontinence. Musculoskeletal No joint pain, swelling or redness. No decreased range of motion.    Integumentary No chronic rashes, inflammation, ulcerations, pruritus, petechiae, purpura, ecchymoses, or skin changes. Neurologic No headache, blurred vision, and no areas of focal weakness or numbness. Normal gait. No sensory problems. Psychiatric No insomnia, depression, nereyda or mood swings. No psychotropic drugs.         Physical Exam:  Constitutional Alert, cooperative, oriented. Mood and affect appropriate. Appears close to chronological age. Well nourished. Well developed. Head Normocephalic; no scars   Eyes Conjunctivae and sclerae are clear and without icterus. Pupils are reactive and equal.   ENMT Sinuses are nontender. No oral exudates, ulcers, masses, thrush or mucositis. Oropharynx clear. Tongue normal.   Neck Supple without masses or thyromegaly. No jugular venous distension. Hematologic/Lymphatic No petechiae or purpura. No tender or palpable lymph nodes in the cervical, supraclavicular, axillary or inguinal area. Respiratory Lungs are clear to auscultation without rhonchi or wheezing. Cardiovascular Regular rate and rhythm of heart without murmurs, gallops or rubs. Chest / Line Site Chest is symmetric with no chest wall deformities. Abdomen Non-tender, non-distended, no masses, ascites or hepatosplenomegaly. Good bowel sounds. No guarding or rebound tenderness. No pulsatile masses. Musculoskeletal Requires assistance with transfer to [facility] 23   Extremities No visible deformities, no cyanosis, clubbing. 2+ bilateral LE edema   Skin No rashes, scars, or lesions suggestive of malignancy. No petechiae, purpura, or ecchymoses. No excoriations. Neurologic No sensory or motor deficits, normal cerebellar function, normal gait, cranial nerves intact. Psychiatric Alert and oriented times three. Coherent speech.  Verbalizes understanding of our discussions today.        -Labs-    Recent Labs     06/03/20  0255 06/02/20  1324 06/02/20  0009   WBC 7.4  --  7.1   HGB 10.1*  --  10.7*     --  189   ANEU 6.0  -- 5.5    137 136   K 3.5 3.3* 2.5*    111* 103*   * 101* 107*   CREA 3.93* 4.05* 4.39*   ALT 12  --  11*   TBILI 0.5  --  0.6   AP 59  --  60   CA 7.9* 8.0* 8.0*   MG 2.1  --  2.0   PHOS 4.5  --  4.7     Imagin20 renal us:  IMPRESSION:   Innumerable bilateral renal cysts, compatible with adult polycystic kidney  disease. No hydronephrosis. Oncology Summary:  Pt was on Xeloda single agent for esophageal cancer and had ablation per  Dr. Montoya [facility] 20. She had started C4 of Xeloda at max dose(1500 mg BID  /days)  per renal function  3/2/20 but stopped 3/4/20 when she was admitted  To [facility] for septicemia. She was discharged 3/12/20 and  received IV antibiotics in [facility] until 3/23/20.       Pt resumed Xeloda 1500 mg BID 3/30-20.       Pt completed last 14 day cycle of Xeloda 20. She arrived to clinic 20 reporting several days of diarrhea, nausea and poor appetite. Pt was given hydration and anti-emetics in clinic on 20 and returned to clinic today for further hydration and re-evaluation.      -Assessment + Plan-     *) SONIDO on CKD:  - Admitted from clinic at end of day  when she presented with Cr 4.3 on . Baseline Cr ~2.0  - Suspected pre renal due to diarrhea from Xeloda therapy that was completed 20  - Appreciate Nephrology's assistance  - Hydration per renal  - Renal us showed polycystic disease  -  Urinalysis unremarkable   - Cr only mildly improved to 3.9 this am      *) Xeloda induced Diarrhea:  - Cont Lomotil scheduled and Imodium prn  - cont IVF  - given diarrhea still persistent will ask GI to see her and ensure no infectious etiology, has been off Xeloda since      *)Hypokalemia, due to GI losses:  - K improved today to 3.5      *) Esophageal cancer:  - sp ablation and adjuvant Xeloda that concluded     *) Chemo induced anemia:  - hbg 10. 1.  continue to monitor    *) Cancer related anorexia, protein calorie malnutrition  - encouraged her to drink Ensure

## 2020-06-03 NOTE — CONSULTS
Gastroenterology Consult     Referring Physician: Dr. Lorraine Dykes Date: 6/3/2020     Subjective:     Chief Complaint: diarrhea    History of Present Illness: Seema Deluna is a 78 y.o. female who is seen in consultation for diarrhea. [facility] esophageal cancer s/p ablation by Dr. Marc Girard 2/28/20. Was receiving Xeloda and diarrhea was presumed a side effect of Xeloda but last dose was 5/24/20. She was admitted 6/1 from oncology clinic when she presented with Crt of 4.3. Renal is following. K has been replaced and  IV hydration provided. Has worsening metabolic acidosis and is receiving bicarb. States the diarrhea started 5/4 after drinking the contrast for the CT. CT showed normal appearing colon and small bowel. Large hiatal hernia. Diarrhea was off/on through the month of may but started getting worse 2 weeks ago. +Nocturnal stooling. Diarrhea is worse after eating/drinking anything. It has been black. Takes 2 iron pills per day. Hgb 10.1, up from 8.2 in March. 5 stools recorded in flow sheet for yesterday. Lomotil and Imodium have helped some. Prior to admission she was having diarrhea every time she would go to urinate. Night nurse reported several loose black stools. No abd pain or fever. She was hospitalized at [facility] at the end of March and received IV meropenam and rocephin for UTI/sepsis. 30+ lb weight loss over the past 1 month. Hasn't been eating much because \"it goes right through. \" No nausea or vomiting. No oily or greasy stools. Prior to onset of diarrhea, stools were pretty regular with only occasional constipation.       Most recent colonoscopy 6/1/2018:  -3 polyps transverse colon (12mm, 8mm, 6mm) s/p hot snare  -left sided diverticula  -mucosal prolapse  Path: tubular adenoma    Past Medical History:   Diagnosis Date    Abnormal x-ray of abdomen 5/22/2019    Bas showed food and irregular stricture above ge junction in addition to large hiatal hernia 5.20.2019    Adenocarcinoma of esophagus ([facility] [address] 5/30/2019    Anemia     Arthritis     Chronic kidney disease     Stage II followed by Dr Varsha Childs Nephrology     Diverticulosis     Esophageal dysphagia 5/22/2019    H/O colonoscopy with polypectomy     benign    History of colon polyps 6/1/2018    2013 tubular adenoma     Hypertension     Ill-defined condition     pulmonary hypertension    Other ill-defined conditions(799.89)     glaucoma and cataracts    Sleep apnea     CPAP compliant, as stated 5/20/2019    Thromboembolus ([facility] [address] 2015    Right  leg , spontaneous    Thyroid disease     hypothyroidism     Past Surgical History:   Procedure Laterality Date    ANAL PRESSURE RECORD  6/6/2019    COLONOSCOPY N/A 6/1/2018    COLONOSCOPY performed by Escobar Bush MD at [address] [facility] [place]  6/1/2018         HX CATARACT REMOVAL Bilateral 2017    HX HEENT  1984    thyroid biopsy-benign    HX HYSTERECTOMY      IR INSERT NON TUNL CVC OVER 5 YRS  3/8/2020    IR INSERT TUNL CVC W PORT OVER 5 YEARS  7/19/2019    IR REMOVE TUNL CVAD W/O PORT / PUMP  3/8/2020    UPPER GI ENDOSCOPY,BALL DIL,30MM  5/22/2019         UPPER GI ENDOSCOPY,BALL DIL,30MM  5/30/2019         UPPER GI ENDOSCOPY,BIOPSY  5/22/2019         UPPER GI ENDOSCOPY,BIOPSY  5/30/2019         UPPER GI ENDOSCOPY,BIOPSY  10/17/2019           History reviewed. No pertinent family history.   Social History     Tobacco Use    Smoking status: Never Smoker    Smokeless tobacco: Never Used   Substance Use Topics    Alcohol use: Not Currently     Comment: in her 19's       No Known Allergies  Current Facility-Administered Medications   Medication Dose Route Frequency    lactated Ringers infusion  150 mL/hr IntraVENous CONTINUOUS    sodium bicarbonate tablet 650 mg  650 mg Oral TID    0.45% sodium chloride infusion  150 mL/hr IntraVENous CONTINUOUS    loperamide (IMODIUM) capsule 2 mg  2 mg Oral PRN    famotidine (PEPCID) tablet 20 mg  20 mg Oral ACB    heparin (porcine) injection 5,000 Units  5,000 Units SubCUTAneous Q8H    acetaminophen (TYLENOL) tablet 650 mg  650 mg Oral Q6H PRN    brimonidine (ALPHAGAN) 0.2 % ophthalmic solution 1 Drop  1 Drop Both Eyes TID    ferrous sulfate tablet 325 mg  325 mg Oral BID WITH MEALS    B complex-vitaminC-folic acid (NEPHROCAP) cap  1 Cap Oral DAILY    latanoprost (XALATAN) 0.005 % ophthalmic solution 1 Drop  1 Drop Both Eyes QHS    levothyroxine (SYNTHROID) tablet 100 mcg  100 mcg Oral 6am    LORazepam (ATIVAN) injection 1 mg  1 mg IntraVENous Q4H PRN    ondansetron (ZOFRAN) injection 4 mg  4 mg IntraVENous Q4H PRN    diphenoxylate-atropine (LOMOTIL) tablet 1 Tab  1 Tab Oral QID PRN    dorzolamide-timoloL (COSOPT) 22.3-6.8 mg/mL ophthalmic solution 1 Drop  1 Drop Both Eyes BID        Review of Systems:  A detailed review of systems was performed as follows:  Constitutional:  +weight loss  Eyes:  No ocular sensitivity to the sun, blurred vision or double vision. ENMT:  No nose or sinus problems. Respiratory: No coughing, wheezing or sob  Cardiac:  No chest pain, exertional chest pain or palpitations  Gastrointestinal:  See history of the present illness  :   Can't tell when it's coming (urine)  Musculoskeletal:  No arthritis or hot swollen joints. Endocrine:  + thyroid disease   Psychiatric: No depression or feeling blue  Integumentary:  No skin rash or sensitivity to the sun. Neurologic:  No stroke or seizure; no numbness or tingling of the extremities. Heme-Lymphatic:  +hx of anemia and esophageal cancer      Objective:     Physical Exam:  Visit Vitals  /58   Pulse 78   Temp 98 °F (36.7 °C)   Resp 18   Ht 5' 8\" (1.727 m)   Wt 88.6 kg (195 lb 5.2 oz)   SpO2 100%   Breastfeeding No   BMI 29.70 kg/m²        Gen: ill/weak appearing black female  Skin:  Extremities and face reveal no rashes. HEENT: Sclerae anicteric. No abnormal pigmentation of the lips. Cardiovascular: Regular rate and rhythm. No murmurs, gallops, or rubs. Respiratory:  Comfortable breathing with no accessory muscle use. Clear breath sounds with no wheezes, rales, or rhonchi. GI:  Abdomen nondistended, soft, and minimally and diffusely tender without guarding or rebounding . Normal active bowel sounds. No enlargement of the liver or spleen. No masses palpable. Rectal:  Deferred  Musculoskeletal:  No pitting edema of the lower legs. Neurological:  Gross memory appears intact. Patient is alert and oriented. Psychiatric:  Mood appears appropriate with judgement intact. Lymphatic:  No cervical or supraclavicular adenopathy. Lab/Data Review:  Lab Results   Component Value Date/Time    WBC 7.4 06/03/2020 02:55 AM    HGB 10.1 (L) 06/03/2020 02:55 AM    HCT 29.0 (L) 06/03/2020 02:55 AM    PLATELET 930 80/21/6735 02:55 AM    MCV 97.0 06/03/2020 02:55 AM     Lab Results   Component Value Date/Time    Sodium 140 06/03/2020 02:55 AM    Potassium 3.5 06/03/2020 02:55 AM    Chloride 113 (H) 06/03/2020 02:55 AM    CO2 15 (LL) 06/03/2020 02:55 AM    Anion gap 12 06/03/2020 02:55 AM    Glucose 100 06/03/2020 02:55 AM     (H) 06/03/2020 02:55 AM    Creatinine 3.93 (H) 06/03/2020 02:55 AM    BUN/Creatinine ratio 27 (H) 06/03/2020 02:55 AM    GFR est AA 13 (L) 06/03/2020 02:55 AM    GFR est non-AA 11 (L) 06/03/2020 02:55 AM    Calcium 7.9 (L) 06/03/2020 02:55 AM    Bilirubin, total 0.5 06/03/2020 02:55 AM    Alk.  phosphatase 59 06/03/2020 02:55 AM    Protein, total 5.0 (L) 06/03/2020 02:55 AM    Albumin 2.4 (L) 06/03/2020 02:55 AM    Globulin 2.6 06/03/2020 02:55 AM    A-G Ratio 0.9 (L) 06/03/2020 02:55 AM    ALT (SGPT) 12 06/03/2020 02:55 AM     CT Results (most recent):  Results from [place] encounter on 05/04/20   CT CHEST WO CONT    Narrative EXAM: CT CHEST WO CONT, CT ABD PELV WO CONT    INDICATION: History of esophageal cancer of the upper third of the esophagus  status post cryoablation on chemotherapy with history of prior esophageal stent. COMPARISON: Upper GI 3/11/2020, CT abdomen pelvis 3/6/2020, and chest x-ray  3/6/2020. TECHNIQUE: Unenhanced 5 mm axial images were obtained through the chest,  abdomen, and pelvis. Oral contrast administered. Coronal and sagittal  reconstructions were generated. CT dose reduction was achieved through use of a  standardized protocol tailored for this examination and automatic exposure  control for dose modulation. FINDINGS:    THYROID: No nodule. MEDIASTINUM: No mass or lymphadenopathy. GLEN: No mass or lymphadenopathy. THORACIC AORTA: No dissection or aneurysm. MAIN PULMONARY ARTERY: Normal in caliber. TRACHEA/BRONCHI: Patent. ESOPHAGUS: Diffuse mild wall thickening with no evident mass or dilation. HEART: Borderline enlarged in size with small pericardial effusion. PLEURA: Trace left pleural effusion. No pneumothorax. LUNGS: Bilateral lower lobe atelectasis. Aerated lungs are clear. LIVER: Borderline dense segment 7 lesion has increased in size measuring 5.4 x  5.5 cm (2, 45), previously 4.2 x 4.4 cm (3, 27). Multiple additional cysts are  redemonstrated with no clearly evident enlarging lesions otherwise. No biliary  ductal dilation. GALLBLADDER: Undistended. SPLEEN: Unremarkable. PANCREAS: No mass or duct dilation. ADRENALS: Unremarkable. KIDNEYS: No significant change in anterior interpolar partially exophytic  hyperdense lesion from the right kidney measuring 3.0 x 3.5 cm (2, 62),  previously 2.9 x 3.6 cm (3, 41). Innumerable mixed density cysts throughout both  kidneys with no identified enlarging lesion, hydronephrosis, or new stones with  note of punctate 1 to 2 mm collecting system stones in the lower pole of the  right kidney. STOMACH: Large hiatal hernia. Otherwise unremarkable. SMALL BOWEL: No dilatation or wall thickening. COLON: No dilation or wall thickening. APPENDIX: Not seen.  No secondary signs of appendicitis. PERITONEUM: No ascites or pneumoperitoneum. RETROPERITONEUM: No lymphadenopathy or aortic aneurysm. REPRODUCTIVE ORGANS: Uterus and ovaries are surgically absent. Pessary device  noted within the vagina. URINARY BLADDER: No mass or calculus. BONES: Degenerative spine change. No acute fracture or aggressive lesion. ADDITIONAL COMMENTS: N/A      Impression IMPRESSION:    1. No focal esophageal mass with diffuse esophageal wall thickening likely  related to post therapy change. 2. Enlarging segment 7 hepatic lesion suspicious for metastasis with numerous  hepatic and renal cysts redemonstrated compatible with abdominal dominant  polycystic kidney disease which could mask other smaller underlying neoplastic  lesions. Consider PET CT for further evaluation. 3. No significant change in hyperdense partially exophytic anterior interpolar  right renal lesion which previously enlarged and could reflect hemorrhagic cyst  or indolent neoplasm. 4. Additional incidental findings as above with no other sites identified  suspicious for metastatic disease within the thorax, abdomen, or pelvis. Assessment/Plan:     Active Problems:    Acute on chronic renal failure ([address] (6/1/2020)      Severe protein-calorie malnutrition ([address] (6/2/2020)      Diarrhea (6/3/2020)       Xeloda does have a high rate of diarrhea but I agree we should evaluate for infectious causes. Send stool for C. Diff, enteric pathogens, ova and parasites, and fecal WBC. Also send stool for fecal elastase. EPI less likely but given weight loss, we will exclude it. Also check celiac antibodies although this is also much less likely. Schedule lomotil 4 times daily so that patient doesn't have to ask for it. As patient is afebrile, has a normal WBC, has minimal tenderness and CT was done the day she developed diarrhea, I would hold off on repeat CT for now.     CHLOE Strong  06/03/20  1:59 PM

## 2020-06-04 LAB
ALBUMIN SERPL-MCNC: 2.3 G/DL (ref 3.5–5)
ANION GAP SERPL CALC-SCNC: 9 MMOL/L (ref 5–15)
BUN SERPL-MCNC: 95 MG/DL (ref 6–20)
BUN/CREAT SERPL: 28 (ref 12–20)
CALCIUM SERPL-MCNC: 7.9 MG/DL (ref 8.5–10.1)
CHLORIDE SERPL-SCNC: 115 MMOL/L (ref 97–108)
CO2 SERPL-SCNC: 18 MMOL/L (ref 21–32)
CREAT SERPL-MCNC: 3.45 MG/DL (ref 0.55–1.02)
GLUCOSE SERPL-MCNC: 85 MG/DL (ref 65–100)
MAGNESIUM SERPL-MCNC: 1.7 MG/DL (ref 1.6–2.4)
PHOSPHATE SERPL-MCNC: 3.1 MG/DL (ref 2.6–4.7)
POTASSIUM SERPL-SCNC: 3.5 MMOL/L (ref 3.5–5.1)
SODIUM SERPL-SCNC: 142 MMOL/L (ref 136–145)

## 2020-06-04 PROCEDURE — 74011250636 HC RX REV CODE- 250/636: Performed by: INTERNAL MEDICINE

## 2020-06-04 PROCEDURE — 87506 IADNA-DNA/RNA PROBE TQ 6-11: CPT

## 2020-06-04 PROCEDURE — 80069 RENAL FUNCTION PANEL: CPT

## 2020-06-04 PROCEDURE — 36415 COLL VENOUS BLD VENIPUNCTURE: CPT

## 2020-06-04 PROCEDURE — 87177 OVA AND PARASITES SMEARS: CPT

## 2020-06-04 PROCEDURE — 74011250637 HC RX REV CODE- 250/637: Performed by: PHYSICIAN ASSISTANT

## 2020-06-04 PROCEDURE — 74011250637 HC RX REV CODE- 250/637: Performed by: INTERNAL MEDICINE

## 2020-06-04 PROCEDURE — 97530 THERAPEUTIC ACTIVITIES: CPT

## 2020-06-04 PROCEDURE — 0107U C DIFF TOX AG DETCJ IA STOOL: CPT

## 2020-06-04 PROCEDURE — 65270000015 HC RM PRIVATE ONCOLOGY

## 2020-06-04 PROCEDURE — 83520 IMMUNOASSAY QUANT NOS NONAB: CPT

## 2020-06-04 PROCEDURE — 83735 ASSAY OF MAGNESIUM: CPT

## 2020-06-04 PROCEDURE — 87493 C DIFF AMPLIFIED PROBE: CPT

## 2020-06-04 PROCEDURE — 83993 ASSAY FOR CALPROTECTIN FECAL: CPT

## 2020-06-04 PROCEDURE — 89055 LEUKOCYTE ASSESSMENT FECAL: CPT

## 2020-06-04 RX ORDER — POTASSIUM CHLORIDE 750 MG/1
40 TABLET, FILM COATED, EXTENDED RELEASE ORAL
Status: COMPLETED | OUTPATIENT
Start: 2020-06-04 | End: 2020-06-04

## 2020-06-04 RX ADMIN — FAMOTIDINE 20 MG: 20 TABLET, FILM COATED ORAL at 10:40

## 2020-06-04 RX ADMIN — BRIMONIDINE TARTRATE 1 DROP: 2 SOLUTION OPHTHALMIC at 10:44

## 2020-06-04 RX ADMIN — BRIMONIDINE TARTRATE 1 DROP: 2 SOLUTION OPHTHALMIC at 18:08

## 2020-06-04 RX ADMIN — FERROUS SULFATE TAB 325 MG (65 MG ELEMENTAL FE) 325 MG: 325 (65 FE) TAB at 18:07

## 2020-06-04 RX ADMIN — LEVOTHYROXINE SODIUM 100 MCG: 0.1 TABLET ORAL at 05:50

## 2020-06-04 RX ADMIN — SODIUM BICARBONATE 650 MG: 650 TABLET ORAL at 10:41

## 2020-06-04 RX ADMIN — HEPARIN SODIUM 5000 UNITS: 5000 INJECTION INTRAVENOUS; SUBCUTANEOUS at 07:00

## 2020-06-04 RX ADMIN — SODIUM CHLORIDE, SODIUM LACTATE, POTASSIUM CHLORIDE, AND CALCIUM CHLORIDE 150 ML/HR: 600; 310; 30; 20 INJECTION, SOLUTION INTRAVENOUS at 13:10

## 2020-06-04 RX ADMIN — DIPHENOXYLATE HYDROCHLORIDE AND ATROPINE SULFATE 1 TABLET: 2.5; .025 TABLET ORAL at 10:41

## 2020-06-04 RX ADMIN — FERROUS SULFATE TAB 325 MG (65 MG ELEMENTAL FE) 325 MG: 325 (65 FE) TAB at 10:41

## 2020-06-04 RX ADMIN — BRIMONIDINE TARTRATE 1 DROP: 2 SOLUTION OPHTHALMIC at 22:11

## 2020-06-04 RX ADMIN — SODIUM BICARBONATE 650 MG: 650 TABLET ORAL at 18:07

## 2020-06-04 RX ADMIN — ASCORBIC ACID, THIAMINE MONONITRATE,RIBOFLAVIN, NIACINAMIDE, PYRIDOXINE HYDROCHLORIDE, FOLIC ACID, CYANOCOBALAMIN, BIOTIN, CALCIUM PANTOTHENATE, 1 CAPSULE: 100; 1.5; 1.7; 20; 10; 1; 6000; 150000; 5 CAPSULE, LIQUID FILLED ORAL at 10:40

## 2020-06-04 RX ADMIN — SODIUM BICARBONATE 650 MG: 650 TABLET ORAL at 22:10

## 2020-06-04 RX ADMIN — DORZOLAMIDE HYDROCHLORIDE AND TIMOLOL MALEATE 1 DROP: 20; 5 SOLUTION/ DROPS OPHTHALMIC at 10:44

## 2020-06-04 RX ADMIN — POTASSIUM CHLORIDE 40 MEQ: 750 TABLET, FILM COATED, EXTENDED RELEASE ORAL at 10:41

## 2020-06-04 RX ADMIN — HEPARIN SODIUM 5000 UNITS: 5000 INJECTION INTRAVENOUS; SUBCUTANEOUS at 22:11

## 2020-06-04 RX ADMIN — SODIUM CHLORIDE, SODIUM LACTATE, POTASSIUM CHLORIDE, AND CALCIUM CHLORIDE 150 ML/HR: 600; 310; 30; 20 INJECTION, SOLUTION INTRAVENOUS at 20:19

## 2020-06-04 RX ADMIN — LATANOPROST 1 DROP: 50 SOLUTION OPHTHALMIC at 22:11

## 2020-06-04 RX ADMIN — HEPARIN SODIUM 5000 UNITS: 5000 INJECTION INTRAVENOUS; SUBCUTANEOUS at 18:08

## 2020-06-04 RX ADMIN — SODIUM CHLORIDE, SODIUM LACTATE, POTASSIUM CHLORIDE, AND CALCIUM CHLORIDE 150 ML/HR: 600; 310; 30; 20 INJECTION, SOLUTION INTRAVENOUS at 05:37

## 2020-06-04 RX ADMIN — DIPHENOXYLATE HYDROCHLORIDE AND ATROPINE SULFATE 1 TABLET: 2.5; .025 TABLET ORAL at 22:10

## 2020-06-04 RX ADMIN — DIPHENOXYLATE HYDROCHLORIDE AND ATROPINE SULFATE 1 TABLET: 2.5; .025 TABLET ORAL at 13:03

## 2020-06-04 RX ADMIN — DORZOLAMIDE HYDROCHLORIDE AND TIMOLOL MALEATE 1 DROP: 20; 5 SOLUTION/ DROPS OPHTHALMIC at 18:44

## 2020-06-04 RX ADMIN — DIPHENOXYLATE HYDROCHLORIDE AND ATROPINE SULFATE 1 TABLET: 2.5; .025 TABLET ORAL at 18:07

## 2020-06-04 NOTE — PROGRESS NOTES
Gastroenterology Progress Note  CHLOE Nix   for Dr. Curtis Benitez    6/4/2020    Admit Date: 6/1/2020    Subjective: Follow up for:  1)  Diarrhea    2) History of esophageal cancer s/p ablation, on Xeloda    Patient is on Regular diet. Tolerating without any N/V. Pain: Patient complains of abdominal pain no. Bowel Movements:4 BM noted in last 24 hours, loose and black per nurses notes. Patient feels they are improving and she is feeling a lot better. There is no bleeding    No F/C. Stool for C. Diff and enteric pathogens ordered as well as fecal elastase and C. Diff. CBC not repeated today, slight improvement in BUN to 95 and Cr to 3.45        Current Facility-Administered Medications   Medication Dose Route Frequency    lactated Ringers infusion  150 mL/hr IntraVENous CONTINUOUS    sodium bicarbonate tablet 650 mg  650 mg Oral TID    diphenoxylate-atropine (LOMOTIL) tablet 1 Tab  1 Tab Oral QID    loperamide (IMODIUM) capsule 2 mg  2 mg Oral PRN    famotidine (PEPCID) tablet 20 mg  20 mg Oral ACB    heparin (porcine) injection 5,000 Units  5,000 Units SubCUTAneous Q8H    acetaminophen (TYLENOL) tablet 650 mg  650 mg Oral Q6H PRN    brimonidine (ALPHAGAN) 0.2 % ophthalmic solution 1 Drop  1 Drop Both Eyes TID    ferrous sulfate tablet 325 mg  325 mg Oral BID WITH MEALS    B complex-vitaminC-folic acid (NEPHROCAP) cap  1 Cap Oral DAILY    latanoprost (XALATAN) 0.005 % ophthalmic solution 1 Drop  1 Drop Both Eyes QHS    levothyroxine (SYNTHROID) tablet 100 mcg  100 mcg Oral 6am    LORazepam (ATIVAN) injection 1 mg  1 mg IntraVENous Q4H PRN    ondansetron (ZOFRAN) injection 4 mg  4 mg IntraVENous Q4H PRN    dorzolamide-timoloL (COSOPT) 22.3-6.8 mg/mL ophthalmic solution 1 Drop  1 Drop Both Eyes BID        Objective:     Blood pressure 117/76, pulse 72, temperature 97.6 °F (36.4 °C), resp.  rate 20, height 5' 8\" (1.727 m), weight 87.5 kg (192 lb 14.4 oz), SpO2 97 %, not currently breastfeeding. No intake/output data recorded. 06/02 1901 - 06/04 0700  In: 1812.5 [P.O.:240; I.V.:1572.5]  Out: 250 [Urine:250]    EXAM:    Gen: Chronically ill appearing, pleasent BF, NAD  HEENT: NCAT, MMM, sclera anicteric  Abdomen: Soft, non tender, non distended, normal BS  Ext: no cyanosis or edema    Data Review    Recent Results (from the past 24 hour(s))   RENAL FUNCTION PANEL    Collection Time: 06/04/20  5:52 AM   Result Value Ref Range    Sodium 142 136 - 145 mmol/L    Potassium 3.5 3.5 - 5.1 mmol/L    Chloride 115 (H) 97 - 108 mmol/L    CO2 18 (L) 21 - 32 mmol/L    Anion gap 9 5 - 15 mmol/L    Glucose 85 65 - 100 mg/dL    BUN 95 (H) 6 - 20 MG/DL    Creatinine 3.45 (H) 0.55 - 1.02 MG/DL    BUN/Creatinine ratio 28 (H) 12 - 20      GFR est AA 16 (L) >60 ml/min/1.73m2    GFR est non-AA 13 (L) >60 ml/min/1.73m2    Calcium 7.9 (L) 8.5 - 10.1 MG/DL    Phosphorus 3.1 2.6 - 4.7 MG/DL    Albumin 2.3 (L) 3.5 - 5.0 g/dL   MAGNESIUM    Collection Time: 06/04/20  5:52 AM   Result Value Ref Range    Magnesium 1.7 1.6 - 2.4 mg/dL     Recent Labs     06/03/20  0255 06/02/20  0009   WBC 7.4 7.1   HGB 10.1* 10.7*   HCT 29.0* 29.7*    189     Recent Labs     06/04/20  0552 06/03/20  0255 06/02/20  1324 06/02/20  0009    140 137 136   K 3.5 3.5 3.3* 2.5*   * 113* 108 106   CO2 18* 15* 20* 19*   BUN 95* 106* 101* 107*   CREA 3.45* 3.93* 4.05* 4.39*   GLU 85 100 111* 103*   CA 7.9* 7.9* 8.0* 8.0*   MG 1.7 2.1  --  2.0   PHOS 3.1 4.5  --  4.7     Recent Labs     06/04/20  0552 06/03/20  0255 06/02/20  0009   ALT  --  12 11*   AP  --  59 60   TBILI  --  0.5 0.6   TP  --  5.0* 5.7*   ALB 2.3* 2.4* 2.7*   GLOB  --  2.6 3.0     No results for input(s): INR, PTP, APTT, INREXT in the last 72 hours. No results for input(s): FE, TIBC, PSAT, FERR in the last 72 hours.    Lab Results   Component Value Date/Time    Folate 95.7 (H) 03/05/2020 04:26 AM      No results for input(s): PH, PCO2, PO2 in the last 72 hours. No results for input(s): CPK, CKNDX, TROIQ in the last 72 hours. No lab exists for component: CPKMB  Lab Results   Component Value Date/Time    Cholesterol, total 96 03/05/2020 04:26 AM    HDL Cholesterol 12 03/05/2020 04:26 AM    LDL, calculated 40.8 03/05/2020 04:26 AM    Triglyceride 216 (H) 03/05/2020 04:26 AM    CHOL/HDL Ratio 8.0 (H) 03/05/2020 04:26 AM     Lab Results   Component Value Date/Time    Glucose (POC) 151 (H) 03/12/2020 11:09 AM    Glucose (POC) 96 03/12/2020 07:18 AM    Glucose (POC) 147 (H) 03/11/2020 10:52 PM    Glucose (POC) 118 (H) 03/11/2020 04:41 PM    Glucose (POC) 124 (H) 03/11/2020 05:41 AM     Lab Results   Component Value Date/Time    Color YELLOW/STRAW 06/02/2020 10:29 PM    Appearance TURBID (A) 06/02/2020 10:29 PM    Specific gravity 1.015 06/02/2020 10:29 PM    pH (UA) 5.5 06/02/2020 10:29 PM    Protein TRACE (A) 06/02/2020 10:29 PM    Glucose Negative 06/02/2020 10:29 PM    Ketone Negative 06/02/2020 10:29 PM    Bilirubin NEGATIVE  03/04/2020 10:38 PM    Urobilinogen 0.2 06/02/2020 10:29 PM    Nitrites Negative 06/02/2020 10:29 PM    Leukocyte Esterase Negative 06/02/2020 10:29 PM    Epithelial cells FEW 06/02/2020 10:29 PM    Bacteria Negative 06/02/2020 10:29 PM    WBC 0-4 06/02/2020 10:29 PM    RBC 0-5 06/02/2020 10:29 PM           Assessment:     Active Problems:    Acute on chronic renal failure ([address] (6/1/2020)      Severe protein-calorie malnutrition ([facility] [facility] (6/2/2020)      Diarrhea (6/3/2020)        Plan:   Some improvement in diarrhea, awaiting stool studies to exclude infectious causes especially given previous abx use. In addition celiac panel and fecal elastase was ordered. Cont supportive treatment and scheduled lomotil, if above labs are non revealing and diarrhea persists consider repeat colonoscopy/flex sig with biopsy to evaluate for microscopic colitis.       Venecia White, PA    06/04/20  8:21 AM  [address] [place] [address] [place]: [phone]    I have examined the patient. I have reviewed the chart and agree with the documentation recorded by the ERICH, including the assessment, treatment plan, and disposition. Patient seen and examined by me. I personally performed all components of the history, physical, and medical decision making and agree with the assessment and plan with minor modifications as noted. Exam:  Alert and awake  HEENT AT  GI: soft w/ normal bowel sounds    Plan: We await stool analysis and serologies as per plan. Continue supportive treatment as you are doing. If stool/serological work up is unrevealing and diarrhea does not resolve consider colonoscopy w/ biopsies. Enedelia Alonso MD  [facility](A)

## 2020-06-04 NOTE — PROGRESS NOTES
8:59 AM- CM attempted to meet with pt- pt declined stating she was eating. Pt requested CM come back after she is done eating to complete initial assessment.          Barbara Mcfarlane, JENNIFER, [address]   [phone]

## 2020-06-04 NOTE — PROGRESS NOTES
Problem: Mobility Impaired (Adult and Pediatric)  Goal: *Acute Goals and Plan of Care (Insert Text)  Description: FUNCTIONAL STATUS PRIOR TO ADMISSION: Patient was modified independent using a single point cane for functional mobility. Reports she was very independent prior. Has some vision loss from glaucoma. HOME SUPPORT PRIOR TO ADMISSION: The patient lived with son and grandson to assist her as needed. Physical Therapy Goals  Initiated 6/3/2020  1. Patient will move from supine to sit and sit to supine  in bed with modified independence within 7 day(s). 2.  Patient will transfer from bed to chair and chair to bed with modified independence using the least restrictive device within 7 day(s). 3.  Patient will perform sit to stand with modified independence within 7 day(s). 4.  Patient will ambulate with modified independence for 200 feet with the least restrictive device within 7 day(s). 5.  Patient will ascend/descend 4 stairs with 1 handrail(s) with modified independence within 7 day(s). Outcome: Progressing Towards Goal   PHYSICAL THERAPY TREATMENT  Patient: Adan Clark (7 y.o. female)  Date: 6/4/2020  Diagnosis: Acute on chronic renal failure (HCC) [N17.9, N18.9]   <principal problem not specified>       Precautions: Fall  Chart, physical therapy assessment, plan of care and goals were reviewed. ASSESSMENT  Patient continues with skilled PT services and is progressing towards goals. Pt received in bed reporting not feeling so well, but she did agree to performing BLE exercises. Pt performed the exercises in supine and seated eob, demonstrating bed mobility and sup<>sit at S. Pt reports she has been getting up going to the bathroom with nursing staff, using her cane now  vs the RW. Will continue to see pt in the acute setting. HHPT remains appropriate at [facility].      Current Level of Function Impacting Discharge (mobility/balance): S bed mobility and sup<>sit    Other factors to consider for discharge: slightly below functional baseline         PLAN :  Patient continues to benefit from skilled intervention to address the above impairments. Continue treatment per established plan of care. to address goals. Recommendation for discharge: (in order for the patient to meet his/her long term goals)  Physical therapy at least 2 days/week in the home     This discharge recommendation:  Has been made in collaboration with the attending provider and/or case management    IF patient discharges home will need the following DME: none       SUBJECTIVE:   Patient stated I have been using my cane, so I think I'm doing better.     OBJECTIVE DATA SUMMARY:   Critical Behavior:  Neurologic State: Alert  Orientation Level: Oriented X4        Functional Mobility Training:  Bed Mobility:  Rolling: Supervision  Supine to Sit: Supervision  Sit to Supine: Supervision  Scooting: Supervision            Balance:  Sitting: Intact    Therapeutic Exercises:   Supine and seated BLE x 10 reps  Pain Rating:  None reported    Activity Tolerance:   Fair  Please refer to the flowsheet for vital signs taken during this treatment. After treatment patient left in no apparent distress:   Supine in bed, Call bell within reach, and Side rails x 3    COMMUNICATION/COLLABORATION:   The patients plan of care was discussed with: Registered nurse.      Oracio Guzman, PT   Time Calculation: 10 mins

## 2020-06-04 NOTE — PROGRESS NOTES
Oncology End of Shift Note      Bedside shift change report given to GEORGIA Palacios (incoming nurse) by Juan Alford RN (outgoing nurse) on [facility]. Report included the following information SBAR, Kardex, ED Summary, [facility] and Recent Results. Shift Summary:   Patient arrived on unit at beginning of shift. No changes. Slept comfortably. Continuous fluids running. Hygiene care provided several times as patient is still incontinent. No complaints of pain. Stated compliancy with call bell. Son called for daily update. Issues for Physician to Address:       Patient on Cardiac Monitoring?    [] Yes  [x] No    Rhythm:          Shift Events        Juan Alford RN

## 2020-06-04 NOTE — PROGRESS NOTES
General Surgery End of Shift Nursing Note    Bedside shift change report given to Mauricio Badillo RN (oncoming nurse) by Luda Lehman RN (offgoing nurse). Report included the following information SBAR, Kardex and MAR. Shift worked:   [id number]     Summary of shift:    Pt has had several black loose stools. Unable to obtain ordered stool cultures due to lack of amount. Pt's son called to get an update and would like to be undated regularly. Pt will be transferred to Oncology. Called Oncology; ;however, nurse was unable to take call. Returned call at [facility] and spoke to Arverne, RN to give report. Put in order for transport to take pt to oncology unit. Issues for physician to address:   None at this time     Number times ambulated in hallway past shift: 0    Number of times OOB to chair past shift: 0    Pain Management:  Current medication: See MAR  Patient states pain is manageable on current pain medication: YES    GI:    Current diet:  DIET REGULAR  DIET NUTRITIONAL SUPPLEMENTS Breakfast, Lunch, Dinner; Ensure Clear  DIET NUTRITIONAL SUPPLEMENTS Lunch, Dinner; Magic Cups    Tolerating current diet: YES  Passing flatus: YES  Last Bowel Movement: today   Appearance: black loose    Respiratory:    Head of bed elevated    Patient Safety:    Falls Score: 3  Bed Alarm On? Not applicable  Sitter?  Not applicable    Luis Fernando Jauregui

## 2020-06-04 NOTE — NURSE NAVIGATOR
[id number]  [address] [facility] Clinical Specialist  Conversation Note      Date of ACP Conversation: 6/4/2020    Conversation Conducted with:   Patient with Decision Making Capacity  Healthcare Decision Maker: Freda Bolanos Sr (son) [phone]  Felisha Daugherty (dtr) [phone] Flora Sampson (son) [phone]    ACP Conversation witht: Don Francois, RN    *Length of ACP Conversation in minutes: 10     Conversation Outcomes:  Pt declined need for ACP, \"I have a paper, my kids know what I want\"  Son, [address] [facility], is my  and he relays to the other two. Explained reason for [address], asked what her wishes were if her heart were to stop or she stopped breathing. \"I would want them to try for a few days and if I don't get better that's it\"    Follow-up plan:  will discuss w/ Dr. Rosemary Moreno tomorrow.

## 2020-06-04 NOTE — PROGRESS NOTES
DAYO:   1) Home with family assistance   2) Home with f.u appts   3) Pt will need 2ND IM letter at time of discharge     Reason for Admission: Acute on chronic renal failure                   RUR Score: 23 MODERATE              PCP: First and Last name: Dr. Nura Mendoza     Name of Practice:    Are you a current patient: Yes/No: Yes   Approximate date of last visit: Approx April    Can you participate in a virtual visit if needed: Yes     Do you (patient/family) have any concerns for transition/discharge? None identified at this time               Plan for utilizing home health: Recommended but pt declined. Current Advanced Directive/Advance Care Plan:  None on file. CM addressed with pt arranging them, pt declined stating she would like her kids together to make a decision if needed and stated she does not need an AMD.             Transition of Care Plan:         Pt is a 78year old,  [place] female, admitted with acute on chronic renal failure. Pt was alert and oriented when speaking with CM via phone due to CDIFF and COVID-19 precautions. Pt states her grandson lives with her and she has a cpap and cane. Pt states prior to admission she was independent for the most part and was able to drive. Pt states she has not had any HH or been to any SNF/IPR in the past. Pt's preferred pharmacy is the local [facility] in [place], states no problems affording or accessing medications. Pt's family will drive her home at time of discharge and as needed. CM spoke with pt regarding [place] recommendations (RN and PT), pt declined stating \"I have enough help in the home\" pt educated on following up with OP providers who can arrange [place] services if needed. CM addressed private name status with pt- pt states she would like it to remain and that she only wants her children to know what is going on, not other family members. CM will continue to follow and support as needed.      Care Management Interventions  PCP Verified by CM: Yes  Mode of Transport at Discharge: Self  Transition of Care Consult (CM Consult): Discharge Planning  MyChart Signup: No  Discharge Durable Medical Equipment: No  Physical Therapy Consult: Yes  Occupational Therapy Consult: Yes  Speech Therapy Consult: No  Current Support Network: Family Lives [place], Own Home  Confirm Follow Up Transport: Family  The Patient and/or Patient Representative was Provided with a Choice of Provider and Agrees with the Discharge Plan?: Yes  Freedom of Choice List was Provided with Basic Dialogue that Supports the Patient's Individualized Plan of Care/Goals, Treatment Preferences and Shares the Quality Data Associated with the Providers?: Yes  Discharge Location  Discharge Placement: Home with family assistance     JENNIFER Zamora, [address] [facility] Manager   [phone]

## 2020-06-04 NOTE — PROGRESS NOTES
-Hematology / Oncology ([facility]) -  -Primary Oncologist- Dr. Eris Up   -[facility]- \" I am better\"    Discussed with Dr Emmanuel Barlow she feel improved today and hasn't had a BM yet this am, ate bfast 30min ago  -O-      Patient Vitals for the past 24 hrs:   Temp Pulse Resp BP SpO2   06/04/20 0800 97.5 °F (36.4 °C) 69 18 108/77 97 %   06/03/20 2236 97.6 °F (36.4 °C) 72 20 117/76 97 %   06/03/20 2020 97.8 °F (36.6 °C) 70 20 109/79 99 %   06/03/20 1900 98.6 °F (37 °C) 65 18 122/65 100 %   06/03/20 1510 98.4 °F (36.9 °C) 69 18 115/64 100 %   06/03/20 1105 98 °F (36.7 °C) 78 18 112/58 100 %     No intake/output data recorded. Review of Systems:   Constitutional No fevers, chills, night sweats. Increasing weakness and wt loss over past 2 months   Allergic/Immunologic No recent allergic reactions   Eyes No significant visual difficulties. No diplopia. ENMT No problems with hearing, no sore throat, no sinus drainage. Endocrine No hot flashes or night sweats. No cold intolerance, polyuria, or polydipsia   Hematologic/Lymphatic No easy bruising or bleeding. The patient denies any tender or palpable lymph nodes   Breasts No abnormal masses of breast, nipple discharge or pain. Respiratory No dyspnea on exertion, orthopnea, chest pain, cough or hemoptysis. Cardiovascular No anginal chest pain, irregular heart beat, tachycardia, palpitations or orthopnea. Gastrointestinal Nausea starting about 1 week ago, increasingly poor appetite, diarrhea starting about 1 wek ago with end of Xeloda cycle. Pt reports some improvement in diarrhea with Lomotil over the weekend   Genitourinary (M) No hematuria, dysuria, increased frequency, urgency, hesitancy or incontinence. Musculoskeletal No joint pain, swelling or redness. No decreased range of motion. Integumentary No chronic rashes, inflammation, ulcerations, pruritus, petechiae, purpura, ecchymoses, or skin changes.    Neurologic No headache, blurred vision, and no areas of focal weakness or numbness. Normal gait. No sensory problems. Psychiatric No insomnia, depression, nereyda or mood swings. No psychotropic drugs.         Physical Exam:  Constitutional Alert, cooperative, oriented. Mood and affect appropriate. Appears close to chronological age. Well nourished. Well developed. Head Normocephalic; no scars   Eyes Conjunctivae and sclerae are clear and without icterus. Pupils are reactive and equal.   ENMT Sinuses are nontender. No oral exudates, ulcers, masses, thrush or mucositis. Oropharynx clear. Tongue normal.   Neck Supple without masses or thyromegaly. No jugular venous distension. Hematologic/Lymphatic No petechiae or purpura. No tender or palpable lymph nodes in the cervical, supraclavicular, axillary or inguinal area. Respiratory Lungs are clear to auscultation without rhonchi or wheezing. Cardiovascular Regular rate and rhythm of heart without murmurs, gallops or rubs. Chest / Line Site Chest is symmetric with no chest wall deformities. Abdomen Non-tender, non-distended, no masses, ascites or hepatosplenomegaly. Good bowel sounds. No guarding or rebound tenderness. No pulsatile masses. Musculoskeletal Requires assistance with transfer to [facility]   Extremities No visible deformities, no cyanosis, clubbing. 2+ bilateral LE edema   Skin No rashes, scars, or lesions suggestive of malignancy. No petechiae, purpura, or ecchymoses. No excoriations. Neurologic No sensory or motor deficits, normal cerebellar function, normal gait, cranial nerves intact. Psychiatric Alert and oriented times three. Coherent speech.  Verbalizes understanding of our discussions today.        -Labs-    Recent Labs     06/04/20  0552 06/03/20  0255 06/02/20  1324 06/02/20  0009   WBC  --  7.4  --  7.1   HGB  --  10.1*  --  10.7*   PLT  --  186  --  189   ANEU  --  6.0  --  5.5    140 137 136   K 3.5 3.5 3.3* 2.5*   GLU 85 100 111* 103*   BUN 95* 106* 101* 107*   CREA 3.45* 3.93* 4.05* 4.39*   ALT  --  12  --  11*   TBILI  --  0.5  --  0.6   AP  --  59  --  60   CA 7.9* 7.9* 8.0* 8.0*   MG 1.7 2.1  --  2.0   PHOS 3.1 4.5  --  4.7     Imagin20 renal us:  IMPRESSION:   Innumerable bilateral renal cysts, compatible with adult polycystic kidney  disease. No hydronephrosis. Oncology Summary:  Pt was on Xeloda single agent for esophageal cancer and had ablation per  Dr. Silvino Hammond 20. She had started C4 of Xeloda at max dose(1500 mg BID  days)  per renal function  3/2/20 but stopped 3/4/20 when she was admitted  To [facility] for septicemia. She was discharged 3/12/20 and  received IV antibiotics in [facility] until 3/23/20.       Pt resumed Xeloda 1500 mg BID 3/30-20.       Pt completed last 14 day cycle of Xeloda 20. She arrived to clinic 20 reporting several days of diarrhea, nausea and poor appetite. Pt was given hydration and anti-emetics in clinic on 20 and returned to clinic today for further hydration and re-evaluation.      -Assessment + Plan-     *) SONIDO on CKD:  - Admitted from clinic at end of day 6 when she presented with Cr 4.3 on .  Baseline Cr ~2.0  - Suspected pre renal due to diarrhea from Xeloda therapy that was completed on 20  - Appreciate Nephrology's assistance  - Hydration per renal  - Renal us showed polycystic disease  -  Urinalysis unremarkable   - Cr improving today to 3.45      *) Xeloda induced Diarrhea:  - Cont Lomotil scheduled and Imodium prn  - cont IVF  - GI assistance appreciated, infectious workup sent    *)Hypokalemia, due to GI losses:  - K improved       *) Esophageal cancer:  - sp ablation and adjuvant Xeloda that concluded     *) Chemo induced anemia:  - cbc tmw    *) Cancer related anorexia, protein calorie malnutrition  - encouraged her to drink Ensure

## 2020-06-04 NOTE — PROGRESS NOTES
Spiritual Care Assessment/Progress Note  [facility]      NAME: Aba Borrego      MRN: [id number]  AGE: 78 y.o.  SEX: female  Nondenominational Affiliation: Methodist   Language: English     6/4/2020     Total Time (in minutes): 10     Spiritual Assessment begun in [facility]COL[facility] through conversation with:         [x]Patient        [] Family    [] Friend(s)        Reason for Consult: Initial/Spiritual assessment, patient floor     Spiritual beliefs: (Please include comment if needed)     [x] Identifies with a alvina tradition:         [] Supported by a alvina community:            [] Claims no spiritual orientation:           [] Seeking spiritual identity:                [] Adheres to an individual form of spirituality:           [] Not able to assess:                           Identified resources for coping:      [] Prayer                               [] Music                  [] Guided Imagery     [x] Family/friends                 [] Pet visits     [] Devotional reading                         [] Unknown     [] Other:                                               Interventions offered during this visit: (See comments for more details)    Patient Interventions: Initial/Spiritual assessment, patient floor, Catharsis/review of pertinent events in supportive environment, Affirmation of alvina, Nondenominational beliefs/image of God discussed, Normalization of emotional/spiritual concerns           Plan of Care:     [] Support spiritual and/or cultural needs    [] Support AMD and/or advance care planning process      [] Support grieving process   [] Coordinate Rites and/or Rituals    [] Coordination with community clergy   [] No spiritual needs identified at this time   [] Detailed Plan of Care below (See Comments)  [] Make referral to Music Therapy  [] Make referral to Pet Therapy     [] Make referral to Addiction services  [] Make referral to [facility]  [] Make referral to Spiritual Care Partner  [] No future visits requested        [x] Follow up visits as needed     Comments: The purpose of the visit was to do a spiritual assessment on the patient. The patient was resting in her room, as well the patient is a rule-out patient. After consulting with the patient's nurse it was determined that the a phone call would be the best method for the visit. The patient shared that she was resting and feeling tired. She shared that she has a loving and supportive family. She shared that she had relationship with God and that her alvina is intact. The  provided ministry of presence on the unit. [address] [facility] [place].Div.    paging service [phone] ([phone])

## 2020-06-04 NOTE — PROGRESS NOTES
Nephrology Progress Note  [address] [facility]  [facility] [employer]  [address]  [place], [address]  Phone - [phone]         Fax - [phone]    Patient: Prisca Orr    YOB: 1940    Date- 6/4/2020        Admit Date: 6/1/2020     CC: Follow up for SONIDO         IMPRESSION & PLAN:   SONIDO  on CKD:  - suspect 2/2 volume depletion from GI losses + Xeloda-- no hydro on renal usg  -CONTINUE CURRENT IVF  - follow bmp  -no indication for RRT    Hypokalemia  kcl 40 meq po    History of metabolic acidosis  Continue po bicarb    DIARRHEA due to XELODA  Agree with GI consult    CKD Stage IV -cystic kidney dis  - 2/2 ADPKD, HTN   - baseline Creatinine : 2 mg/ dl      Chronic Anemia     Esophageal Ca   Hypothyroidism   EMILY       Subjective: Interval History:   -cr. Improved  bp stable  Diarrhea persist  k improving    ROS:-c/o diarrhea   No c/o sob,  No c/o chest pain,   No c/o  vomiting, No c/o  fever. Objective:   Visit Vitals  /77 (BP 1 Location: Right arm, BP Patient Position: At rest)   Pulse 69   Temp 97.5 °F (36.4 °C)   Resp 18   Ht 5' 8\" (1.727 m)   Wt 87.5 kg (192 lb 14.4 oz)   SpO2 97%   Breastfeeding No   BMI 29.33 kg/m²     Last 3 Recorded Weights in this Encounter    06/01/20 1807 06/02/20 1445 06/04/20 0651   Weight: 88.6 kg (195 lb 5.2 oz) 88.6 kg (195 lb 5.2 oz) 87.5 kg (192 lb 14.4 oz)     06/02 1901 - 06/04 0700  In: 1812.5 [P.O.:240; I.V.:1572.5]  Out: 250 [Urine:250]    Intake/Output Summary (Last 24 hours) at 6/4/2020 1135  Last data filed at 6/3/2020 1632  Gross per 24 hour   Intake 770 ml   Output --   Net 770 ml      Physical exam:   GEN: NAD  NECK- Supple,NO MASS  RESP:clear b/l, no wheezing  CVS: S1,S2  RRR  ABDO:soft, non tender  NEURO: normal speech, non focal  EXT: No Edema   Discussed with patient and the nurse  Chart reviewed. Pertinent Notes reviewed.      Data Review :  Recent Labs     06/04/20  0552 06/03/20  0255 06/02/20  1324 06/02/20  0009    140 137 136   K 3.5 3.5 3.3* 2.5*   * 113* 108 106   CO2 18* 15* 20* 19*   BUN 95* 106* 101* 107*   CREA 3.45* 3.93* 4.05* 4.39*   GLU 85 100 111* 103*   CA 7.9* 7.9* 8.0* 8.0*   MG 1.7 2.1  --  2.0   PHOS 3.1 4.5  --  4.7     Recent Labs     06/03/20  0255 06/02/20  0009   WBC 7.4 7.1   HGB 10.1* 10.7*   HCT 29.0* 29.7*    189     No results for input(s): FE, TIBC, PSAT, FERR in the last 72 hours. No results for input(s): CPK, CKNDX, TROIQ in the last 72 hours. No lab exists for component: CPKMB  Lab Results   Component Value Date/Time    Color YELLOW/STRAW 06/02/2020 10:29 PM    Appearance TURBID (A) 06/02/2020 10:29 PM    Specific gravity 1.015 06/02/2020 10:29 PM    pH (UA) 5.5 06/02/2020 10:29 PM    Protein TRACE (A) 06/02/2020 10:29 PM    Glucose Negative 06/02/2020 10:29 PM    Ketone Negative 06/02/2020 10:29 PM    Bilirubin NEGATIVE  03/04/2020 10:38 PM    Urobilinogen 0.2 06/02/2020 10:29 PM    Nitrites Negative 06/02/2020 10:29 PM    Leukocyte Esterase Negative 06/02/2020 10:29 PM    Epithelial cells FEW 06/02/2020 10:29 PM    Bacteria Negative 06/02/2020 10:29 PM    WBC 0-4 06/02/2020 10:29 PM    RBC 0-5 06/02/2020 10:29 PM     Lab Results   Component Value Date/Time    Culture result: NO GROWTH 5 DAYS 03/08/2020 04:03 AM    Culture result:  03/06/2020 09:15 AM     MRSA NOT PRESENT. Apparent Staphylococus aureus (not MRSA noted). Culture result:  03/06/2020 09:15 AM         Screening of patient nares for MRSA is for surveillance purposes and, if positive, to facilitate isolation considerations in high risk settings. It is not intended for automatic decolonization interventions per se as regimens are not sufficiently effective to warrant routine use.      No results found for: SDES  Lab Results   Component Value Date/Time    Sodium,urine random 18 06/02/2020 10:29 PM    Creatinine, urine 97.00 06/02/2020 10:29 PM     Results from Hospital Encounter encounter on 06/01/20   XR ABD (KUB)    Narrative PROCEDURE: XR ABD (KUB)    REASON FOR STUDY: diarhea, rule out constipation with overflow     COMPARISON: 7/16/2019    FINDINGS: Supine views the abdomen demonstrate gaseous distention of the colon  and several loops of small bowel without evidence of high-grade bowel  obstruction. Impression IMPRESSION:  Mild gaseous distention of the colon and loops of small bowel  without evidence of high-grade bowel obstruction. Medication list  reviewed  No current facility-administered medications on file prior to encounter. Current Outpatient Medications on File Prior to Encounter   Medication Sig Dispense Refill    ferrous sulfate 324 mg (65 mg iron) tablet Take 324 mg by mouth two (2) times daily (with meals).  omeprazole (PRILOSEC OTC) 20 mg tablet Take 20 mg by mouth daily.  sodium bicarbonate 650 mg tablet Take 650 mg by mouth two (2) times a day.  folic acid-vit Y6-EOF F76 (FOLBEE) 2.5-25-1 mg tablet Take 1 Tab by mouth daily.  iron bisgly,ps-FA-B-C#12-succ 65 mg-65 mg -1,000 mcg (24) tab Take 1 Tab by mouth daily.  latanoprost (XALATAN) 0.005 % ophthalmic solution Administer 1 Drop to both eyes nightly.  MULTIVITS W-FE,OTHER MIN/LUT (CENTRUM SILVER ULTRA WOMEN'S PO) Take 1 Tab by mouth daily.  DORZOLAMIDE HCL/TIMOLOL MALEAT (DORZOLAMIDE-TIMOLOL OP) Apply 1 Drop to eye three (3) times daily. One drop to each eye twice daily       brimonidine (ALPHAGAN) 0.2 % ophthalmic solution Administer 1 Drop to both eyes three (3) times daily.  levothyroxine (SYNTHROID) 100 mcg tablet Take 100 mcg by mouth Daily (before breakfast). Indications: HYPOTHYROIDISM      capecitabine (XELODA) 500 mg tablet 1,500 mg two (2) times a day. X 14 days then 7 days off      acetaminophen (TYLENOL EXTRA STRENGTH) 500 mg tablet Take 1,000 mg by mouth every six (6) hours as needed. Indications: ARTHRITIC PAIN, PAIN                         Nazia Cox MD  [facility]   [email or web address]

## 2020-06-05 ENCOUNTER — TELEPHONE (OUTPATIENT)
Dept: CASE MANAGEMENT | Age: 80
End: 2020-06-05

## 2020-06-05 LAB
ALBUMIN SERPL-MCNC: 2.2 G/DL (ref 3.5–5)
ANION GAP SERPL CALC-SCNC: 10 MMOL/L (ref 5–15)
APPEARANCE UR: ABNORMAL
BACTERIA URNS QL MICRO: ABNORMAL /HPF
BASOPHILS # BLD: 0 K/UL (ref 0–0.1)
BASOPHILS NFR BLD: 0 % (ref 0–1)
BILIRUB UR QL CFM: NEGATIVE
BUN SERPL-MCNC: 84 MG/DL (ref 6–20)
BUN/CREAT SERPL: 29 (ref 12–20)
C DIFF GDH STL QL: POSITIVE
C DIFF TOX A+B STL QL IA: NEGATIVE
C DIFF TOX GENS STL QL NAA+PROBE: POSITIVE
CALCIUM SERPL-MCNC: 7.9 MG/DL (ref 8.5–10.1)
CAMPYLOBACTER SPECIES, DNA: NEGATIVE
CHLORIDE SERPL-SCNC: 116 MMOL/L (ref 97–108)
CO2 SERPL-SCNC: 17 MMOL/L (ref 21–32)
COLOR UR: ABNORMAL
CREAT SERPL-MCNC: 2.86 MG/DL (ref 0.55–1.02)
DIFFERENTIAL METHOD BLD: ABNORMAL
ENTEROTOXIGEN E COLI, DNA: NEGATIVE
EOSINOPHIL # BLD: 0.3 K/UL (ref 0–0.4)
EOSINOPHIL NFR BLD: 4 % (ref 0–7)
EPITH CASTS URNS QL MICRO: ABNORMAL /LPF
ERYTHROCYTE [DISTWIDTH] IN BLOOD BY AUTOMATED COUNT: 20.5 % (ref 11.5–14.5)
GLUCOSE SERPL-MCNC: 87 MG/DL (ref 65–100)
GLUCOSE UR STRIP.AUTO-MCNC: NEGATIVE MG/DL
HCT VFR BLD AUTO: 26.9 % (ref 35–47)
HGB BLD-MCNC: 9.5 G/DL (ref 11.5–16)
HGB UR QL STRIP: ABNORMAL
IMM GRANULOCYTES # BLD AUTO: 0 K/UL (ref 0–0.04)
IMM GRANULOCYTES NFR BLD AUTO: 0 % (ref 0–0.5)
INTERPRETATION: ABNORMAL
KETONES UR QL STRIP.AUTO: NEGATIVE MG/DL
LEUKOCYTE ESTERASE UR QL STRIP.AUTO: ABNORMAL
LYMPHOCYTES # BLD: 0.3 K/UL (ref 0.8–3.5)
LYMPHOCYTES NFR BLD: 5 % (ref 12–49)
MAGNESIUM SERPL-MCNC: 1.6 MG/DL (ref 1.6–2.4)
MCH RBC QN AUTO: 33.8 PG (ref 26–34)
MCHC RBC AUTO-ENTMCNC: 35.3 G/DL (ref 30–36.5)
MCV RBC AUTO: 95.7 FL (ref 80–99)
MONOCYTES # BLD: 0.5 K/UL (ref 0–1)
MONOCYTES NFR BLD: 7 % (ref 5–13)
NEUTS SEG # BLD: 5.4 K/UL (ref 1.8–8)
NEUTS SEG NFR BLD: 84 % (ref 32–75)
NITRITE UR QL STRIP.AUTO: NEGATIVE
NRBC # BLD: 0 K/UL (ref 0–0.01)
NRBC BLD-RTO: 0 PER 100 WBC
P SHIGELLOIDES DNA STL QL NAA+PROBE: NEGATIVE
PCR REFLEX: ABNORMAL
PH UR STRIP: 6 [PH] (ref 5–8)
PHOSPHATE SERPL-MCNC: 2.8 MG/DL (ref 2.6–4.7)
PLATELET # BLD AUTO: 140 K/UL (ref 150–400)
PMV BLD AUTO: 10.2 FL (ref 8.9–12.9)
POTASSIUM SERPL-SCNC: 3.4 MMOL/L (ref 3.5–5.1)
PROT UR STRIP-MCNC: ABNORMAL MG/DL
RBC # BLD AUTO: 2.81 M/UL (ref 3.8–5.2)
RBC #/AREA URNS HPF: ABNORMAL /HPF (ref 0–5)
RBC MORPH BLD: ABNORMAL
RBC MORPH BLD: ABNORMAL
SALMONELLA SPECIES, DNA: NEGATIVE
SHIGA TOXIN PRODUCING, DNA: NEGATIVE
SHIGELLA SP+EIEC IPAH STL QL NAA+PROBE: NEGATIVE
SODIUM SERPL-SCNC: 143 MMOL/L (ref 136–145)
SP GR UR REFRACTOMETRY: 1.01 (ref 1–1.03)
UROBILINOGEN UR QL STRIP.AUTO: 0.2 EU/DL (ref 0.2–1)
VIBRIO SPECIES, DNA: NEGATIVE
WBC # BLD AUTO: 6.5 K/UL (ref 3.6–11)
WBC #/AREA STL HPF: NORMAL /HPF (ref 0–4)
WBC URNS QL MICRO: ABNORMAL /HPF (ref 0–4)
Y. ENTEROCOLITICA, DNA: NEGATIVE

## 2020-06-05 PROCEDURE — 82784 ASSAY IGA/IGD/IGG/IGM EACH: CPT

## 2020-06-05 PROCEDURE — 97535 SELF CARE MNGMENT TRAINING: CPT

## 2020-06-05 PROCEDURE — 74011250637 HC RX REV CODE- 250/637: Performed by: INTERNAL MEDICINE

## 2020-06-05 PROCEDURE — 81001 URINALYSIS AUTO W/SCOPE: CPT

## 2020-06-05 PROCEDURE — 74011250637 HC RX REV CODE- 250/637: Performed by: PHYSICIAN ASSISTANT

## 2020-06-05 PROCEDURE — 97530 THERAPEUTIC ACTIVITIES: CPT

## 2020-06-05 PROCEDURE — 85025 COMPLETE CBC W/AUTO DIFF WBC: CPT

## 2020-06-05 PROCEDURE — 97116 GAIT TRAINING THERAPY: CPT

## 2020-06-05 PROCEDURE — 74011250636 HC RX REV CODE- 250/636: Performed by: INTERNAL MEDICINE

## 2020-06-05 PROCEDURE — 36415 COLL VENOUS BLD VENIPUNCTURE: CPT

## 2020-06-05 PROCEDURE — 74011000250 HC RX REV CODE- 250: Performed by: INTERNAL MEDICINE

## 2020-06-05 PROCEDURE — 83735 ASSAY OF MAGNESIUM: CPT

## 2020-06-05 PROCEDURE — 65270000015 HC RM PRIVATE ONCOLOGY

## 2020-06-05 PROCEDURE — 74011250637 HC RX REV CODE- 250/637: Performed by: FAMILY MEDICINE

## 2020-06-05 PROCEDURE — 80069 RENAL FUNCTION PANEL: CPT

## 2020-06-05 PROCEDURE — 94640 AIRWAY INHALATION TREATMENT: CPT

## 2020-06-05 PROCEDURE — 74011000258 HC RX REV CODE- 258: Performed by: INTERNAL MEDICINE

## 2020-06-05 PROCEDURE — 74011000250 HC RX REV CODE- 250: Performed by: FAMILY MEDICINE

## 2020-06-05 PROCEDURE — 77030038269 HC DRN EXT URIN PURWCK BARD -A

## 2020-06-05 RX ORDER — BENZONATATE 100 MG/1
100 CAPSULE ORAL
Status: DISCONTINUED | OUTPATIENT
Start: 2020-06-05 | End: 2020-06-11 | Stop reason: HOSPADM

## 2020-06-05 RX ORDER — IPRATROPIUM BROMIDE AND ALBUTEROL SULFATE 2.5; .5 MG/3ML; MG/3ML
3 SOLUTION RESPIRATORY (INHALATION)
Status: COMPLETED | OUTPATIENT
Start: 2020-06-05 | End: 2020-06-05

## 2020-06-05 RX ORDER — VANCOMYCIN HYDROCHLORIDE 250 MG/5ML
125 POWDER, FOR SOLUTION ORAL EVERY 6 HOURS
Status: DISCONTINUED | OUTPATIENT
Start: 2020-06-05 | End: 2020-06-11 | Stop reason: HOSPADM

## 2020-06-05 RX ADMIN — IPRATROPIUM BROMIDE AND ALBUTEROL SULFATE 3 ML: .5; 3 SOLUTION RESPIRATORY (INHALATION) at 01:11

## 2020-06-05 RX ADMIN — ASCORBIC ACID, THIAMINE MONONITRATE,RIBOFLAVIN, NIACINAMIDE, PYRIDOXINE HYDROCHLORIDE, FOLIC ACID, CYANOCOBALAMIN, BIOTIN, CALCIUM PANTOTHENATE, 1 CAPSULE: 100; 1.5; 1.7; 20; 10; 1; 6000; 150000; 5 CAPSULE, LIQUID FILLED ORAL at 09:19

## 2020-06-05 RX ADMIN — VANCOMYCIN HYDROCHLORIDE 125 MG: KIT at 12:55

## 2020-06-05 RX ADMIN — SODIUM BICARBONATE 650 MG: 650 TABLET ORAL at 09:19

## 2020-06-05 RX ADMIN — BENZONATATE 100 MG: 100 CAPSULE ORAL at 01:44

## 2020-06-05 RX ADMIN — DIPHENOXYLATE HYDROCHLORIDE AND ATROPINE SULFATE 1 TABLET: 2.5; .025 TABLET ORAL at 12:55

## 2020-06-05 RX ADMIN — DORZOLAMIDE HYDROCHLORIDE AND TIMOLOL MALEATE 1 DROP: 20; 5 SOLUTION/ DROPS OPHTHALMIC at 09:20

## 2020-06-05 RX ADMIN — SODIUM BICARBONATE: 84 INJECTION, SOLUTION INTRAVENOUS at 14:49

## 2020-06-05 RX ADMIN — VANCOMYCIN HYDROCHLORIDE 125 MG: KIT at 17:14

## 2020-06-05 RX ADMIN — DIPHENOXYLATE HYDROCHLORIDE AND ATROPINE SULFATE 1 TABLET: 2.5; .025 TABLET ORAL at 17:09

## 2020-06-05 RX ADMIN — SODIUM CHLORIDE, SODIUM LACTATE, POTASSIUM CHLORIDE, AND CALCIUM CHLORIDE 150 ML/HR: 600; 310; 30; 20 INJECTION, SOLUTION INTRAVENOUS at 12:55

## 2020-06-05 RX ADMIN — LATANOPROST 1 DROP: 50 SOLUTION OPHTHALMIC at 21:28

## 2020-06-05 RX ADMIN — DIPHENOXYLATE HYDROCHLORIDE AND ATROPINE SULFATE 1 TABLET: 2.5; .025 TABLET ORAL at 09:19

## 2020-06-05 RX ADMIN — BRIMONIDINE TARTRATE 1 DROP: 2 SOLUTION OPHTHALMIC at 15:14

## 2020-06-05 RX ADMIN — VANCOMYCIN HYDROCHLORIDE 125 MG: KIT at 23:29

## 2020-06-05 RX ADMIN — DORZOLAMIDE HYDROCHLORIDE AND TIMOLOL MALEATE 1 DROP: 20; 5 SOLUTION/ DROPS OPHTHALMIC at 17:10

## 2020-06-05 RX ADMIN — FERROUS SULFATE TAB 325 MG (65 MG ELEMENTAL FE) 325 MG: 325 (65 FE) TAB at 17:09

## 2020-06-05 RX ADMIN — FERROUS SULFATE TAB 325 MG (65 MG ELEMENTAL FE) 325 MG: 325 (65 FE) TAB at 09:19

## 2020-06-05 RX ADMIN — SODIUM BICARBONATE 650 MG: 650 TABLET ORAL at 15:14

## 2020-06-05 RX ADMIN — BRIMONIDINE TARTRATE 1 DROP: 2 SOLUTION OPHTHALMIC at 21:28

## 2020-06-05 RX ADMIN — POTASSIUM BICARBONATE 40 MEQ: 782 TABLET, EFFERVESCENT ORAL at 10:08

## 2020-06-05 RX ADMIN — HEPARIN SODIUM 5000 UNITS: 5000 INJECTION INTRAVENOUS; SUBCUTANEOUS at 09:18

## 2020-06-05 RX ADMIN — BRIMONIDINE TARTRATE 1 DROP: 2 SOLUTION OPHTHALMIC at 09:20

## 2020-06-05 RX ADMIN — HEPARIN SODIUM 5000 UNITS: 5000 INJECTION INTRAVENOUS; SUBCUTANEOUS at 15:15

## 2020-06-05 RX ADMIN — SODIUM CHLORIDE, SODIUM LACTATE, POTASSIUM CHLORIDE, AND CALCIUM CHLORIDE 150 ML/HR: 600; 310; 30; 20 INJECTION, SOLUTION INTRAVENOUS at 09:16

## 2020-06-05 RX ADMIN — DIPHENOXYLATE HYDROCHLORIDE AND ATROPINE SULFATE 1 TABLET: 2.5; .025 TABLET ORAL at 21:27

## 2020-06-05 RX ADMIN — SODIUM CHLORIDE, SODIUM LACTATE, POTASSIUM CHLORIDE, AND CALCIUM CHLORIDE 150 ML/HR: 600; 310; 30; 20 INJECTION, SOLUTION INTRAVENOUS at 03:00

## 2020-06-05 RX ADMIN — LEVOTHYROXINE SODIUM 100 MCG: 0.1 TABLET ORAL at 06:51

## 2020-06-05 RX ADMIN — FAMOTIDINE 20 MG: 20 TABLET, FILM COATED ORAL at 09:19

## 2020-06-05 RX ADMIN — SODIUM BICARBONATE 650 MG: 650 TABLET ORAL at 21:27

## 2020-06-05 NOTE — TELEPHONE ENCOUNTER
ONCOLOGY NURSE NAVIGATOR    1230 TC to son,Jeronimo, introduced self and role. Advised ONN met with mom yesterday, however she was weak and resting. Jerman Kemp did share briefly about AMD, left info at bs, along w/ [employer] flyer. Pt lives in her home, grandson Manjeet Naik) stays with her most of the time. Son Doc Augusto) lives 1 mile away, Dtr/Son live within 20 min. Dtr/Sister standby assist when showering. Uses a cane PRN, refused walker . Son states chemo has made very sick and weak. Note prior to pandemic was going to grocery store w/ family. Drove up until 5-6 months ago. Provided  name and # per request.  \"normally I and my siblings would be up there visiting\"  Appreciative of phone call and update. States sister dropping off CPAP machine today. Pt has had O2 in past returned. Son states she does get SOB and he feels she would benefit from O2.      Will advise MD and     Hiral Mcwilliams RN

## 2020-06-05 NOTE — PROGRESS NOTES
Oncology End of Shift Note      Bedside shift change report given to Mele Munoz RN (incoming nurse) by Parvin Hawk (outgoing nurse) on [facility]. Report included the following information SBAR, Kardex and MAR. Shift Summary:   Patient has not complained of any pain during my shift. Patient stated, Abril Guerrero uses a CPAP machine at nights at home. \"  This was communicated to the Tele-Hospitalist.  Patient was having expiratory wheezing, Tele-Hospitalist was informed, albuterol-ipratropium was prescribed, see MAR. Respiratory was called to administer this medication and it was done. Patient was having spontaneous coughing, Tele-Hospitalist was informed, Tessalon was prescribed and administered, see PRN MAR. All other scheduled medications have been given, see MAR. Patient had a loose bowel movement. I-J Triple Lumen was flushed and all ports had blood return. Patient has been on bedrest throughout my shift. Patient teaching and routine rounding has been done. The patient's sone called and I gave him an update. I also requested that he brings the patient's CPAP machine to the hospital today, 06/05/20 so that his mom can use it tonight after being checked by Respiratory. Issues for Physician to Address:       Patient on Cardiac Monitoring?     [] Yes  [x] No    Rhythm:          Shift Events        Parvin Hawk

## 2020-06-05 NOTE — PROGRESS NOTES
Problem: Self Care Deficits Care Plan (Adult)  Goal: *Acute Goals and Plan of Care (Insert Text)  Description:     FUNCTIONAL STATUS PRIOR TO ADMISSION: Patient was independent to mod I for ADLs and ambulating with a cane. HOME SUPPORT: The patient lived with her 2 grandsons who work in the afternoon. Occupational Therapy Goals  Initiated 6/3/2020  1. Patient will perform grooming standing at sink with supervision/set-up within 7 day(s). 2.  Patient will perform sponge bathing with supervision/set-up within 7 day(s). 3.  Patient will perform lower body dressing with supervision/set-up within 7 day(s). 4.  Patient will perform toilet transfers with supervision/set-up within 7 day(s). 5.  Patient will perform all aspects of toileting with supervision/set-up within 7 day(s). Outcome: Progressing Towards Goal   OCCUPATIONAL THERAPY TREATMENT  Patient: Adan Clark (30 y.o. female)  Date: 6/5/2020  Diagnosis: Acute on chronic renal failure ([facility] [address] [N17.9, N18.9]   <principal problem not specified>       Precautions: Fall  Chart, occupational therapy assessment, plan of care, and goals were reviewed. ASSESSMENT  Patient continues with skilled OT services and is progressing towards goals. Pt was supine in bed and soiled with urine. She was CGA for bed mobility and was able to scoot with no assist to EOB and stood with min of 1. She was a little unsteady at first and was wanting to use her [facility] and [facility] to [facility]. .  Pt was able to bathe UE sitting on toilet and setup. She was mod of 1 to stand from toilet and walked to sink to wash emeterio area. She was mod to min to wash buttocks in standing. Pt was min to CGA to walk back to recliner and left sitting up in recliner.     Feel that pt will be safer with RW but she does not care to use    Current Level of Function Impacting Discharge (ADLs): decreased endurance, strength, functional mobility, balance and ADLs    Other factors to consider for discharge: pt will be able to return home with assist and home care OT pending progress. PLAN :  Patient continues to benefit from skilled intervention to address the above impairments. Continue treatment per established plan of care. to address goals. Recommend with staff: Ashley Medina for meals     Recommend next OT session: work on standing endurance     Recommendation for discharge: (in order for the patient to meet his/her long term goals)  Occupational therapy at least 2 days/week in the home AND ensure assist and/or supervision for safety with ADLs    This discharge recommendation:  Has been made in collaboration with the attending provider and/or case management    IF patient discharges home will need the following DME: walker: rolling       SUBJECTIVE:   Patient stated I feel much better now that I am up.     OBJECTIVE DATA SUMMARY:   Cognitive/Behavioral Status:  Neurologic State: Agitated  Orientation Level: Appropriate for age  Cognition: Appropriate decision making  Perception: Appears intact  Perseveration: No perseveration noted  Safety/Judgement: Awareness of environment    Functional Mobility and Transfers for ADLs:  Bed Mobility:  Rolling: Stand-by assistance  Supine to Sit: Stand-by assistance  Scooting: Contact guard assistance    Transfers:  Sit to Stand: Minimum assistance;Contact guard assistance  Functional Transfers  Bathroom Mobility: Minimum assistance  Toilet Transfer : Moderate assistance;Assist x1(come to stand)  Bed to Chair: Contact guard assistance    Balance:  Sitting: Intact  Standing: Impaired; Without support  Standing - Static: Good;Constant support  Standing - Dynamic : Fair;Constant support    ADL Intervention:  Feeding  Feeding Assistance: Independent    Grooming  Position Performed: Seated in chair  Washing Face: Independent  Washing Hands: Independent  Brushing Teeth:  Independent    Upper Body Bathing  Bathing Assistance: Set-up  Position Performed: Seated in chair    Lower Body Bathing  Bathing Assistance: Contact guard assistance;Minimum assistance  Perineal  : Minimum assistance;Contact guard assistance  Position Performed: Standing           Toileting  Toileting Assistance: Stand-by assistance;Supervision  Bladder Hygiene: Stand-by assistance;Supervision  Bowel Hygiene: Stand-by assistance;Supervision    Cognitive Retraining  Safety/Judgement: Awareness of environment            Activity Tolerance:   Fair  Please refer to the flowsheet for vital signs taken during this treatment. After treatment patient left in no apparent distress:   Sitting in chair and Call bell within reach    COMMUNICATION/COLLABORATION:   The patients plan of care was discussed with: Physical therapist and Registered nurse.      Yeimy Wills, OT  Time Calculation: 38 mins

## 2020-06-05 NOTE — PROGRESS NOTES
Nephrology Progress Note  LILY Nelson Cecile         Patient: Stanislav Perry  YOB: 1940  Admit Date: 6/1/2020       CC: Follow up for SONIDO           IMPRESSION & PLAN:   SONIDO  on CKD:  - 2/2 profound IVVD from diarrhea   - resolving w/ IVF   - changed IVF to Bicarb gtt and reduced rate   - labs again in am     Acute on Chronic Met acidosis   - continue Oral Bicarb   - IVF w/ Bicarb     C diff Diarrhea   - starting p.o Vanc     CKD Stage IV   - 2/2 ADPKD, HTN   - baseline Creatinine : 2 mg/ dl      Chronic Anemia     Esophageal Ca   Hypothyroidism   EMILY       Interval Hx :  She was able to recognize me   Has ongoing diarrhea   C diff +ve   She has no new sx   Cr trending down   No edema from IVF thus far       ROS:-c/o diarrhea   No c/o sob,  No c/o chest pain,   No c/o  vomiting, No c/o  fever. Objective:   Visit Vitals  /82 (BP 1 Location: Right arm, BP Patient Position: At rest)   Pulse 73   Temp 97.5 °F (36.4 °C)   Resp 20   Ht 5' 8\" (1.727 m)   Wt 87.5 kg (192 lb 14.4 oz)   SpO2 100%   Breastfeeding No   BMI 29.33 kg/m²     Last 3 Recorded Weights in this Encounter    06/01/20 1807 06/02/20 1445 06/04/20 0651   Weight: 88.6 kg (195 lb 5.2 oz) 88.6 kg (195 lb 5.2 oz) 87.5 kg (192 lb 14.4 oz)     No intake/output data recorded. Intake/Output Summary (Last 24 hours) at 6/5/2020 1310  Last data filed at 6/5/2020 1255  Gross per 24 hour   Intake 6897.5 ml   Output 600 ml   Net 6297.5 ml      Physical exam:   GEN: NAD  NECK- Supple,NO MASS  RESP:clear b/l, no wheezing  CVS: S1,S2  RRR  ABDO:soft, non tender  NEURO: normal speech, non focal  EXT: No Edema   Discussed with patient and the nurse  Chart reviewed. Pertinent Notes reviewed.      Data Review :  Recent Labs     06/05/20  0413 06/04/20  0552 06/03/20  0255    142 140   K 3.4* 3.5 3.5   * 115* 113*   CO2 17* 18* 15*   BUN 84* 95* 106*   CREA 2.86* 3.45* 3.93*   GLU 87 85 100   CA 7.9* 7.9* 7.9*   MG 1.6 1.7 2.1   PHOS 2.8 3.1 4.5     Recent Labs     06/05/20  0413 06/03/20  0255   WBC 6.5 7.4   HGB 9.5* 10.1*   HCT 26.9* 29.0*   * 186     No results for input(s): FE, TIBC, PSAT, FERR in the last 72 hours. No results for input(s): CPK, CKNDX, TROIQ in the last 72 hours. No lab exists for component: CPKMB  Lab Results   Component Value Date/Time    Color YELLOW/STRAW 06/05/2020 03:07 AM    Appearance CLOUDY (A) 06/05/2020 03:07 AM    Specific gravity 1.013 06/05/2020 03:07 AM    pH (UA) 6.0 06/05/2020 03:07 AM    Protein TRACE (A) 06/05/2020 03:07 AM    Glucose Negative 06/05/2020 03:07 AM    Ketone Negative 06/05/2020 03:07 AM    Bilirubin NEGATIVE  03/04/2020 10:38 PM    Urobilinogen 0.2 06/05/2020 03:07 AM    Nitrites Negative 06/05/2020 03:07 AM    Leukocyte Esterase MODERATE (A) 06/05/2020 03:07 AM    Epithelial cells FEW 06/05/2020 03:07 AM    Bacteria 4+ (A) 06/05/2020 03:07 AM    WBC 20-50 06/05/2020 03:07 AM    RBC 10-20 06/05/2020 03:07 AM     Lab Results   Component Value Date/Time    Culture result: NO GROWTH 5 DAYS 03/08/2020 04:03 AM    Culture result:  03/06/2020 09:15 AM     MRSA NOT PRESENT. Apparent Staphylococus aureus (not MRSA noted). Culture result:  03/06/2020 09:15 AM         Screening of patient nares for MRSA is for surveillance purposes and, if positive, to facilitate isolation considerations in high risk settings. It is not intended for automatic decolonization interventions per se as regimens are not sufficiently effective to warrant routine use.      No results found for: SDES  Lab Results   Component Value Date/Time    Sodium,urine random 18 06/02/2020 10:29 PM    Creatinine, urine 97.00 06/02/2020 10:29 PM     Results from Hospital Encounter encounter on 06/01/20   XR ABD (KUB)    Narrative PROCEDURE: XR ABD (INDRA)    REASON FOR STUDY: diarhea, rule out constipation with overflow     COMPARISON: 7/16/2019    FINDINGS: Supine views the abdomen demonstrate gaseous distention of the colon  and several loops of small bowel without evidence of high-grade bowel  obstruction. Impression IMPRESSION:  Mild gaseous distention of the colon and loops of small bowel  without evidence of high-grade bowel obstruction. Medication list  reviewed  No current facility-administered medications on file prior to encounter. Current Outpatient Medications on File Prior to Encounter   Medication Sig Dispense Refill    ferrous sulfate 324 mg (65 mg iron) tablet Take 324 mg by mouth two (2) times daily (with meals).  omeprazole (PRILOSEC OTC) 20 mg tablet Take 20 mg by mouth daily.  sodium bicarbonate 650 mg tablet Take 650 mg by mouth two (2) times a day.  folic acid-vit E3-HAYLEY Z40 (FOLBEE) 2.5-25-1 mg tablet Take 1 Tab by mouth daily.  iron bisgly,ps-FA-B-C#12-succ 65 mg-65 mg -1,000 mcg (24) tab Take 1 Tab by mouth daily.  latanoprost (XALATAN) 0.005 % ophthalmic solution Administer 1 Drop to both eyes nightly.  MULTIVITS W-FE,OTHER MIN/LUT (CENTRUM SILVER ULTRA WOMEN'S PO) Take 1 Tab by mouth daily.  DORZOLAMIDE HCL/TIMOLOL MALEAT (DORZOLAMIDE-TIMOLOL OP) Apply 1 Drop to eye three (3) times daily. One drop to each eye twice daily       brimonidine (ALPHAGAN) 0.2 % ophthalmic solution Administer 1 Drop to both eyes three (3) times daily.  levothyroxine (SYNTHROID) 100 mcg tablet Take 100 mcg by mouth Daily (before breakfast). Indications: HYPOTHYROIDISM      capecitabine (XELODA) 500 mg tablet 1,500 mg two (2) times a day. X 14 days then 7 days off      acetaminophen (TYLENOL EXTRA STRENGTH) 500 mg tablet Take 1,000 mg by mouth every six (6) hours as needed. Indications: ARTHRITIC PAIN, PAIN                         Ilia Lechuga MD  [facility]   [email or web address] [facility].[employer]

## 2020-06-05 NOTE — PROGRESS NOTES
Nutrition Assessment:    INTERVENTIONS/RECOMMENDATIONS:   · Continue current diet  · Continue ensure clear TID, Magic Cup BID  · Trial of gelatein  · Encourage supplement consumption  · Please document % meals and supplements consumed in flowsheet I/O's under intake     ASSESSMENT:   In attempt to conserve PPE d/t COVID-19 crisis and pt on enteric contact isolation, assessment was conducted through chart review and phone conversation with pt. Pt reports slight improvement in PO intake however will not consistently consume >50% of meals. She has only consumed 1 ensure clear supplement since the order was placed and has not tried magic cup yet. She was encouraged to consume at least 2 ensure clear per day and to give Magic cup a try. She also agreed to try gelatein (protein fortified gelatin). Diet Order: Regular(Ensure Clear TID, Magic Cup BID, Gelatein daily )  % Eaten:    Patient Vitals for the past 72 hrs:   % Diet Eaten   06/05/20 0819 50 %   06/03/20 1440 0 %   06/03/20 0933 5 %   06/02/20 1730 0 %   06/02/20 1310 50 %     Pertinent Medications: [x]Reviewed: nephrocap, pepcid, Fe, lactobac, K+, Na bicarb  Pertinent Labs: [x]Reviewed: K+ 3.4,   Food Allergies: [x]NKFA  []Other   Last BM:  6/4  Edema: trace  []RUE   []LUE   [x]RLE   [x]LLE      Pressure Ulcer:  n/a    [] Stage I   [] Stage II   [] Stage III   [] Stage IV      Anthropometrics: Height: 5' 8\" (172.7 cm) Weight: 87.5 kg (192 lb 14.4 oz)    IBW (%IBW):   ( ) UBW (%UBW): 105.2 kg (232 lb) (84.19 %)    BMI: Body mass index is 29.33 kg/m².     This BMI is indicative of:  []Underweight   []Normal   [x]Overweight   [] Obesity   [] Extreme Obesity (BMI>40)  Last Weight Metrics:  Weight Loss Metrics 6/4/2020 5/19/2020 3/11/2020 10/17/2019 7/19/2019 7/5/2019 6/26/2019   Today's Wt 192 lb 14.4 oz 225 lb 246 lb 232 lb 3 oz 252 lb 248 lb 258 lb   BMI 29.33 kg/m2 33.23 kg/m2 37.4 kg/m2 35.3 kg/m2 38.32 kg/m2 37.71 kg/m2 39.23 kg/m2       Estimated Nutrition Needs (Based on): 1850 Kcals/day(MSJ 1409 x 1.3) , 89 g(-106 (1-1.2gPro/kg) ) Protein  Carbohydrate: At Least 130 g/day  Fluids: 1850 mL/day or per primary team    NUTRITION DIAGNOSES:   Problem:  Malnutrition      Etiology: related to poor appetite, nausea and diarrhea      Signs/Symptoms: as evidenced by 16% wt loss in the past 1-2 months, minimal PO intake x 2-3 weeks. Previous Nutrition Dx:  [] Resolved  [] Unresolved           [x] Progressing    NUTRITION INTERVENTIONS:  Meals/Snacks: General/healthful diet, Modify diet/texture/consistency/nutrients   Supplements: Commercial supplement              GOAL:   Pt will consume >50% of meals/supplements in 2-4 days. NUTRITION MONITORING AND EVALUATION      Food/Nutrient Intake Outcomes: Total energy intake, Liquid meal replacement  Physical Signs/Symptoms Outcomes: Glucose profile, Electrolyte and renal profile, Weight/weight change, GI profile, GI    Previous Goal Met:   [] Met              [x] Progressing Towards Goal              [] Not Progressing Towards Goal   Previous Recommendations:   [x] Implemented          [] Not Implemented          [] Not Applicable    LEARNING NEEDS (Diet, Food/Nutrient-Drug Interaction):    [x] None Identified   [] Identified and Education Provided/Documented   [] Identified and Pt declined/was not appropriate     Cultural, Anabaptism, OR Ethnic Dietary Needs:    [x] None Identified   [] Identified and Addressed     [x] Interdisciplinary Care Plan Reviewed/Documented    [x] Discharge Planning: General healthy diet with kcal and protein dense foods.  Kcal/protein denes ONS 2-3x per day   [] Participated in Interdisciplinary Rounds    NUTRITION RISK:    [x] High              [] Moderate           []  Low  []  Minimal/Uncompromised      Lorie Jeffers RDN  Pager [phone]  Weekend Pager [phone]

## 2020-06-05 NOTE — PROGRESS NOTES
Problem: Mobility Impaired (Adult and Pediatric)  Goal: *Acute Goals and Plan of Care (Insert Text)  Description: FUNCTIONAL STATUS PRIOR TO ADMISSION: Patient was modified independent using a single point cane for functional mobility. Reports she was very independent prior. Has some vision loss from glaucoma. HOME SUPPORT PRIOR TO ADMISSION: The patient lived with son and grandson to assist her as needed. Physical Therapy Goals  Initiated 6/3/2020  1. Patient will move from supine to sit and sit to supine  in bed with modified independence within 7 day(s). 2.  Patient will transfer from bed to chair and chair to bed with modified independence using the least restrictive device within 7 day(s). 3.  Patient will perform sit to stand with modified independence within 7 day(s). 4.  Patient will ambulate with modified independence for 200 feet with the least restrictive device within 7 day(s). 5.  Patient will ascend/descend 4 stairs with 1 handrail(s) with modified independence within 7 day(s). Outcome: Progressing Towards Goal   PHYSICAL THERAPY TREATMENT  Patient: Francisco Holliday (35 y.o. female)  Date: 6/5/2020  Diagnosis: Acute on chronic renal failure ([facility]) [N17.9, N18.9]   <principal problem not specified>       Precautions: Fall  Chart, physical therapy assessment, plan of care and goals were reviewed. ASSESSMENT  Patient continues with skilled PT services and is progressing towards goals. Pt received sitting in bedside chair, agreeable to PT session. Pt demonstrates all mobility at cga to sba, using spc for transfers and ambulation. Gait slow and deliberate. No lob noted. Pt reporting she feels very close to her baseline, but is just a little slower. Per chart, pt declining HHPT, reporting she has plenty of assistance at home.      Current Level of Function Impacting Discharge (mobility/balance): cga to sba    Other factors to consider for discharge: none         PLAN :  Patient continues to benefit from skilled intervention to address the above impairments. Continue treatment per established plan of care. to address goals. Recommendation for discharge: (in order for the patient to meet his/her long term goals)  Physical therapy at least 2 days/week in the home     This discharge recommendation:  Has been made in collaboration with the attending provider and/or case management    IF patient discharges home will need the following DME: none       SUBJECTIVE:   Patient stated I told you I would feel better today    OBJECTIVE DATA SUMMARY:   Critical Behavior:  Neurologic State: Agitated  Orientation Level: Appropriate for age  Cognition: Appropriate decision making  Safety/Judgement: Awareness of environment  Functional Mobility Training:  Bed Mobility:  Rolling: Stand-by assistance  Supine to Sit: Stand-by assistance     Scooting: Contact guard assistance        Transfers:  Sit to Stand: Stand-by assistance;Contact guard assistance  Stand to Sit: Stand-by assistance;Contact guard assistance        Bed to Chair: Stand-by assistance                    Balance:  Sitting: Intact  Standing: Intact; With support  Standing - Static: Constant support;Good  Standing - Dynamic : Constant support;Good  Ambulation/Gait Training:  Distance (ft): 45 Feet (ft)  Assistive Device: Cane, straight;Gait belt  Ambulation - Level of Assistance: Supervision;Stand-by assistance                 Base of Support: Widened     Speed/Jacki: Slow  Step Length: Left shortened;Right shortened      Pain Rating:  None reported    Activity Tolerance:   Good  Please refer to the flowsheet for vital signs taken during this treatment. After treatment patient left in no apparent distress:   Sitting in chair and Call bell within reach    COMMUNICATION/COLLABORATION:   The patients plan of care was discussed with: Registered nurse.      Lis Nguyen, PT   Time Calculation: 21 mins

## 2020-06-05 NOTE — PROGRESS NOTES
Spoke with Darlene More and he has the code for speaking with the nurses which is [phone]. Updated son on patients status and pt is aware as well.

## 2020-06-05 NOTE — PROGRESS NOTES
-Hematology / Oncology ([facility]) -  -Primary Oncologist- Dr. Lorie Courtney   -[facility]- \" I am better\"    Discussed with Dr Hailey Louise  -Leobardo Gamez to feel improved, sitting in chair eating bfast, stools slowing down, no BM this am yet. -O-      Patient Vitals for the past 24 hrs:   Temp Pulse Resp BP SpO2   06/05/20 0817 -- 73 -- -- --   06/05/20 0748 97.5 °F (36.4 °C) (!) 127 20 107/82 100 %   06/05/20 0111 -- -- -- -- 96 %   06/04/20 2338 97.9 °F (36.6 °C) 68 16 106/82 96 %   06/04/20 1924 97.8 °F (36.6 °C) 69 18 104/84 97 %   06/04/20 1611 97.9 °F (36.6 °C) 75 18 112/88 99 %     06/05 0701 - 06/05 1900  In: 6087.5 [P.O.:120; I.V.:5967.5]  Out: 600 [Urine:600]  Review of Systems:   Constitutional No fevers, chills, night sweats. Increasing weakness and wt loss over past 2 months   Allergic/Immunologic No recent allergic reactions   Eyes No significant visual difficulties. No diplopia. ENMT No problems with hearing, no sore throat, no sinus drainage. Endocrine No hot flashes or night sweats. No cold intolerance, polyuria, or polydipsia   Hematologic/Lymphatic No easy bruising or bleeding. The patient denies any tender or palpable lymph nodes   Breasts No abnormal masses of breast, nipple discharge or pain. Respiratory No dyspnea on exertion, orthopnea, chest pain, cough or hemoptysis. Cardiovascular No anginal chest pain, irregular heart beat, tachycardia, palpitations or orthopnea. Gastrointestinal Nausea starting about 1 week ago, increasingly poor appetite, diarrhea starting about 1 wek ago with end of Xeloda cycle. Pt reports some improvement in diarrhea with Lomotil over the weekend   Genitourinary (M) No hematuria, dysuria, increased frequency, urgency, hesitancy or incontinence. Musculoskeletal No joint pain, swelling or redness. No decreased range of motion. Integumentary No chronic rashes, inflammation, ulcerations, pruritus, petechiae, purpura, ecchymoses, or skin changes.    Neurologic No headache, blurred vision, and no areas of focal weakness or numbness. Normal gait. No sensory problems. Psychiatric No insomnia, depression, nereyda or mood swings. No psychotropic drugs.         Physical Exam:  Constitutional Alert, cooperative, oriented. Mood and affect appropriate. Appears close to chronological age. Well nourished. Well developed. Head Normocephalic; no scars   Eyes Conjunctivae and sclerae are clear and without icterus. Pupils are reactive and equal.   ENMT Sinuses are nontender. No oral exudates, ulcers, masses, thrush or mucositis. Oropharynx clear. Tongue normal.   Neck Supple without masses or thyromegaly. No jugular venous distension. Hematologic/Lymphatic No petechiae or purpura. No tender or palpable lymph nodes in the cervical, supraclavicular, axillary or inguinal area. Respiratory Lungs are clear to auscultation without rhonchi or wheezing. Cardiovascular Regular rate and rhythm of heart without murmurs, gallops or rubs. Chest / Line Site Chest is symmetric with no chest wall deformities. Abdomen Non-tender, non-distended, no masses, ascites or hepatosplenomegaly. Good bowel sounds. No guarding or rebound tenderness. No pulsatile masses. Musculoskeletal Requires assistance with transfer to [facility]   Extremities No visible deformities, no cyanosis, clubbing. 2+ bilateral LE edema   Skin No rashes, scars, or lesions suggestive of malignancy. No petechiae, purpura, or ecchymoses. No excoriations. Neurologic No sensory or motor deficits, normal cerebellar function, normal gait, cranial nerves intact. Psychiatric Alert and oriented times three. Coherent speech.  Verbalizes understanding of our discussions today.        -Labs-    Recent Labs     06/05/20  0413 06/04/20  0552 06/03/20  0255 06/02/20  1324   WBC 6.5  --  7.4  --    HGB 9.5*  --  10.1*  --    *  --  186  --    ANEU 5.4  --  6.0  --     142 140 137   K 3.4* 3.5 3.5 3.3*   GLU 87 85 100 111*   BUN 84* 95* 106* 101* CREA 2.86* 3.45* 3.93* 4.05*   ALT  --   --  12  --    TBILI  --   --  0.5  --    AP  --   --  59  --    CA 7.9* 7.9* 7.9* 8.0*   MG 1.6 1.7 2.1  --    PHOS 2.8 3.1 4.5  --      Imagin20 renal us:  IMPRESSION:   Innumerable bilateral renal cysts, compatible with adult polycystic kidney  disease. No hydronephrosis. Oncology Summary:  Pt was on Xeloda single agent for esophageal cancer and had ablation per  Dr. Claudean Brood 20. She had started C4 of Xeloda at max dose(1500 mg BID  days)  per renal function  3/2/20 but stopped 3/4/20 when she was admitted  To [facility] for septicemia. She was discharged 3/12/20 and  received IV antibiotics in [facility] until 3/23/20.       Pt resumed Xeloda 1500 mg BID 3/30-20.       Pt completed last 14 day cycle of Xeloda 20. She arrived to clinic 20 reporting several days of diarrhea, nausea and poor appetite. Pt was given hydration and anti-emetics in clinic on 20 and returned to clinic today for further hydration and re-evaluation.      -Assessment + Plan-     *) SONIDO on CKD:  - Admitted from clinic at end of day 6 when she presented with Cr 4.3 on .  Baseline Cr ~2.0  - Suspected pre renal due to diarrhea from Xeloda therapy that was completed on 20  - Appreciate Nephrology's assistance  - Hydration per renal  - Renal us showed polycystic disease  -  Urinalysis unremarkable   - Cr improving today to 2.8      *) Xeloda induced Diarrhea:  - Cont Lomotil scheduled and Imodium prn  - cont IVF  - GI assistance appreciated, infectious workup sent, ova/parasites negative, C diff pending still    *)Hypokalemia, due to GI losses:  - K low, will replace today      *) Esophageal cancer:  - sp ablation and adjuvant Xeloda that concluded     *) Chemo induced anemia:  - hgb 9.5, monitor    *) Cancer related anorexia, protein calorie malnutrition  - encouraged her to drink Ensure

## 2020-06-05 NOTE — PROGRESS NOTES
Bedside and Verbal shift change report given to [facility] (oncoming nurse) by Gililan Dukes. Olga Matthews RN (offgoing nurse). Report included the following information SBAR, Kardex, Intake/Output, MAR and Recent Results.

## 2020-06-05 NOTE — PROGRESS NOTES
Pt is up to chair and workring with PT today. Tolerating diet and stools have slowed down. Pt is to call for assistance for help.

## 2020-06-05 NOTE — PROGRESS NOTES
Gastroenterology Progress Note  Quique Murphy MD      6/5/2020    Admit Date: 6/1/2020    Subjective: Follow up for:  1)  Diarrhea    2) History of esophageal cancer s/p ablation, on Xeloda    Patient is on Regular diet. Tolerating without any N/V. Pain: Patient complains of abdominal pain no. Some liquid stool. Slowing down but saying she feels the urge now. No F/C. Current Facility-Administered Medications   Medication Dose Route Frequency    benzonatate (TESSALON) capsule 100 mg  100 mg Oral TID PRN    lactated Ringers infusion  150 mL/hr IntraVENous CONTINUOUS    sodium bicarbonate tablet 650 mg  650 mg Oral TID    diphenoxylate-atropine (LOMOTIL) tablet 1 Tab  1 Tab Oral QID    loperamide (IMODIUM) capsule 2 mg  2 mg Oral PRN    famotidine (PEPCID) tablet 20 mg  20 mg Oral ACB    heparin (porcine) injection 5,000 Units  5,000 Units SubCUTAneous Q8H    acetaminophen (TYLENOL) tablet 650 mg  650 mg Oral Q6H PRN    brimonidine (ALPHAGAN) 0.2 % ophthalmic solution 1 Drop  1 Drop Both Eyes TID    ferrous sulfate tablet 325 mg  325 mg Oral BID WITH MEALS    B complex-vitaminC-folic acid (NEPHROCAP) cap  1 Cap Oral DAILY    latanoprost (XALATAN) 0.005 % ophthalmic solution 1 Drop  1 Drop Both Eyes QHS    levothyroxine (SYNTHROID) tablet 100 mcg  100 mcg Oral 6am    LORazepam (ATIVAN) injection 1 mg  1 mg IntraVENous Q4H PRN    ondansetron (ZOFRAN) injection 4 mg  4 mg IntraVENous Q4H PRN    dorzolamide-timoloL (COSOPT) 22.3-6.8 mg/mL ophthalmic solution 1 Drop  1 Drop Both Eyes BID        Objective:     Blood pressure 107/82, pulse 73, temperature 97.5 °F (36.4 °C), resp. rate 20, height 5' 8\" (1.727 m), weight 87.5 kg (192 lb 14.4 oz), SpO2 100 %, not currently breastfeeding. 06/05 0701 - 06/05 1900  In: 6087.5 [P.O.:120; I.V.:5967.5]  Out: 600 [Urine:600]    No intake/output data recorded.     EXAM:    Gen: Chronically ill appearing, pleasent BF, NAD  HEENT: NCAT, MMM, sclera anicteric  Abdomen: Soft, non tender, non distended, normal BS  Ext: no cyanosis or edema    Data Review    Recent Results (from the past 24 hour(s))   WBC, STOOL    Collection Time: 06/04/20  3:58 PM   Result Value Ref Range    White blood cells, stool 0 TO 4 0 - 4 /HPF   C. DIFFICILE AG & TOXIN A/B    Collection Time: 06/04/20  3:58 PM   Result Value Ref Range    GDH ANTIGEN Positive (A) NEG      C. difficile toxin Negative NEG      PCR Reflex See Reflex order for C. difficile (DNA)      INTERPRETATION (A) NTXCD       Indeterminate for Toxigenic C. difficile, DNA confirmation to follow.    URINALYSIS W/MICROSCOPIC    Collection Time: 06/05/20  3:07 AM   Result Value Ref Range    Color YELLOW/STRAW      Appearance CLOUDY (A) CLEAR      Specific gravity 1.013 1.003 - 1.030      pH (UA) 6.0 5.0 - 8.0      Protein TRACE (A) NEG mg/dL    Glucose Negative NEG mg/dL    Ketone Negative NEG mg/dL    Blood LARGE (A) NEG      Urobilinogen 0.2 0.2 - 1.0 EU/dL    Nitrites Negative NEG      Leukocyte Esterase MODERATE (A) NEG      WBC 20-50 0 - 4 /hpf    RBC 10-20 0 - 5 /hpf    Epithelial cells FEW FEW /lpf    Bacteria 4+ (A) NEG /hpf   BILIRUBIN, CONFIRM    Collection Time: 06/05/20  3:07 AM   Result Value Ref Range    Bilirubin UA, confirm Negative NEG     RENAL FUNCTION PANEL    Collection Time: 06/05/20  4:13 AM   Result Value Ref Range    Sodium 143 136 - 145 mmol/L    Potassium 3.4 (L) 3.5 - 5.1 mmol/L    Chloride 116 (H) 97 - 108 mmol/L    CO2 17 (L) 21 - 32 mmol/L    Anion gap 10 5 - 15 mmol/L    Glucose 87 65 - 100 mg/dL    BUN 84 (H) 6 - 20 MG/DL    Creatinine 2.86 (H) 0.55 - 1.02 MG/DL    BUN/Creatinine ratio 29 (H) 12 - 20      GFR est AA 19 (L) >60 ml/min/1.73m2    GFR est non-AA 16 (L) >60 ml/min/1.73m2    Calcium 7.9 (L) 8.5 - 10.1 MG/DL    Phosphorus 2.8 2.6 - 4.7 MG/DL    Albumin 2.2 (L) 3.5 - 5.0 g/dL   CBC WITH AUTOMATED DIFF    Collection Time: 06/05/20  4:13 AM   Result Value Ref Range    WBC 6.5 3.6 - 11.0 K/uL    RBC 2.81 (L) 3.80 - 5.20 M/uL    HGB 9.5 (L) 11.5 - 16.0 g/dL    HCT 26.9 (L) 35.0 - 47.0 %    MCV 95.7 80.0 - 99.0 FL    MCH 33.8 26.0 - 34.0 PG    MCHC 35.3 30.0 - 36.5 g/dL    RDW 20.5 (H) 11.5 - 14.5 %    PLATELET 018 (L) 304 - 400 K/uL    MPV 10.2 8.9 - 12.9 FL    NRBC 0.0 0  WBC    ABSOLUTE NRBC 0.00 0.00 - 0.01 K/uL    NEUTROPHILS 84 (H) 32 - 75 %    LYMPHOCYTES 5 (L) 12 - 49 %    MONOCYTES 7 5 - 13 %    EOSINOPHILS 4 0 - 7 %    BASOPHILS 0 0 - 1 %    IMMATURE GRANULOCYTES 0 0.0 - 0.5 %    ABS. NEUTROPHILS 5.4 1.8 - 8.0 K/UL    ABS. LYMPHOCYTES 0.3 (L) 0.8 - 3.5 K/UL    ABS. MONOCYTES 0.5 0.0 - 1.0 K/UL    ABS. EOSINOPHILS 0.3 0.0 - 0.4 K/UL    ABS. BASOPHILS 0.0 0.0 - 0.1 K/UL    ABS. IMM. GRANS. 0.0 0.00 - 0.04 K/UL    DF AUTOMATED      RBC COMMENTS ANISOCYTOSIS  2+        RBC COMMENTS MACROCYTOSIS  1+       MAGNESIUM    Collection Time: 06/05/20  4:13 AM   Result Value Ref Range    Magnesium 1.6 1.6 - 2.4 mg/dL     Recent Labs     06/05/20  0413 06/03/20  0255   WBC 6.5 7.4   HGB 9.5* 10.1*   HCT 26.9* 29.0*   * 186     Recent Labs     06/05/20  0413 06/04/20  0552 06/03/20  0255    142 140   K 3.4* 3.5 3.5   * 115* 113*   CO2 17* 18* 15*   BUN 84* 95* 106*   CREA 2.86* 3.45* 3.93*   GLU 87 85 100   CA 7.9* 7.9* 7.9*   MG 1.6 1.7 2.1   PHOS 2.8 3.1 4.5     Recent Labs     06/05/20  0413 06/04/20  0552 06/03/20  0255   ALT  --   --  12   AP  --   --  59   TBILI  --   --  0.5   TP  --   --  5.0*   ALB 2.2* 2.3* 2.4*   GLOB  --   --  2.6     No results for input(s): INR, PTP, APTT, INREXT, INREXT in the last 72 hours. No results for input(s): FE, TIBC, PSAT, FERR in the last 72 hours. Lab Results   Component Value Date/Time    Folate 95.7 (H) 03/05/2020 04:26 AM      No results for input(s): PH, PCO2, PO2 in the last 72 hours. No results for input(s): CPK, CKNDX, TROIQ in the last 72 hours.     No lab exists for component: CPKMB  Lab Results   Component Value Date/Time    Cholesterol, total 96 03/05/2020 04:26 AM    HDL Cholesterol 12 03/05/2020 04:26 AM    LDL, calculated 40.8 03/05/2020 04:26 AM    Triglyceride 216 (H) 03/05/2020 04:26 AM    CHOL/HDL Ratio 8.0 (H) 03/05/2020 04:26 AM     Lab Results   Component Value Date/Time    Glucose (POC) 151 (H) 03/12/2020 11:09 AM    Glucose (POC) 96 03/12/2020 07:18 AM    Glucose (POC) 147 (H) 03/11/2020 10:52 PM    Glucose (POC) 118 (H) 03/11/2020 04:41 PM    Glucose (POC) 124 (H) 03/11/2020 05:41 AM     Lab Results   Component Value Date/Time    Color YELLOW/STRAW 06/05/2020 03:07 AM    Appearance CLOUDY (A) 06/05/2020 03:07 AM    Specific gravity 1.013 06/05/2020 03:07 AM    pH (UA) 6.0 06/05/2020 03:07 AM    Protein TRACE (A) 06/05/2020 03:07 AM    Glucose Negative 06/05/2020 03:07 AM    Ketone Negative 06/05/2020 03:07 AM    Bilirubin NEGATIVE  03/04/2020 10:38 PM    Urobilinogen 0.2 06/05/2020 03:07 AM    Nitrites Negative 06/05/2020 03:07 AM    Leukocyte Esterase MODERATE (A) 06/05/2020 03:07 AM    Epithelial cells FEW 06/05/2020 03:07 AM    Bacteria 4+ (A) 06/05/2020 03:07 AM    WBC 20-50 06/05/2020 03:07 AM    RBC 10-20 06/05/2020 03:07 AM           Assessment:     Active Problems:    Acute on chronic renal failure ([facility] [address] (6/1/2020)      Severe protein-calorie malnutrition ([facility] [address] (6/2/2020)      Diarrhea (6/3/2020)        Plan:   Stool is + for C diff ag but negative for toxin. PCR is pending. With her hx(malignancy, diarrhea,age,RF) I suggest starting PO Vancomycin 125 mg PO qid and await PCR results. Use contact precautions.     06/05/20 [address] [facility], [address] [facility]  [phone] [id number]  [address] [place] [address]: [phone]

## 2020-06-06 LAB
ALBUMIN SERPL-MCNC: 2.2 G/DL (ref 3.5–5)
ANION GAP SERPL CALC-SCNC: 8 MMOL/L (ref 5–15)
BASOPHILS # BLD: 0 K/UL (ref 0–0.1)
BASOPHILS NFR BLD: 0 % (ref 0–1)
BUN SERPL-MCNC: 68 MG/DL (ref 6–20)
BUN/CREAT SERPL: 28 (ref 12–20)
CALCIUM SERPL-MCNC: 8.2 MG/DL (ref 8.5–10.1)
CHLORIDE SERPL-SCNC: 113 MMOL/L (ref 97–108)
CO2 SERPL-SCNC: 20 MMOL/L (ref 21–32)
CREAT SERPL-MCNC: 2.41 MG/DL (ref 0.55–1.02)
DIFFERENTIAL METHOD BLD: ABNORMAL
EOSINOPHIL # BLD: 0.2 K/UL (ref 0–0.4)
EOSINOPHIL NFR BLD: 2 % (ref 0–7)
ERYTHROCYTE [DISTWIDTH] IN BLOOD BY AUTOMATED COUNT: 20.9 % (ref 11.5–14.5)
GLUCOSE SERPL-MCNC: 101 MG/DL (ref 65–100)
HCT VFR BLD AUTO: 27.8 % (ref 35–47)
HGB BLD-MCNC: 9.9 G/DL (ref 11.5–16)
IMM GRANULOCYTES # BLD AUTO: 0.2 K/UL (ref 0–0.04)
IMM GRANULOCYTES NFR BLD AUTO: 3 % (ref 0–0.5)
LYMPHOCYTES # BLD: 0.5 K/UL (ref 0.8–3.5)
LYMPHOCYTES NFR BLD: 7 % (ref 12–49)
MAGNESIUM SERPL-MCNC: 1.5 MG/DL (ref 1.6–2.4)
MCH RBC QN AUTO: 34.4 PG (ref 26–34)
MCHC RBC AUTO-ENTMCNC: 35.6 G/DL (ref 30–36.5)
MCV RBC AUTO: 96.5 FL (ref 80–99)
MONOCYTES # BLD: 0.7 K/UL (ref 0–1)
MONOCYTES NFR BLD: 9 % (ref 5–13)
NEUTS SEG # BLD: 5.9 K/UL (ref 1.8–8)
NEUTS SEG NFR BLD: 79 % (ref 32–75)
NRBC # BLD: 0 K/UL (ref 0–0.01)
NRBC BLD-RTO: 0 PER 100 WBC
PHOSPHATE SERPL-MCNC: 2.2 MG/DL (ref 2.6–4.7)
PLATELET # BLD AUTO: 142 K/UL (ref 150–400)
PMV BLD AUTO: 10.1 FL (ref 8.9–12.9)
POTASSIUM SERPL-SCNC: 3.4 MMOL/L (ref 3.5–5.1)
RBC # BLD AUTO: 2.88 M/UL (ref 3.8–5.2)
RBC MORPH BLD: ABNORMAL
SODIUM SERPL-SCNC: 141 MMOL/L (ref 136–145)
WBC # BLD AUTO: 7.5 K/UL (ref 3.6–11)

## 2020-06-06 PROCEDURE — 36415 COLL VENOUS BLD VENIPUNCTURE: CPT

## 2020-06-06 PROCEDURE — 74011250637 HC RX REV CODE- 250/637: Performed by: INTERNAL MEDICINE

## 2020-06-06 PROCEDURE — 74011250637 HC RX REV CODE- 250/637: Performed by: PHYSICIAN ASSISTANT

## 2020-06-06 PROCEDURE — 74011250636 HC RX REV CODE- 250/636: Performed by: INTERNAL MEDICINE

## 2020-06-06 PROCEDURE — 83735 ASSAY OF MAGNESIUM: CPT

## 2020-06-06 PROCEDURE — 74011000250 HC RX REV CODE- 250: Performed by: INTERNAL MEDICINE

## 2020-06-06 PROCEDURE — 74011000258 HC RX REV CODE- 258: Performed by: INTERNAL MEDICINE

## 2020-06-06 PROCEDURE — 85025 COMPLETE CBC W/AUTO DIFF WBC: CPT

## 2020-06-06 PROCEDURE — 65270000015 HC RM PRIVATE ONCOLOGY

## 2020-06-06 PROCEDURE — 80069 RENAL FUNCTION PANEL: CPT

## 2020-06-06 RX ORDER — POTASSIUM CHLORIDE 14.9 MG/ML
10 INJECTION INTRAVENOUS
Status: DISPENSED | OUTPATIENT
Start: 2020-06-06 | End: 2020-06-06

## 2020-06-06 RX ORDER — POTASSIUM CHLORIDE 14.9 MG/ML
10 INJECTION INTRAVENOUS
Status: COMPLETED | OUTPATIENT
Start: 2020-06-06 | End: 2020-06-06

## 2020-06-06 RX ADMIN — DIPHENOXYLATE HYDROCHLORIDE AND ATROPINE SULFATE 1 TABLET: 2.5; .025 TABLET ORAL at 12:16

## 2020-06-06 RX ADMIN — SODIUM BICARBONATE 650 MG: 650 TABLET ORAL at 08:44

## 2020-06-06 RX ADMIN — BRIMONIDINE TARTRATE 1 DROP: 2 SOLUTION OPHTHALMIC at 23:33

## 2020-06-06 RX ADMIN — DIPHENOXYLATE HYDROCHLORIDE AND ATROPINE SULFATE 1 TABLET: 2.5; .025 TABLET ORAL at 17:35

## 2020-06-06 RX ADMIN — HEPARIN SODIUM 5000 UNITS: 5000 INJECTION INTRAVENOUS; SUBCUTANEOUS at 23:33

## 2020-06-06 RX ADMIN — DIPHENOXYLATE HYDROCHLORIDE AND ATROPINE SULFATE 1 TABLET: 2.5; .025 TABLET ORAL at 23:34

## 2020-06-06 RX ADMIN — SODIUM BICARBONATE 650 MG: 650 TABLET ORAL at 23:34

## 2020-06-06 RX ADMIN — HEPARIN SODIUM 5000 UNITS: 5000 INJECTION INTRAVENOUS; SUBCUTANEOUS at 06:30

## 2020-06-06 RX ADMIN — FERROUS SULFATE TAB 325 MG (65 MG ELEMENTAL FE) 325 MG: 325 (65 FE) TAB at 08:44

## 2020-06-06 RX ADMIN — DORZOLAMIDE HYDROCHLORIDE AND TIMOLOL MALEATE 1 DROP: 20; 5 SOLUTION/ DROPS OPHTHALMIC at 08:46

## 2020-06-06 RX ADMIN — POTASSIUM CHLORIDE 10 MEQ: 200 INJECTION, SOLUTION INTRAVENOUS at 16:36

## 2020-06-06 RX ADMIN — BRIMONIDINE TARTRATE 1 DROP: 2 SOLUTION OPHTHALMIC at 08:45

## 2020-06-06 RX ADMIN — ASCORBIC ACID, THIAMINE MONONITRATE,RIBOFLAVIN, NIACINAMIDE, PYRIDOXINE HYDROCHLORIDE, FOLIC ACID, CYANOCOBALAMIN, BIOTIN, CALCIUM PANTOTHENATE, 1 CAPSULE: 100; 1.5; 1.7; 20; 10; 1; 6000; 150000; 5 CAPSULE, LIQUID FILLED ORAL at 08:44

## 2020-06-06 RX ADMIN — VANCOMYCIN HYDROCHLORIDE 125 MG: KIT at 06:28

## 2020-06-06 RX ADMIN — FERROUS SULFATE TAB 325 MG (65 MG ELEMENTAL FE) 325 MG: 325 (65 FE) TAB at 17:35

## 2020-06-06 RX ADMIN — VANCOMYCIN HYDROCHLORIDE 125 MG: KIT at 12:17

## 2020-06-06 RX ADMIN — POTASSIUM CHLORIDE 10 MEQ: 200 INJECTION, SOLUTION INTRAVENOUS at 15:19

## 2020-06-06 RX ADMIN — DIPHENOXYLATE HYDROCHLORIDE AND ATROPINE SULFATE 1 TABLET: 2.5; .025 TABLET ORAL at 08:44

## 2020-06-06 RX ADMIN — BRIMONIDINE TARTRATE 1 DROP: 2 SOLUTION OPHTHALMIC at 15:30

## 2020-06-06 RX ADMIN — POTASSIUM CHLORIDE 10 MEQ: 14.9 INJECTION, SOLUTION INTRAVENOUS at 20:45

## 2020-06-06 RX ADMIN — VANCOMYCIN HYDROCHLORIDE 125 MG: KIT at 23:38

## 2020-06-06 RX ADMIN — LATANOPROST 1 DROP: 50 SOLUTION OPHTHALMIC at 23:33

## 2020-06-06 RX ADMIN — POTASSIUM CHLORIDE 10 MEQ: 14.9 INJECTION, SOLUTION INTRAVENOUS at 21:40

## 2020-06-06 RX ADMIN — LEVOTHYROXINE SODIUM 100 MCG: 0.1 TABLET ORAL at 06:29

## 2020-06-06 RX ADMIN — SODIUM BICARBONATE: 84 INJECTION, SOLUTION INTRAVENOUS at 13:05

## 2020-06-06 RX ADMIN — DORZOLAMIDE HYDROCHLORIDE AND TIMOLOL MALEATE 1 DROP: 20; 5 SOLUTION/ DROPS OPHTHALMIC at 17:41

## 2020-06-06 RX ADMIN — VANCOMYCIN HYDROCHLORIDE 125 MG: KIT at 17:40

## 2020-06-06 RX ADMIN — SODIUM BICARBONATE 650 MG: 650 TABLET ORAL at 17:35

## 2020-06-06 RX ADMIN — POTASSIUM CHLORIDE 10 MEQ: 200 INJECTION, SOLUTION INTRAVENOUS at 19:00

## 2020-06-06 RX ADMIN — FAMOTIDINE 20 MG: 20 TABLET, FILM COATED ORAL at 08:44

## 2020-06-06 NOTE — PROGRESS NOTES
Oncology End of Shift Note      Bedside shift change report given to Elias Purdy RN (incoming nurse) by Lena Trejo (outgoing nurse) on [facility]. Report included the following information SBAR, Kardex, Intake/Output and MAR. Shift Summary: slept most of the night, 2 liquid BM's, reported bright red blood with AM BM, am labs drawn, nila Cameron called for update this am, ambulated to bathroom twice with one assist using her cane, good urine output, used her CPAP last night     Issues for Physician to Address:      Patient on Cardiac Monitoring?     [] Yes  [x] No    Rhythm:         Shift Events    Lena Trejo

## 2020-06-06 NOTE — PROGRESS NOTES
Oncology End of Shift Note      Bedside shift change report given to Lisa Anguiano RN (incoming nurse) by Trixie Booth (outgoing nurse) on [facility]. Report included the following information SBAR, Kardex, Intake/Output, MAR, Accordion and Recent Results. Shift Summary: no acute changes; started runs of K; pt sat up in chair most of the shift; denied pain; poor appetite      Issues for Physician to Address:       Patient on Cardiac Monitoring?     [] Yes  [x] No    Rhythm:          Shift Events        Trixie Booth

## 2020-06-06 NOTE — PROGRESS NOTES
Labs reviewed, Cr improving  Cont present care  Will follow up again on Monday  Call with any questions over the weekend      Emmie Guzman MD  [facility]   [address] [address]  Phone - [phone]   Fax - [phone]  [email or web address]

## 2020-06-06 NOTE — PROGRESS NOTES
-Hematology / Oncology ([facility]) -  -Primary Oncologist- Audrey Mcgrath  -CC- Esophageal Cancer    -S-  Stools improving. No new complaints    -O-      Patient Vitals for the past 24 hrs:   Temp Pulse Resp BP SpO2   06/06/20 0711 97.4 °F (36.3 °C) 61 18 113/84 98 %   06/05/20 2316 97.6 °F (36.4 °C) 70 18 117/68 95 %   06/05/20 1936 97.8 °F (36.6 °C) (!) 107 18 121/88 98 %   06/05/20 1528 97.4 °F (36.3 °C) 68 18 97/77 94 %     No intake/output data recorded. Gen: nad  Chest: bilateral breath sounds present  Cardiac: rrr  Abd: s/nt    -Labs-    Recent Labs     06/06/20  0238 06/05/20  0413 06/04/20  0552   WBC 7.5 6.5  --    HGB 9.9* 9.5*  --    * 140*  --    ANEU 5.9 5.4  --     143 142   K 3.4* 3.4* 3.5   * 87 85   BUN 68* 84* 95*   CREA 2.41* 2.86* 3.45*   CA 8.2* 7.9* 7.9*   MG 1.5* 1.6 1.7   PHOS 2.2* 2.8 3.1         -Assessment + Plan-     *) SONIDO on CKD:  - Admitted from clinic at end of day 6/1 when she presented with Cr 4.3 on 6/1.  Baseline Cr ~2.0  - Suspected pre renal due to diarrhea from Xeloda therapy that was completed on 5/24/20  - Appreciate Nephrology's assistance  - Hydration per renal  - Renal us showed polycystic disease  -  Urinalysis unremarkable   - Cr improving today to 2.4     *)  Diarrhea, context of recent use of xeloda  - Cont Lomotil scheduled and Imodium prn  - cont IVF  - GI assistance appreciated, po vanc use noted     *)Hypokalemia, due to GI losses:  - K low, will replace today     *) Esophageal cancer:  - sp ablation and adjuvant Xeloda that concluded 5/24     *) Chemo induced anemia:  - hgb 9.9, monitor     *) Cancer related anorexia, protein calorie malnutrition  - encouraged her to drink Ensure

## 2020-06-07 LAB
ALBUMIN SERPL-MCNC: 2.1 G/DL (ref 3.5–5)
ANION GAP SERPL CALC-SCNC: 7 MMOL/L (ref 5–15)
BASOPHILS # BLD: 0 K/UL (ref 0–0.1)
BASOPHILS NFR BLD: 0 % (ref 0–1)
BUN SERPL-MCNC: 65 MG/DL (ref 6–20)
BUN/CREAT SERPL: 26 (ref 12–20)
CALCIUM SERPL-MCNC: 7.5 MG/DL (ref 8.5–10.1)
CHLORIDE SERPL-SCNC: 112 MMOL/L (ref 97–108)
CO2 SERPL-SCNC: 22 MMOL/L (ref 21–32)
CREAT SERPL-MCNC: 2.47 MG/DL (ref 0.55–1.02)
DIFFERENTIAL METHOD BLD: ABNORMAL
EOSINOPHIL # BLD: 0.1 K/UL (ref 0–0.4)
EOSINOPHIL NFR BLD: 1 % (ref 0–7)
ERYTHROCYTE [DISTWIDTH] IN BLOOD BY AUTOMATED COUNT: 21.2 % (ref 11.5–14.5)
GLUCOSE SERPL-MCNC: 96 MG/DL (ref 65–100)
HCT VFR BLD AUTO: 26.6 % (ref 35–47)
HGB BLD-MCNC: 9.1 G/DL (ref 11.5–16)
IMM GRANULOCYTES # BLD AUTO: 0.2 K/UL (ref 0–0.04)
IMM GRANULOCYTES NFR BLD AUTO: 2 % (ref 0–0.5)
LYMPHOCYTES # BLD: 0.5 K/UL (ref 0.8–3.5)
LYMPHOCYTES NFR BLD: 7 % (ref 12–49)
MAGNESIUM SERPL-MCNC: 1.3 MG/DL (ref 1.6–2.4)
MCH RBC QN AUTO: 33.6 PG (ref 26–34)
MCHC RBC AUTO-ENTMCNC: 34.2 G/DL (ref 30–36.5)
MCV RBC AUTO: 98.2 FL (ref 80–99)
MONOCYTES # BLD: 0.6 K/UL (ref 0–1)
MONOCYTES NFR BLD: 8 % (ref 5–13)
NEUTS SEG # BLD: 6.1 K/UL (ref 1.8–8)
NEUTS SEG NFR BLD: 82 % (ref 32–75)
NRBC # BLD: 0 K/UL (ref 0–0.01)
NRBC BLD-RTO: 0 PER 100 WBC
PHOSPHATE SERPL-MCNC: 2 MG/DL (ref 2.6–4.7)
PLATELET # BLD AUTO: 118 K/UL (ref 150–400)
PMV BLD AUTO: 10 FL (ref 8.9–12.9)
POTASSIUM SERPL-SCNC: 3.4 MMOL/L (ref 3.5–5.1)
RBC # BLD AUTO: 2.71 M/UL (ref 3.8–5.2)
RBC MORPH BLD: ABNORMAL
SODIUM SERPL-SCNC: 141 MMOL/L (ref 136–145)
WBC # BLD AUTO: 7.5 K/UL (ref 3.6–11)

## 2020-06-07 PROCEDURE — 74011250637 HC RX REV CODE- 250/637: Performed by: INTERNAL MEDICINE

## 2020-06-07 PROCEDURE — 74011250636 HC RX REV CODE- 250/636: Performed by: INTERNAL MEDICINE

## 2020-06-07 PROCEDURE — 74011000258 HC RX REV CODE- 258: Performed by: INTERNAL MEDICINE

## 2020-06-07 PROCEDURE — 65270000015 HC RM PRIVATE ONCOLOGY

## 2020-06-07 PROCEDURE — 74011250637 HC RX REV CODE- 250/637: Performed by: PHYSICIAN ASSISTANT

## 2020-06-07 PROCEDURE — 36415 COLL VENOUS BLD VENIPUNCTURE: CPT

## 2020-06-07 PROCEDURE — 80069 RENAL FUNCTION PANEL: CPT

## 2020-06-07 PROCEDURE — 74011000250 HC RX REV CODE- 250: Performed by: INTERNAL MEDICINE

## 2020-06-07 PROCEDURE — 83735 ASSAY OF MAGNESIUM: CPT

## 2020-06-07 PROCEDURE — 85025 COMPLETE CBC W/AUTO DIFF WBC: CPT

## 2020-06-07 RX ORDER — SODIUM CHLORIDE 0.9 % (FLUSH) 0.9 %
SYRINGE (ML) INJECTION
Status: COMPLETED
Start: 2020-06-07 | End: 2020-06-07

## 2020-06-07 RX ORDER — POTASSIUM CHLORIDE 14.9 MG/ML
10 INJECTION INTRAVENOUS
Status: COMPLETED | OUTPATIENT
Start: 2020-06-07 | End: 2020-06-07

## 2020-06-07 RX ORDER — MAGNESIUM SULFATE HEPTAHYDRATE 40 MG/ML
2 INJECTION, SOLUTION INTRAVENOUS ONCE
Status: COMPLETED | OUTPATIENT
Start: 2020-06-07 | End: 2020-06-07

## 2020-06-07 RX ADMIN — BRIMONIDINE TARTRATE 1 DROP: 2 SOLUTION OPHTHALMIC at 21:00

## 2020-06-07 RX ADMIN — FERROUS SULFATE TAB 325 MG (65 MG ELEMENTAL FE) 325 MG: 325 (65 FE) TAB at 18:06

## 2020-06-07 RX ADMIN — DIPHENOXYLATE HYDROCHLORIDE AND ATROPINE SULFATE 1 TABLET: 2.5; .025 TABLET ORAL at 12:09

## 2020-06-07 RX ADMIN — MAGNESIUM SULFATE HEPTAHYDRATE 2 G: 40 INJECTION, SOLUTION INTRAVENOUS at 12:13

## 2020-06-07 RX ADMIN — Medication 10 ML: at 20:51

## 2020-06-07 RX ADMIN — LEVOTHYROXINE SODIUM 100 MCG: 0.1 TABLET ORAL at 05:17

## 2020-06-07 RX ADMIN — DORZOLAMIDE HYDROCHLORIDE AND TIMOLOL MALEATE 1 DROP: 20; 5 SOLUTION/ DROPS OPHTHALMIC at 08:33

## 2020-06-07 RX ADMIN — FAMOTIDINE 20 MG: 20 TABLET, FILM COATED ORAL at 06:36

## 2020-06-07 RX ADMIN — VANCOMYCIN HYDROCHLORIDE 125 MG: KIT at 12:10

## 2020-06-07 RX ADMIN — POTASSIUM CHLORIDE 10 MEQ: 14.9 INJECTION, SOLUTION INTRAVENOUS at 13:20

## 2020-06-07 RX ADMIN — VANCOMYCIN HYDROCHLORIDE 125 MG: KIT at 06:36

## 2020-06-07 RX ADMIN — BRIMONIDINE TARTRATE 1 DROP: 2 SOLUTION OPHTHALMIC at 08:33

## 2020-06-07 RX ADMIN — DIPHENOXYLATE HYDROCHLORIDE AND ATROPINE SULFATE 1 TABLET: 2.5; .025 TABLET ORAL at 21:00

## 2020-06-07 RX ADMIN — HEPARIN SODIUM 5000 UNITS: 5000 INJECTION INTRAVENOUS; SUBCUTANEOUS at 23:00

## 2020-06-07 RX ADMIN — LOPERAMIDE HYDROCHLORIDE 2 MG: 2 CAPSULE ORAL at 06:40

## 2020-06-07 RX ADMIN — DIPHENOXYLATE HYDROCHLORIDE AND ATROPINE SULFATE 1 TABLET: 2.5; .025 TABLET ORAL at 08:30

## 2020-06-07 RX ADMIN — DORZOLAMIDE HYDROCHLORIDE AND TIMOLOL MALEATE 1 DROP: 20; 5 SOLUTION/ DROPS OPHTHALMIC at 18:00

## 2020-06-07 RX ADMIN — SODIUM BICARBONATE: 84 INJECTION, SOLUTION INTRAVENOUS at 20:47

## 2020-06-07 RX ADMIN — FERROUS SULFATE TAB 325 MG (65 MG ELEMENTAL FE) 325 MG: 325 (65 FE) TAB at 08:31

## 2020-06-07 RX ADMIN — BRIMONIDINE TARTRATE 1 DROP: 2 SOLUTION OPHTHALMIC at 16:00

## 2020-06-07 RX ADMIN — LATANOPROST 1 DROP: 50 SOLUTION OPHTHALMIC at 21:00

## 2020-06-07 RX ADMIN — HEPARIN SODIUM 5000 UNITS: 5000 INJECTION INTRAVENOUS; SUBCUTANEOUS at 16:00

## 2020-06-07 RX ADMIN — ASCORBIC ACID, THIAMINE MONONITRATE,RIBOFLAVIN, NIACINAMIDE, PYRIDOXINE HYDROCHLORIDE, FOLIC ACID, CYANOCOBALAMIN, BIOTIN, CALCIUM PANTOTHENATE, 1 CAPSULE: 100; 1.5; 1.7; 20; 10; 1; 6000; 150000; 5 CAPSULE, LIQUID FILLED ORAL at 08:30

## 2020-06-07 RX ADMIN — POTASSIUM CHLORIDE 10 MEQ: 14.9 INJECTION, SOLUTION INTRAVENOUS at 12:13

## 2020-06-07 RX ADMIN — VANCOMYCIN HYDROCHLORIDE 125 MG: KIT at 18:30

## 2020-06-07 RX ADMIN — SODIUM BICARBONATE 650 MG: 650 TABLET ORAL at 08:31

## 2020-06-07 RX ADMIN — HEPARIN SODIUM 5000 UNITS: 5000 INJECTION INTRAVENOUS; SUBCUTANEOUS at 06:36

## 2020-06-07 RX ADMIN — DIPHENOXYLATE HYDROCHLORIDE AND ATROPINE SULFATE 1 TABLET: 2.5; .025 TABLET ORAL at 18:06

## 2020-06-07 NOTE — PROGRESS NOTES
-Hematology / Oncology ([facility]) -  -Primary Oncologist- Hyman Spatz  -CC- Esophageal Cancer    -S-  Stools improving, only 1 so far today. No new complaints    -O-      Patient Vitals for the past 24 hrs:   Temp Pulse Resp BP SpO2   06/07/20 0735 97.8 °F (36.6 °C) 77 18 115/77 100 %   06/06/20 2329 97.6 °F (36.4 °C) 77 18 111/78 100 %   06/06/20 2017 97.8 °F (36.6 °C) 74 17 114/78 96 %   06/06/20 1528 98.5 °F (36.9 °C) 67 18 101/74 99 %     06/07 0701 - 06/07 1900  In: 1803.3 [I.V.:1803.3]  Out: -   Gen: nad  Chest: bilateral breath sounds present  Cardiac: rrr  Abd: s/nt    -Labs-    Recent Labs     06/07/20  0513 06/06/20  0238 06/05/20  0413   WBC 7.5 7.5 6.5   HGB 9.1* 9.9* 9.5*   * 142* 140*   ANEU 6.1 5.9 5.4    141 143   K 3.4* 3.4* 3.4*   GLU 96 101* 87   BUN 65* 68* 84*   CREA 2.47* 2.41* 2.86*   CA 7.5* 8.2* 7.9*   MG 1.3* 1.5* 1.6   PHOS 2.0* 2.2* 2.8         -Assessment + Plan-     *) SONIDO on CKD:  - Admitted from clinic at end of day 6/1 when she presented with Cr 4.3 on 6/1.  Baseline Cr ~2.0  - Suspected pre renal due to diarrhea from Xeloda therapy that was completed on 5/24/20  - Appreciate Nephrology's assistance  - Hydration per renal  - Renal us showed polycystic disease  -  Urinalysis unremarkable   - Cr improving stable, 2.8     *)  Diarrhea, context of recent use of xeloda  - Cont Lomotil scheduled and Imodium prn  - cont IVF  - GI assistance appreciated, po vanc use noted     *)Hypokalemia, due to GI losses:  - K low, received today     *) Esophageal cancer:  - sp ablation and adjuvant Xeloda that concluded 5/24     *) Chemo induced anemia:  - hgb 9.5, monitor     *) Cancer related anorexia, protein calorie malnutrition  - encouraged her to drink Ensure

## 2020-06-07 NOTE — PROGRESS NOTES
Oncology End of Shift Note      Bedside shift change report given to RN (incoming nurse) by Monique Ibarra (outgoing nurse) on [facility]. Report included the following information SBAR, Kardex, Intake/Output and MAR. Shift Summary: slept most of the shift, appears to be getting stronger, am labs drawn,  0  bowel movements overnight,  no prns given, son Job Cords called for update at [place], bath and gown    Issues for Physician to Address:       Patient on Cardiac Monitoring?     [] Yes  [x] No    Rhythm:          Shift Events      Monique Ibarra

## 2020-06-07 NOTE — PROGRESS NOTES
Oncology End of Shift Note      Bedside shift change report given to Ernst Valadez RN (incoming nurse) by Mehnaz Uribe (outgoing nurse) on [facility]. Report included the following information SBAR, Kardex, Intake/Output and MAR. Shift Summary: Pt remained stable throughout shift. Scheduled meds given, no PRN meds given, education provided. Hourly rounding completed, pt up with stand by with her can to the bathroom, pt uses purwick when in bed or chair because she cannot get up quick enough to make it to the bathroom when she urinates, pt had 2 bm's during shift. Pt did not complain of pain, pt up in chair most of shift. Issues for Physician to Address:       Patient on Cardiac Monitoring?     [] Yes  [x] No    Rhythm:              Mehnaz Uribe

## 2020-06-07 NOTE — PROGRESS NOTES
Problem: Falls - Risk of  Goal: *Absence of Falls  Description: Document Starleen Freeze Fall Risk and appropriate interventions in the flowsheet. Outcome: Progressing Towards Goal  Note: Fall Risk Interventions:  Mobility Interventions: Patient to call before getting OOB         Medication Interventions: Patient to call before getting OOB    Elimination Interventions: Call light in reach    History of Falls Interventions: Door open when patient unattended         Problem: Pressure Injury - Risk of  Goal: *Prevention of pressure injury  Description: Document Tomy Scale and appropriate interventions in the flowsheet. Outcome: Progressing Towards Goal  Note: Pressure Injury Interventions:  Sensory Interventions: Assess changes in LOC, Assess need for specialty bed    Moisture Interventions: Absorbent underpads, Apply protective barrier, creams and emollients    Activity Interventions: Increase time out of bed    Mobility Interventions: Pressure redistribution bed/mattress (bed type)    Nutrition Interventions: Document food/fluid/supplement intake    Friction and Shear Interventions: HOB 30 degrees or less                Problem: Patient Education: Go to Patient Education Activity  Goal: Patient/Family Education  Outcome: Progressing Towards Goal     Problem: Patient Education: Go to Patient Education Activity  Goal: Patient/Family Education  Outcome: Progressing Towards Goal     Problem: Risk for Spread of Infection  Goal: Prevent transmission of infectious organism to others  Description: Prevent the transmission of infectious organisms to other patients, staff members, and visitors.   Outcome: Progressing Towards Goal

## 2020-06-07 NOTE — PROGRESS NOTES
Oncology End of Shift Note      Bedside shift change report given to  Karissa HO (incoming nurse) by Fermín Mcfarland (outgoing nurse) on [facility]. Report included the following information SBAR, Kardex, Intake/Output and MAR. Shift Summary: slept most of the night had 1 BM, ambulated with assisted to restroom, am labs drawn, no complaints of pain     Issues for Physician to Address:       Patient on Cardiac Monitoring?     [] Yes  [x] No    Rhythm:          Shift Events        Fermín Mcfarland

## 2020-06-08 LAB
ALBUMIN SERPL-MCNC: 2.3 G/DL (ref 3.5–5)
ANION GAP SERPL CALC-SCNC: 7 MMOL/L (ref 5–15)
BASOPHILS # BLD: 0 K/UL (ref 0–0.1)
BASOPHILS NFR BLD: 0 % (ref 0–1)
BUN SERPL-MCNC: 61 MG/DL (ref 6–20)
BUN/CREAT SERPL: 25 (ref 12–20)
CALCIUM SERPL-MCNC: 7.5 MG/DL (ref 8.5–10.1)
CHLORIDE SERPL-SCNC: 110 MMOL/L (ref 97–108)
CO2 SERPL-SCNC: 23 MMOL/L (ref 21–32)
CREAT SERPL-MCNC: 2.46 MG/DL (ref 0.55–1.02)
DIFFERENTIAL METHOD BLD: ABNORMAL
EOSINOPHIL # BLD: 0.1 K/UL (ref 0–0.4)
EOSINOPHIL NFR BLD: 1 % (ref 0–7)
ERYTHROCYTE [DISTWIDTH] IN BLOOD BY AUTOMATED COUNT: 21.2 % (ref 11.5–14.5)
GLIADIN PEPTIDE IGA SER-ACNC: 6 UNITS (ref 0–19)
GLIADIN PEPTIDE IGG SER-ACNC: 29 UNITS (ref 0–19)
GLUCOSE SERPL-MCNC: 112 MG/DL (ref 65–100)
HCT VFR BLD AUTO: 27.7 % (ref 35–47)
HGB BLD-MCNC: 9.5 G/DL (ref 11.5–16)
IGA SERPL-MCNC: 215 MG/DL (ref 64–422)
IMM GRANULOCYTES # BLD AUTO: 0.1 K/UL (ref 0–0.04)
IMM GRANULOCYTES NFR BLD AUTO: 1 % (ref 0–0.5)
LYMPHOCYTES # BLD: 0.6 K/UL (ref 0.8–3.5)
LYMPHOCYTES NFR BLD: 8 % (ref 12–49)
MAGNESIUM SERPL-MCNC: 1.8 MG/DL (ref 1.6–2.4)
MCH RBC QN AUTO: 33.9 PG (ref 26–34)
MCHC RBC AUTO-ENTMCNC: 34.3 G/DL (ref 30–36.5)
MCV RBC AUTO: 98.9 FL (ref 80–99)
MONOCYTES # BLD: 0.8 K/UL (ref 0–1)
MONOCYTES NFR BLD: 10 % (ref 5–13)
NEUTS SEG # BLD: 6 K/UL (ref 1.8–8)
NEUTS SEG NFR BLD: 80 % (ref 32–75)
NRBC # BLD: 0 K/UL (ref 0–0.01)
NRBC BLD-RTO: 0 PER 100 WBC
O+P SPEC MICRO: NORMAL
O+P STL CONC: NORMAL
PHOSPHATE SERPL-MCNC: 2.7 MG/DL (ref 2.6–4.7)
PLATELET # BLD AUTO: 127 K/UL (ref 150–400)
PMV BLD AUTO: 10.6 FL (ref 8.9–12.9)
POTASSIUM SERPL-SCNC: 3.4 MMOL/L (ref 3.5–5.1)
RBC # BLD AUTO: 2.8 M/UL (ref 3.8–5.2)
RBC MORPH BLD: ABNORMAL
SODIUM SERPL-SCNC: 140 MMOL/L (ref 136–145)
SPECIMEN SOURCE: NORMAL
TTG IGA SER-ACNC: <2 U/ML (ref 0–3)
TTG IGG SER-ACNC: <2 U/ML (ref 0–5)
WBC # BLD AUTO: 7.6 K/UL (ref 3.6–11)

## 2020-06-08 PROCEDURE — 97110 THERAPEUTIC EXERCISES: CPT | Performed by: OCCUPATIONAL THERAPIST

## 2020-06-08 PROCEDURE — 80069 RENAL FUNCTION PANEL: CPT

## 2020-06-08 PROCEDURE — 74011250637 HC RX REV CODE- 250/637: Performed by: INTERNAL MEDICINE

## 2020-06-08 PROCEDURE — 77030038269 HC DRN EXT URIN PURWCK BARD -A

## 2020-06-08 PROCEDURE — 85025 COMPLETE CBC W/AUTO DIFF WBC: CPT

## 2020-06-08 PROCEDURE — 74011000258 HC RX REV CODE- 258: Performed by: INTERNAL MEDICINE

## 2020-06-08 PROCEDURE — 97530 THERAPEUTIC ACTIVITIES: CPT

## 2020-06-08 PROCEDURE — 74011250636 HC RX REV CODE- 250/636: Performed by: INTERNAL MEDICINE

## 2020-06-08 PROCEDURE — 74011000250 HC RX REV CODE- 250: Performed by: INTERNAL MEDICINE

## 2020-06-08 PROCEDURE — 94760 N-INVAS EAR/PLS OXIMETRY 1: CPT

## 2020-06-08 PROCEDURE — 97116 GAIT TRAINING THERAPY: CPT

## 2020-06-08 PROCEDURE — 36415 COLL VENOUS BLD VENIPUNCTURE: CPT

## 2020-06-08 PROCEDURE — 74011250637 HC RX REV CODE- 250/637: Performed by: PHYSICIAN ASSISTANT

## 2020-06-08 PROCEDURE — 83735 ASSAY OF MAGNESIUM: CPT

## 2020-06-08 PROCEDURE — 97535 SELF CARE MNGMENT TRAINING: CPT | Performed by: OCCUPATIONAL THERAPIST

## 2020-06-08 PROCEDURE — 65270000015 HC RM PRIVATE ONCOLOGY

## 2020-06-08 RX ORDER — MAGNESIUM SULFATE 1 G/100ML
1 INJECTION INTRAVENOUS ONCE
Status: COMPLETED | OUTPATIENT
Start: 2020-06-08 | End: 2020-06-08

## 2020-06-08 RX ORDER — POTASSIUM CHLORIDE 14.9 MG/ML
10 INJECTION INTRAVENOUS
Status: COMPLETED | OUTPATIENT
Start: 2020-06-08 | End: 2020-06-08

## 2020-06-08 RX ORDER — SODIUM CHLORIDE 450 MG/100ML
50 INJECTION, SOLUTION INTRAVENOUS CONTINUOUS
Status: DISPENSED | OUTPATIENT
Start: 2020-06-08 | End: 2020-06-09

## 2020-06-08 RX ADMIN — POTASSIUM CHLORIDE 10 MEQ: 14.9 INJECTION, SOLUTION INTRAVENOUS at 11:31

## 2020-06-08 RX ADMIN — SODIUM BICARBONATE: 84 INJECTION, SOLUTION INTRAVENOUS at 08:28

## 2020-06-08 RX ADMIN — VANCOMYCIN HYDROCHLORIDE 125 MG: KIT at 13:07

## 2020-06-08 RX ADMIN — POTASSIUM CHLORIDE 10 MEQ: 14.9 INJECTION, SOLUTION INTRAVENOUS at 13:03

## 2020-06-08 RX ADMIN — ASCORBIC ACID, THIAMINE MONONITRATE,RIBOFLAVIN, NIACINAMIDE, PYRIDOXINE HYDROCHLORIDE, FOLIC ACID, CYANOCOBALAMIN, BIOTIN, CALCIUM PANTOTHENATE, 1 CAPSULE: 100; 1.5; 1.7; 20; 10; 1; 6000; 150000; 5 CAPSULE, LIQUID FILLED ORAL at 08:29

## 2020-06-08 RX ADMIN — BRIMONIDINE TARTRATE 1 DROP: 2 SOLUTION OPHTHALMIC at 08:28

## 2020-06-08 RX ADMIN — POTASSIUM CHLORIDE 10 MEQ: 14.9 INJECTION, SOLUTION INTRAVENOUS at 10:26

## 2020-06-08 RX ADMIN — DIPHENOXYLATE HYDROCHLORIDE AND ATROPINE SULFATE 1 TABLET: 2.5; .025 TABLET ORAL at 13:05

## 2020-06-08 RX ADMIN — VANCOMYCIN HYDROCHLORIDE 125 MG: KIT at 17:26

## 2020-06-08 RX ADMIN — VANCOMYCIN HYDROCHLORIDE 125 MG: KIT at 00:09

## 2020-06-08 RX ADMIN — SODIUM CHLORIDE 100 ML/HR: 450 INJECTION, SOLUTION INTRAVENOUS at 14:38

## 2020-06-08 RX ADMIN — POTASSIUM CHLORIDE 10 MEQ: 14.9 INJECTION, SOLUTION INTRAVENOUS at 09:15

## 2020-06-08 RX ADMIN — VANCOMYCIN HYDROCHLORIDE 125 MG: KIT at 23:13

## 2020-06-08 RX ADMIN — DORZOLAMIDE HYDROCHLORIDE AND TIMOLOL MALEATE 1 DROP: 20; 5 SOLUTION/ DROPS OPHTHALMIC at 17:29

## 2020-06-08 RX ADMIN — HEPARIN SODIUM 5000 UNITS: 5000 INJECTION INTRAVENOUS; SUBCUTANEOUS at 23:13

## 2020-06-08 RX ADMIN — FERROUS SULFATE TAB 325 MG (65 MG ELEMENTAL FE) 325 MG: 325 (65 FE) TAB at 08:29

## 2020-06-08 RX ADMIN — MAGNESIUM SULFATE HEPTAHYDRATE 1 G: 1 INJECTION, SOLUTION INTRAVENOUS at 14:31

## 2020-06-08 RX ADMIN — FERROUS SULFATE TAB 325 MG (65 MG ELEMENTAL FE) 325 MG: 325 (65 FE) TAB at 17:26

## 2020-06-08 RX ADMIN — ACETAMINOPHEN 650 MG: 325 TABLET, FILM COATED ORAL at 15:52

## 2020-06-08 RX ADMIN — VANCOMYCIN HYDROCHLORIDE 125 MG: KIT at 06:12

## 2020-06-08 RX ADMIN — HEPARIN SODIUM 5000 UNITS: 5000 INJECTION INTRAVENOUS; SUBCUTANEOUS at 06:11

## 2020-06-08 RX ADMIN — DIPHENOXYLATE HYDROCHLORIDE AND ATROPINE SULFATE 1 TABLET: 2.5; .025 TABLET ORAL at 23:12

## 2020-06-08 RX ADMIN — LEVOTHYROXINE SODIUM 100 MCG: 0.1 TABLET ORAL at 06:12

## 2020-06-08 RX ADMIN — BRIMONIDINE TARTRATE 1 DROP: 2 SOLUTION OPHTHALMIC at 23:18

## 2020-06-08 RX ADMIN — DIPHENOXYLATE HYDROCHLORIDE AND ATROPINE SULFATE 1 TABLET: 2.5; .025 TABLET ORAL at 17:26

## 2020-06-08 RX ADMIN — DIPHENOXYLATE HYDROCHLORIDE AND ATROPINE SULFATE 1 TABLET: 2.5; .025 TABLET ORAL at 08:29

## 2020-06-08 RX ADMIN — ACETAMINOPHEN 650 MG: 325 TABLET, FILM COATED ORAL at 23:13

## 2020-06-08 RX ADMIN — DORZOLAMIDE HYDROCHLORIDE AND TIMOLOL MALEATE 1 DROP: 20; 5 SOLUTION/ DROPS OPHTHALMIC at 08:31

## 2020-06-08 RX ADMIN — BRIMONIDINE TARTRATE 1 DROP: 2 SOLUTION OPHTHALMIC at 17:27

## 2020-06-08 RX ADMIN — FAMOTIDINE 20 MG: 20 TABLET, FILM COATED ORAL at 08:29

## 2020-06-08 RX ADMIN — LATANOPROST 1 DROP: 50 SOLUTION OPHTHALMIC at 23:18

## 2020-06-08 NOTE — PROGRESS NOTES
Oncology End of Shift Note      Bedside shift change report given to Kelton Mendez RN (incoming nurse) by Michele Fernando (outgoing nurse) on [facility]. Report included the following information SBAR, Kardex, Intake/Output, MAR, Accordion and Recent Results. Shift Summary: pt up to chair after breakfast until end of shift, purewick in place, c/o abd pain after meals - PRN tylenol given; IVF adjusted per order; 4 runs of K and 1 of Mag given      Issues for Physician to Address:       Patient on Cardiac Monitoring?     [] Yes  [x] No    Rhythm:          Shift Events        Michele Fernando

## 2020-06-08 NOTE — PROGRESS NOTES
OCCUPATIONAL THERAPY TREATMENT  Patient: Stanislav Perry (89 y.o. female)  Date: 6/8/2020  Diagnosis: Acute on chronic renal failure ([facility] [address] [N17.9, N18.9]   <principal problem not specified>       Precautions: Fall  Chart, occupational therapy assessment, plan of care, and goals were reviewed. ASSESSMENT  Patient continues with skilled OT services and is progressing towards goals. Patient was fatigued today, but willing to participate in therapy session. She will continue to benefit from skilled OT with home health recommended. PLAN :  Patient continues to benefit from skilled intervention to address the above impairments. Continue treatment per established plan of care. to address goals. Recommendation for discharge: (in order for the patient to meet his/her long term goals)  Occupational therapy at least 2 days/week in the home     This discharge recommendation:  Has been made in collaboration with the attending provider and/or case management    IF patient discharges home will need the following DME: RW vs cane - defer to PT       SUBJECTIVE:   Patient stated I'm always cold. I think it's because I'm anemic.     OBJECTIVE DATA SUMMARY:   Cognitive/Behavioral Status:  Neurologic State: Alert  Orientation Level: Oriented X4  Cognition: Appropriate for age attention/concentration; Follows commands  Perception: Appears intact  Perseveration: No perseveration noted  Safety/Judgement: Awareness of environment; Insight into deficits    Functional Mobility and Transfers for ADLs:  Bed Mobility:       Transfers:  Sit to Stand: Stand-by assistance     Bed to Chair: Stand-by assistance    Balance:  Sitting: Intact  Standing: Impaired  Standing - Static: Constant support;Good  Standing - Dynamic : Constant support; Fair    ADL Intervention:       Grooming  Position Performed: Standing  Washing Face: Stand-by assistance  Washing Hands: Stand-by assistance         Toileting  Bladder Hygiene: Stand-by assistance  Bowel Hygiene: Stand-by assistance  Clothing Management: Stand-by assistance    Cognitive Retraining  Safety/Judgement: Awareness of environment; Insight into deficits    Therapeutic Exercises:   Patient participated in B UE exercises while sitting in the chair. She completed 10 repetitions of exercises in all planes/major muscle groups. When her arms were fatigued, she would work in a quick exercise for her legs, but she participated in 12 minutes of continuous exercise with Min cues and without a break. Pain:  No complaints    Activity Tolerance:   Fair  Please refer to the flowsheet for vital signs taken during this treatment. After treatment patient left in no apparent distress:   Sitting in chair and Call bell within reach    COMMUNICATION/COLLABORATION:   The patients plan of care was discussed with: Physical therapist and Registered nurse.      Adilene Sargent OTR/L  Time Calculation: 24 mins

## 2020-06-08 NOTE — PROGRESS NOTES
Hematology Oncology Progress Note           Follow up for: metastatic esophageal cancer    Chart notes reviewed since last visit. Case discussed with following: pt. Nurse at bedside. Patient complains of the following: no BM today and only 2 yesterday. Reports diarrhea definitely clearing up    Additional concerns noted by the staff: decreased strength and endurance. Patient Vitals for the past 24 hrs:   BP Temp Pulse Resp SpO2 Weight   06/08/20 0722 119/81 97.8 °F (36.6 °C) 73 16 97 % --   06/08/20 0608 (!) 124/92 98.1 °F (36.7 °C) 75 17 97 % --   06/08/20 0606 -- -- -- -- -- 98.6 kg (217 lb 6 oz)   06/08/20 0004 116/88 97.5 °F (36.4 °C) 72 18 93 % --   06/07/20 1928 105/86 98 °F (36.7 °C) 68 17 95 % --   06/07/20 1507 111/75 97.8 °F (36.6 °C) 75 18 100 % --       ROS negative for 11 organ systems except as noted above    Physical Examination:  Constitutional Alert, cooperative, oriented. Mood and affect appropriate. Appears close to chronological age. Well nourished. Well developed. Head Normocephalic; no scars   Eyes Conjunctivae and sclerae are clear and without icterus. Pupils are round. Dysconjugate gaze   ENMT Sinuses are nontender. No oral exudates, ulcers, masses, thrush or mucositis. Oropharynx clear. Tongue normal.   Neck Supple without masses or thyromegaly. No jugular venous distension. Hematologic/Lymphatic No petechiae or purpura. No tender or palpable lymph nodes noted   Respiratory Lungs are clear to auscultation without rhonchi or wheezing. Distant BS   Cardiovascular Regular rate and rhythm of heart without murmurs, gallops or rubs. Chest / Line Site Chest is symmetric with no chest wall deformities. Abdomen Non-tender, non-distended, no masses, ascites or hepatosplenomegaly. Good bowel sounds. Musculoskeletal No tenderness or swelling, normal range of motion with generalized weakness. Extremities No visible deformities, no cyanosis, clubbing or edema.     Skin No rashes, scars, or lesions suggestive of malignancy. No petechiae, purpura, or ecchymoses. No excoriations. Neurologic No sensory or motor deficits noted but not specifically tested. Psychiatric Alert and oriented. Coherent speech. Verbalizes understanding of our discussions today. Labs:  Recent Results (from the past 24 hour(s))   RENAL FUNCTION PANEL    Collection Time: 06/08/20 12:26 AM   Result Value Ref Range    Sodium 140 136 - 145 mmol/L    Potassium 3.4 (L) 3.5 - 5.1 mmol/L    Chloride 110 (H) 97 - 108 mmol/L    CO2 23 21 - 32 mmol/L    Anion gap 7 5 - 15 mmol/L    Glucose 112 (H) 65 - 100 mg/dL    BUN 61 (H) 6 - 20 MG/DL    Creatinine 2.46 (H) 0.55 - 1.02 MG/DL    BUN/Creatinine ratio 25 (H) 12 - 20      GFR est AA 23 (L) >60 ml/min/1.73m2    GFR est non-AA 19 (L) >60 ml/min/1.73m2    Calcium 7.5 (L) 8.5 - 10.1 MG/DL    Phosphorus 2.7 2.6 - 4.7 MG/DL    Albumin 2.3 (L) 3.5 - 5.0 g/dL   CBC WITH AUTOMATED DIFF    Collection Time: 06/08/20 12:26 AM   Result Value Ref Range    WBC 7.6 3.6 - 11.0 K/uL    RBC 2.80 (L) 3.80 - 5.20 M/uL    HGB 9.5 (L) 11.5 - 16.0 g/dL    HCT 27.7 (L) 35.0 - 47.0 %    MCV 98.9 80.0 - 99.0 FL    MCH 33.9 26.0 - 34.0 PG    MCHC 34.3 30.0 - 36.5 g/dL    RDW 21.2 (H) 11.5 - 14.5 %    PLATELET 287 (L) 107 - 400 K/uL    MPV 10.6 8.9 - 12.9 FL    NRBC 0.0 0  WBC    ABSOLUTE NRBC 0.00 0.00 - 0.01 K/uL    NEUTROPHILS 80 (H) 32 - 75 %    LYMPHOCYTES 8 (L) 12 - 49 %    MONOCYTES 10 5 - 13 %    EOSINOPHILS 1 0 - 7 %    BASOPHILS 0 0 - 1 %    IMMATURE GRANULOCYTES 1 (H) 0.0 - 0.5 %    ABS. NEUTROPHILS 6.0 1.8 - 8.0 K/UL    ABS. LYMPHOCYTES 0.6 (L) 0.8 - 3.5 K/UL    ABS. MONOCYTES 0.8 0.0 - 1.0 K/UL    ABS. EOSINOPHILS 0.1 0.0 - 0.4 K/UL    ABS. BASOPHILS 0.0 0.0 - 0.1 K/UL    ABS. IMM.  GRANS. 0.1 (H) 0.00 - 0.04 K/UL    DF AUTOMATED      RBC COMMENTS ANISOCYTOSIS  2+       MAGNESIUM    Collection Time: 06/08/20 12:26 AM   Result Value Ref Range    Magnesium 1.8 1.6 - 2.4 mg/dL Imaging:  abd films 6/3/20:Supine views the abdomen demonstrate gaseous distention of the colon  and several loops of small bowel without evidence of high-grade bowel  obstruction.     IMPRESSION:  Mild gaseous distention of the colon and loops of small bowel  without evidence of high-grade bowel obstruction. Ultrasound retroperitoneum 6/2/20:   IMPRESSION: Innumerable bilateral renal cysts, compatible with adult polycystic kidney  disease. No hydronephrosis.       Assessment and Plan:   *) SONIDO on CKD:  - Admitted from clinic at end of day 6/1 when she presented with Cr 4.3 on 6/1. Baseline Cr ~2.0  - Suspected pre renal due to diarrhea from Xeloda therapy that was completed on 5/24/20. Diarrhea seemed quite prolonged for xeloda (capecitabine) and she has now been diagnosed with C diff diarrhea (PCR positive for DNA)  - Appreciate Nephrology's assistance  - Hydration per renal  - Renal us showed polycystic disease  -  Urinalysis unremarkable   - Cr improving stable, 2.46     *)  Diarrhea, context of recent use of xeloda, C diff positive  - Cont Lomotil scheduled and Imodium prn  - cont IVF  - GI assistance appreciated, po vanc started 6/5/20. Will need to make sure she can afford this as discharge medication.       *)Hypokalemia, due to GI losses:  - K low, ordered 40 meq this AM     *) Esophageal cancer:  - sp ablation and adjuvant Xeloda that concluded 5/24     *) Chemo induced anemia:  - hgb 9.5, monitor     *) Cancer related anorexia, protein calorie malnutrition  - encouraged her to drink Ensure, try magic cup and suleiman Guerrero MD [facility] office  [address]  Phone [phone]  Fax [phone]

## 2020-06-08 NOTE — PROGRESS NOTES
Problem: Mobility Impaired (Adult and Pediatric)  Goal: *Acute Goals and Plan of Care (Insert Text)  Description: FUNCTIONAL STATUS PRIOR TO ADMISSION: Patient was modified independent using a single point cane for functional mobility. Reports she was very independent prior. Has some vision loss from glaucoma. HOME SUPPORT PRIOR TO ADMISSION: The patient lived with son and grandson to assist her as needed. Physical Therapy Goals  Initiated 6/3/2020  1. Patient will move from supine to sit and sit to supine  in bed with modified independence within 7 day(s). 2.  Patient will transfer from bed to chair and chair to bed with modified independence using the least restrictive device within 7 day(s). 3.  Patient will perform sit to stand with modified independence within 7 day(s). 4.  Patient will ambulate with modified independence for 200 feet with the least restrictive device within 7 day(s). 5.  Patient will ascend/descend 4 stairs with 1 handrail(s) with modified independence within 7 day(s). Outcome: Progressing Towards Goal   PHYSICAL THERAPY TREATMENT  Patient: Al Treadwell (36 y.o. female)  Date: 6/8/2020  Diagnosis: Acute on chronic renal failure ([facility]) [N17.9, N18.9]   <principal problem not specified>       Precautions: Fall  Chart, physical therapy assessment, plan of care and goals were reviewed. ASSESSMENT  Patient continues with skilled PT services and is progressing towards goals. Patient received up with RN and heading into the bathroom, patient able to toilet and perform hygiene with min assist to ensure cleanliness. Patient able to wash hands at sink and perform functional reaching without LOB, then ambulated in room x approx 70 feet with cane and slow steady pace. Patient with good tolerance of activity today and left sitting up in chair with call bell in reach and lunch set up. Patient should be able to discharge home with family to assist and HHPT.     Current Level of Function Impacting Discharge (mobility/balance): CGA for ambulation with cane    Other factors to consider for discharge: will need assist from family, falls risk         PLAN :  Patient continues to benefit from skilled intervention to address the above impairments. Continue treatment per established plan of care. to address goals. Recommendation for discharge: (in order for the patient to meet his/her long term goals)  Physical therapy at least 2 days/week in the home AND ensure assist and/or supervision for safety with mobility     This discharge recommendation:  A follow-up discussion with the attending provider and/or case management is planned    IF patient discharges home will need the following DME: rolling walker       SUBJECTIVE:   Patient stated I have help at home.     OBJECTIVE DATA SUMMARY:   Critical Behavior:  Neurologic State: Alert  Orientation Level: Oriented X4  Cognition: Appropriate for age attention/concentration, Follows commands  Safety/Judgement: Awareness of environment, Insight into deficits  Functional Mobility Training:  Bed Mobility:                    Transfers:  Sit to Stand: Stand-by assistance  Stand to Sit: Stand-by assistance        Bed to Chair: Stand-by assistance                    Balance:  Sitting: Intact  Standing: Impaired  Standing - Static: Constant support;Good  Standing - Dynamic : Constant support; Fair  Ambulation/Gait Training:  Distance (ft): 70 Feet (ft)  Assistive Device: Cane, straight;Gait belt  Ambulation - Level of Assistance: Contact guard assistance                 Base of Support: Widened     Speed/Jacki: Slow  Step Length: Left shortened;Right shortened                    Stairs: Activity Tolerance:   Fair  Please refer to the flowsheet for vital signs taken during this treatment.     After treatment patient left in no apparent distress:   Sitting in chair and Call bell within reach    COMMUNICATION/COLLABORATION:   The patients plan of care was discussed with: Occupational therapist and Registered nurse.      Kimberly Kumar, PT   Time Calculation: 27 mins

## 2020-06-08 NOTE — PROGRESS NOTES
DAYO:   1) Home with family assistance   2) Home with f.u appts   3) Pt will need 2ND IM letter at time of discharge     11:00 AM- CM noted PT/OT's recommendations for a walker, pt declined CM ordering one stating she has access to one at home. CM informed by attending pt will need to go home with PO Keturah- attending requesting price check. CM will need hard script in order to obtained, once completed CM will fax to pt's pharmacy for price check, attending and RN informed.      Marshall Nelson, MSYUE, [address] [place] [address]   [phone]

## 2020-06-08 NOTE — PROGRESS NOTES
Nephrology Progress Note  [address] [facility]  [facility] [address] [facility], Benoit Griggs, [address]  Phone - [phone]         Fax - [phone]    Patient: Kamila Henderson    YOB: 1940    Date- 6/8/2020        Admit Date: 6/1/2020     CC: Follow up for acute kidney injury       IMPRESSION & PLAN:   Acute kidney injury on CKD:  - suspect 2/2 volume depletion from GI losses + Xeloda-- no hydro on renal usg  -Change ivf to half normal saline  - follow  BMP in the morning    Hypokalemia   KCl 40 M EQ IV    History of metabolic acidosis    DIARRHEA due to XELODA    CKD Stage IV -cystic kidney dis  - 2/2 ADPKD, HTN   - baseline Creatinine : 2 mg/ dl      Chronic Anemia     Esophageal Ca   Hypothyroidism   EMILY       Subjective: Interval History:   -Creatinine continues to improve  bp stable  Potassium low    ROS:-  No c/o sob,  No c/o chest pain,   No c/o nausea or vomiting, No c/o  fever. Objective:   Visit Vitals  /85 (BP 1 Location: Left arm, BP Patient Position: At rest)   Pulse 78   Temp 99.2 °F (37.3 °C)   Resp 18   Ht 5' 8\" (1.727 m)   Wt 98.6 kg (217 lb 6 oz)   SpO2 96%   Breastfeeding No   BMI 33.05 kg/m²     Last 3 Recorded Weights in this Encounter    06/02/20 1445 06/04/20 0651 06/08/20 0606   Weight: 88.6 kg (195 lb 5.2 oz) 87.5 kg (192 lb 14.4 oz) 98.6 kg (217 lb 6 oz)     06/06 1901 - 06/08 0700  In: 1923.3 [P.O.:120; I.V.:1803.3]  Out: -     Intake/Output Summary (Last 24 hours) at 6/8/2020 1727  Last data filed at 6/8/2020 0300  Gross per 24 hour   Intake 120 ml   Output --   Net 120 ml      Physical exam:   GEN: NAD  NECK- Supple,NO MASS  RESP:clear b/l, no wheezing  CVS: S1,S2  RRR  ABDO:soft, non tender  NEURO: normal speech, non focal  EXT: No Edema   Discussed with patient and the nurse  Chart reviewed. Pertinent Notes reviewed.      Data Review :  Recent Labs 06/08/20  0026 06/07/20  0513 06/06/20  0238    141 141   K 3.4* 3.4* 3.4*   * 112* 113*   CO2 23 22 20*   BUN 61* 65* 68*   CREA 2.46* 2.47* 2.41*   * 96 101*   CA 7.5* 7.5* 8.2*   MG 1.8 1.3* 1.5*   PHOS 2.7 2.0* 2.2*     Recent Labs     06/08/20  0026 06/07/20  0513 06/06/20  0238   WBC 7.6 7.5 7.5   HGB 9.5* 9.1* 9.9*   HCT 27.7* 26.6* 27.8*   * 118* 142*     No results for input(s): FE, TIBC, PSAT, FERR in the last 72 hours. No results for input(s): CPK, CKNDX, TROIQ in the last 72 hours. No lab exists for component: CPKMB  Lab Results   Component Value Date/Time    Color YELLOW/STRAW 06/05/2020 03:07 AM    Appearance CLOUDY (A) 06/05/2020 03:07 AM    Specific gravity 1.013 06/05/2020 03:07 AM    pH (UA) 6.0 06/05/2020 03:07 AM    Protein TRACE (A) 06/05/2020 03:07 AM    Glucose Negative 06/05/2020 03:07 AM    Ketone Negative 06/05/2020 03:07 AM    Bilirubin NEGATIVE  03/04/2020 10:38 PM    Urobilinogen 0.2 06/05/2020 03:07 AM    Nitrites Negative 06/05/2020 03:07 AM    Leukocyte Esterase MODERATE (A) 06/05/2020 03:07 AM    Epithelial cells FEW 06/05/2020 03:07 AM    Bacteria 4+ (A) 06/05/2020 03:07 AM    WBC 20-50 06/05/2020 03:07 AM    RBC 10-20 06/05/2020 03:07 AM     Lab Results   Component Value Date/Time    Culture result: NO GROWTH 5 DAYS 03/08/2020 04:03 AM    Culture result:  03/06/2020 09:15 AM     MRSA NOT PRESENT. Apparent Staphylococus aureus (not MRSA noted). Culture result:  03/06/2020 09:15 AM         Screening of patient nares for MRSA is for surveillance purposes and, if positive, to facilitate isolation considerations in high risk settings. It is not intended for automatic decolonization interventions per se as regimens are not sufficiently effective to warrant routine use.      No results found for: SDES  Lab Results   Component Value Date/Time    Sodium,urine random 18 06/02/2020 10:29 PM    Creatinine, urine 97.00 06/02/2020 10:29 PM     Results from Hospital Encounter encounter on 06/01/20   XR ABD (KUB)    Narrative PROCEDURE: XR ABD (KUB)    REASON FOR STUDY: diarhea, rule out constipation with overflow     COMPARISON: 7/16/2019    FINDINGS: Supine views the abdomen demonstrate gaseous distention of the colon  and several loops of small bowel without evidence of high-grade bowel  obstruction. Impression IMPRESSION:  Mild gaseous distention of the colon and loops of small bowel  without evidence of high-grade bowel obstruction. Medication list  reviewed  No current facility-administered medications on file prior to encounter. Current Outpatient Medications on File Prior to Encounter   Medication Sig Dispense Refill    ferrous sulfate 324 mg (65 mg iron) tablet Take 324 mg by mouth two (2) times daily (with meals).  omeprazole (PRILOSEC OTC) 20 mg tablet Take 20 mg by mouth daily.  sodium bicarbonate 650 mg tablet Take 650 mg by mouth two (2) times a day.  folic acid-vit L2-XCN T42 (FOLBEE) 2.5-25-1 mg tablet Take 1 Tab by mouth daily.  iron bisgly,ps-FA-B-C#12-succ 65 mg-65 mg -1,000 mcg (24) tab Take 1 Tab by mouth daily.  latanoprost (XALATAN) 0.005 % ophthalmic solution Administer 1 Drop to both eyes nightly.  MULTIVITS W-FE,OTHER MIN/LUT (CENTRUM SILVER ULTRA WOMEN'S PO) Take 1 Tab by mouth daily.  DORZOLAMIDE HCL/TIMOLOL MALEAT (DORZOLAMIDE-TIMOLOL OP) Apply 1 Drop to eye three (3) times daily. One drop to each eye twice daily       brimonidine (ALPHAGAN) 0.2 % ophthalmic solution Administer 1 Drop to both eyes three (3) times daily.  levothyroxine (SYNTHROID) 100 mcg tablet Take 100 mcg by mouth Daily (before breakfast). Indications: HYPOTHYROIDISM      capecitabine (XELODA) 500 mg tablet 1,500 mg two (2) times a day. X 14 days then 7 days off      acetaminophen (TYLENOL EXTRA STRENGTH) 500 mg tablet Take 1,000 mg by mouth every six (6) hours as needed.  Indications: ARTHRITIC PAIN, PAIN Nathanael King MD  [facility]   [email or web address]

## 2020-06-08 NOTE — PROGRESS NOTES
Problem: Falls - Risk of  Goal: *Absence of Falls  Description: Document Rangeljuna m Brannon Fall Risk and appropriate interventions in the flowsheet.   Outcome: Progressing Towards Goal  Note: Fall Risk Interventions:  Mobility Interventions: Patient to call before getting OOB         Medication Interventions: Patient to call before getting OOB    Elimination Interventions: Call light in reach    History of Falls Interventions: Door open when patient unattended

## 2020-06-09 LAB
ALBUMIN SERPL-MCNC: 2.3 G/DL (ref 3.5–5)
ANION GAP SERPL CALC-SCNC: 7 MMOL/L (ref 5–15)
BASOPHILS # BLD: 0 K/UL (ref 0–0.1)
BASOPHILS NFR BLD: 0 % (ref 0–1)
BUN SERPL-MCNC: 52 MG/DL (ref 6–20)
BUN/CREAT SERPL: 25 (ref 12–20)
CALCIUM SERPL-MCNC: 8.2 MG/DL (ref 8.5–10.1)
CHLORIDE SERPL-SCNC: 108 MMOL/L (ref 97–108)
CO2 SERPL-SCNC: 22 MMOL/L (ref 21–32)
CREAT SERPL-MCNC: 2.06 MG/DL (ref 0.55–1.02)
DIFFERENTIAL METHOD BLD: ABNORMAL
EOSINOPHIL # BLD: 0.1 K/UL (ref 0–0.4)
EOSINOPHIL NFR BLD: 1 % (ref 0–7)
ERYTHROCYTE [DISTWIDTH] IN BLOOD BY AUTOMATED COUNT: 21.2 % (ref 11.5–14.5)
GLUCOSE SERPL-MCNC: 118 MG/DL (ref 65–100)
HCT VFR BLD AUTO: 29.1 % (ref 35–47)
HGB BLD-MCNC: 9.8 G/DL (ref 11.5–16)
IMM GRANULOCYTES # BLD AUTO: 0.1 K/UL (ref 0–0.04)
IMM GRANULOCYTES NFR BLD AUTO: 1 % (ref 0–0.5)
LYMPHOCYTES # BLD: 0.6 K/UL (ref 0.8–3.5)
LYMPHOCYTES NFR BLD: 7 % (ref 12–49)
MAGNESIUM SERPL-MCNC: 2.2 MG/DL (ref 1.6–2.4)
MCH RBC QN AUTO: 33.8 PG (ref 26–34)
MCHC RBC AUTO-ENTMCNC: 33.7 G/DL (ref 30–36.5)
MCV RBC AUTO: 100.3 FL (ref 80–99)
MONOCYTES # BLD: 0.8 K/UL (ref 0–1)
MONOCYTES NFR BLD: 9 % (ref 5–13)
NEUTS SEG # BLD: 6.9 K/UL (ref 1.8–8)
NEUTS SEG NFR BLD: 82 % (ref 32–75)
NRBC # BLD: 0 K/UL (ref 0–0.01)
NRBC BLD-RTO: 0 PER 100 WBC
PHOSPHATE SERPL-MCNC: 3.3 MG/DL (ref 2.6–4.7)
PLATELET # BLD AUTO: 128 K/UL (ref 150–400)
PMV BLD AUTO: 10.5 FL (ref 8.9–12.9)
POTASSIUM SERPL-SCNC: 3.2 MMOL/L (ref 3.5–5.1)
POTASSIUM SERPL-SCNC: 3.4 MMOL/L (ref 3.5–5.1)
RBC # BLD AUTO: 2.9 M/UL (ref 3.8–5.2)
RBC MORPH BLD: ABNORMAL
SODIUM SERPL-SCNC: 137 MMOL/L (ref 136–145)
WBC # BLD AUTO: 8.5 K/UL (ref 3.6–11)

## 2020-06-09 PROCEDURE — 77030041247 HC PROTECTOR HEEL HEELMEDIX MDII -B

## 2020-06-09 PROCEDURE — 97535 SELF CARE MNGMENT TRAINING: CPT

## 2020-06-09 PROCEDURE — 36415 COLL VENOUS BLD VENIPUNCTURE: CPT

## 2020-06-09 PROCEDURE — 97530 THERAPEUTIC ACTIVITIES: CPT

## 2020-06-09 PROCEDURE — 97116 GAIT TRAINING THERAPY: CPT

## 2020-06-09 PROCEDURE — 77030038269 HC DRN EXT URIN PURWCK BARD -A

## 2020-06-09 PROCEDURE — 74011250637 HC RX REV CODE- 250/637: Performed by: INTERNAL MEDICINE

## 2020-06-09 PROCEDURE — 65270000015 HC RM PRIVATE ONCOLOGY

## 2020-06-09 PROCEDURE — 85025 COMPLETE CBC W/AUTO DIFF WBC: CPT

## 2020-06-09 PROCEDURE — 94760 N-INVAS EAR/PLS OXIMETRY 1: CPT

## 2020-06-09 PROCEDURE — 84132 ASSAY OF SERUM POTASSIUM: CPT

## 2020-06-09 PROCEDURE — 83735 ASSAY OF MAGNESIUM: CPT

## 2020-06-09 PROCEDURE — 74011250636 HC RX REV CODE- 250/636: Performed by: INTERNAL MEDICINE

## 2020-06-09 RX ORDER — POTASSIUM CHLORIDE 750 MG/1
40 TABLET, FILM COATED, EXTENDED RELEASE ORAL
Status: COMPLETED | OUTPATIENT
Start: 2020-06-09 | End: 2020-06-09

## 2020-06-09 RX ORDER — POTASSIUM CHLORIDE 750 MG/1
20 TABLET, FILM COATED, EXTENDED RELEASE ORAL ONCE
Status: COMPLETED | OUTPATIENT
Start: 2020-06-09 | End: 2020-06-09

## 2020-06-09 RX ORDER — DIPHENOXYLATE HYDROCHLORIDE AND ATROPINE SULFATE 2.5; .025 MG/1; MG/1
1 TABLET ORAL
Status: DISCONTINUED | OUTPATIENT
Start: 2020-06-09 | End: 2020-06-11 | Stop reason: HOSPADM

## 2020-06-09 RX ORDER — ENOXAPARIN SODIUM 100 MG/ML
30 INJECTION SUBCUTANEOUS EVERY 24 HOURS
Status: DISCONTINUED | OUTPATIENT
Start: 2020-06-09 | End: 2020-06-11 | Stop reason: HOSPADM

## 2020-06-09 RX ADMIN — LEVOTHYROXINE SODIUM 100 MCG: 0.1 TABLET ORAL at 06:52

## 2020-06-09 RX ADMIN — LATANOPROST 1 DROP: 50 SOLUTION OPHTHALMIC at 23:18

## 2020-06-09 RX ADMIN — POTASSIUM CHLORIDE 20 MEQ: 750 TABLET, FILM COATED, EXTENDED RELEASE ORAL at 14:18

## 2020-06-09 RX ADMIN — FERROUS SULFATE TAB 325 MG (65 MG ELEMENTAL FE) 325 MG: 325 (65 FE) TAB at 08:49

## 2020-06-09 RX ADMIN — VANCOMYCIN HYDROCHLORIDE 125 MG: KIT at 17:45

## 2020-06-09 RX ADMIN — VANCOMYCIN HYDROCHLORIDE 125 MG: KIT at 23:19

## 2020-06-09 RX ADMIN — ASCORBIC ACID, THIAMINE MONONITRATE,RIBOFLAVIN, NIACINAMIDE, PYRIDOXINE HYDROCHLORIDE, FOLIC ACID, CYANOCOBALAMIN, BIOTIN, CALCIUM PANTOTHENATE, 1 CAPSULE: 100; 1.5; 1.7; 20; 10; 1; 6000; 150000; 5 CAPSULE, LIQUID FILLED ORAL at 08:46

## 2020-06-09 RX ADMIN — BRIMONIDINE TARTRATE 1 DROP: 2 SOLUTION OPHTHALMIC at 08:50

## 2020-06-09 RX ADMIN — FAMOTIDINE 20 MG: 20 TABLET, FILM COATED ORAL at 08:49

## 2020-06-09 RX ADMIN — DORZOLAMIDE HYDROCHLORIDE AND TIMOLOL MALEATE 1 DROP: 20; 5 SOLUTION/ DROPS OPHTHALMIC at 17:45

## 2020-06-09 RX ADMIN — ENOXAPARIN SODIUM 30 MG: 30 INJECTION SUBCUTANEOUS at 09:09

## 2020-06-09 RX ADMIN — POTASSIUM CHLORIDE 40 MEQ: 750 TABLET, FILM COATED, EXTENDED RELEASE ORAL at 08:56

## 2020-06-09 RX ADMIN — BRIMONIDINE TARTRATE 1 DROP: 2 SOLUTION OPHTHALMIC at 23:18

## 2020-06-09 RX ADMIN — VANCOMYCIN HYDROCHLORIDE 125 MG: KIT at 11:58

## 2020-06-09 RX ADMIN — BRIMONIDINE TARTRATE 1 DROP: 2 SOLUTION OPHTHALMIC at 16:08

## 2020-06-09 RX ADMIN — FERROUS SULFATE TAB 325 MG (65 MG ELEMENTAL FE) 325 MG: 325 (65 FE) TAB at 17:45

## 2020-06-09 RX ADMIN — DORZOLAMIDE HYDROCHLORIDE AND TIMOLOL MALEATE 1 DROP: 20; 5 SOLUTION/ DROPS OPHTHALMIC at 08:50

## 2020-06-09 RX ADMIN — HEPARIN SODIUM 5000 UNITS: 5000 INJECTION INTRAVENOUS; SUBCUTANEOUS at 06:53

## 2020-06-09 RX ADMIN — VANCOMYCIN HYDROCHLORIDE 125 MG: KIT at 06:53

## 2020-06-09 NOTE — PROGRESS NOTES
Oncology End of Shift Note      Bedside shift change report given to Catherine Dougherty RN (incoming nurse) by [facility]uceda (outgoing [facility] Report included the following information SBAR, Kardex, Intake/Output and MAR. Shift Summary: Pt remained stable during shift. No BM through night. [address] [facility] [address] working well. Labs drawn. New dressing placed on IJ. PRN tylenol given for abdominal pain/cramping. Issues for Physician to Address:  Pt having abdominal pain/cramping through day yesterday and part of night. DBP in 90s. Patient on Cardiac Monitoring?     [] Yes  [x] No    Rhythm:          Ashley Euceda

## 2020-06-09 NOTE — PROGRESS NOTES
Nutrition Assessment:    Addendum: pt reports she does better with soft foods. She agreed a trial on mechanical soft diet. INTERVENTIONS/RECOMMENDATIONS:   · Continue current diet  · Discontinue ensure clear  · Add Mighty shake BID  · Increase gelatein to BID  · Ensure pudding BID  · Encourage PO intake  · Please document % meals and supplements consumed in flowsheet I/O's under intake   · Consider appetite stimulant if medically feasible   · If PO intake does not improve, may need to consider nutrition support due to severe malnutrition on admission and little progression with PO intake since    ASSESSMENT:   In attempt to conserve PPE d/t COVID-19 crisis and pt being on enteric isolation, assessment was conducted through chart review, phone conversation with pt and discussion with RN. Intake remains poor. She is not consume ensure clear as they are causing her stomach to hurt. She agreed to try mighty shake. She is consuming gelatein. She has tried magic cup but it makes her cold when she eats it. PO intake was encouraged. Diet Order: Regular(mighty shake, ensure pudding, magic cup, gelatein)  % Eaten:    Patient Vitals for the past 72 hrs:   % Diet Eaten   06/07/20 1943 50 %     Pertinent Medications: [x]Reviewed: nephrocap, pepcid, Fe, KCl  Pertinent Labs: [x]Reviewed: K+ 3.2,   Food Allergies: [x]NKFA  []Other   Last BM:  6/8  Edema:      []RUE   []LUE   [x]RLE 1+  [x]LLE 1+      Pressure Ulcer:  n/a    [] Stage I   [] Stage II   [] Stage III   [] Stage IV      Anthropometrics: Height: 5' 8\" (172.7 cm) Weight: 98.6 kg (217 lb 6 oz)    IBW (%IBW):   ( ) UBW (%UBW): 105.2 kg (232 lb) (84.19 %)    BMI: Body mass index is 33.05 kg/m².     This BMI is indicative of:  []Underweight   []Normal   []Overweight   [x] Obesity   [] Extreme Obesity (BMI>40)  Last Weight Metrics:  Weight Loss Metrics 6/8/2020 5/19/2020 3/11/2020 10/17/2019 7/19/2019 7/5/2019 6/26/2019   Today's Wt 217 lb 6 oz 225 lb 246 lb 232 lb 3 oz 252 lb 248 lb 258 lb   BMI 33.05 kg/m2 33.23 kg/m2 37.4 kg/m2 35.3 kg/m2 38.32 kg/m2 37.71 kg/m2 39.23 kg/m2       Estimated Nutrition Needs (Based on): 1738 Kcals/day(MSJ 1409 x 1.3) , 89 g(-106 (1-1.2gPro/kg) ) Protein  Carbohydrate: At Least 130 g/day  Fluids: 1850 mL/day or per primary team    NUTRITION DIAGNOSES:   Problem:  Malnutrition      Etiology: related to poor appetite, nausea and diarrhea      Signs/Symptoms: as evidenced by 16% wt loss in the past 1-2 months, minimal PO intake x 2-3 weeks. Previous Nutrition Dx:  [] Resolved  [] Unresolved           [x] Progressing    NUTRITION INTERVENTIONS:  Meals/Snacks: General/healthful diet, Modify diet/texture/consistency/nutrients   Supplements: Commercial supplement              GOAL:   Pt will consume >50% of meals/supplements in 2-4 days. NUTRITION MONITORING AND EVALUATION      Food/Nutrient Intake Outcomes:  Total energy intake, Liquid meal replacement  Physical Signs/Symptoms Outcomes: Glucose profile, Electrolyte and renal profile, Weight/weight change, GI profile, GI    Previous Goal Met:   [] Met              [x] Progressing Towards Goal              [] Not Progressing Towards Goal   Previous Recommendations:   [x] Implemented          [] Not Implemented          [] Not Applicable    LEARNING NEEDS (Diet, Food/Nutrient-Drug Interaction):    [x] None Identified   [] Identified and Education Provided/Documented   [] Identified and Pt declined/was not appropriate     Cultural, Yarsanism, OR Ethnic Dietary Needs:    [x] None Identified   [] Identified and Addressed     [x] Interdisciplinary Care Plan Reviewed/Documented    [x] Discharge Planning: TBD   [] Participated in Interdisciplinary Rounds    NUTRITION RISK:    [x] High              [] Moderate           []  Low  []  Minimal/Uncompromised      Darell Jordan RDN  Pager [phone]  Weekend Pager [phone]

## 2020-06-09 NOTE — PROGRESS NOTES
Problem: Self Care Deficits Care Plan (Adult)  Goal: *Acute Goals and Plan of Care (Insert Text)  Description:     FUNCTIONAL STATUS PRIOR TO ADMISSION: Patient was independent to mod I for ADLs and ambulating with a cane. HOME SUPPORT: The patient lived with her 2 grandsons who work in the afternoon. Occupational Therapy Goals  Initiated 6/3/2020  1. Patient will perform grooming standing at sink with supervision/set-up within 7 day(s). 2.  Patient will perform sponge bathing with supervision/set-up within 7 day(s). 3.  Patient will perform lower body dressing with supervision/set-up within 7 day(s). 4.  Patient will perform toilet transfers with supervision/set-up within 7 day(s). 5.  Patient will perform all aspects of toileting with supervision/set-up within 7 day(s). Outcome: Progressing Towards Goal   OCCUPATIONAL THERAPY TREATMENT  Patient: Sky Kingston (83 y.o. female)  Date: 6/9/2020  Diagnosis: Acute on chronic renal failure ([facility] [address] [N17.9, N18.9]   <principal problem not specified>       Precautions: Fall, Contact(C-diff)  Chart, occupational therapy assessment, plan of care, and goals were reviewed. ASSESSMENT  Patient continues with skilled OT services and is progressing towards goals. Good participation; ++ B LE edema which she reports may be due to not wearing her support hose which she wears daily PTA     Current Level of Function Impacting Discharge (ADLs): set up UE ADLs, S-CGA LE ADLs and functional mobility with increased time, pacing    Other factors to consider for discharge: has supportive family available for tx         PLAN :  Patient continues to benefit from skilled intervention to address the above impairments. Continue treatment per established plan of care. to address goals.     Recommend with staff: ambulated to and from [address] [place] [address] if able to make it (may be limited by diarrhea)    Recommend next OT session: weekly re-eval    Recommendation for discharge: (in order for the patient to meet his/her long term goals)  Occupational therapy at least 2 days/week in the home AND ensure assist and/or supervision for safety with bathing     This discharge recommendation:  Has been made in collaboration with the attending provider and/or case management    IF patient discharges home will need the following DME: TBA       SUBJECTIVE:   Patient stated Azam Enamorado took off the support hose and put these on.  (non-skid socks)    OBJECTIVE DATA SUMMARY:   Cognitive/Behavioral Status:  Neurologic State: Alert  Orientation Level: Oriented X4  Cognition: Appropriate decision making; Follows commands; Appropriate for age attention/concentration; Appropriate safety awareness  Perception: Appears intact  Perseveration: No perseveration noted  Safety/Judgement: Fall prevention; Insight into deficits    Functional Mobility and Transfers for ADLs:         Transfers:  Sit to Stand: Stand-by assistance;Contact guard assistance     Bed to Chair: Stand-by assistance    Balance:  Sitting: Intact  Standing: Impaired  Standing - Static: Constant support;Good  Standing - Dynamic : Constant support; Fair    ADL Intervention:                           Lower Body Dressing Assistance  Dressing Assistance: Contact guard assistance;Supervision  Socks: Supervision  Leg Crossed Method Used: No  Position Performed: Seated in chair         Cognitive Retraining  Attention to Task: Single task  Maintains Attention For (Time): Greater than 10 minutes  Following Commands: Follows two step commands/directions  Safety/Judgement: Fall prevention; Insight into deficits        Pain:  No pain reported this session    Activity Tolerance:   Fair, requires rest breaks, and observed SOB with activity  Please refer to the flowsheet for vital signs taken during this treatment.     After treatment patient left in no apparent distress:   Sitting in chair and Call bell within reach    COMMUNICATION/COLLABORATION:   The patients plan of care was discussed with: Physical therapist and Registered nurse.      Jennifer Frances OTR/L  Time Calculation: 29 mins

## 2020-06-09 NOTE — PROGRESS NOTES
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, HTN   - baseline Creatinine : 2 mg/ dl      Chronic Anemia     Esophageal Ca   Hypothyroidism   EMILY       Subjective: Interval History:   -cr 2.0  k 3.2  Na stable    ROS:-  No C/O of chest pain,SOB, vomiting, abdominal pain,fever,chills    Objective:   Visit Vitals  /89 (BP 1 Location: Right arm, BP Patient Position: At rest)   Pulse 76   Temp 98.1 °F (36.7 °C)   Resp 18   Ht 5' 8\" (1.727 m)   Wt 98.6 kg (217 lb 6 oz)   SpO2 96%   Breastfeeding No   BMI 33.05 kg/m²     Last 3 Recorded Weights in this Encounter    06/02/20 1445 06/04/20 0651 06/08/20 0606   Weight: 88.6 kg (195 lb 5.2 oz) 87.5 kg (192 lb 14.4 oz) 98.6 kg (217 lb 6 oz)     06/07 1901 - 06/09 0700  In: 723.3 [P.O.:120; I.V.:603.3]  Out: 1000 [Urine:1000]    Intake/Output Summary (Last 24 hours) at 6/9/2020 1009  Last data filed at 6/9/2020 1828  Gross per 24 hour   Intake 603.33 ml   Output 1000 ml   Net -396.67 ml      Physical exam:   GEN: NAD  NECK- Supple,NO MASS  RESP:clear b/l, no wheezing  CVS: S1,S2  RRR  ABDO:soft, non tender  NEURO: normal speech, non focal  EXT: No Edema     Chart reviewed. Pertinent Notes reviewed.      Data Review :  Recent Labs     06/09/20  0523 06/08/20  0026 06/07/20  0513    140 141   K 3.2* 3.4* 3.4*    110* 112*   CO2 22 23 22   BUN 52* 61* 65*   CREA 2.06* 2.46* 2.47*   * 112* 96   CA 8.2* 7.5* 7.5*   MG 2.2 1.8 1.3*   PHOS 3.3 2.7 2.0*     Recent Labs     06/09/20  0523 06/08/20  0026 06/07/20  0513   WBC 8.5 7.6 7.5   HGB 9.8* 9.5* 9.1*   HCT 29.1* 27.7* 26.6*   * 127* 118*     No results for input(s): FE, TIBC, PSAT, FERR in the last 72 hours. No results for input(s): CPK, CKNDX, TROIQ in the last 72 hours. No lab exists for component: CPKMB  Lab Results   Component Value Date/Time    Color YELLOW/STRAW 06/05/2020 03:07 AM    Appearance CLOUDY (A) 06/05/2020 03:07 AM    Specific gravity 1.013 06/05/2020 03:07 AM    pH (UA) 6.0 06/05/2020 03:07 AM    Protein TRACE (A) 06/05/2020 03:07 AM    Glucose Negative 06/05/2020 03:07 AM    Ketone Negative 06/05/2020 03:07 AM    Bilirubin NEGATIVE  03/04/2020 10:38 PM    Urobilinogen 0.2 06/05/2020 03:07 AM    Nitrites Negative 06/05/2020 03:07 AM    Leukocyte Esterase MODERATE (A) 06/05/2020 03:07 AM    Epithelial cells FEW 06/05/2020 03:07 AM    Bacteria 4+ (A) 06/05/2020 03:07 AM    WBC 20-50 06/05/2020 03:07 AM    RBC 10-20 06/05/2020 03:07 AM     Lab Results   Component Value Date/Time    Culture result: NO GROWTH 5 DAYS 03/08/2020 04:03 AM    Culture result:  03/06/2020 09:15 AM     MRSA NOT PRESENT. Apparent Staphylococus aureus (not MRSA noted). Culture result:  03/06/2020 09:15 AM         Screening of patient nares for MRSA is for surveillance purposes and, if positive, to facilitate isolation considerations in high risk settings. It is not intended for automatic decolonization interventions per se as regimens are not sufficiently effective to warrant routine use.      No results found for: SDES  Lab Results   Component Value Date/Time    Sodium,urine random 18 06/02/2020 10:29 PM    Creatinine, urine 97.00 06/02/2020 10:29 PM     Results from Hospital Encounter encounter on 06/01/20   XR ABD (INDRA)    Narrative PROCEDURE: XR ABD (INDRA)    REASON FOR STUDY: diarhea, rule out constipation with overflow     COMPARISON: 7/16/2019    FINDINGS: Supine views the abdomen demonstrate gaseous distention of the colon  and several loops of small bowel without evidence of high-grade bowel  obstruction. Impression IMPRESSION:  Mild gaseous distention of the colon and loops of small bowel  without evidence of high-grade bowel obstruction. Medication list  reviewed  No current facility-administered medications on file prior to encounter. Current Outpatient Medications on File Prior to Encounter   Medication Sig Dispense Refill    ferrous sulfate 324 mg (65 mg iron) tablet Take 324 mg by mouth two (2) times daily (with meals).  omeprazole (PRILOSEC OTC) 20 mg tablet Take 20 mg by mouth daily.  sodium bicarbonate 650 mg tablet Take 650 mg by mouth two (2) times a day.  folic acid-vit J8-GSO A42 (FOLBEE) 2.5-25-1 mg tablet Take 1 Tab by mouth daily.  iron bisgly,ps-FA-B-C#12-succ 65 mg-65 mg -1,000 mcg (24) tab Take 1 Tab by mouth daily.  latanoprost (XALATAN) 0.005 % ophthalmic solution Administer 1 Drop to both eyes nightly.  MULTIVITS W-FE,OTHER MIN/LUT (CENTRUM SILVER ULTRA WOMEN'S PO) Take 1 Tab by mouth daily.  DORZOLAMIDE HCL/TIMOLOL MALEAT (DORZOLAMIDE-TIMOLOL OP) Apply 1 Drop to eye three (3) times daily. One drop to each eye twice daily       brimonidine (ALPHAGAN) 0.2 % ophthalmic solution Administer 1 Drop to both eyes three (3) times daily.  levothyroxine (SYNTHROID) 100 mcg tablet Take 100 mcg by mouth Daily (before breakfast). Indications: HYPOTHYROIDISM      capecitabine (XELODA) 500 mg tablet 1,500 mg two (2) times a day. X 14 days then 7 days off      acetaminophen (TYLENOL EXTRA STRENGTH) 500 mg tablet Take 1,000 mg by mouth every six (6) hours as needed. Indications: ARTHRITIC PAIN, PAIN                         Jeny Swain MD  [facility]   [email or web address]

## 2020-06-09 NOTE — PROGRESS NOTES
Oncology End of Shift Note      Bedside shift change report given to Lucian Osorio RN (incoming nurse) by Radha Yañez (outgoing nurse) on [facility]. Report included the following information SBAR, Kardex and MAR. Shift Summary:   Patient tolerated care well throughout shfift. No complaints of pain. Patient up in chair for most of the day. Medications given and education provided. Potassium lab drawn at 4pm Per MD Martini Her second dose of potassium. Hourly rounding completed. Issues for Physician to Address:       Patient on Cardiac Monitoring?     [] Yes  [x] No    Rhythm:          Shift Events        Radha Yañez

## 2020-06-09 NOTE — PROGRESS NOTES
Problem: Mobility Impaired (Adult and Pediatric)  Goal: *Acute Goals and Plan of Care (Insert Text)  Description: FUNCTIONAL STATUS PRIOR TO ADMISSION: Patient was modified independent using a single point cane for functional mobility. Reports she was very independent prior. Has some vision loss from glaucoma. HOME SUPPORT PRIOR TO ADMISSION: The patient lived with son and grandson to assist her as needed. Physical Therapy Goals  Initiated 6/3/2020  1. Patient will move from supine to sit and sit to supine  in bed with modified independence within 7 day(s). 2.  Patient will transfer from bed to chair and chair to bed with modified independence using the least restrictive device within 7 day(s). 3.  Patient will perform sit to stand with modified independence within 7 day(s). 4.  Patient will ambulate with modified independence for 200 feet with the least restrictive device within 7 day(s). 5.  Patient will ascend/descend 4 stairs with 1 handrail(s) with modified independence within 7 day(s). Outcome: Progressing Towards Goal   PHYSICAL THERAPY TREATMENT  Patient: Adan Clark (2 y.o. female)  Date: 6/9/2020  Diagnosis: Acute on chronic renal failure (HCC) [N17.9, N18.9]   <principal problem not specified>       Precautions: Fall  Chart, physical therapy assessment, plan of care and goals were reviewed. ASSESSMENT  Patient continues with skilled PT services and is progressing towards goals. Patient received up in chair and agreeable to participate, able to come to stand with SBA and ambulated into bathroom to toilet. Patient able to stand unsupported and perform functional reaching for hygiene tasks. Patient ambulated x 70 feet in room, trial with RW today and patient with improved escobar and trunk stability, recommend using RW at home to increase safety and educated on falls prevention.   MD in room at end of visit, discussed doing home health PT for strengthening and activity progression and patient is hopeful to discharge home tomorrow. Current Level of Function Impacting Discharge (mobility/balance): CGA for gait with RW    Other factors to consider for discharge: falls risk, patient will require assist from family         PLAN :  Patient continues to benefit from skilled intervention to address the above impairments. Continue treatment per established plan of care. to address goals. Recommendation for discharge: (in order for the patient to meet his/her long term goals)  Physical therapy at least 2 days/week in the home AND ensure assist and/or supervision for safety with mobility     This discharge recommendation:  Has been made in collaboration with the attending provider and/or case management    IF patient discharges home will need the following DME: patient owns DME required for discharge       SUBJECTIVE:   Patient stated my family will help me.     OBJECTIVE DATA SUMMARY:   Critical Behavior:  Neurologic State: Alert  Orientation Level: Oriented X4  Cognition: Follows commands  Safety/Judgement: Awareness of environment, Insight into deficits  Functional Mobility Training:  Bed Mobility:                    Transfers:  Sit to Stand: (P) Stand-by assistance  Stand to Sit: (P) Stand-by assistance        Bed to Chair: (P) Stand-by assistance                    Balance:  Sitting: (P) Intact  Standing: (P) Impaired  Standing - Static: (P) Constant support;Good  Standing - Dynamic : (P) Constant support; Fair  Ambulation/Gait Training:  Distance (ft): (P) 70 Feet (ft)  Assistive Device: (P) Cane, straight;Gait belt;Walker, rolling  Ambulation - Level of Assistance: (P) Contact guard assistance                 Base of Support: (P) Widened     Speed/Jacki: (P) Pace decreased (<100 feet/min)  Step Length: (P) Left shortened;Right shortened                    Stairs:                   Activity Tolerance:   Fair  Please refer to the flowsheet for vital signs taken during this treatment. After treatment patient left in no apparent distress:   Sitting in chair    COMMUNICATION/COLLABORATION:   The patients plan of care was discussed with: Registered nurse.      Stefanie Thayer, PT   Time Calculation: 49 mins

## 2020-06-09 NOTE — INTERDISCIPLINARY ROUNDS
Oncology Interdisciplinary rounds were held today to discuss patient plan of care and outcomes. The following members were present: Nursing, Physician, Case Management, Pharmacy, and PT/OT    Actual Length of Stay: 8    DRG GLOS: 4.3    Expected Length of Stay: 4d 7h                       Plan            Discharge    Nephrology following. Oncology following. PT/OT  Strict I'S &O's. PO Vanc. Home with family. Follow up appointments. Possible D/C 6/9/2020.

## 2020-06-10 LAB
ALBUMIN SERPL-MCNC: 2.2 G/DL (ref 3.5–5)
ANION GAP SERPL CALC-SCNC: 7 MMOL/L (ref 5–15)
BUN SERPL-MCNC: 57 MG/DL (ref 6–20)
BUN/CREAT SERPL: 24 (ref 12–20)
CALCIUM SERPL-MCNC: 8 MG/DL (ref 8.5–10.1)
CALPROTECTIN STL-MCNT: 1263 UG/G (ref 0–120)
CHLORIDE SERPL-SCNC: 110 MMOL/L (ref 97–108)
CO2 SERPL-SCNC: 22 MMOL/L (ref 21–32)
CREAT SERPL-MCNC: 2.41 MG/DL (ref 0.55–1.02)
GLUCOSE SERPL-MCNC: 103 MG/DL (ref 65–100)
MAGNESIUM SERPL-MCNC: 1.7 MG/DL (ref 1.6–2.4)
PHOSPHATE SERPL-MCNC: 3.3 MG/DL (ref 2.6–4.7)
POTASSIUM SERPL-SCNC: 3.7 MMOL/L (ref 3.5–5.1)
SODIUM SERPL-SCNC: 139 MMOL/L (ref 136–145)

## 2020-06-10 PROCEDURE — 97116 GAIT TRAINING THERAPY: CPT | Performed by: PHYSICAL THERAPIST

## 2020-06-10 PROCEDURE — 65270000015 HC RM PRIVATE ONCOLOGY

## 2020-06-10 PROCEDURE — 97530 THERAPEUTIC ACTIVITIES: CPT | Performed by: PHYSICAL THERAPIST

## 2020-06-10 PROCEDURE — 74011250637 HC RX REV CODE- 250/637: Performed by: INTERNAL MEDICINE

## 2020-06-10 PROCEDURE — 36415 COLL VENOUS BLD VENIPUNCTURE: CPT

## 2020-06-10 PROCEDURE — 74011250636 HC RX REV CODE- 250/636: Performed by: INTERNAL MEDICINE

## 2020-06-10 PROCEDURE — 80069 RENAL FUNCTION PANEL: CPT

## 2020-06-10 PROCEDURE — 83735 ASSAY OF MAGNESIUM: CPT

## 2020-06-10 RX ORDER — LOPERAMIDE HYDROCHLORIDE 2 MG/1
2 CAPSULE ORAL AS NEEDED
Qty: 30 CAP | Refills: 5 | Status: SHIPPED | OUTPATIENT
Start: 2020-06-10 | End: 2020-06-20

## 2020-06-10 RX ORDER — VANCOMYCIN HYDROCHLORIDE 250 MG/5ML
125 POWDER, FOR SOLUTION ORAL EVERY 6 HOURS
Qty: 240 ML | Refills: 0 | Status: SHIPPED | OUTPATIENT
Start: 2020-06-10 | End: 2020-06-10

## 2020-06-10 RX ORDER — POTASSIUM CHLORIDE 20 MEQ/1
40 TABLET, EXTENDED RELEASE ORAL
Status: COMPLETED | OUTPATIENT
Start: 2020-06-10 | End: 2020-06-10

## 2020-06-10 RX ORDER — VANCOMYCIN HYDROCHLORIDE 250 MG/5ML
125 POWDER, FOR SOLUTION ORAL EVERY 6 HOURS
Qty: 60 ML | Refills: 0 | Status: SHIPPED | OUTPATIENT
Start: 2020-06-10 | End: 2021-01-01 | Stop reason: ALTCHOICE

## 2020-06-10 RX ORDER — DIPHENOXYLATE HYDROCHLORIDE AND ATROPINE SULFATE 2.5; .025 MG/1; MG/1
1 TABLET ORAL
Qty: 30 TAB | Refills: 3 | Status: SHIPPED | OUTPATIENT
Start: 2020-06-10

## 2020-06-10 RX ORDER — FAMOTIDINE 20 MG/1
20 TABLET, FILM COATED ORAL
Qty: 30 TAB | Refills: 3 | Status: SHIPPED | OUTPATIENT
Start: 2020-06-11

## 2020-06-10 RX ADMIN — DORZOLAMIDE HYDROCHLORIDE AND TIMOLOL MALEATE 1 DROP: 20; 5 SOLUTION/ DROPS OPHTHALMIC at 08:24

## 2020-06-10 RX ADMIN — VANCOMYCIN HYDROCHLORIDE 125 MG: KIT at 23:32

## 2020-06-10 RX ADMIN — LATANOPROST 1 DROP: 50 SOLUTION OPHTHALMIC at 21:22

## 2020-06-10 RX ADMIN — FAMOTIDINE 20 MG: 20 TABLET, FILM COATED ORAL at 08:23

## 2020-06-10 RX ADMIN — ENOXAPARIN SODIUM 30 MG: 30 INJECTION SUBCUTANEOUS at 09:25

## 2020-06-10 RX ADMIN — FERROUS SULFATE TAB 325 MG (65 MG ELEMENTAL FE) 325 MG: 325 (65 FE) TAB at 08:24

## 2020-06-10 RX ADMIN — BRIMONIDINE TARTRATE 1 DROP: 2 SOLUTION OPHTHALMIC at 09:25

## 2020-06-10 RX ADMIN — VANCOMYCIN HYDROCHLORIDE 125 MG: KIT at 17:56

## 2020-06-10 RX ADMIN — DORZOLAMIDE HYDROCHLORIDE AND TIMOLOL MALEATE 1 DROP: 20; 5 SOLUTION/ DROPS OPHTHALMIC at 17:56

## 2020-06-10 RX ADMIN — FERROUS SULFATE TAB 325 MG (65 MG ELEMENTAL FE) 325 MG: 325 (65 FE) TAB at 16:15

## 2020-06-10 RX ADMIN — LEVOTHYROXINE SODIUM 100 MCG: 0.1 TABLET ORAL at 05:57

## 2020-06-10 RX ADMIN — BRIMONIDINE TARTRATE 1 DROP: 2 SOLUTION OPHTHALMIC at 16:15

## 2020-06-10 RX ADMIN — VANCOMYCIN HYDROCHLORIDE 125 MG: KIT at 11:55

## 2020-06-10 RX ADMIN — ASCORBIC ACID, THIAMINE MONONITRATE,RIBOFLAVIN, NIACINAMIDE, PYRIDOXINE HYDROCHLORIDE, FOLIC ACID, CYANOCOBALAMIN, BIOTIN, CALCIUM PANTOTHENATE, 1 CAPSULE: 100; 1.5; 1.7; 20; 10; 1; 6000; 150000; 5 CAPSULE, LIQUID FILLED ORAL at 08:23

## 2020-06-10 RX ADMIN — VANCOMYCIN HYDROCHLORIDE 125 MG: KIT at 05:57

## 2020-06-10 RX ADMIN — BRIMONIDINE TARTRATE 1 DROP: 2 SOLUTION OPHTHALMIC at 21:24

## 2020-06-10 RX ADMIN — POTASSIUM CHLORIDE 40 MEQ: 20 TABLET, EXTENDED RELEASE ORAL at 11:53

## 2020-06-10 NOTE — PROGRESS NOTES
DAYO:   1) Home with HH (At [address] [place] [address], PT, OT, SW)   2) Home with f.u appts     D/C Order Noted-   2:50 PM- CM contacted pt's insurance at [phone] who states they have 24-48 more hours to make a decision on whether or not they will approve the medication. Confirmation number for the phone conversation: [phone]. CM advised the insurance company pt will be staying here until that medication is approved as there are no other compatible options, representative aware. CM inquired about how we will be notified if this medication would be approved or denied, they stated they would fax the determination, CM requested that they contact the RN station at [phone], representative said she would place a notification for that. 12:02 PM- Medicare pt has received, reviewed, and signed 2nd IM letter informing them of their right to appeal the discharge. Signed copy has been placed on pt bedside chart. At [facility] accepted, AVS updated. 11:20 AM- Attending contacted pt's insurance at [phone] regarding the authorization of the PO Vanc as there are no other options due to the CDIFF. Attending completed the paperwork needed and requested CM fax to: [phone], CM faxed paperwork needed and placed on chart to be scanned. Attending provided CM a confirmation number for the phone conversation regarding the appeal for pricing and authorization- confirmation number [id number].      9:58AM- CM faxed prescription to the [facility] for pricing, CM informed this is approx $139 per day and that insurance does not cover it. CM informed attending who is looking at alternatives. 9:00 AM- CM spoke with pt's son regarding d/c planning. Son Davi Westbrook. Is requesting [facility] services but states his Mom does not want it. Son states he and family have arranged to have family stay with her 24/7 until she is back at baseline. Pt's son states if pt is agreeable we can arrange [facility] services.  CM met with attending and pt at bedside, [facility] (RN, PT and OT) but declined a walker. Pt states she will cancel HH at home if she does not want it anymore, attending aware. Attendings office prefers At [facility]- family is aware and agreeable for referral to be sent there. Care Management Interventions  PCP Verified by CM: Yes  Mode of Transport at Discharge: Self  Transition of Care Consult (CM Consult): Discharge Planning  MyChart Signup: No  Discharge Durable Medical Equipment: No  Physical Therapy Consult: Yes  Occupational Therapy Consult: Yes  Speech Therapy Consult: No  Current Support Network: Lives Alone, Family Lives Nearby  Confirm Follow Up Transport: Family  The Patient and/or Patient Representative was Provided with a Choice of Provider and Agrees with the Discharge Plan?: Yes  Name of the Patient Representative Who was Provided with a Choice of Provider and Agrees with the Discharge Plan: Spoke with pt's son Jovan Fuentes Sr.    Keller of Choice List was Provided with Basic Dialogue that Supports the Patient's Individualized Plan of Care/Goals, Treatment Preferences and Shares the Quality Data Associated with the Providers?: Yes  Discharge Location  Discharge Placement: Home with home health       JENNIFER Payne, [address] [facility]    [phone]

## 2020-06-10 NOTE — PROGRESS NOTES
Oncology End of Shift Note      Bedside shift change report given to GEORGIA Ly (incoming nurse) by Myrna Doherty (outgoing nurse) on [facility]. Report included the following information SBAR, Kardex and MAR. Shift Summary:   Patient tolerated care well throughout shift. No complaints of pain. Patient up to their chair for most of the shift. Education provided regarding all meds. Patient worked with PT/OT. Patient to be discharged once vancomycin is approved. Patient up with one to the bathroom. Issues for Physician to Address:       Patient on Cardiac Monitoring?     [] Yes  [x] No    Rhythm:          Shift Events      Myrna Doherty

## 2020-06-10 NOTE — PROGRESS NOTES
Problem: Mobility Impaired (Adult and Pediatric)  Goal: *Acute Goals and Plan of Care (Insert Text)  Description: FUNCTIONAL STATUS PRIOR TO ADMISSION: Patient was modified independent using a single point cane for functional mobility. Reports she was very independent prior. Has some vision loss from glaucoma. HOME SUPPORT PRIOR TO ADMISSION: The patient lived with son and grandson to assist her as needed. Physical Therapy Goals  Physical Therapy Goals  Revised 6/10/2020  1. Patient will move from supine to sit and sit to supine  in bed with independence within 7 day(s). 2.  Patient will transfer from bed to chair and chair to bed with independence using the least restrictive device within 7 day(s). 3.  Patient will perform sit to stand with independence within 7 day(s). 4.  Patient will ambulate with supervision/set-up for 150 feet with the least restrictive device within 7 day(s). 5.  Patient will ascend/descend 4 stairs with 1 handrail(s) with minimal assistance/contact guard assist within 7 day(s). Initiated 6/3/2020  1. Patient will move from supine to sit and sit to supine  in bed with modified independence within 7 day(s). 2.  Patient will transfer from bed to chair and chair to bed with modified independence using the least restrictive device within 7 day(s). 3.  Patient will perform sit to stand with modified independence within 7 day(s). 4.  Patient will ambulate with modified independence for 200 feet with the least restrictive device within 7 day(s). 5.  Patient will ascend/descend 4 stairs with 1 handrail(s) with modified independence within 7 day(s).     Note:   PHYSICAL THERAPY TREATMENT: WEEKLY REASSESSMENT  Patient: Delbert Ortiz (49 y.o. female)  Date: 6/10/2020  Primary Diagnosis: Acute on chronic renal failure ([facility] [N17.9, N18.9]        Precautions:   Fall, Contact(C-diff)      ASSESSMENT  Patient continues with skilled PT services and is progressing towards goals. Patient is progressing with activity tolerance, strength, and balance. Today, supine upon arrival, and agreeable to mobility. Bed mobility with SBA. Good sitting balance EOB. Sit to stand with SBA. Patient ambulated 72' in room with rolling walker and CGA. Patient with slow pace and shortened steps but no LOB. Assisted patient into bathroom and required CGA for toilet transfers. Up in chair at end of session. .     Patient's progression toward goals since last assessment: Several goals met. Current Level of Function Impacting Discharge (mobility/balance): CGA    Functional Outcome Measure: The patient scored 70/100 on the Barthel Index outcome measure which is indicative of 30% decline in functional mobility. Other factors to consider for discharge: supervision for safety when ambulating. PLAN :  Goals have been updated based on progression since last assessment. Patient continues to benefit from skilled intervention to address the above impairments. Recommendations and Planned Interventions: transfer training, gait training, therapeutic exercises, and therapeutic activities      Frequency/Duration: Patient will be followed by physical therapy:  4 times a week to address goals. Recommendation for discharge: (in order for the patient to meet his/her long term goals)  Physical therapy at least 2 days/week in the home AND ensure assist and/or supervision for safety with mobility     This discharge recommendation:  Has been made in collaboration with the attending provider and/or case management    IF patient discharges home will need the following DME: patient owns DME required for discharge         SUBJECTIVE:   Patient stated I'm going home today.     OBJECTIVE DATA SUMMARY:   HISTORY:    Past Medical History:   Diagnosis Date    Abnormal x-ray of abdomen 5/22/2019    Bas showed food and irregular stricture above ge junction in addition to large hiatal hernia  5.20.2019 Adenocarcinoma of esophagus ([facility] [address] 5/30/2019    Anemia     Arthritis     Chronic kidney disease     Stage II followed by Dr Lisha Pfeiffer Nephrology     Diverticulosis     Esophageal dysphagia 5/22/2019    H/O colonoscopy with polypectomy     benign    History of colon polyps 6/1/2018    2013 tubular adenoma     Hypertension     Ill-defined condition     pulmonary hypertension    Other ill-defined conditions(799.89)     glaucoma and cataracts    Sleep apnea     CPAP compliant, as stated 5/20/2019    Thromboembolus ([facility] [address] 2015    Right  leg , spontaneous    Thyroid disease     hypothyroidism     Past Surgical History:   Procedure Laterality Date    ANAL PRESSURE RECORD  6/6/2019    COLONOSCOPY N/A 6/1/2018    COLONOSCOPY performed by Mary Villagran MD at [address]  6/1/2018         HX CATARACT REMOVAL Bilateral 2017    HX HEENT  1984    thyroid biopsy-benign    HX HYSTERECTOMY      IR INSERT NON TUNL CVC OVER 5 YRS  3/8/2020    IR INSERT TUNL CVC W PORT OVER 5 YEARS  7/19/2019    IR REMOVE TUNL CVAD W/O PORT / PUMP  3/8/2020    UPPER GI ENDOSCOPY,BALL DIL,30MM  5/22/2019         UPPER GI ENDOSCOPY,BALL DIL,30MM  5/30/2019         UPPER GI ENDOSCOPY,BIOPSY  5/22/2019         UPPER GI ENDOSCOPY,BIOPSY  5/30/2019         UPPER GI ENDOSCOPY,BIOPSY  10/17/2019            Personal factors and/or comorbidities impacting plan of care: fall risk    Home Situation  Home Environment: Private residence  # Steps to Enter: 4  Rails to Enter: Yes  Hand Rails : Bilateral  Wheelchair Ramp: No  One/Two Story Residence: One story  Living Alone: No  Support Systems: Child(nadeem)  Patient Expects to be Discharged to[de-identified] Private residence  Current DME Used/Available at Home: Cane, straight, Tub transfer bench  Tub or Shower Type: Tub/Shower combination    EXAMINATION/PRESENTATION/DECISION MAKING:   Critical Behavior:  Neurologic State: Alert, Appropriate for age  Orientation Level: Appropriate for age, Oriented X4  Cognition: Follows commands  Safety/Judgement: Fall prevention, Insight into deficits  Hearing: Auditory  Auditory Impairment: Hard of hearing, left side  Skin:  intact  Edema: LE's  Range Of Motion:  AROM: Generally decreased, functional           PROM: Within functional limits           Strength:    Strength: Generally decreased, functional                    Tone & Sensation:   Tone: Normal              Sensation: Intact               Coordination:  Coordination: Within functional limits  Vision:      Functional Mobility:  Bed Mobility:  Rolling: Supervision  Supine to Sit: Supervision        Transfers:  Sit to Stand: Stand-by assistance  Stand to Sit: Stand-by assistance        Bed to Chair: Contact guard assistance              Balance:   Sitting: Intact  Standing: Impaired  Standing - Static: Good;Constant support  Standing - Dynamic : Constant support; Fair  Ambulation/Gait Training:  Distance (ft): 65 Feet (ft)  Assistive Device: Gait belt;Walker, rolling  Ambulation - Level of Assistance: Contact guard assistance        Gait Abnormalities: Decreased step clearance  Right Side Weight Bearing: Full  Left Side Weight Bearing: Full  Base of Support: Widened     Speed/Jacki: Pace decreased (<100 feet/min)  Step Length: Left shortened;Right shortened                               Therapeutic Exercises: Ankle pumps, quad sets    Functional Measure:  Barthel Index:    Bathin  Bladder: 5  Bowels: 10  Groomin  Dressin  Feeding: 10  Mobility: 10  Stairs: 0  Toilet Use: 10  Transfer (Bed to Chair and Back): 10  Total: 70/100       The Barthel ADL Index: Guidelines  1. The index should be used as a record of what a patient does, not as a record of what a patient could do. 2. The main aim is to establish degree of independence from any help, physical or verbal, however minor and for whatever reason. 3. The need for supervision renders the patient not independent.   4. A patient's performance should be established using the best available evidence. Asking the patient, friends/relatives and nurses are the usual sources, but direct observation and common sense are also important. However direct testing is not needed. 5. Usually the patient's performance over the preceding 24-48 hours is important, but occasionally longer periods will be relevant. 6. Middle categories imply that the patient supplies over 50 per cent of the effort. 7. Use of aids to be independent is allowed. Gabriela Mccabe, Barthel, D.W. ([id number]). Functional evaluation: the Barthel Index. [address] [facility] (14)2. Prachi Guzman pari ROBERTO Rogers, Noe Patrick., Cate Rodriguez., [place], [address] (1999). Measuring the change indisability after inpatient rehabilitation; comparison of the responsiveness of the Barthel Index and Functional [place] Measure. Journal of Neurology, Neurosurgery, and Psychiatry, 66(4), 338-885. Alvarez Sims, NFredoJ.A, YUN Rossi, & Jhonny Shay M.A. (2004.) Assessment of post-stroke quality of life in cost-effectiveness studies: The usefulness of the Barthel Index and the EuroQoL-5D. Quality of Life Research, 13, 427-43           Pain Rating:  None reported    Activity Tolerance:   Good  Please refer to the flowsheet for vital signs taken during this treatment. After treatment patient left in no apparent distress:   Sitting in chair and Call bell within reach    COMMUNICATION/EDUCATION:   The patients plan of care was discussed with: Registered nurse. Fall prevention education was provided and the patient/caregiver indicated understanding. and Patient/family agree to work toward stated goals and plan of care.     Thank you for this referral.  Kris Patient, PT   Time Calculation: 29 mins

## 2020-06-10 NOTE — DISCHARGE SUMMARY
Oncology Discharge Summary    Discharge Diagnoses: Acute on chronic renal failure ([facility] [N17.9, N18.9]     Problem List:  Active Problems:    Acute on chronic renal failure ([facility] [address] (6/1/2020)      Severe protein-calorie malnutrition ([facility] (6/2/2020)      Diarrhea (6/3/2020)         Hospital Course: Stuart Barth is a 78 y.o. admitted on 6/1/2020 for Acute on chronic renal failure [facility] [N17.9, N18.9].  She has been on Xeloda single agent for esophageal cancer and had ablation per  Dr. Jean Small 2/28/20. She had started C4 of Xeloda at max dose(1500 mg BID 14/21days)  per renal function  3/2/20 but stopped 3/4/20 when she was admitted to [facility] for septicemia. She was discharged 3/12/20 and received IV antibiotics in [facility] [place] until 3/23/20.       Pt resumed Xeloda 1500 mg BID 3/30-4/12/20.       Pt completed last 14 day cycle of Xeloda 5/24/20. She arrived to clinic 5/29/20 reporting several days of diarrhea, nausea and poor appetite. Pt was given hydration and anti-emetics in clinic on 5/29/20 and returned to clinic  for further hydration and re-evaluation. She was found to be in acute renal failure with a creatinine of 4.3 and potassium of 2.4. She was admitted and started on hydrational fluids and KCl supplements. Stuart Barth was seen in consultation by Dr. Oracio Padilla from nephrology and Dr. Schuyler Whitman (and Inessa Fuentes) from GI, both of whom provided invaluable assistance in her care. / Corrina Merlin adjusted her IVF and helped replete her K. She was checked for C difficile and surprisingly proved to have this test be positive. She was started on oral vancomycin 125 mg po qid and her diarrhea started to improve almost immediately. She had only 1 BM yesterday, her abdominal cramps have subsided, and she has been improving daily re strength with PT. She is being discharged, reluctantly agreeable to having [place] [id number] and PT see her at her house to continue the progress she has made. Apryl Paniagua Imaging:    Chest X-Ray: 6/2/20: IMPRESSION: Right internal jugular central venous catheter appears to be in satisfactory position. No evidence of placement related complication. Low lung  volumes with bibasilar atelectasis. Abd Xray 6/3/20: IMPRESSION:  Mild gaseous distention of the colon and loops of small bowel  without evidence of high-grade bowel obstruction.       Labs:    CBC: 6/2/2020: HCT 29.7 %* (Ref range: 35.0 - 47.0 %); HGB 10.7 g/dL* (Ref range: 11.5 - 16.0 g/dL); PLATELET 393 K/uL (Ref range: 150 - 400 K/uL); RBC 3.11 M/uL* (Ref range: 3.80 - 5.20 M/uL); WBC 7.1 K/uL (Ref range: 3.6 - 11.0 K/uL)   BMP: 6/2/2020:  MG/DL* (Ref range: 6 - 20 MG/DL);  MG/DL* (Ref range: 6 - 20 MG/DL); Calcium 8.0 MG/DL* (Ref range: 8.5 - 10.1 MG/DL); Calcium 8.0 MG/DL* (Ref range: 8.5 - 10.1 MG/DL); Chloride 106 mmol/L (Ref range: 97 - 108 mmol/L); Chloride 108 mmol/L (Ref range: 97 - 108 mmol/L); CO2 19 mmol/L* (Ref range: 21 - 32 mmol/L); CO2 20 mmol/L* (Ref range: 21 - 32 mmol/L); Creatinine 4.39 MG/DL* (Ref range: 0.55 - 1.02 MG/DL); Creatinine 4.05 MG/DL* (Ref range: 0.55 - 1.02 MG/DL); Glucose 103 mg/dL* (Ref range: 65 - 100 mg/dL); Glucose 111 mg/dL* (Ref range: 65 - 100 mg/dL); Potassium 2.5 mmol/L* (Ref range: 3.5 - 5.1 mmol/L); Potassium 3.3 mmol/L* (Ref range: 3.5 - 5.1 mmol/L); Sodium 136 mmol/L (Ref range: 136 - 145 mmol/L); Sodium 137 mmol/L (Ref range: 136 - 145 mmol/L)  Coagulation: No results found for requested labs within first 48 hours of the last admission day. Liver: 6/2/2020: Albumin 2.7 g/dL* (Ref range: 3.5 - 5.0 g/dL); Alk. phosphatase 60 U/L (Ref range: 45 - 117 U/L);  Protein, total 5.7 g/dL* (Ref range: 6.4 - 8.2 g/dL)  Recent Results (from the past 24 hour(s))   POTASSIUM    Collection Time: 06/09/20  4:11 PM   Result Value Ref Range    Potassium 3.4 (L) 3.5 - 5.1 mmol/L   RENAL FUNCTION PANEL    Collection Time: 06/10/20  5:56 AM   Result Value Ref Range    Sodium 139 136 - 145 mmol/L    Potassium 3.7 3.5 - 5.1 mmol/L    Chloride 110 (H) 97 - 108 mmol/L    CO2 22 21 - 32 mmol/L    Anion gap 7 5 - 15 mmol/L    Glucose 103 (H) 65 - 100 mg/dL    BUN 57 (H) 6 - 20 MG/DL    Creatinine 2.41 (H) 0.55 - 1.02 MG/DL    BUN/Creatinine ratio 24 (H) 12 - 20      GFR est AA 23 (L) >60 ml/min/1.73m2    GFR est non-AA 19 (L) >60 ml/min/1.73m2    Calcium 8.0 (L) 8.5 - 10.1 MG/DL    Phosphorus 3.3 2.6 - 4.7 MG/DL    Albumin 2.2 (L) 3.5 - 5.0 g/dL   MAGNESIUM    Collection Time: 06/10/20  5:56 AM   Result Value Ref Range    Magnesium 1.7 1.6 - 2.4 mg/dL       Discharge Medications:  Current Discharge Medication List      START taking these medications    Details   diphenoxylate-atropine (LOMOTIL) 2.5-0.025 mg per tablet Take 1 Tab by mouth four (4) times daily as needed for Diarrhea. Max Daily Amount: 4 Tabs. Qty: 30 Tab, Refills: 3    Associated Diagnoses: Diarrhea of infectious origin      famotidine (PEPCID) 20 mg tablet Take 1 Tab by mouth Daily (before breakfast). Indications: gastroesophageal reflux disease  Qty: 30 Tab, Refills: 3      loperamide (IMODIUM) 2 mg capsule Take 1 Cap by mouth as needed for Diarrhea for up to 10 days. Qty: 30 Cap, Refills: 5      vancomycin (FIRVANQ) 50 mg/mL oral solution Take 2.5 mL by mouth every six (6) hours. Qty: 240 mL, Refills: 0         CONTINUE these medications which have NOT CHANGED    Details   omeprazole (PRILOSEC OTC) 20 mg tablet Take 20 mg by mouth daily. folic acid-vit O1-EAP V58 (FOLBEE) 2.5-25-1 mg tablet Take 1 Tab by mouth daily. latanoprost (XALATAN) 0.005 % ophthalmic solution Administer 1 Drop to both eyes nightly. DORZOLAMIDE HCL/TIMOLOL MALEAT (DORZOLAMIDE-TIMOLOL OP) Apply 1 Drop to eye three (3) times daily. One drop to each eye twice daily       brimonidine (ALPHAGAN) 0.2 % ophthalmic solution Administer 1 Drop to both eyes three (3) times daily.       levothyroxine (SYNTHROID) 100 mcg tablet Take 100 mcg by mouth Daily (before breakfast). Indications: HYPOTHYROIDISM      acetaminophen (TYLENOL EXTRA STRENGTH) 500 mg tablet Take 1,000 mg by mouth every six (6) hours as needed. Indications: ARTHRITIC PAIN, PAIN         STOP taking these medications       ferrous sulfate 324 mg (65 mg iron) tablet Comments:   Reason for Stopping:         sodium bicarbonate 650 mg tablet Comments:   Reason for Stopping:         iron bisgly,ps-FA-B-C#12-succ 65 mg-65 mg -1,000 mcg (24) tab Comments:   Reason for Stopping:         MULTIVITS W-FE,OTHER MIN/LUT (CENTRUM SILVER ULTRA WOMEN'S PO) Comments:   Reason for Stopping:         capecitabine (XELODA) 500 mg tablet Comments:   Reason for Stopping:                Discharge Exam:  Visit Vitals  /89 (BP 1 Location: Right arm, BP Patient Position: At rest)   Pulse 74   Temp 98.5 °F (36.9 °C)   Resp 18   Ht 5' 8\" (1.727 m)   Wt 98.6 kg (217 lb 6 oz)   SpO2 97%   Breastfeeding No   BMI 33.05 kg/m²     General appearance: alert, cooperative, no distress, appears stated age  Head: Normocephalic, without obvious abnormality, atraumatic  Throat: Lips, mucosa, and tongue normal. Teeth and gums normal  Neck: supple, symmetrical, trachea midline, no adenopathy, thyroid: not enlarged, symmetric, no tenderness/mass/nodules and no JVD  Lungs: clear to auscultation bilaterally  Heart: regular rate and rhythm, S1, S2 normal, no murmur, click, rub or gallop  Abdomen: soft, non-tender. Bowel sounds normal. No masses,  no organomegaly  Extremities: extremities normal, atraumatic, no cyanosis or edema  Skin: Skin color, texture, turgor normal. No rashes or lesions  Lymph nodes: Cervical, supraclavicular, and axillary nodes normal.  Neurologic: Grossly normal    Disposition: home    Patient Instructions: Activity: Activity as tolerated  Diet: please follow recommendations of our dylon Ponce  Wound Care: None needed    Follow-up with Dr. Abbie White or [facility] in a week.   Follow-up tests/labs CBC CMP    Called and spoke with son  Reviewed plans with her, floor nurse Miguel Adrian and   Sent scripts to [facility]  > 45 minutes spent on floor coordinating discharge plans.      Signed:  Christina Thompson MD  6/10/2020  9:18 AM      Zhanna Juares MD [facility]  [address], [facility] [address] [facility]  Phone [phone]  Fax [phone]

## 2020-06-10 NOTE — INTERDISCIPLINARY ROUNDS
Oncology Interdisciplinary rounds were held today to discuss patient plan of care and outcomes. The following members were present: Nursing, Physician, Case Management, Pharmacy, and PT/OT    Actual Length of Stay: 9    DRG GLOS: 4.3    Expected Length of Stay: 4d 7h                       Plan            Discharge    Nutrition following. Nephrology consulted. Date: 6/10/2020  Home with family. Home Health.

## 2020-06-10 NOTE — DISCHARGE INSTRUCTIONS
PATIENT DISCHARGE INSTRUCTIONS      PATIENT DISCHARGE INSTRUCTIONS    John Hedrick / [id number] : 1940    Admitted 2020 Discharged: 6/10/2020       · It is important that you take the medication exactly as they are prescribed. · Keep your medication in the bottles provided by the pharmacist and keep a list of the medication names, dosages, and times to be taken in your wallet. · Do not take other medications without consulting your doctor. What to do at Home    Recommended Diet: our dietician has reviewed dietary recommendations and given handout. Please try his suggestions to minimize diarrhea    Recommended Activity: Activity as tolerated    If you experience any symptoms that worry you, please follow up with Dr. Kylah Alegre and her staff.

## 2020-06-10 NOTE — PROGRESS NOTES
Oncology End of Shift Note      Bedside shift change report given to Gale Haile RN (incoming nurse) by Conner Tubbs (outgoing nurse) on [place]. Report included the following information SBAR, Kardex, Intake/Output and MAR. Shift Summary: Pt remained stable during shift. No complaints over night. IJ working well. Labs drawn. [place] changed at bedtime. Issues for Physician to Address:       Patient on Cardiac Monitoring?     [] Yes  [x] No    Rhythm:          Conner Tubbs

## 2020-06-11 VITALS
OXYGEN SATURATION: 100 % | HEIGHT: 68 IN | DIASTOLIC BLOOD PRESSURE: 73 MMHG | TEMPERATURE: 97.7 F | HEART RATE: 64 BPM | RESPIRATION RATE: 18 BRPM | BODY MASS INDEX: 32.94 KG/M2 | WEIGHT: 217.37 LBS | SYSTOLIC BLOOD PRESSURE: 113 MMHG

## 2020-06-11 LAB
ALBUMIN SERPL-MCNC: 2.1 G/DL (ref 3.5–5)
ANION GAP SERPL CALC-SCNC: 9 MMOL/L (ref 5–15)
BUN SERPL-MCNC: 63 MG/DL (ref 6–20)
BUN/CREAT SERPL: 26 (ref 12–20)
CALCIUM SERPL-MCNC: 8.2 MG/DL (ref 8.5–10.1)
CHLORIDE SERPL-SCNC: 112 MMOL/L (ref 97–108)
CO2 SERPL-SCNC: 19 MMOL/L (ref 21–32)
COMMENT, HOLDF: NORMAL
CREAT SERPL-MCNC: 2.38 MG/DL (ref 0.55–1.02)
GLUCOSE SERPL-MCNC: 105 MG/DL (ref 65–100)
MAGNESIUM SERPL-MCNC: 1.8 MG/DL (ref 1.6–2.4)
PHOSPHATE SERPL-MCNC: 3.2 MG/DL (ref 2.6–4.7)
POTASSIUM SERPL-SCNC: 3.9 MMOL/L (ref 3.5–5.1)
SAMPLES BEING HELD,HOLD: NORMAL
SODIUM SERPL-SCNC: 140 MMOL/L (ref 136–145)

## 2020-06-11 PROCEDURE — 74011250636 HC RX REV CODE- 250/636: Performed by: INTERNAL MEDICINE

## 2020-06-11 PROCEDURE — 80069 RENAL FUNCTION PANEL: CPT

## 2020-06-11 PROCEDURE — 97116 GAIT TRAINING THERAPY: CPT

## 2020-06-11 PROCEDURE — 36415 COLL VENOUS BLD VENIPUNCTURE: CPT

## 2020-06-11 PROCEDURE — 83735 ASSAY OF MAGNESIUM: CPT

## 2020-06-11 PROCEDURE — 74011250637 HC RX REV CODE- 250/637: Performed by: INTERNAL MEDICINE

## 2020-06-11 PROCEDURE — 97530 THERAPEUTIC ACTIVITIES: CPT

## 2020-06-11 PROCEDURE — 97535 SELF CARE MNGMENT TRAINING: CPT

## 2020-06-11 RX ORDER — SODIUM BICARBONATE 650 MG/1
650 TABLET ORAL 2 TIMES DAILY
Status: DISCONTINUED | OUTPATIENT
Start: 2020-06-11 | End: 2020-06-11 | Stop reason: HOSPADM

## 2020-06-11 RX ADMIN — VANCOMYCIN HYDROCHLORIDE 125 MG: KIT at 05:13

## 2020-06-11 RX ADMIN — VANCOMYCIN HYDROCHLORIDE 125 MG: KIT at 11:27

## 2020-06-11 RX ADMIN — SODIUM BICARBONATE 650 MG: 650 TABLET ORAL at 11:27

## 2020-06-11 RX ADMIN — BRIMONIDINE TARTRATE 1 DROP: 2 SOLUTION OPHTHALMIC at 08:49

## 2020-06-11 RX ADMIN — ASCORBIC ACID, THIAMINE MONONITRATE,RIBOFLAVIN, NIACINAMIDE, PYRIDOXINE HYDROCHLORIDE, FOLIC ACID, CYANOCOBALAMIN, BIOTIN, CALCIUM PANTOTHENATE, 1 CAPSULE: 100; 1.5; 1.7; 20; 10; 1; 6000; 150000; 5 CAPSULE, LIQUID FILLED ORAL at 08:48

## 2020-06-11 RX ADMIN — ENOXAPARIN SODIUM 30 MG: 30 INJECTION SUBCUTANEOUS at 08:48

## 2020-06-11 RX ADMIN — FAMOTIDINE 20 MG: 20 TABLET, FILM COATED ORAL at 08:48

## 2020-06-11 RX ADMIN — LEVOTHYROXINE SODIUM 100 MCG: 0.1 TABLET ORAL at 05:13

## 2020-06-11 RX ADMIN — DORZOLAMIDE HYDROCHLORIDE AND TIMOLOL MALEATE 1 DROP: 20; 5 SOLUTION/ DROPS OPHTHALMIC at 08:50

## 2020-06-11 RX ADMIN — FERROUS SULFATE TAB 325 MG (65 MG ELEMENTAL FE) 325 MG: 325 (65 FE) TAB at 08:48

## 2020-06-11 NOTE — PROGRESS NOTES
1308: spoke to [facility] about discharge plan - will pull IJ when pt is done with lunch. [facility] said  time for 230 pm would work for him. 1330: R IJ removed. Pt lying flat in bed. When pulling IJ RN noticed green drainage coming from one of the suture sites. Dr Kylah Alegre made aware. Ordered for son to put neosporin on it x2 daily and call Dr Kylah Alegre if area looks worse. Son made aware at time of discharge. 1450: I have reviewed discharge instructions with the PATIENT PARENT GUARDIAN: patient and son. The patient and son verbalized understanding. Discharge medications reviewed with patient and son and appropriate educational materials and side effects teaching were provided. Follow-up appointments reviewed. Opportunity for questions and clarification was provided. Venous access removed without difficulty. Patient's belongings gathered and sent with patient. Patient is ready for discharge.      Viridiana Caal

## 2020-06-11 NOTE — PROGRESS NOTES
Problem: Mobility Impaired (Adult and Pediatric)  Goal: *Acute Goals and Plan of Care (Insert Text)  Description: FUNCTIONAL STATUS PRIOR TO ADMISSION: Patient was modified independent using a single point cane for functional mobility. Reports she was very independent prior. Has some vision loss from glaucoma. HOME SUPPORT PRIOR TO ADMISSION: The patient lived with son and grandson to assist her as needed. Physical Therapy Goals  Revised 6/10/2020  1. Patient will move from supine to sit and sit to supine  in bed with independence within 7 day(s). 2.  Patient will transfer from bed to chair and chair to bed with independence using the least restrictive device within 7 day(s). 3.  Patient will perform sit to stand with independence within 7 day(s). 4.  Patient will ambulate with supervision/set-up for 150 feet with the least restrictive device within 7 day(s). 5.  Patient will ascend/descend 4 stairs with 1 handrail(s) with minimal assistance/contact guard assist within 7 day(s). Initiated 6/3/2020  1. Patient will move from supine to sit and sit to supine  in bed with modified independence within 7 day(s). 2.  Patient will transfer from bed to chair and chair to bed with modified independence using the least restrictive device within 7 day(s). 3.  Patient will perform sit to stand with modified independence within 7 day(s). 4.  Patient will ambulate with modified independence for 200 feet with the least restrictive device within 7 day(s). 5.  Patient will ascend/descend 4 stairs with 1 handrail(s) with modified independence within 7 day(s).     Outcome: Progressing Towards Goal  PHYSICAL THERAPY TREATMENT  Patient: Chauncey Kahn (61 y.o. female)  Date: 6/11/2020  Diagnosis: Acute on chronic renal failure (HCC) [N17.9, N18.9]   <principal problem not specified>       Precautions: Fall, Contact(C-diff)  Chart, physical therapy assessment, plan of care and goals were reviewed. ASSESSMENT  Patient continues with skilled PT services and is progressing towards goals. Patient in chair on arrival and eager to return home today. She demonstrated safety with use of cane and verbalized safety when using cane vs RW at home based on balance/strength/environment. Noted patient with mild dyspnea during activity, resolved with brief standing rest break. Reviewed pacing of activity as needed and recommended increased frequency of activity prior to duration initially. She verbalized understanding. Current Level of Function Impacting Discharge (mobility/balance): Supervision to CGA    Other factors to consider for discharge: reports supportive family that will be with her all the time         PLAN :  Patient continues to benefit from skilled intervention to address the above impairments. Continue treatment per established plan of care. to address goals. Recommendation for discharge: (in order for the patient to meet his/her long term goals)  Physical therapy at least 2 days/week in the home AND ensure assist and/or supervision for safety with mobility as needed     This discharge recommendation:  Has been made in collaboration with the attending provider and/or case management    IF patient discharges home will need the following DME: patient owns DME required for discharge       SUBJECTIVE:   Patient stated I am looking forward to going home.     OBJECTIVE DATA SUMMARY:   Critical Behavior:  Neurologic State: Alert  Orientation Level: Oriented X4  Cognition: Follows commands  Safety/Judgement: Fall prevention, Insight into deficits  Functional Mobility Training:  Bed Mobility:   Not assessed; patient in chair on arrival                  Transfers:  Sit to Stand: Stand-by assistance; Additional time(requires definte use of hands)  Stand to Sit: Stand-by assistance        Balance:  Sitting: Intact  Standing: Impaired  Standing - Static: Good  Standing - Dynamic : Fair  Ambulation/Gait Training:  Distance (ft): 60 Feet (ft)(brief standing break each 20 ft)  Assistive Device: Cane, straight  Ambulation - Level of Assistance: Contact guard assistance        Gait Abnormalities: Decreased step clearance        Base of Support: Widened     Speed/Jacki: Pace decreased (<100 feet/min)  Step Length: Left shortened;Right shortened                  Slow but steady with use cane. Therapeutic Exercises:   Instructed in repeated sit<>stand transfers with cues to use BUE as little as possible thus increasing use of BLE for strengthening. Pain Rating:  No c/o pain    Activity Tolerance:   Good    After treatment patient left in no apparent distress:   Sitting in chair, Call bell within reach and LE elevated    COMMUNICATION/COLLABORATION:   The patients plan of care was discussed with: Registered nurse.      Rachel Sher PT, DPT   Time Calculation: 23 mins

## 2020-06-11 NOTE — PROGRESS NOTES
Nephrology Progress Note  [facility]  [place] [facility] [employer]  [address], [place], [address]  Phone - [phone]         Fax - [phone]    Patient: Chauncey Kahn    YOB: 1940    Date- 6/11/2020        Admit Date: 6/1/2020     CC: Follow up for SONIDO ON CKD       IMPRESSION & PLAN:   SONIDO- suspect 2/2 volume depletion from GI losses + Xeloda-- no hydro on renal usg  - no more ivf  - follow bmp as out pt    HYPOKALEMIA  improved    Metabolic acidosis  Sodium bicarb 650 mg bid    DIARRHEA due to XELODA  c diff positive  On po vanco    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, HTN   - baseline Creatinine : 2 mg/ dl      Chronic Anemia     Esophageal Ca   Hypothyroidism   EMILY       Subjective: Interval History:   Cr stable  k improved  Acidosis +    ROS:-  No C/O of chest pain,SOB, vomiting, abdominal pain,fever,chills      Objective:   Visit Vitals  /73 (BP 1 Location: Right arm, BP Patient Position: At rest)   Pulse 64   Temp 97.7 °F (36.5 °C)   Resp 18   Ht 5' 8\" (1.727 m)   Wt 98.6 kg (217 lb 6 oz)   SpO2 100%   Breastfeeding No   BMI 33.05 kg/m²     Last 3 Recorded Weights in this Encounter    06/02/20 1445 06/04/20 0651 06/08/20 0606   Weight: 88.6 kg (195 lb 5.2 oz) 87.5 kg (192 lb 14.4 oz) 98.6 kg (217 lb 6 oz)     06/09 1901 - 06/11 0700  In: -   Out: 300 [Urine:300]  No intake or output data in the 24 hours ending 06/11/20 0931   Physical exam:   GEN:  NAD  NECK:  Supple, no thyromegaly  RESP: CTA  b/l, no  wheezing,   CVS: RRR,S1,S2   ABDO:  soft , non tender, No mass  NEURO: non focal, normal speech  EXT: Edema +nt         Chart reviewed. Pertinent Notes reviewed.      Data Review :  Recent Labs     06/11/20  0512 06/10/20  0556 06/09/20  1611 06/09/20  0523    139  --  137   K 3.9 3.7 3.4* 3.2*   * 110*  --  108   CO2 19* 22  --  22   BUN 63* 57*  --  52*   CREA 2.38* 2.41*  --  2.06* * 103*  --  118*   CA 8.2* 8.0*  --  8.2*   MG 1.8 1.7  --  2.2   PHOS 3.2 3.3  --  3.3     Recent Labs     06/09/20  0523   WBC 8.5   HGB 9.8*   HCT 29.1*   *     No results for input(s): FE, TIBC, PSAT, FERR in the last 72 hours. No results for input(s): CPK, CKNDX, TROIQ in the last 72 hours. No lab exists for component: CPKMB  Lab Results   Component Value Date/Time    Color YELLOW/STRAW 06/05/2020 03:07 AM    Appearance CLOUDY (A) 06/05/2020 03:07 AM    Specific gravity 1.013 06/05/2020 03:07 AM    pH (UA) 6.0 06/05/2020 03:07 AM    Protein TRACE (A) 06/05/2020 03:07 AM    Glucose Negative 06/05/2020 03:07 AM    Ketone Negative 06/05/2020 03:07 AM    Bilirubin NEGATIVE  03/04/2020 10:38 PM    Urobilinogen 0.2 06/05/2020 03:07 AM    Nitrites Negative 06/05/2020 03:07 AM    Leukocyte Esterase MODERATE (A) 06/05/2020 03:07 AM    Epithelial cells FEW 06/05/2020 03:07 AM    Bacteria 4+ (A) 06/05/2020 03:07 AM    WBC 20-50 06/05/2020 03:07 AM    RBC 10-20 06/05/2020 03:07 AM     Lab Results   Component Value Date/Time    Culture result: NO GROWTH 5 DAYS 03/08/2020 04:03 AM    Culture result:  03/06/2020 09:15 AM     MRSA NOT PRESENT. Apparent Staphylococus aureus (not MRSA noted). Culture result:  03/06/2020 09:15 AM         Screening of patient nares for MRSA is for surveillance purposes and, if positive, to facilitate isolation considerations in high risk settings. It is not intended for automatic decolonization interventions per se as regimens are not sufficiently effective to warrant routine use.      No results found for: SDES  Lab Results   Component Value Date/Time    Sodium,urine random 18 06/02/2020 10:29 PM    Creatinine, urine 97.00 06/02/2020 10:29 PM     Results from Hospital Encounter encounter on 06/01/20   XR ABD (KUB)    Narrative PROCEDURE: XR ABD (LARISSAB)    REASON FOR STUDY: diarhea, rule out constipation with overflow     COMPARISON: 7/16/2019    FINDINGS: Supine views the abdomen demonstrate gaseous distention of the colon  and several loops of small bowel without evidence of high-grade bowel  obstruction. Impression IMPRESSION:  Mild gaseous distention of the colon and loops of small bowel  without evidence of high-grade bowel obstruction. Medication list  reviewed  No current facility-administered medications on file prior to encounter. Current Outpatient Medications on File Prior to Encounter   Medication Sig Dispense Refill    ferrous sulfate 324 mg (65 mg iron) tablet Take 324 mg by mouth two (2) times daily (with meals).  omeprazole (PRILOSEC OTC) 20 mg tablet Take 20 mg by mouth daily.  sodium bicarbonate 650 mg tablet Take 650 mg by mouth two (2) times a day.  folic acid-vit K6-KZN F67 (FOLBEE) 2.5-25-1 mg tablet Take 1 Tab by mouth daily.  iron bisgly,ps-FA-B-C#12-succ 65 mg-65 mg -1,000 mcg (24) tab Take 1 Tab by mouth daily.  latanoprost (XALATAN) 0.005 % ophthalmic solution Administer 1 Drop to both eyes nightly.  MULTIVITS W-FE,OTHER MIN/LUT (CENTRUM SILVER ULTRA WOMEN'S PO) Take 1 Tab by mouth daily.  DORZOLAMIDE HCL/TIMOLOL MALEAT (DORZOLAMIDE-TIMOLOL OP) Apply 1 Drop to eye three (3) times daily. One drop to each eye twice daily       brimonidine (ALPHAGAN) 0.2 % ophthalmic solution Administer 1 Drop to both eyes three (3) times daily.  levothyroxine (SYNTHROID) 100 mcg tablet Take 100 mcg by mouth Daily (before breakfast). Indications: HYPOTHYROIDISM      capecitabine (XELODA) 500 mg tablet 1,500 mg two (2) times a day. X 14 days then 7 days off      acetaminophen (TYLENOL EXTRA STRENGTH) 500 mg tablet Take 1,000 mg by mouth every six (6) hours as needed. Indications: ARTHRITIC PAIN, PAIN                         Lito Rowe MD  [facility]   [email or web address]

## 2020-06-11 NOTE — PROGRESS NOTES
Problem: Falls - Risk of  Goal: *Absence of Falls  Description: Document Marisol Manning Fall Risk and appropriate interventions in the flowsheet. Outcome: Resolved/Met  Note: Fall Risk Interventions:  Mobility Interventions: Communicate number of staff needed for ambulation/transfer, Patient to call before getting OOB         Medication Interventions: Patient to call before getting OOB    Elimination Interventions: Call light in reach, Patient to call for help with toileting needs    History of Falls Interventions: Door open when patient unattended         Problem: Patient Education: Go to Patient Education Activity  Goal: Patient/Family Education  Outcome: Resolved/Met     Problem: Pressure Injury - Risk of  Goal: *Prevention of pressure injury  Description: Document Tomy Scale and appropriate interventions in the flowsheet. Outcome: Resolved/Met  Note: Pressure Injury Interventions:  Sensory Interventions: Keep linens dry and wrinkle-free, Minimize linen layers    Moisture Interventions: Internal/External urinary devices    Activity Interventions: Increase time out of bed, Pressure redistribution bed/mattress(bed type)    Mobility Interventions: HOB 30 degrees or less    Nutrition Interventions: Document food/fluid/supplement intake    Friction and Shear Interventions: HOB 30 degrees or less                Problem: Patient Education: Go to Patient Education Activity  Goal: Patient/Family Education  Outcome: Resolved/Met     Problem: Patient Education: Go to Patient Education Activity  Goal: Patient/Family Education  Outcome: Resolved/Met     Problem: Patient Education: Go to Patient Education Activity  Goal: Patient/Family Education  Outcome: Resolved/Met     Problem: Risk for Spread of Infection  Goal: Prevent transmission of infectious organism to others  Description: Prevent the transmission of infectious organisms to other patients, staff members, and visitors.   Outcome: Resolved/Met     Problem: Patient Education:  Go to Education Activity  Goal: Patient/Family Education  Outcome: Resolved/Met

## 2020-06-11 NOTE — DISCHARGE SUMMARY
Oncology Discharge Summary    Discharge Diagnoses: Acute on chronic renal failure ([facility] [N17.9, N18.9]     Problem List:  Active Problems:    Acute on chronic renal failure ([facility] (6/1/2020)      Severe protein-calorie malnutrition ([facility] (6/2/2020)      Diarrhea (6/3/2020)         Hospital Course: Jeannine Loya is a 78 y.o. admitted on 6/1/2020 for Acute on chronic renal failure ([facility] [N17.9, N18.9].  She has been on Xeloda single agent for esophageal cancer and had ablation per  Dr. Brenda Benites 2/28/20. She had started C4 of Xeloda at max dose(1500 mg BID 14/21days)  per renal function  3/2/20 but stopped 3/4/20 when she was admitted to [facility] for septicemia. She was discharged 3/12/20 and received IV antibiotics in [facility] until 3/23/20.       Pt resumed Xeloda 1500 mg BID 3/30-4/12/20.       Pt completed last 14 day cycle of Xeloda 5/24/20. She arrived to clinic 5/29/20 reporting several days of diarrhea, nausea and poor appetite. Pt was given hydration and anti-emetics in clinic on 5/29/20 and returned to clinic  for further hydration and re-evaluation. She was found to be in acute renal failure with a creatinine of 4.3 and potassium of 2.4. She was admitted and started on hydrational fluids and KCl supplements. Jeannine Loya was seen in consultation by Dr. Jj Briones from nephrology and Dr. Heriberto Anaya (and Severiano Bayley) from GI, both of whom provided invaluable assistance in her care. / Adin Martínez adjusted her IVF and helped replete her K. She was checked for C difficile and surprisingly proved to have this test be positive. She was started on oral vancomycin 125 mg po qid and her diarrhea started to improve almost immediately. She had only 1 BM yesterday, her abdominal cramps have subsided, and she has been improving daily re strength with PT. She is being discharged, reluctantly agreeable to having [place] [id number] and PT see her at her house to continue the progress she has made.      Addendum: There were issues trying to get her oral vancomycin paid for due to insurance coverage. Her son has indicated that he is willing to pay the cash price and she will be discharged today. No changes in physical exam or condition overnight. Only one soft Bm thus far. No diarrhea. She appears better today with improved tolerance of her PO. Imaging:    Chest X-Ray: 6/2/20: IMPRESSION: Right internal jugular central venous catheter appears to be in satisfactory position. No evidence of placement related complication. Low lung  volumes with bibasilar atelectasis. Abd Xray 6/3/20: IMPRESSION:  Mild gaseous distention of the colon and loops of small bowel  without evidence of high-grade bowel obstruction.       Labs:    CBC: 6/2/2020: HCT 29.7 %* (Ref range: 35.0 - 47.0 %); HGB 10.7 g/dL* (Ref range: 11.5 - 16.0 g/dL); PLATELET 664 K/uL (Ref range: 150 - 400 K/uL); RBC 3.11 M/uL* (Ref range: 3.80 - 5.20 M/uL); WBC 7.1 K/uL (Ref range: 3.6 - 11.0 K/uL)   BMP: 6/2/2020:  MG/DL* (Ref range: 6 - 20 MG/DL);  MG/DL* (Ref range: 6 - 20 MG/DL); Calcium 8.0 MG/DL* (Ref range: 8.5 - 10.1 MG/DL); Calcium 8.0 MG/DL* (Ref range: 8.5 - 10.1 MG/DL); Chloride 106 mmol/L (Ref range: 97 - 108 mmol/L); Chloride 108 mmol/L (Ref range: 97 - 108 mmol/L); CO2 19 mmol/L* (Ref range: 21 - 32 mmol/L); CO2 20 mmol/L* (Ref range: 21 - 32 mmol/L); Creatinine 4.39 MG/DL* (Ref range: 0.55 - 1.02 MG/DL); Creatinine 4.05 MG/DL* (Ref range: 0.55 - 1.02 MG/DL); Glucose 103 mg/dL* (Ref range: 65 - 100 mg/dL); Glucose 111 mg/dL* (Ref range: 65 - 100 mg/dL); Potassium 2.5 mmol/L* (Ref range: 3.5 - 5.1 mmol/L); Potassium 3.3 mmol/L* (Ref range: 3.5 - 5.1 mmol/L); Sodium 136 mmol/L (Ref range: 136 - 145 mmol/L); Sodium 137 mmol/L (Ref range: 136 - 145 mmol/L)  Coagulation: No results found for requested labs within first 48 hours of the last admission day. Liver: 6/2/2020: Albumin 2.7 g/dL* (Ref range: 3.5 - 5.0 g/dL); Alk.  phosphatase 60 U/L (Ref range: 45 - 117 U/L); Protein, total 5.7 g/dL* (Ref range: 6.4 - 8.2 g/dL)    Recent Results (from the past 24 hour(s))   RENAL FUNCTION PANEL    Collection Time: 06/11/20  5:12 AM   Result Value Ref Range    Sodium 140 136 - 145 mmol/L    Potassium 3.9 3.5 - 5.1 mmol/L    Chloride 112 (H) 97 - 108 mmol/L    CO2 19 (L) 21 - 32 mmol/L    Anion gap 9 5 - 15 mmol/L    Glucose 105 (H) 65 - 100 mg/dL    BUN 63 (H) 6 - 20 MG/DL    Creatinine 2.38 (H) 0.55 - 1.02 MG/DL    BUN/Creatinine ratio 26 (H) 12 - 20      GFR est AA 24 (L) >60 ml/min/1.73m2    GFR est non-AA 20 (L) >60 ml/min/1.73m2    Calcium 8.2 (L) 8.5 - 10.1 MG/DL    Phosphorus 3.2 2.6 - 4.7 MG/DL    Albumin 2.1 (L) 3.5 - 5.0 g/dL   MAGNESIUM    Collection Time: 06/11/20  5:12 AM   Result Value Ref Range    Magnesium 1.8 1.6 - 2.4 mg/dL   SAMPLES BEING HELD    Collection Time: 06/11/20  5:12 AM   Result Value Ref Range    SAMPLES BEING HELD GR     COMMENT        Add-on orders for these samples will be processed based on acceptable specimen integrity and analyte stability, which may vary by analyte. Discharge Medications:  Current Discharge Medication List      START taking these medications    Details   diphenoxylate-atropine (LOMOTIL) 2.5-0.025 mg per tablet Take 1 Tab by mouth four (4) times daily as needed for Diarrhea. Max Daily   Qty: 30 Tab, Refills: 3    Associated Diagnoses: Diarrhea of infectious origin      famotidine (PEPCID) 20 mg tablet Take 1 Tab by mouth Daily (before breakfast). Indications: gastroesophageal reflux disease  Qty: 30 Tab, Refills: 3      loperamide (IMODIUM) 2 mg capsule Take 1 Cap by mouth as needed for Diarrhea for up to 10 days. Qty: 30 Cap, Refills: 5      vancomycin (FIRVANQ) 50 mg/mL oral solution Take 2.5 mL by mouth every six (6) hours. Qty: 240 mL, Refills: 0         CONTINUE these medications which have NOT CHANGED    Details   omeprazole (PRILOSEC OTC) 20 mg tablet Take 20 mg by mouth daily.       folic acid-vit b6-vit b12 (FOLBEE) 2.5-25-1 mg tablet Take 1 Tab by mouth daily. latanoprost (XALATAN) 0.005 % ophthalmic solution Administer 1 Drop to both eyes nightly. DORZOLAMIDE HCL/TIMOLOL MALEAT (DORZOLAMIDE-TIMOLOL OP) Apply 1 Drop to eye three (3) times daily. One drop to each eye twice daily       brimonidine (ALPHAGAN) 0.2 % ophthalmic solution Administer 1 Drop to both eyes three (3) times daily. levothyroxine (SYNTHROID) 100 mcg tablet Take 100 mcg by mouth Daily (before breakfast). Indications: HYPOTHYROIDISM      acetaminophen (TYLENOL EXTRA STRENGTH) 500 mg tablet Take 1,000 mg by mouth every six (6) hours as needed. Indications: ARTHRITIC PAIN, PAIN         STOP taking these medications       ferrous sulfate 324 mg (65 mg iron) tablet Comments:   Reason for Stopping:         sodium bicarbonate 650 mg tablet Comments:   Reason for Stopping:         iron bisgly,ps-FA-B-C#12-succ 65 mg-65 mg -1,000 mcg (24) tab Comments:   Reason for Stopping:         MULTIVITS W-FE,OTHER MIN/LUT (CENTRUM SILVER ULTRA WOMEN'S PO) Comments:   Reason for Stopping:         capecitabine (XELODA) 500 mg tablet Comments:   Reason for Stopping:                Discharge Exam:  Visit Vitals  /73 (BP 1 Location: Right arm, BP Patient Position: At rest)   Pulse 64   Temp 97.7 °F (36.5 °C)   Resp 18   Ht 5' 8\" (1.727 m)   Wt 98.6 kg (217 lb 6 oz)   SpO2 100%   Breastfeeding No   BMI 33.05 kg/m²     General appearance: alert, cooperative, no distress, appears stated age  Head: Normocephalic, without obvious abnormality, atraumatic  Throat: Lips, mucosa, and tongue normal. Teeth and gums normal  Neck: supple, symmetrical, trachea midline, no adenopathy, thyroid: not enlarged, symmetric, no tenderness/mass/nodules and no JVD  Lungs: clear to auscultation bilaterally  Heart: regular rate and rhythm, S1, S2 normal, no murmur, click, rub or gallop  Abdomen: soft, non-tender.  Bowel sounds normal. No masses,  no organomegaly  Extremities: extremities normal, atraumatic, no cyanosis or edema  Skin: Skin color, texture, turgor normal. No rashes or lesions  Lymph nodes: Cervical, supraclavicular, and axillary nodes normal.  Neurologic: Grossly normal    Disposition: home    Patient Instructions: Activity: Activity as tolerated  Diet: please follow recommendations of our dietiican Kris  Wound Care: None needed    Follow-up with Dr. Damien Sims or Arden Mayo in a week. Follow-up tests/labs CBC CMP    Called and spoke with son  Sent scripts to [facility] [address]  > 45 minutes spent on floor coordinating discharge plans.      Signed:  Eliz Soni MD  6/11/2020  9:18 AM      [facility] MD [facility]  [address]  Phone [phone]  Fax [phone]

## 2020-06-11 NOTE — PROGRESS NOTES
Follow up for: metastatic esophageal cancer     Chart notes reviewed since last visit.     Case discussed with following: pt.  her nurse Saira Baez and .      Patient complains of the following: minimal abdominal cramping which is precipitated by eating certain foods. BM better formed, not watery but soft.      Additional concerns noted by the staff: decreased strength and endurance. Working with PT - will need cane or rolling walker which she has at home. ROS negative for 11 organ systems except as noted above     Physical Examination: VS reviewed   Constitutional Alert, cooperative, oriented. Mood and affect appropriate. Appears close to chronological age. Well nourished. Well developed. Head Normocephalic; no scars   Eyes Conjunctivae and sclerae are clear and without icterus. Pupils are round. Dysconjugate gaze   ENMT Sinuses are nontender. No oral exudates, ulcers, masses, thrush or mucositis. Oropharynx clear. Tongue normal.   Neck Supple without masses or thyromegaly. No jugular venous distension. Hematologic/Lymphatic No petechiae or purpura. No tender or palpable lymph nodes noted   Respiratory Lungs are clear to auscultation without rhonchi or wheezing. Distant BS   Cardiovascular Regular rate and rhythm of heart without murmurs, gallops or rubs. Chest / Line Site Chest is symmetric with no chest wall deformities. Abdomen Non-tender, non-distended, no masses, ascites or hepatosplenomegaly. Good bowel sounds. Musculoskeletal No tenderness or swelling, normal range of motion with generalized weakness. Extremities No visible deformities, no cyanosis, clubbing or edema. Skin No rashes, scars, or lesions suggestive of malignancy. No petechiae, purpura, or ecchymoses. No excoriations. Neurologic No sensory or motor deficits noted but not specifically tested. Psychiatric Alert and oriented. Coherent speech. Verbalizes understanding of our discussions today.      Labs reviewed - K better! Imaging:  abd films 6/3/20:Supine views the abdomen demonstrate gaseous distention of the colon  and several loops of small bowel without evidence of high-grade bowel  obstruction.     IMPRESSION:  Mild gaseous distention of the colon and loops of small bowel  without evidence of high-grade bowel obstruction.        Ultrasound retroperitoneum 6/2/20:   IMPRESSION: Innumerable bilateral renal cysts, compatible with adult polycystic kidney  disease. No hydronephrosis.          Assessment and Plan:   *) SONIDO on CKD:  - Admitted from clinic at end of day 6/1 when she presented with Cr 4.3 on 6/1. Baseline Cr ~2.0  - Suspected pre renal due to diarrhea from Xeloda therapy that was completed on 5/24/20. Diarrhea seemed quite prolonged for xeloda (capecitabine) and she has now been diagnosed with C diff diarrhea (PCR positive for DNA)  - Appreciate Nephrology's assistance  - Hydration per renal  - Renal us showed polycystic disease  -  Urinalysis unremarkable   - Cr 2.41     *)  Diarrhea, context of recent use of xeloda, C diff positive  -  changed Lomotil to prn given drop in frequency of BMs. Imodium prn also  - continue IVF per nephrology  - GI assistance appreciated, po vanc started 6/5/20. Will need to make sure she can afford this as discharge medication. Have filled out expedited preauth forms.       *)Hypokalemia, due to GI losses:  - K corrected     *) Esophageal cancer:  - sp ablation and adjuvant Xeloda that concluded 5/24     *) Chemo induced anemia:  - hgb 9.8, monitor     *) Cancer related anorexia, protein calorie malnutrition  - encouraged her to drink Ensure, try magic cup and jello  -- asked dietician to see again for post discharge recommendations for PO

## 2020-06-11 NOTE — PROGRESS NOTES
Problem: Self Care Deficits Care Plan (Adult)  Goal: *Acute Goals and Plan of Care (Insert Text)  Description:     FUNCTIONAL STATUS PRIOR TO ADMISSION: Patient was independent to mod I for ADLs and ambulating with a cane. HOME SUPPORT: The patient lived with her 2 grandsons who work in the afternoon. Occupational Therapy Goals  Initiated 6/3/2020  1. Patient will perform grooming standing at sink with supervision/set-up within 7 day(s). 2.  Patient will perform sponge bathing with supervision/set-up within 7 day(s). 3.  Patient will perform lower body dressing with supervision/set-up within 7 day(s). 4.  Patient will perform toilet transfers with supervision/set-up within 7 day(s). 5.  Patient will perform all aspects of toileting with supervision/set-up within 7 day(s). Outcome: Progressing Towards Goal     OCCUPATIONAL THERAPY TREATMENT  Patient: Vinnie Holloway (12 y.o. female)  Date: 6/11/2020  Diagnosis: Acute on chronic renal failure (HCC) [N17.9, N18.9]   <principal problem not specified>       Precautions: Fall, Contact(C-diff)  Chart, occupational therapy assessment, plan of care, and goals were reviewed. ASSESSMENT  Patient continues with skilled OT services and is progressing towards goals. Pt noted with progressive ADL independence this date, completing seated aspects with S/U and completing standing aspects with standby assist at [address] [place] [address]. Pt reports good family support at home environment, with plans to discharge home this afternoon with all DME/AE needs fulfilled. OT to continue to follow in the event of delay in discharge. Current Level of Function Impacting Discharge (ADLs): Standby assist at     Other factors to consider for discharge: none         PLAN :  Patient continues to benefit from skilled intervention to address the above impairments. Continue treatment per established plan of care. to address goals.     Recommend with staff: OOB meals, Active ADL engagement, assist x1 at RW to/from bathroom    Recommend next OT session: higher level RW mobility/balance challenges    Recommendation for discharge: (in order for the patient to meet his/her long term goals)  No skilled occupational therapy/ follow up rehabilitation needs identified at this time. This discharge recommendation:  Has been made in collaboration with the attending provider and/or case management    IF patient discharges home will need the following DME: patient owns DME required for discharge       SUBJECTIVE:   Patient stated my grandsons live with me but my daughter will be there to help out when they aren't.     OBJECTIVE DATA SUMMARY:   Cognitive/Behavioral Status:  Neurologic State: Alert  Orientation Level: Oriented X4  Cognition: Follows commands  Perception: Appears intact  Perseveration: No perseveration noted  Safety/Judgement: Awareness of environment; Insight into deficits    Functional Mobility and Transfers for ADLs:  Transfers:  Sit to Stand: Stand-by assistance; Additional time(requires definte use of hands)    Balance:  Sitting: Intact  Standing: Impaired  Standing - Static: Good  Standing - Dynamic : Fair    ADL Intervention:  Upper Body Dressing Assistance  Pullover Shirt: Set-up    Lower Body Dressing Assistance  Dressing Assistance: Stand-by assistance  Underpants: Stand-by assistance  Pants With Elastic Waist: Stand-by assistance  Socks: Modified independent(extended time)  Antiembolitic Stockings: Modified independent(extended time)  Leg Crossed Method Used: Yes  Position Performed: Seated edge of bed;Standing    Cognitive Retraining  Safety/Judgement: Awareness of environment; Insight into deficits    Pt educated on safe transfer techniques, with specific emphasis on proper hand placement to push up from seated surface rather than attempt to pull self up, fully positioning self in-front of desired seated location, feeling chair on back of legs and reaching back with 1-2 UE to slowly lower self to seated position. Patient instructed and indicated understanding the benefits of maintaining activity tolerance, functional mobility, and independence with self care tasks during acute stay  to ensure safe return home and to baseline. Encouraged patient to increase frequency and duration OOB, be out of bed for all meals, perform daily ADLs (as approved by RN/MD regarding bathing etc), and performing functional mobility to/from bathroom. Pain:  No c/o pain    Activity Tolerance:   Good and requires rest breaks  Please refer to the flowsheet for vital signs taken during this treatment. After treatment patient left in no apparent distress:   Sitting in chair and Call bell within reach    COMMUNICATION/COLLABORATION:   The patients plan of care was discussed with: Physical therapist and Registered nurse.      Chavo Rasmussen OT  Time Calculation: 23 mins

## 2020-06-11 NOTE — PROGRESS NOTES
Oncology End of Shift Note      Bedside shift change report given to Papo Villeda (incoming nurse) by Raffi Kirkpatrick (outgoing nurse) on [facility]. Report included the following information SBAR, Kardex and MAR. Shift Summary: \  Patient has not complained of any pain during my shift. Patient has been up with the assistance of one to the bathroom. Patient uses her own CIPAP at nights. Triple Lumen has been flushed, all ports patent and each has blood return. All scheduled medications have been given, see MAR. Patient teaching and routine rounding has been done. Once Vancomycin oral medication prescription is approved, patient will be discharged 06/11/20. Patient was resting comfortably throughout my shift. Issues for Physician to Address:       Patient on Cardiac Monitoring?     [] Yes  [x] No    Rhythm:          Shift Events        Raffi Kirkpatrick

## 2020-06-11 NOTE — INTERDISCIPLINARY ROUNDS
Oncology Interdisciplinary rounds were held today to discuss patient plan of care and outcomes. The following members were present: Nursing, Physician, Case Management, Pharmacy, and PT/OT    Actual Length of Stay: 10    DRG GLOS: 4.3    Expected Length of Stay: 4d 7h                       Plan            Discharge    Home with [facility], RN, PT,OT.    Date: 6/11/2020  Transport: Family

## 2020-06-12 ENCOUNTER — PATIENT OUTREACH (OUTPATIENT)
Dept: INTERNAL MEDICINE CLINIC | Age: 80
End: 2020-06-12

## 2020-06-12 NOTE — PROGRESS NOTES
06/15/20 Multiple attempts made to reach patient - voice messages left. Unable to reach patient for hospital f/u. Episode resolved at this time. AR 
 
 
 
 
 
Per chart review: START taking these medications  
  Details diphenoxylate-atropine (LOMOTIL) 2.5-0.025 mg per tablet Take 1 Tab by mouth four (4) times daily as needed for Diarrhea. Max Daily  
Qty: 30 Tab, Refills: 3  
  Associated Diagnoses: Diarrhea of infectious origin  
   
famotidine (PEPCID) 20 mg tablet Take 1 Tab by mouth Daily (before breakfast). Indications: gastroesophageal reflux disease Qty: 30 Tab, Refills: 3  
   
loperamide (IMODIUM) 2 mg capsule Take 1 Cap by mouth as needed for Diarrhea for up to 10 days. Qty: 30 Cap, Refills: 5  
   
vancomycin (FIRVANQ) 50 mg/mL oral solution Take 2.5 mL by mouth every six (6) hours.  
Qty: 240 mL, Refills: 0  
   
   
 
STOP taking these medications  
   
  ferrous sulfate 324 mg (65 mg iron) tablet Comments:  
Reason for Stopping:   
     
  sodium bicarbonate 650 mg tablet Comments:  
Reason for Stopping:   
     
  iron bisgly,ps-FA-B-C#12-succ 65 mg-65 mg -1,000 mcg (24) tab Comments:  
Reason for Stopping:   
     
  MULTIVITS W-FE,OTHER MIN/LUT (CENTRUM SILVER ULTRA WOMEN'S PO) Comments:  
Reason for Stopping:   
     
  capecitabine (XELODA) 500 mg tablet Comments:  
Reason for Stopping:

## 2020-08-25 ENCOUNTER — HOSPITAL ENCOUNTER (OUTPATIENT)
Dept: PET IMAGING | Age: 80
Discharge: HOME | End: 2020-08-25
Attending: INTERNAL MEDICINE
Payer: MEDICARE

## 2020-08-25 DIAGNOSIS — R63.0 ANOREXIA: ICD-10-CM

## 2020-08-25 DIAGNOSIS — D64.9 ANEMIA, UNSPECIFIED: ICD-10-CM

## 2020-08-25 DIAGNOSIS — N18.30 CHRONIC RENAL DISEASE, STAGE III (HCC): ICD-10-CM

## 2020-08-25 DIAGNOSIS — C15.3 MALIGNANT NEOPLASM OF UPPER THIRD OF ESOPHAGUS (HCC): ICD-10-CM

## 2020-08-25 DIAGNOSIS — N18.9 ANEMIA IN CHRONIC RENAL DISEASE: ICD-10-CM

## 2020-08-25 DIAGNOSIS — D63.1 ANEMIA IN CHRONIC RENAL DISEASE: ICD-10-CM

## 2020-08-25 DIAGNOSIS — R53.83 FATIGUE: ICD-10-CM

## 2020-08-25 DIAGNOSIS — Z51.11 ENCOUNTER FOR ANTINEOPLASTIC CHEMOTHERAPY: ICD-10-CM

## 2020-08-25 DIAGNOSIS — R19.7 DIARRHEA: ICD-10-CM

## 2020-08-25 PROCEDURE — A9552 F18 FDG: HCPCS

## 2020-08-25 RX ORDER — SODIUM CHLORIDE 0.9 % (FLUSH) 0.9 %
10 SYRINGE (ML) INJECTION
Status: COMPLETED | OUTPATIENT
Start: 2020-08-25 | End: 2020-08-25

## 2020-08-25 RX ADMIN — Medication 10 ML: at 08:45

## 2020-12-11 NOTE — DISCHARGE INSTRUCTIONS
[address] [facility] Santiago MD  [phone]      December 11, 2020     Earlene Diallo  YOB: 1940    ENDOSCOPY DISCHARGE INSTRUCTIONS    If there is redness at IV site you should apply warm compress to area. If redness or soreness persist contact Dr. Duran Santiago' or your primary care doctor. Gaseous discomfort may develop, but walking, belching will help relieve this. You may not operate a vehicle for 12 hours  You may not operate machinery or dangerous appliances for rest of today  You may not drink alcoholic beverages for 12 hours  Avoid making any critical decisions for 24 hours    DIET:  You may resume your normal diet, but some patients find that heavy or large meals may lead to indigestion or vomiting. I suggest a light meal as first food intake. MEDICATIONS:  The use of some over-the-counter pain medication may lead to bleeding after biopsies or other procedures you may have had done. Tylenol (also called acetaminophen) is safe to take and will not lead to bleeding. Based on the procedure you had today you may safely take aspirin or aspirin-like products for the next ten (10) days. ACTIVITY:  You may resume your normal household activities, but it is recommended that you spend the remainder of the day resting -  avoid any strenuous activity. CALL DR. Ed Mckeon' OFFICE IF:  Increasing pain, nausea, vomiting  Abdominal distension (swelling)  Significant new or increased bleeding (oral or rectal)  Fever/Chills  Chest pain/shortness of breath                   Additional instructions:   No aspirin 10 days. We found that there is still a tumor in the esophagus but it is not blocking the esophagus. We took biopsies today and I'll contact you with those results in about a week. Eat soft foods, no hard bread or hard meat, drink plenty of fluids with meals and especially with medication.      It was an honor to be your doctor today. Please let me or my office staff know if you have any feedback about today's procedure.     Charlie Torres MD

## 2020-12-11 NOTE — INTERVAL H&P NOTE
Pre-Endoscopy H&P Update  Chief complaint/HPI/ROS:  The indication for the procedure, the patient's history and the patient's current medications are reviewed prior to the procedure and that data is reported on the H&P to which this document is attached. Any significant complaints with regard to organ systems will be noted, and if not mentioned then a review of systems is not contributory.   Past Medical History:   Diagnosis Date    Abnormal x-ray of abdomen 5/22/2019    Bas showed food and irregular stricture above ge junction in addition to large hiatal hernia  5.20.2019    Adenocarcinoma of esophagus ([facility] [address] 5/30/2019    chemotherapy    Anemia     Arthritis     C. difficile enteritis 06/2020    Chronic kidney disease     Stage II followed by Dr Bradford Nelson Nephrology     Diverticulosis     Esophageal dysphagia 5/22/2019    H/O colonoscopy with polypectomy     benign    History of colon polyps 6/1/2018    2013 tubular adenoma     Hypertension     Ill-defined condition     pulmonary hypertension    Other ill-defined conditions(799.89)     glaucoma and cataracts    Sleep apnea     CPAP compliant, as stated 5/20/2019    Thromboembolus ([facility] [address] 2015    Right  leg , spontaneous    Thyroid disease     hypothyroidism      Past Surgical History:   Procedure Laterality Date    ANAL PRESSURE RECORD  6/6/2019    COLONOSCOPY N/A 6/1/2018    COLON[facility]COPY performed [facility] Suraj Pabon [facility]  6/1/2018         HX CATARACT REMOVAL Bilateral 2017    HX HEENT  1984    thyroid biopsy-benign    HX HYSTERECTOMY      IR INSERT NON TUNL CVC OVER 5 YRS  3/8/2020    IR INSERT TUNL CVC W PORT OVER 5 YEARS  7/19/2019    IR REMOVE TUNL CVAD W/O PORT / PUMP  3/8/2020    UPPER GI ENDOSCOPY,BALL DIL,30MM  5/22/2019         UPPER GI ENDOSCOPY,BALL DIL,30MM  5/30/2019         UPPER GI ENDOSCOPY,BIOPSY  5/22/2019         UPPER GI ENDOSCOPY,BIOPSY  5/30/2019         UPPER GI ENDOSCOPY,BIOPSY  10/17/2019          Social   Social History     Tobacco Use    Smoking status: Never Smoker    Smokeless tobacco: Never Used   Substance Use Topics    Alcohol use: Not Currently     Comment: in her 19's       No family history on file. No Known Allergies   Prior to Admission Medications   Prescriptions Last Dose Informant Patient Reported? Taking? DORZOLAMIDE HCL/TIMOLOL MALEAT (DORZOLAMIDE-TIMOLOL OP) 12/10/2020 at Unknown time  Yes Yes   Sig: Apply 1 Drop to eye three (3) times daily. One drop to each eye twice daily    acetaminophen (TYLENOL EXTRA STRENGTH) 500 mg tablet 11/11/2020 at Unknown time  Yes Yes   Sig: Take 1,000 mg by mouth every six (6) hours as needed. Indications: ARTHRITIC PAIN, PAIN   brimonidine (ALPHAGAN) 0.2 % ophthalmic solution 12/10/2020 at Unknown time  Yes Yes   Sig: Administer 1 Drop to both eyes three (3) times daily. diphenoxylate-atropine (LOMOTIL) 2.5-0.025 mg per tablet Unknown at Unknown time  No No   Sig: Take 1 Tab by mouth four (4) times daily as needed for Diarrhea. Max Daily Amount: 4 Tabs. famotidine (PEPCID) 20 mg tablet 12/10/2020 at Unknown time  No Yes   Sig: Take 1 Tab by mouth Daily (before breakfast). Indications: gastroesophageal reflux disease   folic acid-vit I6-ILT O24 (FOLBEE) 2.5-25-1 mg tablet 12/10/2020 at Unknown time  Yes Yes   Sig: Take 1 Tab by mouth daily. latanoprost (XALATAN) 0.005 % ophthalmic solution 12/11/2020 at Unknown time  Yes Yes   Sig: Administer 1 Drop to both eyes nightly. levothyroxine (SYNTHROID) 100 mcg tablet 12/11/2020 at Unknown time  Yes Yes   Sig: Take 100 mcg by mouth Daily (before breakfast). Indications: HYPOTHYROIDISM   losartan (COZAAR) 100 mg tablet 12/11/2020 at Unknown time  Yes Yes   Sig: Take 100 mg by mouth daily. Indications: high blood pressure   magnesium oxide (MAG-OX) 400 mg tablet 12/10/2020 at Unknown time  Yes Yes   Sig: Take 400 mg by mouth two (2) times a day.    omeprazole (PRILOSEC OTC) 20 mg tablet Not Taking at Unknown time  Yes No   Sig: Take 20 mg by mouth daily. vancomycin (FIRVANQ) 50 mg/mL oral solution Not Taking at Unknown time  No No   Sig: Take 2.5 mL by mouth every six (6) hours. Patient taking differently: Take 125 mg by mouth every six (6) hours. Indications: diarrhea from an infection with Clostridium difficile bacteria, prescribed in June 2020 for cdif      Facility-Administered Medications: None       PHYSICAL EXAM:  The patient is examined immediately prior to the procedure. Visit Vitals  BP (!) 131/97   Pulse 74   Temp 100 °F (37.8 °C)   Resp 29   Ht 5' 8\" (1.727 m)   Wt 90.9 kg (200 lb 6.4 oz)   SpO2 98%   Breastfeeding No   BMI 30.47 kg/m²     Gen: Appears comfortable, no distress. Pulm: comfortable respirations with no abnormal audible breath sounds  HEART: well perfused, no abnormal audible heart sounds  GI: abdomen flat. PLAN:  Informed consent discussion held, patient afforded an opportunity to ask questions and all questions answered. After being advised of the risks, benefits, and alternatives, the patient requested that we proceed and indicated so on a written consent form. Will proceed with procedure as planned.   Dyanna Severe, MD

## 2020-12-11 NOTE — ANESTHESIA PREPROCEDURE EVALUATION
Relevant Problems No relevant active problems Anesthetic History No history of anesthetic complications Review of Systems / Medical History Patient summary reviewed, nursing notes reviewed and pertinent labs reviewed Pulmonary Sleep apnea: CPAP Neuro/Psych Within defined limits Cardiovascular Hypertension: well controlled Exercise tolerance: >4 METS 
  
GI/Hepatic/Renal 
Within defined limits Endo/Other Hypothyroidism: well controlled Morbid obesity, arthritis and cancer Other Findings Comments: Dysphagia Esophageal stent CA esophagus Physical Exam 
 
Airway Mallampati: II 
TM Distance: > 6 cm Neck ROM: normal range of motion Mouth opening: Normal 
 
 Cardiovascular Regular rate and rhythm,  S1 and S2 normal,  no murmur, click, rub, or gallop Dental 
 
Dentition: Full lower dentures and Full upper dentures Pulmonary Breath sounds clear to auscultation Abdominal 
GI exam deferred Other Findings Anesthetic Plan ASA: 3 Anesthesia type: MAC Induction: Intravenous Anesthetic plan and risks discussed with: Patient

## 2020-12-11 NOTE — PROCEDURES
[address]  [phone]      2020    Esophagogastroduodenoscopy (EGD) Procedure Note  Vanessa Bhandari  : 1940  [employer] Medical Record Number: [id number]      Indications:    Established history of esophageal cancer, dysphagia. Referring Physician:  Geovany Miller MD  Anesthesia/Sedation:  Conscious sedation/deep sedation/monitored anesthesia -- see notes. Endoscopist:  Dr. Hillary Go  Complications:  None  Estimated Blood Loss:  None    Permit:  The indications, risks, benefits and alternatives were reviewed with the patient or their decision maker who was provided an opportunity to ask questions and all questions were answered. The specific risks of esophagogastroduodenoscopy with conscious sedation were reviewed, including but not limited to anesthetic complication, bleeding, adverse drug reaction, missed lesion, infection, IV site reactions, and intestinal perforation which would lead to the need for surgical repair. Alternatives to EGD including radiographic imaging, observation without testing, or laboratory testing were reviewed as well as the limitations of those alternatives discussed. After considering the options and having all their questions answered, the patient or their decision maker provided both verbal and written consent to proceed. Procedure in Detail:  After obtaining informed consent, positioning of the patient in the left lateral decubitus position, and conduction of a pre-procedure pause or \"time out\" the endoscope was introduced into the mouth and advanced to the duodenum. A careful inspection was made, and findings or interventions are described below. Findings:   Esophagus: There is an irregular friable circumferential mass lesion from 23 to 29 cm in the esophagus. The residual lumen in this segment is significant.   Biopsies and dilation were not performed given the risk for bleeding from this finding. There is a remarkable sized hiatus hernia with what I would estimate is half the stomach above the diaprhagm. Stomach: Gastric mucosa is erythematous and cobblestoned. Cold forceps biopsies obtained for histology  Duodenum/jejunum: normal      Specimens: See above    Impression: Esophageal carcinoma, gastritis, hiatal hernia. Recommendations:  -Await pathology.  -Soft foods and take food and medication with plenty of fluids.  -Follow up with her oncologist and surgeon as directed      Thank you for entrusting me with this patient's care. Please do not hesitate to contact me with any questions or if I can be of assistance with any of your other patients' GI needs. Signed By: Reymundo Cisneros MD                        December 11, 2020     Surgical assistant none. Implants none unless specified.

## 2020-12-11 NOTE — PERIOP NOTES
1409  Anesthesia staff at patient's bedside administering anesthesia and monitoring patients vital signs throughout procedure. See anesthesia note. Post procedure, report received from Mina VIZCAINO. 0  Endoscope was pre-cleaned at bedside immediately following procedure by [employer], Montgomery Krabbe. 1422  Patient tolerated procedure. Abdomen soft and patient arousable and voices no complaints. Patient transported to endoscopy recovery area. Report given to post procedure RNKaylen.

## 2020-12-11 NOTE — PERIOP NOTES
Endoscopy discharge instructions have been reviewed and given to patient. The patient verbalized understanding and acceptance of instructions. Dr. Zack Ramirez discussed with son procedure findings and next steps.

## 2020-12-11 NOTE — PROGRESS NOTES
Micheline Ferrell  1940  [id number]    Situation:  Verbal report received from: Charo Sanchez RN   Procedure: Procedure(s):  ESOPHAGOGASTRODUODENOSCOPY (EGD)  ESOPHAGOGASTRODUODENAL (EGD) BIOPSY    Background:    Preoperative diagnosis: DYSPHAGIA  Postoperative diagnosis: esophageal mass, hiatal hernia, gastritis. :  Dr. Ki Bartlett   Assistant(s): Endoscopy Technician-1: Christine Cruz  Endoscopy RN-1: Kwadwo Pemberton RN    Specimens:   ID Type Source Tests Collected by Time Destination   1 : antrum biopsy Preservative   Claire Brunner MD 12/11/2020 1416 Pathology     H. Pylori  no    Assessment:      Anesthesia gave intra-procedure sedation and medications, see anesthesia flow sheet no    Intravenous fluids: NS@ KVO     Vital signs stable     Abdominal assessment: round and soft     Recommendation:  Discharge patient per MD order.   Return to floor  Family or Friend   Permission to share finding with family or friend yes

## 2020-12-15 NOTE — ANESTHESIA POSTPROCEDURE EVALUATION
Procedure(s): ESOPHAGOGASTRODUODENOSCOPY (EGD) ESOPHAGOGASTRODUODENAL (EGD) BIOPSY. MAC Anesthesia Post Evaluation Multimodal analgesia: multimodal analgesia used between 6 hours prior to anesthesia start to PACU discharge Patient location during evaluation: PACU Patient participation: complete - patient participated Level of consciousness: awake and alert Pain management: adequate Airway patency: patent Anesthetic complications: no 
Cardiovascular status: acceptable Respiratory status: acceptable Hydration status: acceptable Post anesthesia nausea and vomiting:  none INITIAL Post-op Vital signs:  
Vitals Value Taken Time /83 12/11/2020  2:56 PM  
Temp 38.1 °C (100.5 °F) 12/11/2020  2:27 PM  
Pulse 75 12/11/2020  2:56 PM  
Resp 25 12/11/2020  2:56 PM  
SpO2 99 % 12/11/2020  2:56 PM

## 2021-01-01 ENCOUNTER — TRANSCRIBE ORDER (OUTPATIENT)
Dept: SCHEDULING | Age: 81
End: 2021-01-01

## 2021-01-01 ENCOUNTER — HOSPITAL ENCOUNTER (INPATIENT)
Age: 81
LOS: 2 days | Discharge: HOME HEALTH | DRG: 375 | End: 2021-09-05
Attending: SURGERY | Admitting: SURGERY
Payer: MEDICARE

## 2021-01-01 ENCOUNTER — HOME CARE VISIT (OUTPATIENT)
Dept: SCHEDULING | Facility: HOME HEALTH | Age: 81
End: 2021-01-01
Payer: MEDICARE

## 2021-01-01 ENCOUNTER — OFFICE VISIT (OUTPATIENT)
Dept: SURGERY | Age: 81
End: 2021-01-01
Payer: MEDICARE

## 2021-01-01 ENCOUNTER — ANESTHESIA EVENT (OUTPATIENT)
Dept: SURGERY | Age: 81
DRG: 375 | End: 2021-01-01
Payer: MEDICARE

## 2021-01-01 ENCOUNTER — HOME CARE VISIT (OUTPATIENT)
Dept: HOME HEALTH SERVICES | Facility: HOME HEALTH | Age: 81
End: 2021-01-01
Payer: MEDICARE

## 2021-01-01 ENCOUNTER — HOSPITAL ENCOUNTER (OUTPATIENT)
Dept: PET IMAGING | Age: 81
Discharge: HOME | End: 2021-05-11
Attending: INTERNAL MEDICINE
Payer: MEDICARE

## 2021-01-01 ENCOUNTER — APPOINTMENT (OUTPATIENT)
Dept: MRI IMAGING | Age: 81
DRG: 640 | End: 2021-01-01
Attending: INTERNAL MEDICINE
Payer: MEDICARE

## 2021-01-01 ENCOUNTER — APPOINTMENT (OUTPATIENT)
Dept: GENERAL RADIOLOGY | Age: 81
End: 2021-01-01
Attending: SURGERY
Payer: MEDICARE

## 2021-01-01 ENCOUNTER — PATIENT OUTREACH (OUTPATIENT)
Dept: CASE MANAGEMENT | Age: 81
End: 2021-01-01

## 2021-01-01 ENCOUNTER — ANESTHESIA (OUTPATIENT)
Dept: SURGERY | Age: 81
DRG: 375 | End: 2021-01-01
Payer: MEDICARE

## 2021-01-01 ENCOUNTER — HOSPITAL ENCOUNTER (OUTPATIENT)
Dept: NON INVASIVE DIAGNOSTICS | Age: 81
Discharge: HOME | End: 2021-05-28
Attending: INTERNAL MEDICINE
Payer: MEDICARE

## 2021-01-01 ENCOUNTER — HOSPITAL ENCOUNTER (OUTPATIENT)
Dept: PET IMAGING | Age: 81
Discharge: HOME | End: 2021-01-25
Attending: INTERNAL MEDICINE
Payer: MEDICARE

## 2021-01-01 ENCOUNTER — HOME HEALTH ADMISSION (OUTPATIENT)
Dept: HOME HEALTH SERVICES | Facility: HOME HEALTH | Age: 81
End: 2021-01-01
Payer: MEDICARE

## 2021-01-01 ENCOUNTER — TELEPHONE (OUTPATIENT)
Dept: SURGERY | Age: 81
End: 2021-01-01

## 2021-01-01 ENCOUNTER — ANESTHESIA (OUTPATIENT)
Dept: SURGERY | Age: 81
End: 2021-01-01
Payer: MEDICARE

## 2021-01-01 ENCOUNTER — APPOINTMENT (OUTPATIENT)
Dept: GENERAL RADIOLOGY | Age: 81
DRG: 640 | End: 2021-01-01
Attending: STUDENT IN AN ORGANIZED HEALTH CARE EDUCATION/TRAINING PROGRAM
Payer: MEDICARE

## 2021-01-01 ENCOUNTER — HOSPITAL ENCOUNTER (INPATIENT)
Age: 81
LOS: 4 days | Discharge: HOME HEALTH | DRG: 640 | End: 2021-09-19
Attending: EMERGENCY MEDICINE | Admitting: INTERNAL MEDICINE
Payer: MEDICARE

## 2021-01-01 ENCOUNTER — HOSPITAL ENCOUNTER (OUTPATIENT)
Dept: INTERVENTIONAL RADIOLOGY/VASCULAR | Age: 81
Discharge: HOME | End: 2021-08-20
Attending: SPECIALIST

## 2021-01-01 ENCOUNTER — APPOINTMENT (OUTPATIENT)
Dept: CT IMAGING | Age: 81
DRG: 640 | End: 2021-01-01
Attending: EMERGENCY MEDICINE
Payer: MEDICARE

## 2021-01-01 ENCOUNTER — APPOINTMENT (OUTPATIENT)
Dept: GENERAL RADIOLOGY | Age: 81
DRG: 640 | End: 2021-01-01
Attending: EMERGENCY MEDICINE
Payer: MEDICARE

## 2021-01-01 ENCOUNTER — ANESTHESIA EVENT (OUTPATIENT)
Dept: SURGERY | Age: 81
End: 2021-01-01
Payer: MEDICARE

## 2021-01-01 ENCOUNTER — APPOINTMENT (OUTPATIENT)
Dept: NON INVASIVE DIAGNOSTICS | Age: 81
DRG: 640 | End: 2021-01-01
Attending: INTERNAL MEDICINE
Payer: MEDICARE

## 2021-01-01 ENCOUNTER — HOSPITAL ENCOUNTER (OUTPATIENT)
Age: 81
Setting detail: OUTPATIENT SURGERY
Discharge: HOME | End: 2021-05-26
Attending: SURGERY | Admitting: SURGERY
Payer: MEDICARE

## 2021-01-01 VITALS
SYSTOLIC BLOOD PRESSURE: 110 MMHG | OXYGEN SATURATION: 98 % | TEMPERATURE: 98 F | HEART RATE: 70 BPM | DIASTOLIC BLOOD PRESSURE: 60 MMHG

## 2021-01-01 VITALS
RESPIRATION RATE: 16 BRPM | HEART RATE: 74 BPM | SYSTOLIC BLOOD PRESSURE: 110 MMHG | TEMPERATURE: 98.7 F | DIASTOLIC BLOOD PRESSURE: 80 MMHG | OXYGEN SATURATION: 93 %

## 2021-01-01 VITALS
RESPIRATION RATE: 18 BRPM | HEART RATE: 74 BPM | DIASTOLIC BLOOD PRESSURE: 69 MMHG | SYSTOLIC BLOOD PRESSURE: 118 MMHG | TEMPERATURE: 97.8 F | OXYGEN SATURATION: 97 %

## 2021-01-01 VITALS
SYSTOLIC BLOOD PRESSURE: 138 MMHG | HEART RATE: 81 BPM | DIASTOLIC BLOOD PRESSURE: 92 MMHG | OXYGEN SATURATION: 93 % | TEMPERATURE: 98.3 F

## 2021-01-01 VITALS
HEART RATE: 63 BPM | RESPIRATION RATE: 21 BRPM | BODY MASS INDEX: 29.4 KG/M2 | DIASTOLIC BLOOD PRESSURE: 96 MMHG | SYSTOLIC BLOOD PRESSURE: 159 MMHG | OXYGEN SATURATION: 100 % | TEMPERATURE: 97.1 F | WEIGHT: 194 LBS | HEIGHT: 68 IN

## 2021-01-01 VITALS
BODY MASS INDEX: 29.4 KG/M2 | HEIGHT: 68 IN | WEIGHT: 194 LBS | OXYGEN SATURATION: 97 % | WEIGHT: 194 LBS | HEIGHT: 68 IN | HEART RATE: 66 BPM | TEMPERATURE: 97.3 F | DIASTOLIC BLOOD PRESSURE: 86 MMHG | SYSTOLIC BLOOD PRESSURE: 156 MMHG | BODY MASS INDEX: 29.4 KG/M2 | SYSTOLIC BLOOD PRESSURE: 159 MMHG | RESPIRATION RATE: 16 BRPM | DIASTOLIC BLOOD PRESSURE: 89 MMHG

## 2021-01-01 VITALS
TEMPERATURE: 96.1 F | RESPIRATION RATE: 16 BRPM | SYSTOLIC BLOOD PRESSURE: 132 MMHG | OXYGEN SATURATION: 100 % | DIASTOLIC BLOOD PRESSURE: 88 MMHG | HEART RATE: 88 BPM

## 2021-01-01 VITALS
SYSTOLIC BLOOD PRESSURE: 102 MMHG | TEMPERATURE: 96.3 F | HEART RATE: 45 BPM | BODY MASS INDEX: 29.38 KG/M2 | WEIGHT: 193.2 LBS | DIASTOLIC BLOOD PRESSURE: 58 MMHG | OXYGEN SATURATION: 99 %

## 2021-01-01 VITALS
HEART RATE: 84 BPM | DIASTOLIC BLOOD PRESSURE: 76 MMHG | SYSTOLIC BLOOD PRESSURE: 118 MMHG | OXYGEN SATURATION: 97 % | TEMPERATURE: 97.8 F | RESPIRATION RATE: 18 BRPM

## 2021-01-01 VITALS
OXYGEN SATURATION: 93 % | TEMPERATURE: 97.9 F | HEART RATE: 80 BPM | DIASTOLIC BLOOD PRESSURE: 86 MMHG | SYSTOLIC BLOOD PRESSURE: 132 MMHG

## 2021-01-01 VITALS
HEART RATE: 66 BPM | OXYGEN SATURATION: 95 % | BODY MASS INDEX: 25.15 KG/M2 | DIASTOLIC BLOOD PRESSURE: 91 MMHG | TEMPERATURE: 96.6 F | WEIGHT: 165.4 LBS | SYSTOLIC BLOOD PRESSURE: 148 MMHG

## 2021-01-01 VITALS
OXYGEN SATURATION: 99 % | WEIGHT: 165.34 LBS | SYSTOLIC BLOOD PRESSURE: 141 MMHG | RESPIRATION RATE: 20 BRPM | DIASTOLIC BLOOD PRESSURE: 101 MMHG | HEART RATE: 88 BPM | HEIGHT: 68 IN | TEMPERATURE: 97.4 F | BODY MASS INDEX: 25.06 KG/M2

## 2021-01-01 VITALS
TEMPERATURE: 99 F | SYSTOLIC BLOOD PRESSURE: 112 MMHG | OXYGEN SATURATION: 93 % | HEART RATE: 71 BPM | DIASTOLIC BLOOD PRESSURE: 64 MMHG

## 2021-01-01 VITALS
DIASTOLIC BLOOD PRESSURE: 76 MMHG | OXYGEN SATURATION: 97 % | SYSTOLIC BLOOD PRESSURE: 100 MMHG | RESPIRATION RATE: 18 BRPM | HEART RATE: 81 BPM | TEMPERATURE: 97.9 F

## 2021-01-01 VITALS
DIASTOLIC BLOOD PRESSURE: 99 MMHG | BODY MASS INDEX: 25.63 KG/M2 | TEMPERATURE: 97.8 F | OXYGEN SATURATION: 99 % | RESPIRATION RATE: 20 BRPM | SYSTOLIC BLOOD PRESSURE: 145 MMHG | HEART RATE: 80 BPM | WEIGHT: 169.09 LBS | HEIGHT: 68 IN

## 2021-01-01 VITALS — HEIGHT: 68 IN | BODY MASS INDEX: 31.07 KG/M2 | WEIGHT: 205 LBS

## 2021-01-01 VITALS
OXYGEN SATURATION: 97 % | DIASTOLIC BLOOD PRESSURE: 80 MMHG | TEMPERATURE: 98 F | HEART RATE: 76 BPM | RESPIRATION RATE: 18 BRPM | SYSTOLIC BLOOD PRESSURE: 128 MMHG

## 2021-01-01 VITALS
OXYGEN SATURATION: 92 % | RESPIRATION RATE: 18 BRPM | DIASTOLIC BLOOD PRESSURE: 80 MMHG | DIASTOLIC BLOOD PRESSURE: 80 MMHG | RESPIRATION RATE: 20 BRPM | TEMPERATURE: 98.5 F | HEART RATE: 66 BPM | TEMPERATURE: 97.5 F | SYSTOLIC BLOOD PRESSURE: 120 MMHG | SYSTOLIC BLOOD PRESSURE: 138 MMHG | HEART RATE: 72 BPM | OXYGEN SATURATION: 93 %

## 2021-01-01 VITALS — BODY MASS INDEX: 29.7 KG/M2 | HEIGHT: 68 IN | WEIGHT: 196 LBS

## 2021-01-01 VITALS
OXYGEN SATURATION: 98 % | SYSTOLIC BLOOD PRESSURE: 120 MMHG | RESPIRATION RATE: 18 BRPM | HEART RATE: 65 BPM | DIASTOLIC BLOOD PRESSURE: 80 MMHG | TEMPERATURE: 97 F

## 2021-01-01 VITALS
OXYGEN SATURATION: 95 % | RESPIRATION RATE: 18 BRPM | DIASTOLIC BLOOD PRESSURE: 70 MMHG | TEMPERATURE: 98.2 F | SYSTOLIC BLOOD PRESSURE: 130 MMHG | HEART RATE: 77 BPM

## 2021-01-01 DIAGNOSIS — C79.52 SECONDARY MALIGNANT NEOPLASM OF BONE AND BONE MARROW (HCC): ICD-10-CM

## 2021-01-01 DIAGNOSIS — N18.9 ANEMIA IN CHRONIC RENAL DISEASE: ICD-10-CM

## 2021-01-01 DIAGNOSIS — N18.9 ANEMIA OF CHRONIC RENAL FAILURE: ICD-10-CM

## 2021-01-01 DIAGNOSIS — C15.3: ICD-10-CM

## 2021-01-01 DIAGNOSIS — R63.0 ANOREXIA: ICD-10-CM

## 2021-01-01 DIAGNOSIS — C79.51 SECONDARY MALIGNANT NEOPLASM OF BONE AND BONE MARROW (HCC): ICD-10-CM

## 2021-01-01 DIAGNOSIS — D63.1 ANEMIA OF CHRONIC RENAL FAILURE: ICD-10-CM

## 2021-01-01 DIAGNOSIS — R53.83 OTHER FATIGUE: ICD-10-CM

## 2021-01-01 DIAGNOSIS — R19.7 DIARRHEA, UNSPECIFIED: ICD-10-CM

## 2021-01-01 DIAGNOSIS — Z51.11 ENCOUNTER FOR ANTINEOPLASTIC CHEMOTHERAPY: ICD-10-CM

## 2021-01-01 DIAGNOSIS — C79.51 SECONDARY MALIGNANT NEOPLASM OF BONE (HCC): Primary | ICD-10-CM

## 2021-01-01 DIAGNOSIS — Z09 POSTOPERATIVE EXAMINATION: Primary | ICD-10-CM

## 2021-01-01 DIAGNOSIS — N18.30 CHRONIC KIDNEY DISEASE, STAGE III (MODERATE) (HCC): ICD-10-CM

## 2021-01-01 DIAGNOSIS — C15.9 METASTASIS FROM ESOPHAGEAL CANCER (HCC): Primary | ICD-10-CM

## 2021-01-01 DIAGNOSIS — R55 SYNCOPE AND COLLAPSE: Primary | ICD-10-CM

## 2021-01-01 DIAGNOSIS — R19.7 DIARRHEA: ICD-10-CM

## 2021-01-01 DIAGNOSIS — D64.9 ANEMIA, UNSPECIFIED: ICD-10-CM

## 2021-01-01 DIAGNOSIS — Z92.21 STATUS POST ADMINISTRATION OF CARDIOTOXIC CHEMOTHERAPY: Primary | ICD-10-CM

## 2021-01-01 DIAGNOSIS — E87.6 HYPOPOTASSEMIA: ICD-10-CM

## 2021-01-01 DIAGNOSIS — C79.9 METASTASIS FROM ESOPHAGEAL CANCER (HCC): Primary | ICD-10-CM

## 2021-01-01 DIAGNOSIS — C15.9 MALIGNANT NEOPLASM OF ESOPHAGUS, UNSPECIFIED LOCATION (HCC): ICD-10-CM

## 2021-01-01 DIAGNOSIS — D63.1 ANEMIA IN CHRONIC RENAL DISEASE: ICD-10-CM

## 2021-01-01 DIAGNOSIS — G89.18 POST-OP PAIN: Primary | ICD-10-CM

## 2021-01-01 DIAGNOSIS — Z01.810 PRE-OPERATIVE CARDIOVASCULAR EXAMINATION: Primary | ICD-10-CM

## 2021-01-01 DIAGNOSIS — C15.3 MALIGNANT NEOPLASM OF UPPER THIRD OF ESOPHAGUS (HCC): Primary | ICD-10-CM

## 2021-01-01 DIAGNOSIS — E87.6 HYPOKALEMIA: ICD-10-CM

## 2021-01-01 DIAGNOSIS — N18.30 CHRONIC KIDNEY DISEASE (CKD), STAGE III (MODERATE) (HCC): ICD-10-CM

## 2021-01-01 DIAGNOSIS — E86.0 DEHYDRATION: ICD-10-CM

## 2021-01-01 DIAGNOSIS — Z01.810 PRE-OPERATIVE CARDIOVASCULAR EXAMINATION: ICD-10-CM

## 2021-01-01 DIAGNOSIS — C15.3 MALIGNANT NEOPLASM OF UPPER THIRD OF ESOPHAGUS (HCC): ICD-10-CM

## 2021-01-01 DIAGNOSIS — C15.3 MALIGNANT NEOPLASM OF CERVICAL ESOPHAGUS (HCC): Primary | ICD-10-CM

## 2021-01-01 DIAGNOSIS — R19.7 DIARRHEA OF PRESUMED INFECTIOUS ORIGIN: ICD-10-CM

## 2021-01-01 DIAGNOSIS — R53.83 FATIGUE: ICD-10-CM

## 2021-01-01 DIAGNOSIS — Z01.810 ENCOUNTER FOR PREPROCEDURAL CARDIOVASCULAR EXAMINATION: ICD-10-CM

## 2021-01-01 LAB
25(OH)D3 SERPL-MCNC: 18 NG/ML (ref 30–100)
ALBUMIN SERPL-MCNC: 1.9 G/DL (ref 3.5–5)
ALBUMIN SERPL-MCNC: 1.9 G/DL (ref 3.5–5)
ALBUMIN SERPL-MCNC: 2.1 G/DL (ref 3.5–5)
ALBUMIN/GLOB SERPL: 0.4 {RATIO} (ref 1.1–2.2)
ALBUMIN/GLOB SERPL: 0.4 {RATIO} (ref 1.1–2.2)
ALBUMIN/GLOB SERPL: 0.5 {RATIO} (ref 1.1–2.2)
ALP SERPL-CCNC: 145 U/L (ref 45–117)
ALP SERPL-CCNC: 149 U/L (ref 45–117)
ALP SERPL-CCNC: 179 U/L (ref 45–117)
ALT SERPL-CCNC: 17 U/L (ref 12–78)
ALT SERPL-CCNC: 19 U/L (ref 12–78)
ALT SERPL-CCNC: 22 U/L (ref 12–78)
ANION GAP BLD CALC-SCNC: 9 MMOL/L (ref 10–20)
ANION GAP SERPL CALC-SCNC: 0 MMOL/L (ref 5–15)
ANION GAP SERPL CALC-SCNC: 2 MMOL/L (ref 5–15)
ANION GAP SERPL CALC-SCNC: 3 MMOL/L (ref 5–15)
ANION GAP SERPL CALC-SCNC: 3 MMOL/L (ref 5–15)
ANION GAP SERPL CALC-SCNC: 4 MMOL/L (ref 5–15)
ANION GAP SERPL CALC-SCNC: 5 MMOL/L (ref 5–15)
ANION GAP SERPL CALC-SCNC: 5 MMOL/L (ref 5–15)
ANION GAP SERPL CALC-SCNC: 6 MMOL/L (ref 5–15)
ANION GAP SERPL CALC-SCNC: 6 MMOL/L (ref 5–15)
AST SERPL-CCNC: 32 U/L (ref 15–37)
AST SERPL-CCNC: 36 U/L (ref 15–37)
AST SERPL-CCNC: 62 U/L (ref 15–37)
ATRIAL RATE: 75 BPM
AV R PG: 77.97 MMHG
AV R PG: 80.19 MMHG
BASOPHILS # BLD: 0 K/UL (ref 0–0.1)
BASOPHILS NFR BLD: 0 % (ref 0–1)
BILIRUB SERPL-MCNC: 0.4 MG/DL (ref 0.2–1)
BILIRUB SERPL-MCNC: 0.5 MG/DL (ref 0.2–1)
BILIRUB SERPL-MCNC: 0.6 MG/DL (ref 0.2–1)
BNP SERPL-MCNC: 2870 PG/ML
BUN SERPL-MCNC: 23 MG/DL (ref 6–20)
BUN SERPL-MCNC: 24 MG/DL (ref 6–20)
BUN SERPL-MCNC: 25 MG/DL (ref 6–20)
BUN SERPL-MCNC: 29 MG/DL (ref 6–20)
BUN SERPL-MCNC: 32 MG/DL (ref 6–20)
BUN SERPL-MCNC: 38 MG/DL (ref 6–20)
BUN SERPL-MCNC: 42 MG/DL (ref 6–20)
BUN SERPL-MCNC: 43 MG/DL (ref 6–20)
BUN SERPL-MCNC: 43 MG/DL (ref 6–20)
BUN SERPL-MCNC: 44 MG/DL (ref 6–20)
BUN SERPL-MCNC: 44 MG/DL (ref 6–20)
BUN/CREAT SERPL: 15 (ref 12–20)
BUN/CREAT SERPL: 15 (ref 12–20)
BUN/CREAT SERPL: 16 (ref 12–20)
BUN/CREAT SERPL: 20 (ref 12–20)
BUN/CREAT SERPL: 22 (ref 12–20)
BUN/CREAT SERPL: 27 (ref 12–20)
BUN/CREAT SERPL: 27 (ref 12–20)
BUN/CREAT SERPL: 28 (ref 12–20)
BUN/CREAT SERPL: 29 (ref 12–20)
CA-I BLD-MCNC: 0.99 MMOL/L (ref 1.12–1.32)
CALCIUM SERPL-MCNC: 6 MG/DL (ref 8.5–10.1)
CALCIUM SERPL-MCNC: 6 MG/DL (ref 8.5–10.1)
CALCIUM SERPL-MCNC: 6.1 MG/DL (ref 8.5–10.1)
CALCIUM SERPL-MCNC: 6.1 MG/DL (ref 8.5–10.1)
CALCIUM SERPL-MCNC: 6.2 MG/DL (ref 8.5–10.1)
CALCIUM SERPL-MCNC: 6.3 MG/DL (ref 8.5–10.1)
CALCIUM SERPL-MCNC: 6.4 MG/DL (ref 8.5–10.1)
CALCIUM SERPL-MCNC: 6.5 MG/DL (ref 8.5–10.1)
CALCIUM SERPL-MCNC: 6.5 MG/DL (ref 8.5–10.1)
CALCIUM SERPL-MCNC: 6.6 MG/DL (ref 8.5–10.1)
CALCIUM SERPL-MCNC: 6.9 MG/DL (ref 8.5–10.1)
CALCULATED P AXIS, ECG09: 62 DEGREES
CALCULATED R AXIS, ECG10: -32 DEGREES
CALCULATED T AXIS, ECG11: -23 DEGREES
CHLORIDE BLD-SCNC: 116 MMOL/L (ref 98–107)
CHLORIDE SERPL-SCNC: 112 MMOL/L (ref 97–108)
CHLORIDE SERPL-SCNC: 113 MMOL/L (ref 97–108)
CHLORIDE SERPL-SCNC: 114 MMOL/L (ref 97–108)
CHLORIDE SERPL-SCNC: 115 MMOL/L (ref 97–108)
CHLORIDE SERPL-SCNC: 116 MMOL/L (ref 97–108)
CHLORIDE SERPL-SCNC: 117 MMOL/L (ref 97–108)
CHLORIDE SERPL-SCNC: 119 MMOL/L (ref 97–108)
CHLORIDE SERPL-SCNC: 120 MMOL/L (ref 97–108)
CK SERPL-CCNC: 61 U/L (ref 26–192)
CO2 BLD-SCNC: 26.4 MMOL/L (ref 21–32)
CO2 SERPL-SCNC: 25 MMOL/L (ref 21–32)
CO2 SERPL-SCNC: 26 MMOL/L (ref 21–32)
CO2 SERPL-SCNC: 28 MMOL/L (ref 21–32)
CO2 SERPL-SCNC: 29 MMOL/L (ref 21–32)
CO2 SERPL-SCNC: 29 MMOL/L (ref 21–32)
CO2 SERPL-SCNC: 30 MMOL/L (ref 21–32)
CO2 SERPL-SCNC: 32 MMOL/L (ref 21–32)
CREAT BLD-MCNC: 1.44 MG/DL (ref 0.6–1.3)
CREAT SERPL-MCNC: 1.42 MG/DL (ref 0.55–1.02)
CREAT SERPL-MCNC: 1.43 MG/DL (ref 0.55–1.02)
CREAT SERPL-MCNC: 1.46 MG/DL (ref 0.55–1.02)
CREAT SERPL-MCNC: 1.51 MG/DL (ref 0.55–1.02)
CREAT SERPL-MCNC: 1.53 MG/DL (ref 0.55–1.02)
CREAT SERPL-MCNC: 1.56 MG/DL (ref 0.55–1.02)
CREAT SERPL-MCNC: 1.58 MG/DL (ref 0.55–1.02)
CREAT SERPL-MCNC: 1.6 MG/DL (ref 0.55–1.02)
CREAT SERPL-MCNC: 1.68 MG/DL (ref 0.55–1.02)
DIAGNOSIS, 93000: NORMAL
DIFFERENTIAL METHOD BLD: ABNORMAL
ECHO AO ROOT DIAM: 2.98 CM
ECHO AR MAX VEL PISA: 441.51 CM/S
ECHO AR MAX VEL PISA: 447.73 CM/S
ECHO AV AREA PEAK VELOCITY: 2.8 CM2
ECHO AV AREA PEAK VELOCITY: 3.06 CM2
ECHO AV AREA/BSA PEAK VELOCITY: 1.5 CM2/M2
ECHO AV AREA/BSA PEAK VELOCITY: 1.5 CM2/M2
ECHO AV MEAN GRADIENT: 4.11 MMHG
ECHO AV PEAK GRADIENT: 10.96 MMHG
ECHO AV PEAK GRADIENT: 7.3 MMHG
ECHO AV PEAK VELOCITY: 134.4 CM/S
ECHO AV PEAK VELOCITY: 165.52 CM/S
ECHO AV REGURGITANT PHT: 1248.94 MS
ECHO AV REGURGITANT PHT: 793.94 MS
ECHO AV VTI: 25.04 CM
ECHO EST RA PRESSURE: 10 MMHG
ECHO EST RA PRESSURE: 10 MMHG
ECHO LA AREA 4C: 27.15 CM2
ECHO LA AREA 4C: 31.29 CM2
ECHO LA MAJOR AXIS: 3.45 CM
ECHO LA MINOR AXIS: 1.82 CM
ECHO LA TO AORTIC ROOT RATIO: 1.19
ECHO LA VOL 4C: 103.66 ML (ref 22–52)
ECHO LA VOL 4C: 120.58 ML (ref 22–52)
ECHO LA VOLUME INDEX A4C: 54.56 ML/M2 (ref 16–28)
ECHO LA VOLUME INDEX A4C: 59.69 ML/M2 (ref 16–28)
ECHO LV E' LATERAL VELOCITY: 11.97 CM/S
ECHO LV E' LATERAL VELOCITY: 7.28 CM/S
ECHO LV E' SEPTAL VELOCITY: 4.34 CM/S
ECHO LV E' SEPTAL VELOCITY: 4.34 CM/S
ECHO LV EDV A4C: 125.27 ML
ECHO LV EDV A4C: 154.62 ML
ECHO LV EDV INDEX A4C: 62 ML/M2
ECHO LV EDV INDEX A4C: 81.4 ML/M2
ECHO LV EJECTION FRACTION A4C: 62 PERCENT
ECHO LV EJECTION FRACTION A4C: 64 PERCENT
ECHO LV ESV A4C: 45.46 ML
ECHO LV ESV A4C: 58.96 ML
ECHO LV ESV INDEX A4C: 22.5 ML/M2
ECHO LV ESV INDEX A4C: 31 ML/M2
ECHO LV INTERNAL DIMENSION DIASTOLIC: 4.18 CM (ref 3.9–5.3)
ECHO LV INTERNAL DIMENSION DIASTOLIC: 5.01 CM (ref 3.9–5.3)
ECHO LV INTERNAL DIMENSION SYSTOLIC: 3.06 CM
ECHO LV INTERNAL DIMENSION SYSTOLIC: 3.23 CM
ECHO LV IVSD: 1.2 CM (ref 0.6–0.9)
ECHO LV IVSD: 1.21 CM (ref 0.6–0.9)
ECHO LV MASS 2D: 175.8 G (ref 67–162)
ECHO LV MASS 2D: 242.8 G (ref 67–162)
ECHO LV MASS INDEX 2D: 127.8 G/M2 (ref 43–95)
ECHO LV MASS INDEX 2D: 87 G/M2 (ref 43–95)
ECHO LV POSTERIOR WALL DIASTOLIC: 1.19 CM (ref 0.6–0.9)
ECHO LV POSTERIOR WALL DIASTOLIC: 1.25 CM (ref 0.6–0.9)
ECHO LVOT DIAM: 1.9 CM
ECHO LVOT DIAM: 2.38 CM
ECHO LVOT PEAK GRADIENT: 5.17 MMHG
ECHO LVOT PEAK GRADIENT: 6.96 MMHG
ECHO LVOT PEAK VELOCITY: 113.63 CM/S
ECHO LVOT PEAK VELOCITY: 131.94 CM/S
ECHO MV A VELOCITY: 126.77 CM/S
ECHO MV A VELOCITY: 94.95 CM/S
ECHO MV AREA PHT: 7.46 CM2
ECHO MV E DECELERATION TIME (DT): 137.32 MS
ECHO MV E DECELERATION TIME (DT): 323.19 MS
ECHO MV E VELOCITY: 77.67 CM/S
ECHO MV E VELOCITY: 84.22 CM/S
ECHO MV E/A RATIO: 0.61
ECHO MV E/A RATIO: 0.89
ECHO MV E/E' LATERAL: 11.57
ECHO MV E/E' LATERAL: 6.49
ECHO MV E/E' RATIO (AVERAGED): 12.19
ECHO MV E/E' RATIO (AVERAGED): 15.49
ECHO MV E/E' SEPTAL: 17.9
ECHO MV E/E' SEPTAL: 19.41
ECHO MV EROA PISA: 0.02 CM2
ECHO MV MAX VELOCITY: 127.31 CM/S
ECHO MV MEAN GRADIENT: 2.48 MMHG
ECHO MV PEAK GRADIENT: 6.48 MMHG
ECHO MV PRESSURE HALF TIME (PHT): 29.48 MS
ECHO MV REGURGITANT RADIUS PISA: 0.24 CM
ECHO MV REGURGITANT VOLUME: 4.18 ML
ECHO MV REGURGITANT VTIA: 199.29 CM
ECHO MV REGURGITANT VTIA: 67.3 CM
ECHO MV VTI: 21.5 CM
ECHO PV MAX VELOCITY: 115.54 CM/S
ECHO PV PEAK INSTANTANEOUS GRADIENT SYSTOLIC: 2.36 MMHG
ECHO PV PEAK INSTANTANEOUS GRADIENT SYSTOLIC: 5.34 MMHG
ECHO PV REGURGITANT MAX VELOCITY: 76.76 CM/S
ECHO RIGHT VENTRICULAR SYSTOLIC PRESSURE (RVSP): 44.79 MMHG
ECHO RIGHT VENTRICULAR SYSTOLIC PRESSURE (RVSP): 54.85 MMHG
ECHO RV INTERNAL DIMENSION: 3.72 CM
ECHO TV MEAN GRADIENT: 22.95 MMHG
ECHO TV REGURGITANT MAX VELOCITY: 294.22 CM/S
ECHO TV REGURGITANT MAX VELOCITY: 334.84 CM/S
ECHO TV REGURGITANT MAX VELOCITY: 439.03 CM/S
ECHO TV REGURGITANT MAX VELOCITY: 575.03 CM/S
ECHO TV REGURGITANT PEAK GRADIENT: 34.79 MMHG
ECHO TV REGURGITANT PEAK GRADIENT: 44.85 MMHG
EOSINOPHIL # BLD: 0.1 K/UL (ref 0–0.4)
EOSINOPHIL NFR BLD: 1 % (ref 0–7)
ERYTHROCYTE [DISTWIDTH] IN BLOOD BY AUTOMATED COUNT: 23.3 % (ref 11.5–14.5)
ERYTHROCYTE [DISTWIDTH] IN BLOOD BY AUTOMATED COUNT: 23.4 % (ref 11.5–14.5)
ERYTHROCYTE [DISTWIDTH] IN BLOOD BY AUTOMATED COUNT: 24.6 % (ref 11.5–14.5)
GLOBULIN SER CALC-MCNC: 4.4 G/DL (ref 2–4)
GLUCOSE BLD STRIP.AUTO-MCNC: 87 MG/DL (ref 65–117)
GLUCOSE BLD STRIP.AUTO-MCNC: 93 MG/DL (ref 65–100)
GLUCOSE BLD-MCNC: 115 MG/DL (ref 65–100)
GLUCOSE SERPL-MCNC: 115 MG/DL (ref 65–100)
GLUCOSE SERPL-MCNC: 119 MG/DL (ref 65–100)
GLUCOSE SERPL-MCNC: 119 MG/DL (ref 65–100)
GLUCOSE SERPL-MCNC: 121 MG/DL (ref 65–100)
GLUCOSE SERPL-MCNC: 121 MG/DL (ref 65–100)
GLUCOSE SERPL-MCNC: 137 MG/DL (ref 65–100)
GLUCOSE SERPL-MCNC: 141 MG/DL (ref 65–100)
GLUCOSE SERPL-MCNC: 173 MG/DL (ref 65–100)
GLUCOSE SERPL-MCNC: 73 MG/DL (ref 65–100)
GLUCOSE SERPL-MCNC: 84 MG/DL (ref 65–100)
GLUCOSE SERPL-MCNC: 96 MG/DL (ref 65–100)
HCT VFR BLD AUTO: 28.9 % (ref 35–47)
HCT VFR BLD AUTO: 30.4 % (ref 35–47)
HCT VFR BLD AUTO: 30.4 % (ref 35–47)
HGB BLD-MCNC: 8.5 G/DL (ref 11.5–16)
HGB BLD-MCNC: 8.5 G/DL (ref 11.5–16)
HGB BLD-MCNC: 9.3 G/DL (ref 11.5–16)
IMM GRANULOCYTES # BLD AUTO: 0.1 K/UL (ref 0–0.04)
IMM GRANULOCYTES NFR BLD AUTO: 1 % (ref 0–0.5)
LYMPHOCYTES # BLD: 0.5 K/UL (ref 0.8–3.5)
LYMPHOCYTES NFR BLD: 6 % (ref 12–49)
MAGNESIUM SERPL-MCNC: 1.4 MG/DL (ref 1.6–2.4)
MAGNESIUM SERPL-MCNC: 1.6 MG/DL (ref 1.6–2.4)
MAGNESIUM SERPL-MCNC: 1.6 MG/DL (ref 1.6–2.4)
MAGNESIUM SERPL-MCNC: 1.7 MG/DL (ref 1.6–2.4)
MAGNESIUM SERPL-MCNC: 1.8 MG/DL (ref 1.6–2.4)
MAGNESIUM SERPL-MCNC: 1.8 MG/DL (ref 1.6–2.4)
MCH RBC QN AUTO: 31 PG (ref 26–34)
MCH RBC QN AUTO: 31.3 PG (ref 26–34)
MCH RBC QN AUTO: 31.5 PG (ref 26–34)
MCHC RBC AUTO-ENTMCNC: 28 G/DL (ref 30–36.5)
MCHC RBC AUTO-ENTMCNC: 29.4 G/DL (ref 30–36.5)
MCHC RBC AUTO-ENTMCNC: 30.6 G/DL (ref 30–36.5)
MCV RBC AUTO: 103.1 FL (ref 80–99)
MCV RBC AUTO: 106.3 FL (ref 80–99)
MCV RBC AUTO: 110.9 FL (ref 80–99)
MONOCYTES # BLD: 0.5 K/UL (ref 0–1)
MONOCYTES NFR BLD: 6 % (ref 5–13)
MR PISA PV: 597.6 CM/S
NEUTS SEG # BLD: 6.8 K/UL (ref 1.8–8)
NEUTS SEG NFR BLD: 86 % (ref 32–75)
NRBC # BLD: 0 K/UL (ref 0–0.01)
NRBC # BLD: 0 K/UL (ref 0–0.01)
NRBC # BLD: 0.04 K/UL (ref 0–0.01)
NRBC BLD-RTO: 0 PER 100 WBC
NRBC BLD-RTO: 0 PER 100 WBC
NRBC BLD-RTO: 0.4 PER 100 WBC
P-R INTERVAL, ECG05: 150 MS
PHOSPHATE SERPL-MCNC: 1.1 MG/DL (ref 2.6–4.7)
PHOSPHATE SERPL-MCNC: 1.6 MG/DL (ref 2.6–4.7)
PHOSPHATE SERPL-MCNC: 3 MG/DL (ref 2.6–4.7)
PHOSPHATE SERPL-MCNC: 3.6 MG/DL (ref 2.6–4.7)
PLATELET # BLD AUTO: 182 K/UL (ref 150–400)
PLATELET # BLD AUTO: 226 K/UL (ref 150–400)
PLATELET # BLD AUTO: 255 K/UL (ref 150–400)
PMV BLD AUTO: 10.3 FL (ref 8.9–12.9)
PMV BLD AUTO: 11.1 FL (ref 8.9–12.9)
PMV BLD AUTO: 11.2 FL (ref 8.9–12.9)
POTASSIUM BLD-SCNC: 2.9 MMOL/L (ref 3.5–5.1)
POTASSIUM SERPL-SCNC: 3.2 MMOL/L (ref 3.5–5.1)
POTASSIUM SERPL-SCNC: 3.7 MMOL/L (ref 3.5–5.1)
POTASSIUM SERPL-SCNC: 3.9 MMOL/L (ref 3.5–5.1)
POTASSIUM SERPL-SCNC: 3.9 MMOL/L (ref 3.5–5.1)
POTASSIUM SERPL-SCNC: 4.3 MMOL/L (ref 3.5–5.1)
POTASSIUM SERPL-SCNC: 4.6 MMOL/L (ref 3.5–5.1)
POTASSIUM SERPL-SCNC: 4.7 MMOL/L (ref 3.5–5.1)
POTASSIUM SERPL-SCNC: 4.8 MMOL/L (ref 3.5–5.1)
POTASSIUM SERPL-SCNC: 5 MMOL/L (ref 3.5–5.1)
POTASSIUM SERPL-SCNC: 5 MMOL/L (ref 3.5–5.1)
POTASSIUM SERPL-SCNC: 5.5 MMOL/L (ref 3.5–5.1)
PROT SERPL-MCNC: 6.3 G/DL (ref 6.4–8.2)
PROT SERPL-MCNC: 6.3 G/DL (ref 6.4–8.2)
PROT SERPL-MCNC: 6.5 G/DL (ref 6.4–8.2)
Q-T INTERVAL, ECG07: 502 MS
QRS DURATION, ECG06: 92 MS
QTC CALCULATION (BEZET), ECG08: 560 MS
RBC # BLD AUTO: 2.72 M/UL (ref 3.8–5.2)
RBC # BLD AUTO: 2.74 M/UL (ref 3.8–5.2)
RBC # BLD AUTO: 2.95 M/UL (ref 3.8–5.2)
RBC MORPH BLD: ABNORMAL
RBC MORPH BLD: ABNORMAL
SERVICE CMNT-IMP: ABNORMAL
SERVICE CMNT-IMP: NORMAL
SERVICE CMNT-IMP: NORMAL
SODIUM BLD-SCNC: 150 MMOL/L (ref 136–145)
SODIUM SERPL-SCNC: 142 MMOL/L (ref 136–145)
SODIUM SERPL-SCNC: 145 MMOL/L (ref 136–145)
SODIUM SERPL-SCNC: 146 MMOL/L (ref 136–145)
SODIUM SERPL-SCNC: 148 MMOL/L (ref 136–145)
SODIUM SERPL-SCNC: 149 MMOL/L (ref 136–145)
SODIUM SERPL-SCNC: 150 MMOL/L (ref 136–145)
TROPONIN I SERPL-MCNC: <0.05 NG/ML
VENTRICULAR RATE, ECG03: 75 BPM
WBC # BLD AUTO: 6.7 K/UL (ref 3.6–11)
WBC # BLD AUTO: 8 K/UL (ref 3.6–11)
WBC # BLD AUTO: 9.4 K/UL (ref 3.6–11)

## 2021-01-01 PROCEDURE — G8536 NO DOC ELDER MAL SCRN: HCPCS | Performed by: SURGERY

## 2021-01-01 PROCEDURE — 77030002986 HC SUT PROL J&J -A: Performed by: SURGERY

## 2021-01-01 PROCEDURE — 77010033678 HC OXYGEN DAILY

## 2021-01-01 PROCEDURE — 80053 COMPREHEN METABOLIC PANEL: CPT

## 2021-01-01 PROCEDURE — 74011000250 HC RX REV CODE- 250: Performed by: NURSE ANESTHETIST, CERTIFIED REGISTERED

## 2021-01-01 PROCEDURE — G0152 HHCP-SERV OF OT,EA 15 MIN: HCPCS

## 2021-01-01 PROCEDURE — 94760 N-INVAS EAR/PLS OXIMETRY 1: CPT

## 2021-01-01 PROCEDURE — 83735 ASSAY OF MAGNESIUM: CPT

## 2021-01-01 PROCEDURE — 74011250636 HC RX REV CODE- 250/636: Performed by: INTERNAL MEDICINE

## 2021-01-01 PROCEDURE — 3331090002 HH PPS REVENUE DEBIT

## 2021-01-01 PROCEDURE — A6212 FOAM DRG <=16 SQ IN W/BORDER: HCPCS

## 2021-01-01 PROCEDURE — 1090F PRES/ABSN URINE INCON ASSESS: CPT | Performed by: SURGERY

## 2021-01-01 PROCEDURE — 84484 ASSAY OF TROPONIN QUANT: CPT

## 2021-01-01 PROCEDURE — 99214 OFFICE O/P EST MOD 30 MIN: CPT | Performed by: SURGERY

## 2021-01-01 PROCEDURE — 77030003029 HC SUT VCRL J&J -B: Performed by: SURGERY

## 2021-01-01 PROCEDURE — G0157 HHC PT ASSISTANT EA 15: HCPCS

## 2021-01-01 PROCEDURE — 76210000034 HC AMBSU PH I REC 0.5 TO 1 HR: Performed by: SURGERY

## 2021-01-01 PROCEDURE — 3331090001 HH PPS REVENUE CREDIT

## 2021-01-01 PROCEDURE — 65270000029 HC RM PRIVATE

## 2021-01-01 PROCEDURE — 99285 EMERGENCY DEPT VISIT HI MDM: CPT

## 2021-01-01 PROCEDURE — A6402 STERILE GAUZE <= 16 SQ IN: HCPCS

## 2021-01-01 PROCEDURE — 77030008608 HC TRCR ENDOSC SMTH AMR -B: Performed by: SURGERY

## 2021-01-01 PROCEDURE — 74011250637 HC RX REV CODE- 250/637: Performed by: INTERNAL MEDICINE

## 2021-01-01 PROCEDURE — 74011250636 HC RX REV CODE- 250/636: Performed by: SURGERY

## 2021-01-01 PROCEDURE — 36415 COLL VENOUS BLD VENIPUNCTURE: CPT

## 2021-01-01 PROCEDURE — 36561 INSERT TUNNELED CV CATH: CPT | Performed by: SURGERY

## 2021-01-01 PROCEDURE — 77030021352 HC CBL LD SYS DISP COVD -B: Performed by: SURGERY

## 2021-01-01 PROCEDURE — 74011250636 HC RX REV CODE- 250/636: Performed by: STUDENT IN AN ORGANIZED HEALTH CARE EDUCATION/TRAINING PROGRAM

## 2021-01-01 PROCEDURE — G8400 PT W/DXA NO RESULTS DOC: HCPCS | Performed by: SURGERY

## 2021-01-01 PROCEDURE — 0DHA4UZ INSERTION OF FEEDING DEVICE INTO JEJUNUM, PERCUTANEOUS ENDOSCOPIC APPROACH: ICD-10-PCS | Performed by: SURGERY

## 2021-01-01 PROCEDURE — 74011000250 HC RX REV CODE- 250: Performed by: INTERNAL MEDICINE

## 2021-01-01 PROCEDURE — 85027 COMPLETE CBC AUTOMATED: CPT

## 2021-01-01 PROCEDURE — 80047 BASIC METABLC PNL IONIZED CA: CPT

## 2021-01-01 PROCEDURE — 400013 HH SOC

## 2021-01-01 PROCEDURE — 77030026438 HC STYL ET INTUB CARD -A: Performed by: ANESTHESIOLOGY

## 2021-01-01 PROCEDURE — 74011250637 HC RX REV CODE- 250/637: Performed by: SURGERY

## 2021-01-01 PROCEDURE — 65660000000 HC RM CCU STEPDOWN

## 2021-01-01 PROCEDURE — 99218 HC RM OBSERVATION: CPT

## 2021-01-01 PROCEDURE — 77030031139 HC SUT VCRL2 J&J -A: Performed by: SURGERY

## 2021-01-01 PROCEDURE — 71045 X-RAY EXAM CHEST 1 VIEW: CPT

## 2021-01-01 PROCEDURE — 77030002933 HC SUT MCRYL J&J -A: Performed by: SURGERY

## 2021-01-01 PROCEDURE — G8432 DEP SCR NOT DOC, RNG: HCPCS | Performed by: SURGERY

## 2021-01-01 PROCEDURE — 74011000636 HC RX REV CODE- 636: Performed by: EMERGENCY MEDICINE

## 2021-01-01 PROCEDURE — 74011000250 HC RX REV CODE- 250: Performed by: SURGERY

## 2021-01-01 PROCEDURE — 76010000153 HC OR TIME 1.5 TO 2 HR: Performed by: SURGERY

## 2021-01-01 PROCEDURE — 83880 ASSAY OF NATRIURETIC PEPTIDE: CPT

## 2021-01-01 PROCEDURE — 84100 ASSAY OF PHOSPHORUS: CPT

## 2021-01-01 PROCEDURE — 80048 BASIC METABOLIC PNL TOTAL CA: CPT

## 2021-01-01 PROCEDURE — 76210000046 HC AMBSU PH II REC FIRST 0.5 HR: Performed by: SURGERY

## 2021-01-01 PROCEDURE — 74011250636 HC RX REV CODE- 250/636: Performed by: NURSE ANESTHETIST, CERTIFIED REGISTERED

## 2021-01-01 PROCEDURE — 77030010507 HC ADH SKN DERMBND J&J -B: Performed by: SURGERY

## 2021-01-01 PROCEDURE — G8419 CALC BMI OUT NRM PARAM NOF/U: HCPCS | Performed by: SURGERY

## 2021-01-01 PROCEDURE — G8427 DOCREV CUR MEDS BY ELIG CLIN: HCPCS | Performed by: SURGERY

## 2021-01-01 PROCEDURE — 1101F PT FALLS ASSESS-DOCD LE1/YR: CPT | Performed by: SURGERY

## 2021-01-01 PROCEDURE — A6196 ALGINATE DRESSING <=16 SQ IN: HCPCS

## 2021-01-01 PROCEDURE — 76030000000 HC AMB SURG OR TIME 0.5 TO 1: Performed by: SURGERY

## 2021-01-01 PROCEDURE — 97162 PT EVAL MOD COMPLEX 30 MIN: CPT

## 2021-01-01 PROCEDURE — 74011250636 HC RX REV CODE- 250/636: Performed by: ANESTHESIOLOGY

## 2021-01-01 PROCEDURE — 77030002996 HC SUT SLK J&J -A: Performed by: SURGERY

## 2021-01-01 PROCEDURE — 96372 THER/PROPH/DIAG INJ SC/IM: CPT

## 2021-01-01 PROCEDURE — 77030008684 HC TU ET CUF COVD -B: Performed by: ANESTHESIOLOGY

## 2021-01-01 PROCEDURE — A9552 F18 FDG: HCPCS

## 2021-01-01 PROCEDURE — 82550 ASSAY OF CK (CPK): CPT

## 2021-01-01 PROCEDURE — 82306 VITAMIN D 25 HYDROXY: CPT

## 2021-01-01 PROCEDURE — 93005 ELECTROCARDIOGRAM TRACING: CPT

## 2021-01-01 PROCEDURE — 96376 TX/PRO/DX INJ SAME DRUG ADON: CPT

## 2021-01-01 PROCEDURE — 97530 THERAPEUTIC ACTIVITIES: CPT

## 2021-01-01 PROCEDURE — 77030020255 HC SOL INJ LR 1000ML BG: Performed by: SURGERY

## 2021-01-01 PROCEDURE — 96375 TX/PRO/DX INJ NEW DRUG ADDON: CPT

## 2021-01-01 PROCEDURE — A9576 INJ PROHANCE MULTIPACK: HCPCS | Performed by: STUDENT IN AN ORGANIZED HEALTH CARE EDUCATION/TRAINING PROGRAM

## 2021-01-01 PROCEDURE — G0151 HHCP-SERV OF PT,EA 15 MIN: HCPCS

## 2021-01-01 PROCEDURE — 2709999900 HC NON-CHARGEABLE SUPPLY: Performed by: SURGERY

## 2021-01-01 PROCEDURE — 76060000034 HC ANESTHESIA 1.5 TO 2 HR: Performed by: SURGERY

## 2021-01-01 PROCEDURE — 99024 POSTOP FOLLOW-UP VISIT: CPT | Performed by: SURGERY

## 2021-01-01 PROCEDURE — G0300 HHS/HOSPICE OF LPN EA 15 MIN: HCPCS

## 2021-01-01 PROCEDURE — A4322 IRRIGATION SYRINGE: HCPCS

## 2021-01-01 PROCEDURE — 70450 CT HEAD/BRAIN W/O DYE: CPT

## 2021-01-01 PROCEDURE — 74011000250 HC RX REV CODE- 250: Performed by: STUDENT IN AN ORGANIZED HEALTH CARE EDUCATION/TRAINING PROGRAM

## 2021-01-01 PROCEDURE — 74011250637 HC RX REV CODE- 250/637: Performed by: NURSE PRACTITIONER

## 2021-01-01 PROCEDURE — G0299 HHS/HOSPICE OF RN EA 15 MIN: HCPCS

## 2021-01-01 PROCEDURE — 71275 CT ANGIOGRAPHY CHEST: CPT

## 2021-01-01 PROCEDURE — 77030005268 HC CATH JEJU HALY -C: Performed by: SURGERY

## 2021-01-01 PROCEDURE — 76000 FLUOROSCOPY <1 HR PHYS/QHP: CPT

## 2021-01-01 PROCEDURE — 93306 TTE W/DOPPLER COMPLETE: CPT

## 2021-01-01 PROCEDURE — 77030013079 HC BLNKT BAIR HGGR 3M -A: Performed by: ANESTHESIOLOGY

## 2021-01-01 PROCEDURE — 77001 FLUOROGUIDE FOR VEIN DEVICE: CPT | Performed by: SURGERY

## 2021-01-01 PROCEDURE — 97116 GAIT TRAINING THERAPY: CPT

## 2021-01-01 PROCEDURE — 76060000061 HC AMB SURG ANES 0.5 TO 1 HR: Performed by: SURGERY

## 2021-01-01 PROCEDURE — 97535 SELF CARE MNGMENT TRAINING: CPT

## 2021-01-01 PROCEDURE — 2709999900 HC NON-CHARGEABLE SUPPLY

## 2021-01-01 PROCEDURE — 77030040361 HC SLV COMPR DVT MDII -B: Performed by: SURGERY

## 2021-01-01 PROCEDURE — C1788 PORT, INDWELLING, IMP: HCPCS | Performed by: SURGERY

## 2021-01-01 PROCEDURE — 77030020829: Performed by: SURGERY

## 2021-01-01 PROCEDURE — A6250 SKIN SEAL PROTECT MOISTURIZR: HCPCS

## 2021-01-01 PROCEDURE — 74011250636 HC RX REV CODE- 250/636: Performed by: NURSE PRACTITIONER

## 2021-01-01 PROCEDURE — A6216 NON-STERILE GAUZE<=16 SQ IN: HCPCS

## 2021-01-01 PROCEDURE — 97165 OT EVAL LOW COMPLEX 30 MIN: CPT

## 2021-01-01 PROCEDURE — 76210000016 HC OR PH I REC 1 TO 1.5 HR: Performed by: SURGERY

## 2021-01-01 PROCEDURE — 77030002916 HC SUT ETHLN J&J -A: Performed by: SURGERY

## 2021-01-01 PROCEDURE — MED10594 SALINE,.9%,3 ML,VIAL

## 2021-01-01 PROCEDURE — 44186 LAP JEJUNOSTOMY: CPT | Performed by: SURGERY

## 2021-01-01 PROCEDURE — 74011000272 HC RX REV CODE- 272: Performed by: SURGERY

## 2021-01-01 PROCEDURE — 95816 EEG AWAKE AND DROWSY: CPT | Performed by: PSYCHIATRY & NEUROLOGY

## 2021-01-01 PROCEDURE — G0156 HHCP-SVS OF AIDE,EA 15 MIN: HCPCS

## 2021-01-01 PROCEDURE — 99203 OFFICE O/P NEW LOW 30 MIN: CPT | Performed by: SURGERY

## 2021-01-01 PROCEDURE — 95816 EEG AWAKE AND DROWSY: CPT | Performed by: STUDENT IN AN ORGANIZED HEALTH CARE EDUCATION/TRAINING PROGRAM

## 2021-01-01 PROCEDURE — 77030012770 HC TRCR OPT FX AMR -B: Performed by: SURGERY

## 2021-01-01 PROCEDURE — 94762 N-INVAS EAR/PLS OXIMTRY CONT: CPT

## 2021-01-01 PROCEDURE — 85025 COMPLETE CBC W/AUTO DIFF WBC: CPT

## 2021-01-01 PROCEDURE — 70553 MRI BRAIN STEM W/O & W/DYE: CPT

## 2021-01-01 PROCEDURE — 400018 HH-NO PAY CLAIM PROCEDURE

## 2021-01-01 PROCEDURE — 96374 THER/PROPH/DIAG INJ IV PUSH: CPT

## 2021-01-01 DEVICE — POWERPORT ISP IMPLANTABLE PORT WITH ATTACHABLE 8F CHRONOFLEX OPEN-ENDED SINGLE-LUMEN VENOUS CATHETER INTERMEDIATE KIT (WITH SUTURE PLUGS)
Type: IMPLANTABLE DEVICE | Site: CHEST | Status: FUNCTIONAL
Brand: POWERPORT, CHRONOFLEX

## 2021-01-01 RX ORDER — ENOXAPARIN SODIUM 100 MG/ML
40 INJECTION SUBCUTANEOUS EVERY 24 HOURS
Status: DISCONTINUED | OUTPATIENT
Start: 2021-01-01 | End: 2021-01-01

## 2021-01-01 RX ORDER — LIDOCAINE HYDROCHLORIDE 10 MG/ML
0.1 INJECTION, SOLUTION EPIDURAL; INFILTRATION; INTRACAUDAL; PERINEURAL AS NEEDED
Status: DISCONTINUED | OUTPATIENT
Start: 2021-01-01 | End: 2021-01-01 | Stop reason: HOSPADM

## 2021-01-01 RX ORDER — FENTANYL CITRATE 50 UG/ML
25 INJECTION, SOLUTION INTRAMUSCULAR; INTRAVENOUS
Status: DISCONTINUED | OUTPATIENT
Start: 2021-01-01 | End: 2021-01-01 | Stop reason: HOSPADM

## 2021-01-01 RX ORDER — SODIUM CHLORIDE 0.9 % (FLUSH) 0.9 %
5-40 SYRINGE (ML) INJECTION AS NEEDED
Status: DISCONTINUED | OUTPATIENT
Start: 2021-01-01 | End: 2021-01-01 | Stop reason: HOSPADM

## 2021-01-01 RX ORDER — OXYCODONE HCL 5 MG/5 ML
5-10 SOLUTION, ORAL ORAL
Status: DISCONTINUED | OUTPATIENT
Start: 2021-01-01 | End: 2021-01-01 | Stop reason: HOSPADM

## 2021-01-01 RX ORDER — BUPIVACAINE HYDROCHLORIDE AND EPINEPHRINE 5; 5 MG/ML; UG/ML
INJECTION, SOLUTION EPIDURAL; INTRACAUDAL; PERINEURAL AS NEEDED
Status: DISCONTINUED | OUTPATIENT
Start: 2021-01-01 | End: 2021-01-01 | Stop reason: HOSPADM

## 2021-01-01 RX ORDER — SODIUM CHLORIDE, SODIUM LACTATE, POTASSIUM CHLORIDE, CALCIUM CHLORIDE 600; 310; 30; 20 MG/100ML; MG/100ML; MG/100ML; MG/100ML
25 INJECTION, SOLUTION INTRAVENOUS CONTINUOUS
Status: DISCONTINUED | OUTPATIENT
Start: 2021-01-01 | End: 2021-01-01 | Stop reason: HOSPADM

## 2021-01-01 RX ORDER — SODIUM CHLORIDE 0.9 % (FLUSH) 0.9 %
5-40 SYRINGE (ML) INJECTION EVERY 8 HOURS
Status: DISCONTINUED | OUTPATIENT
Start: 2021-01-01 | End: 2021-01-01 | Stop reason: HOSPADM

## 2021-01-01 RX ORDER — HEPARIN SODIUM 5000 [USP'U]/ML
5000 INJECTION, SOLUTION INTRAVENOUS; SUBCUTANEOUS EVERY 12 HOURS
Status: DISCONTINUED | OUTPATIENT
Start: 2021-01-01 | End: 2021-01-01 | Stop reason: HOSPADM

## 2021-01-01 RX ORDER — ACETAMINOPHEN 325 MG/1
650 TABLET ORAL
Status: DISCONTINUED | OUTPATIENT
Start: 2021-01-01 | End: 2021-01-01 | Stop reason: HOSPADM

## 2021-01-01 RX ORDER — CALCIUM GLUCONATE 20 MG/ML
2 INJECTION, SOLUTION INTRAVENOUS ONCE
Status: COMPLETED | OUTPATIENT
Start: 2021-01-01 | End: 2021-01-01

## 2021-01-01 RX ORDER — LATANOPROST 50 UG/ML
1 SOLUTION/ DROPS OPHTHALMIC
Status: DISCONTINUED | OUTPATIENT
Start: 2021-01-01 | End: 2021-01-01 | Stop reason: HOSPADM

## 2021-01-01 RX ORDER — CEFAZOLIN SODIUM 1 G/3ML
INJECTION, POWDER, FOR SOLUTION INTRAMUSCULAR; INTRAVENOUS
Status: DISCONTINUED
Start: 2021-01-01 | End: 2021-01-01 | Stop reason: HOSPADM

## 2021-01-01 RX ORDER — DIPHENOXYLATE HYDROCHLORIDE AND ATROPINE SULFATE 2.5; .025 MG/1; MG/1
1 TABLET ORAL
Status: DISCONTINUED | OUTPATIENT
Start: 2021-01-01 | End: 2021-01-01 | Stop reason: HOSPADM

## 2021-01-01 RX ORDER — HYDRALAZINE HYDROCHLORIDE 20 MG/ML
10 INJECTION INTRAMUSCULAR; INTRAVENOUS
Status: DISCONTINUED | OUTPATIENT
Start: 2021-01-01 | End: 2021-01-01

## 2021-01-01 RX ORDER — FUROSEMIDE 10 MG/ML
40 INJECTION INTRAMUSCULAR; INTRAVENOUS ONCE
Status: COMPLETED | OUTPATIENT
Start: 2021-01-01 | End: 2021-01-01

## 2021-01-01 RX ORDER — HYDROMORPHONE HYDROCHLORIDE 1 MG/ML
.2-.5 INJECTION, SOLUTION INTRAMUSCULAR; INTRAVENOUS; SUBCUTANEOUS ONCE
Status: DISCONTINUED | OUTPATIENT
Start: 2021-01-01 | End: 2021-01-01 | Stop reason: HOSPADM

## 2021-01-01 RX ORDER — LOSARTAN POTASSIUM 100 MG/1
100 TABLET ORAL DAILY
Status: DISCONTINUED | OUTPATIENT
Start: 2021-01-01 | End: 2021-01-01 | Stop reason: HOSPADM

## 2021-01-01 RX ORDER — HYDRALAZINE HYDROCHLORIDE 20 MG/ML
10 INJECTION INTRAMUSCULAR; INTRAVENOUS
Status: DISCONTINUED | OUTPATIENT
Start: 2021-01-01 | End: 2021-01-01 | Stop reason: HOSPADM

## 2021-01-01 RX ORDER — ONDANSETRON 4 MG/1
4 TABLET, ORALLY DISINTEGRATING ORAL
Status: DISCONTINUED | OUTPATIENT
Start: 2021-01-01 | End: 2021-01-01 | Stop reason: HOSPADM

## 2021-01-01 RX ORDER — NEOSTIGMINE METHYLSULFATE 1 MG/ML
INJECTION, SOLUTION INTRAVENOUS AS NEEDED
Status: DISCONTINUED | OUTPATIENT
Start: 2021-01-01 | End: 2021-01-01 | Stop reason: HOSPADM

## 2021-01-01 RX ORDER — DEXTROSE MONOHYDRATE AND SODIUM CHLORIDE 5; .45 G/100ML; G/100ML
75 INJECTION, SOLUTION INTRAVENOUS CONTINUOUS
Status: DISCONTINUED | OUTPATIENT
Start: 2021-01-01 | End: 2021-01-01 | Stop reason: HOSPADM

## 2021-01-01 RX ORDER — SODIUM CHLORIDE 450 MG/100ML
75 INJECTION, SOLUTION INTRAVENOUS CONTINUOUS
Status: DISCONTINUED | OUTPATIENT
Start: 2021-01-01 | End: 2021-01-01

## 2021-01-01 RX ORDER — BRIMONIDINE TARTRATE 2 MG/ML
1 SOLUTION/ DROPS OPHTHALMIC EVERY 8 HOURS
Status: DISCONTINUED | OUTPATIENT
Start: 2021-01-01 | End: 2021-01-01 | Stop reason: HOSPADM

## 2021-01-01 RX ORDER — LEVOTHYROXINE SODIUM 100 UG/1
100 TABLET ORAL
Status: DISCONTINUED | OUTPATIENT
Start: 2021-01-01 | End: 2021-01-01 | Stop reason: HOSPADM

## 2021-01-01 RX ORDER — CALCIUM GLUCONATE 94 MG/ML
1 INJECTION, SOLUTION INTRAVENOUS ONCE
Status: DISCONTINUED | OUTPATIENT
Start: 2021-01-01 | End: 2021-01-01 | Stop reason: CLARIF

## 2021-01-01 RX ORDER — CALCIUM GLUCONATE 20 MG/ML
1 INJECTION, SOLUTION INTRAVENOUS ONCE
Status: COMPLETED | OUTPATIENT
Start: 2021-01-01 | End: 2021-01-01

## 2021-01-01 RX ORDER — POTASSIUM CHLORIDE 7.45 MG/ML
10 INJECTION INTRAVENOUS
Status: COMPLETED | OUTPATIENT
Start: 2021-01-01 | End: 2021-01-01

## 2021-01-01 RX ORDER — ONDANSETRON 2 MG/ML
4 INJECTION INTRAMUSCULAR; INTRAVENOUS AS NEEDED
Status: DISCONTINUED | OUTPATIENT
Start: 2021-01-01 | End: 2021-01-01 | Stop reason: HOSPADM

## 2021-01-01 RX ORDER — SODIUM CHLORIDE 0.9 % (FLUSH) 0.9 %
10 SYRINGE (ML) INJECTION
Status: COMPLETED | OUTPATIENT
Start: 2021-01-01 | End: 2021-01-01

## 2021-01-01 RX ORDER — AMLODIPINE BESYLATE 5 MG/1
5 TABLET ORAL DAILY
Status: DISCONTINUED | OUTPATIENT
Start: 2021-01-01 | End: 2021-01-01 | Stop reason: HOSPADM

## 2021-01-01 RX ORDER — TRIPROLIDINE/PSEUDOEPHEDRINE 2.5MG-60MG
400 TABLET ORAL
Status: DISCONTINUED | OUTPATIENT
Start: 2021-01-01 | End: 2021-01-01 | Stop reason: HOSPADM

## 2021-01-01 RX ORDER — LIDOCAINE HYDROCHLORIDE 20 MG/ML
INJECTION, SOLUTION EPIDURAL; INFILTRATION; INTRACAUDAL; PERINEURAL AS NEEDED
Status: DISCONTINUED | OUTPATIENT
Start: 2021-01-01 | End: 2021-01-01 | Stop reason: HOSPADM

## 2021-01-01 RX ORDER — PROPOFOL 10 MG/ML
INJECTION, EMULSION INTRAVENOUS AS NEEDED
Status: DISCONTINUED | OUTPATIENT
Start: 2021-01-01 | End: 2021-01-01 | Stop reason: HOSPADM

## 2021-01-01 RX ORDER — DEXTROSE MONOHYDRATE AND SODIUM CHLORIDE 5; .225 G/100ML; G/100ML
75 INJECTION, SOLUTION INTRAVENOUS CONTINUOUS
Status: DISCONTINUED | OUTPATIENT
Start: 2021-01-01 | End: 2021-01-01

## 2021-01-01 RX ORDER — NALOXONE HYDROCHLORIDE 0.4 MG/ML
0.4 INJECTION, SOLUTION INTRAMUSCULAR; INTRAVENOUS; SUBCUTANEOUS AS NEEDED
Status: DISCONTINUED | OUTPATIENT
Start: 2021-01-01 | End: 2021-01-01 | Stop reason: HOSPADM

## 2021-01-01 RX ORDER — DIPHENHYDRAMINE HYDROCHLORIDE 50 MG/ML
12.5 INJECTION, SOLUTION INTRAMUSCULAR; INTRAVENOUS AS NEEDED
Status: DISCONTINUED | OUTPATIENT
Start: 2021-01-01 | End: 2021-01-01 | Stop reason: HOSPADM

## 2021-01-01 RX ORDER — SODIUM BICARBONATE 650 MG/1
1300 TABLET ORAL 2 TIMES DAILY
Status: DISCONTINUED | OUTPATIENT
Start: 2021-01-01 | End: 2021-01-01 | Stop reason: HOSPADM

## 2021-01-01 RX ORDER — LIDOCAINE HYDROCHLORIDE AND EPINEPHRINE 10; 10 MG/ML; UG/ML
INJECTION, SOLUTION INFILTRATION; PERINEURAL AS NEEDED
Status: DISCONTINUED | OUTPATIENT
Start: 2021-01-01 | End: 2021-01-01 | Stop reason: HOSPADM

## 2021-01-01 RX ORDER — SODIUM CHLORIDE 450 MG/100ML
50 INJECTION, SOLUTION INTRAVENOUS ONCE
Status: COMPLETED | OUTPATIENT
Start: 2021-01-01 | End: 2021-01-01

## 2021-01-01 RX ORDER — FUROSEMIDE 10 MG/ML
40 INJECTION INTRAMUSCULAR; INTRAVENOUS 2 TIMES DAILY
Status: DISCONTINUED | OUTPATIENT
Start: 2021-01-01 | End: 2021-01-01 | Stop reason: HOSPADM

## 2021-01-01 RX ORDER — CALCIUM CARBONATE 200(500)MG
500 TABLET,CHEWABLE ORAL
Status: DISCONTINUED | OUTPATIENT
Start: 2021-01-01 | End: 2021-01-01 | Stop reason: HOSPADM

## 2021-01-01 RX ORDER — OXYCODONE AND ACETAMINOPHEN 5; 325 MG/1; MG/1
1 TABLET ORAL
Status: DISCONTINUED | OUTPATIENT
Start: 2021-01-01 | End: 2021-01-01 | Stop reason: HOSPADM

## 2021-01-01 RX ORDER — FAMOTIDINE 20 MG/1
20 TABLET, FILM COATED ORAL
Status: DISCONTINUED | OUTPATIENT
Start: 2021-01-01 | End: 2021-01-01 | Stop reason: HOSPADM

## 2021-01-01 RX ORDER — FENTANYL CITRATE 50 UG/ML
INJECTION, SOLUTION INTRAMUSCULAR; INTRAVENOUS AS NEEDED
Status: DISCONTINUED | OUTPATIENT
Start: 2021-01-01 | End: 2021-01-01 | Stop reason: HOSPADM

## 2021-01-01 RX ORDER — ACETAMINOPHEN 650 MG/1
650 SUPPOSITORY RECTAL
Status: DISCONTINUED | OUTPATIENT
Start: 2021-01-01 | End: 2021-01-01 | Stop reason: HOSPADM

## 2021-01-01 RX ORDER — HYDROCODONE BITARTRATE AND ACETAMINOPHEN 5; 325 MG/1; MG/1
1 TABLET ORAL
Qty: 20 TABLET | Refills: 0 | Status: SHIPPED | OUTPATIENT
Start: 2021-01-01 | End: 2021-10-01

## 2021-01-01 RX ORDER — CALCIUM GLUCONATE 20 MG/ML
2 INJECTION, SOLUTION INTRAVENOUS ONCE
Status: DISCONTINUED | OUTPATIENT
Start: 2021-01-01 | End: 2021-01-01

## 2021-01-01 RX ORDER — FUROSEMIDE 20 MG/1
TABLET ORAL
Qty: 30 TABLET | Refills: 1 | Status: SHIPPED | OUTPATIENT
Start: 2021-01-01

## 2021-01-01 RX ORDER — ONDANSETRON 2 MG/ML
4 INJECTION INTRAMUSCULAR; INTRAVENOUS
Status: DISCONTINUED | OUTPATIENT
Start: 2021-01-01 | End: 2021-01-01 | Stop reason: HOSPADM

## 2021-01-01 RX ORDER — DORZOLAMIDE HYDROCHLORIDE AND TIMOLOL MALEATE 20; 5 MG/ML; MG/ML
1 SOLUTION/ DROPS OPHTHALMIC 3 TIMES DAILY
Status: DISCONTINUED | OUTPATIENT
Start: 2021-01-01 | End: 2021-01-01 | Stop reason: HOSPADM

## 2021-01-01 RX ORDER — WATER FOR INJECTION,STERILE
VIAL (ML) INJECTION
Status: DISCONTINUED
Start: 2021-01-01 | End: 2021-01-01 | Stop reason: HOSPADM

## 2021-01-01 RX ORDER — HYDROMORPHONE HYDROCHLORIDE 1 MG/ML
0.2 INJECTION, SOLUTION INTRAMUSCULAR; INTRAVENOUS; SUBCUTANEOUS
Status: DISCONTINUED | OUTPATIENT
Start: 2021-01-01 | End: 2021-01-01 | Stop reason: HOSPADM

## 2021-01-01 RX ORDER — CAPECITABINE 500 MG/1
1000 TABLET, FILM COATED ORAL 2 TIMES DAILY
COMMUNITY

## 2021-01-01 RX ORDER — CHLORTHALIDONE 25 MG/1
25 TABLET ORAL DAILY
COMMUNITY

## 2021-01-01 RX ORDER — SUCCINYLCHOLINE CHLORIDE 20 MG/ML
INJECTION INTRAMUSCULAR; INTRAVENOUS AS NEEDED
Status: DISCONTINUED | OUTPATIENT
Start: 2021-01-01 | End: 2021-01-01 | Stop reason: HOSPADM

## 2021-01-01 RX ORDER — AMLODIPINE BESYLATE 5 MG/1
5 TABLET ORAL DAILY
COMMUNITY
End: 2021-01-01

## 2021-01-01 RX ORDER — CALCIUM CARBONATE 200(500)MG
400 TABLET,CHEWABLE ORAL DAILY
Status: DISCONTINUED | OUTPATIENT
Start: 2021-01-01 | End: 2021-01-01 | Stop reason: HOSPADM

## 2021-01-01 RX ORDER — SODIUM CHLORIDE 9 MG/ML
75 INJECTION, SOLUTION INTRAVENOUS CONTINUOUS
Status: DISCONTINUED | OUTPATIENT
Start: 2021-01-01 | End: 2021-01-01

## 2021-01-01 RX ORDER — ONDANSETRON 2 MG/ML
INJECTION INTRAMUSCULAR; INTRAVENOUS AS NEEDED
Status: DISCONTINUED | OUTPATIENT
Start: 2021-01-01 | End: 2021-01-01 | Stop reason: HOSPADM

## 2021-01-01 RX ORDER — AMLODIPINE BESYLATE 5 MG/1
5 TABLET ORAL DAILY
COMMUNITY

## 2021-01-01 RX ORDER — HYDROCODONE BITARTRATE AND ACETAMINOPHEN 5; 325 MG/1; MG/1
1 TABLET ORAL
Qty: 10 TABLET | Refills: 0 | Status: SHIPPED | OUTPATIENT
Start: 2021-01-01 | End: 2021-01-01

## 2021-01-01 RX ORDER — ROCURONIUM BROMIDE 10 MG/ML
INJECTION, SOLUTION INTRAVENOUS AS NEEDED
Status: DISCONTINUED | OUTPATIENT
Start: 2021-01-01 | End: 2021-01-01 | Stop reason: HOSPADM

## 2021-01-01 RX ORDER — DEXAMETHASONE SODIUM PHOSPHATE 4 MG/ML
INJECTION, SOLUTION INTRA-ARTICULAR; INTRALESIONAL; INTRAMUSCULAR; INTRAVENOUS; SOFT TISSUE AS NEEDED
Status: DISCONTINUED | OUTPATIENT
Start: 2021-01-01 | End: 2021-01-01 | Stop reason: HOSPADM

## 2021-01-01 RX ORDER — ENOXAPARIN SODIUM 100 MG/ML
30 INJECTION SUBCUTANEOUS EVERY 24 HOURS
Status: DISCONTINUED | OUTPATIENT
Start: 2021-01-01 | End: 2021-01-01 | Stop reason: HOSPADM

## 2021-01-01 RX ORDER — LORAZEPAM 2 MG/ML
1 INJECTION INTRAMUSCULAR
Status: DISCONTINUED | OUTPATIENT
Start: 2021-01-01 | End: 2021-01-01 | Stop reason: HOSPADM

## 2021-01-01 RX ORDER — DEXTROSE MONOHYDRATE AND SODIUM CHLORIDE 5; .45 G/100ML; G/100ML
75 INJECTION, SOLUTION INTRAVENOUS CONTINUOUS
Status: DISCONTINUED | OUTPATIENT
Start: 2021-01-01 | End: 2021-01-01

## 2021-01-01 RX ORDER — HYDROMORPHONE HYDROCHLORIDE 1 MG/ML
.5-1 INJECTION, SOLUTION INTRAMUSCULAR; INTRAVENOUS; SUBCUTANEOUS
Status: DISCONTINUED | OUTPATIENT
Start: 2021-01-01 | End: 2021-01-01 | Stop reason: HOSPADM

## 2021-01-01 RX ORDER — CALCIUM GLUCONATE 94 MG/ML
1 INJECTION, SOLUTION INTRAVENOUS ONCE
Status: DISCONTINUED | OUTPATIENT
Start: 2021-01-01 | End: 2021-01-01

## 2021-01-01 RX ORDER — MORPHINE SULFATE 10 MG/ML
2 INJECTION, SOLUTION INTRAMUSCULAR; INTRAVENOUS
Status: DISCONTINUED | OUTPATIENT
Start: 2021-01-01 | End: 2021-01-01 | Stop reason: HOSPADM

## 2021-01-01 RX ORDER — LOSARTAN POTASSIUM 100 MG/1
100 TABLET ORAL DAILY
Status: DISCONTINUED | OUTPATIENT
Start: 2021-01-01 | End: 2021-01-01

## 2021-01-01 RX ORDER — POLYETHYLENE GLYCOL 3350 17 G/17G
17 POWDER, FOR SOLUTION ORAL DAILY PRN
Status: DISCONTINUED | OUTPATIENT
Start: 2021-01-01 | End: 2021-01-01 | Stop reason: HOSPADM

## 2021-01-01 RX ORDER — GLYCOPYRROLATE 0.2 MG/ML
INJECTION INTRAMUSCULAR; INTRAVENOUS AS NEEDED
Status: DISCONTINUED | OUTPATIENT
Start: 2021-01-01 | End: 2021-01-01 | Stop reason: HOSPADM

## 2021-01-01 RX ADMIN — HEPARIN SODIUM 5000 UNITS: 5000 INJECTION INTRAVENOUS; SUBCUTANEOUS at 09:15

## 2021-01-01 RX ADMIN — LOSARTAN POTASSIUM 100 MG: 100 TABLET, FILM COATED ORAL at 08:27

## 2021-01-01 RX ADMIN — LATANOPROST 1 DROP: 50 SOLUTION OPHTHALMIC at 21:11

## 2021-01-01 RX ADMIN — Medication 10 ML: at 21:14

## 2021-01-01 RX ADMIN — Medication 3 AMPULE: at 09:39

## 2021-01-01 RX ADMIN — LEVOTHYROXINE SODIUM 100 MCG: 0.1 TABLET ORAL at 09:29

## 2021-01-01 RX ADMIN — POTASSIUM CHLORIDE 10 MEQ: 10 INJECTION, SOLUTION INTRAVENOUS at 22:10

## 2021-01-01 RX ADMIN — IOPAMIDOL 100 ML: 755 INJECTION, SOLUTION INTRAVENOUS at 16:28

## 2021-01-01 RX ADMIN — ENOXAPARIN SODIUM 40 MG: 40 INJECTION SUBCUTANEOUS at 21:07

## 2021-01-01 RX ADMIN — HEPARIN SODIUM 5000 UNITS: 5000 INJECTION INTRAVENOUS; SUBCUTANEOUS at 09:28

## 2021-01-01 RX ADMIN — SODIUM BICARBONATE 1300 MG: 650 TABLET ORAL at 09:38

## 2021-01-01 RX ADMIN — DORZOLAMIDE HYDROCHLORIDE AND TIMOLOL MALEATE 1 DROP: 20; 5 SOLUTION/ DROPS OPHTHALMIC at 23:00

## 2021-01-01 RX ADMIN — POTASSIUM CHLORIDE 10 MEQ: 10 INJECTION, SOLUTION INTRAVENOUS at 23:30

## 2021-01-01 RX ADMIN — CALCIUM GLUCONATE 1000 MG: 20 INJECTION, SOLUTION INTRAVENOUS at 10:11

## 2021-01-01 RX ADMIN — ROCURONIUM BROMIDE 10 MG: 10 INJECTION INTRAVENOUS at 10:51

## 2021-01-01 RX ADMIN — ENOXAPARIN SODIUM 30 MG: 30 INJECTION SUBCUTANEOUS at 20:19

## 2021-01-01 RX ADMIN — WATER 2 G: 1 INJECTION INTRAMUSCULAR; INTRAVENOUS; SUBCUTANEOUS at 10:20

## 2021-01-01 RX ADMIN — Medication 1 AMPULE: at 10:07

## 2021-01-01 RX ADMIN — SODIUM CHLORIDE 75 ML/HR: 4.5 INJECTION, SOLUTION INTRAVENOUS at 16:36

## 2021-01-01 RX ADMIN — LEVOTHYROXINE SODIUM 100 MCG: 0.1 TABLET ORAL at 06:38

## 2021-01-01 RX ADMIN — Medication 1 AMPULE: at 08:27

## 2021-01-01 RX ADMIN — CALCIUM GLUCONATE 1000 MG: 20 INJECTION, SOLUTION INTRAVENOUS at 11:05

## 2021-01-01 RX ADMIN — LEVOTHYROXINE SODIUM 100 MCG: 0.1 TABLET ORAL at 05:39

## 2021-01-01 RX ADMIN — Medication 10 ML: at 14:18

## 2021-01-01 RX ADMIN — Medication 10 ML: at 21:22

## 2021-01-01 RX ADMIN — Medication 3 AMPULE: at 06:54

## 2021-01-01 RX ADMIN — Medication 10 ML: at 14:00

## 2021-01-01 RX ADMIN — LEVOTHYROXINE SODIUM 100 MCG: 0.1 TABLET ORAL at 05:57

## 2021-01-01 RX ADMIN — ROCURONIUM BROMIDE 10 MG: 10 INJECTION INTRAVENOUS at 10:36

## 2021-01-01 RX ADMIN — Medication 10 ML: at 01:20

## 2021-01-01 RX ADMIN — LOSARTAN POTASSIUM 100 MG: 100 TABLET, FILM COATED ORAL at 10:07

## 2021-01-01 RX ADMIN — HEPARIN SODIUM 5000 UNITS: 5000 INJECTION INTRAVENOUS; SUBCUTANEOUS at 09:38

## 2021-01-01 RX ADMIN — DORZOLAMIDE HYDROCHLORIDE AND TIMOLOL MALEATE 1 DROP: 20; 5 SOLUTION/ DROPS OPHTHALMIC at 01:21

## 2021-01-01 RX ADMIN — ENOXAPARIN SODIUM 30 MG: 30 INJECTION SUBCUTANEOUS at 19:49

## 2021-01-01 RX ADMIN — HYDROMORPHONE HYDROCHLORIDE 1 MG: 1 INJECTION, SOLUTION INTRAMUSCULAR; INTRAVENOUS; SUBCUTANEOUS at 17:38

## 2021-01-01 RX ADMIN — Medication 10 ML: at 23:09

## 2021-01-01 RX ADMIN — Medication 10 ML: at 21:07

## 2021-01-01 RX ADMIN — DORZOLAMIDE HYDROCHLORIDE AND TIMOLOL MALEATE 1 DROP: 20; 5 SOLUTION/ DROPS OPHTHALMIC at 16:32

## 2021-01-01 RX ADMIN — SODIUM CHLORIDE 75 ML/HR: 9 INJECTION, SOLUTION INTRAVENOUS at 05:37

## 2021-01-01 RX ADMIN — DEXTROSE AND SODIUM CHLORIDE 75 ML/HR: 5; 450 INJECTION, SOLUTION INTRAVENOUS at 08:27

## 2021-01-01 RX ADMIN — AMLODIPINE BESYLATE 5 MG: 5 TABLET ORAL at 09:16

## 2021-01-01 RX ADMIN — NEPHROCAP 1 CAPSULE: 1 CAP ORAL at 09:16

## 2021-01-01 RX ADMIN — SODIUM CHLORIDE 75 ML/HR: 9 INJECTION, SOLUTION INTRAVENOUS at 12:34

## 2021-01-01 RX ADMIN — FAMOTIDINE 20 MG: 20 TABLET, FILM COATED ORAL at 10:01

## 2021-01-01 RX ADMIN — BRIMONIDINE TARTRATE 1 DROP: 2 SOLUTION OPHTHALMIC at 06:25

## 2021-01-01 RX ADMIN — ONDANSETRON HYDROCHLORIDE 4 MG: 2 INJECTION, SOLUTION INTRAMUSCULAR; INTRAVENOUS at 11:30

## 2021-01-01 RX ADMIN — FAMOTIDINE 20 MG: 20 TABLET, FILM COATED ORAL at 09:37

## 2021-01-01 RX ADMIN — SODIUM BICARBONATE 1300 MG: 650 TABLET ORAL at 09:29

## 2021-01-01 RX ADMIN — Medication 1 AMPULE: at 13:00

## 2021-01-01 RX ADMIN — DEXTROSE AND SODIUM CHLORIDE 75 ML/HR: 5; 200 INJECTION, SOLUTION INTRAVENOUS at 02:27

## 2021-01-01 RX ADMIN — HEPARIN SODIUM 5000 UNITS: 5000 INJECTION INTRAVENOUS; SUBCUTANEOUS at 18:38

## 2021-01-01 RX ADMIN — BRIMONIDINE TARTRATE 1 DROP: 2 SOLUTION OPHTHALMIC at 23:09

## 2021-01-01 RX ADMIN — Medication 10 ML: at 22:02

## 2021-01-01 RX ADMIN — PROPOFOL 20 MG: 10 INJECTION, EMULSION INTRAVENOUS at 10:34

## 2021-01-01 RX ADMIN — FUROSEMIDE 40 MG: 10 INJECTION, SOLUTION INTRAMUSCULAR; INTRAVENOUS at 17:01

## 2021-01-01 RX ADMIN — DEXAMETHASONE SODIUM PHOSPHATE 4 MG: 4 INJECTION, SOLUTION INTRAMUSCULAR; INTRAVENOUS at 10:48

## 2021-01-01 RX ADMIN — AMLODIPINE BESYLATE 5 MG: 5 TABLET ORAL at 08:27

## 2021-01-01 RX ADMIN — Medication 1 AMPULE: at 08:53

## 2021-01-01 RX ADMIN — LEVOTHYROXINE SODIUM 100 MCG: 0.1 TABLET ORAL at 06:09

## 2021-01-01 RX ADMIN — SODIUM CHLORIDE, POTASSIUM CHLORIDE, SODIUM LACTATE AND CALCIUM CHLORIDE 25 ML/HR: 600; 310; 30; 20 INJECTION, SOLUTION INTRAVENOUS at 09:39

## 2021-01-01 RX ADMIN — DORZOLAMIDE HYDROCHLORIDE AND TIMOLOL MALEATE 1 DROP: 20; 5 SOLUTION/ DROPS OPHTHALMIC at 23:07

## 2021-01-01 RX ADMIN — FENTANYL CITRATE 50 MCG: 50 INJECTION, SOLUTION INTRAMUSCULAR; INTRAVENOUS at 07:33

## 2021-01-01 RX ADMIN — CALCIUM GLUCONATE 1000 MG: 20 INJECTION, SOLUTION INTRAVENOUS at 12:09

## 2021-01-01 RX ADMIN — Medication 10 ML: at 17:00

## 2021-01-01 RX ADMIN — AMLODIPINE BESYLATE 5 MG: 5 TABLET ORAL at 09:29

## 2021-01-01 RX ADMIN — CALCIUM CARBONATE (ANTACID) CHEW TAB 500 MG 400 MG: 500 CHEW TAB at 09:16

## 2021-01-01 RX ADMIN — SODIUM BICARBONATE 1300 MG: 650 TABLET ORAL at 09:16

## 2021-01-01 RX ADMIN — BRIMONIDINE TARTRATE 1 DROP: 2 SOLUTION OPHTHALMIC at 06:24

## 2021-01-01 RX ADMIN — AMLODIPINE BESYLATE 5 MG: 5 TABLET ORAL at 10:01

## 2021-01-01 RX ADMIN — FENTANYL CITRATE 50 MCG: 50 INJECTION, SOLUTION INTRAMUSCULAR; INTRAVENOUS at 11:33

## 2021-01-01 RX ADMIN — DEXTROSE AND SODIUM CHLORIDE 75 ML/HR: 5; 450 INJECTION, SOLUTION INTRAVENOUS at 08:39

## 2021-01-01 RX ADMIN — Medication 1 AMPULE: at 11:44

## 2021-01-01 RX ADMIN — POTASSIUM CHLORIDE 10 MEQ: 10 INJECTION, SOLUTION INTRAVENOUS at 17:15

## 2021-01-01 RX ADMIN — Medication 10 ML: at 06:25

## 2021-01-01 RX ADMIN — LATANOPROST 1 DROP: 50 SOLUTION OPHTHALMIC at 23:00

## 2021-01-01 RX ADMIN — LATANOPROST 1 DROP: 50 SOLUTION OPHTHALMIC at 21:14

## 2021-01-01 RX ADMIN — DORZOLAMIDE HYDROCHLORIDE AND TIMOLOL MALEATE 1 DROP: 20; 5 SOLUTION/ DROPS OPHTHALMIC at 22:26

## 2021-01-01 RX ADMIN — DEXTROSE AND SODIUM CHLORIDE 75 ML/HR: 5; 200 INJECTION, SOLUTION INTRAVENOUS at 10:30

## 2021-01-01 RX ADMIN — DORZOLAMIDE HYDROCHLORIDE AND TIMOLOL MALEATE 1 DROP: 20; 5 SOLUTION/ DROPS OPHTHALMIC at 13:10

## 2021-01-01 RX ADMIN — SODIUM CHLORIDE, POTASSIUM CHLORIDE, SODIUM LACTATE AND CALCIUM CHLORIDE 25 ML/HR: 600; 310; 30; 20 INJECTION, SOLUTION INTRAVENOUS at 06:54

## 2021-01-01 RX ADMIN — PROPOFOL 70 MG: 10 INJECTION, EMULSION INTRAVENOUS at 10:23

## 2021-01-01 RX ADMIN — Medication 10 ML: at 05:39

## 2021-01-01 RX ADMIN — Medication 10 ML: at 22:22

## 2021-01-01 RX ADMIN — Medication 10 ML: at 17:02

## 2021-01-01 RX ADMIN — BRIMONIDINE TARTRATE 1 DROP: 2 SOLUTION OPHTHALMIC at 22:59

## 2021-01-01 RX ADMIN — GLYCOPYRROLATE 0.4 MG: 0.2 INJECTION, SOLUTION INTRAMUSCULAR; INTRAVENOUS at 11:27

## 2021-01-01 RX ADMIN — HEPARIN SODIUM 5000 UNITS: 5000 INJECTION INTRAVENOUS; SUBCUTANEOUS at 19:36

## 2021-01-01 RX ADMIN — AMLODIPINE BESYLATE 5 MG: 5 TABLET ORAL at 08:51

## 2021-01-01 RX ADMIN — ENOXAPARIN SODIUM 30 MG: 30 INJECTION SUBCUTANEOUS at 21:13

## 2021-01-01 RX ADMIN — CALCIUM GLUCONATE 1000 MG: 20 INJECTION, SOLUTION INTRAVENOUS at 17:34

## 2021-01-01 RX ADMIN — BRIMONIDINE TARTRATE 1 DROP: 2 SOLUTION OPHTHALMIC at 14:29

## 2021-01-01 RX ADMIN — LOSARTAN POTASSIUM 100 MG: 100 TABLET, FILM COATED ORAL at 11:35

## 2021-01-01 RX ADMIN — CALCIUM CARBONATE (ANTACID) CHEW TAB 500 MG 400 MG: 500 CHEW TAB at 09:29

## 2021-01-01 RX ADMIN — SODIUM CHLORIDE 40 MCG/MIN: 900 INJECTION, SOLUTION INTRAVENOUS at 10:34

## 2021-01-01 RX ADMIN — AMLODIPINE BESYLATE 5 MG: 5 TABLET ORAL at 11:35

## 2021-01-01 RX ADMIN — PROPOFOL 35 MCG/KG/MIN: 10 INJECTION, EMULSION INTRAVENOUS at 07:37

## 2021-01-01 RX ADMIN — POTASSIUM CHLORIDE 10 MEQ: 10 INJECTION, SOLUTION INTRAVENOUS at 11:06

## 2021-01-01 RX ADMIN — HEPARIN SODIUM 5000 UNITS: 5000 INJECTION INTRAVENOUS; SUBCUTANEOUS at 01:26

## 2021-01-01 RX ADMIN — LEVOTHYROXINE SODIUM 100 MCG: 0.1 TABLET ORAL at 06:22

## 2021-01-01 RX ADMIN — Medication 1 AMPULE: at 20:19

## 2021-01-01 RX ADMIN — Medication 1 AMPULE: at 21:06

## 2021-01-01 RX ADMIN — SODIUM CHLORIDE 50 ML/HR: 4.5 INJECTION, SOLUTION INTRAVENOUS at 09:26

## 2021-01-01 RX ADMIN — NEOSTIGMINE METHYLSULFATE 3 MG: 1 INJECTION, SOLUTION INTRAVENOUS at 11:27

## 2021-01-01 RX ADMIN — HYDROMORPHONE HYDROCHLORIDE 1 MG: 1 INJECTION, SOLUTION INTRAMUSCULAR; INTRAVENOUS; SUBCUTANEOUS at 10:17

## 2021-01-01 RX ADMIN — CALCIUM GLUCONATE 2 G: 20 INJECTION, SOLUTION INTRAVENOUS at 11:38

## 2021-01-01 RX ADMIN — Medication 10 ML: at 06:38

## 2021-01-01 RX ADMIN — DEXTROSE AND SODIUM CHLORIDE 75 ML/HR: 5; 450 INJECTION, SOLUTION INTRAVENOUS at 11:37

## 2021-01-01 RX ADMIN — LATANOPROST 1 DROP: 50 SOLUTION OPHTHALMIC at 01:21

## 2021-01-01 RX ADMIN — LEVOTHYROXINE SODIUM 100 MCG: 0.1 TABLET ORAL at 10:23

## 2021-01-01 RX ADMIN — LOSARTAN POTASSIUM 100 MG: 100 TABLET, FILM COATED ORAL at 10:01

## 2021-01-01 RX ADMIN — SUCCINYLCHOLINE CHLORIDE 100 MG: 20 INJECTION, SOLUTION INTRAMUSCULAR; INTRAVENOUS at 10:23

## 2021-01-01 RX ADMIN — SODIUM BICARBONATE 1300 MG: 650 TABLET ORAL at 10:01

## 2021-01-01 RX ADMIN — LATANOPROST 1 DROP: 50 SOLUTION OPHTHALMIC at 23:09

## 2021-01-01 RX ADMIN — OXYCODONE HYDROCHLORIDE 5 MG: 5 SOLUTION ORAL at 21:13

## 2021-01-01 RX ADMIN — BRIMONIDINE TARTRATE 1 DROP: 2 SOLUTION OPHTHALMIC at 14:00

## 2021-01-01 RX ADMIN — BRIMONIDINE TARTRATE 1 DROP: 2 SOLUTION OPHTHALMIC at 01:22

## 2021-01-01 RX ADMIN — Medication 10 ML: at 06:00

## 2021-01-01 RX ADMIN — FUROSEMIDE 40 MG: 10 INJECTION, SOLUTION INTRAMUSCULAR; INTRAVENOUS at 18:27

## 2021-01-01 RX ADMIN — AMLODIPINE BESYLATE 5 MG: 5 TABLET ORAL at 10:07

## 2021-01-01 RX ADMIN — HYDROMORPHONE HYDROCHLORIDE 0.5 MG: 1 INJECTION, SOLUTION INTRAMUSCULAR; INTRAVENOUS; SUBCUTANEOUS at 11:36

## 2021-01-01 RX ADMIN — Medication 10 ML: at 05:35

## 2021-01-01 RX ADMIN — BRIMONIDINE TARTRATE 1 DROP: 2 SOLUTION OPHTHALMIC at 06:09

## 2021-01-01 RX ADMIN — NEPHROCAP 1 CAPSULE: 1 CAP ORAL at 10:01

## 2021-01-01 RX ADMIN — Medication 10 ML: at 09:52

## 2021-01-01 RX ADMIN — NEPHROCAP 1 CAPSULE: 1 CAP ORAL at 09:13

## 2021-01-01 RX ADMIN — Medication 10 ML: at 06:10

## 2021-01-01 RX ADMIN — SODIUM PHOSPHATE, MONOBASIC, MONOHYDRATE 30 MMOL: 276; 142 INJECTION, SOLUTION INTRAVENOUS at 14:17

## 2021-01-01 RX ADMIN — POTASSIUM CHLORIDE 10 MEQ: 10 INJECTION, SOLUTION INTRAVENOUS at 19:55

## 2021-01-01 RX ADMIN — SODIUM PHOSPHATE, MONOBASIC, MONOHYDRATE 20 MMOL: 276; 142 INJECTION, SOLUTION INTRAVENOUS at 10:53

## 2021-01-01 RX ADMIN — SODIUM CHLORIDE 75 ML/HR: 9 INJECTION, SOLUTION INTRAVENOUS at 01:18

## 2021-01-01 RX ADMIN — FAMOTIDINE 20 MG: 20 TABLET, FILM COATED ORAL at 09:17

## 2021-01-01 RX ADMIN — OXYCODONE HYDROCHLORIDE 10 MG: 5 SOLUTION ORAL at 13:38

## 2021-01-01 RX ADMIN — PROPOFOL 40 MG: 10 INJECTION, EMULSION INTRAVENOUS at 07:35

## 2021-01-01 RX ADMIN — FUROSEMIDE 40 MG: 10 INJECTION, SOLUTION INTRAMUSCULAR; INTRAVENOUS at 10:00

## 2021-01-01 RX ADMIN — BRIMONIDINE TARTRATE 1 DROP: 2 SOLUTION OPHTHALMIC at 18:28

## 2021-01-01 RX ADMIN — WATER 2 G: 1 INJECTION INTRAMUSCULAR; INTRAVENOUS; SUBCUTANEOUS at 07:42

## 2021-01-01 RX ADMIN — HEPARIN SODIUM 5000 UNITS: 5000 INJECTION INTRAVENOUS; SUBCUTANEOUS at 09:57

## 2021-01-01 RX ADMIN — Medication 10 ML: at 06:23

## 2021-01-01 RX ADMIN — LOSARTAN POTASSIUM 100 MG: 100 TABLET, FILM COATED ORAL at 00:06

## 2021-01-01 RX ADMIN — DORZOLAMIDE HYDROCHLORIDE AND TIMOLOL MALEATE 1 DROP: 20; 5 SOLUTION/ DROPS OPHTHALMIC at 10:02

## 2021-01-01 RX ADMIN — HYDRALAZINE HYDROCHLORIDE 10 MG: 20 INJECTION INTRAMUSCULAR; INTRAVENOUS at 21:19

## 2021-01-01 RX ADMIN — Medication 10 ML: at 09:00

## 2021-01-01 RX ADMIN — FENTANYL CITRATE 50 MCG: 50 INJECTION, SOLUTION INTRAMUSCULAR; INTRAVENOUS at 10:22

## 2021-01-01 RX ADMIN — SODIUM BICARBONATE 1300 MG: 650 TABLET ORAL at 17:01

## 2021-01-01 RX ADMIN — LATANOPROST 1 DROP: 50 SOLUTION OPHTHALMIC at 22:06

## 2021-01-01 RX ADMIN — Medication 10 ML: at 14:04

## 2021-01-01 RX ADMIN — Medication 1 AMPULE: at 22:08

## 2021-01-01 RX ADMIN — FUROSEMIDE 40 MG: 10 INJECTION, SOLUTION INTRAMUSCULAR; INTRAVENOUS at 12:05

## 2021-01-01 RX ADMIN — ROCURONIUM BROMIDE 5 MG: 10 INJECTION INTRAVENOUS at 10:23

## 2021-01-01 RX ADMIN — NEPHROCAP 1 CAPSULE: 1 CAP ORAL at 09:37

## 2021-01-01 RX ADMIN — DORZOLAMIDE HYDROCHLORIDE AND TIMOLOL MALEATE 1 DROP: 20; 5 SOLUTION/ DROPS OPHTHALMIC at 12:06

## 2021-01-01 RX ADMIN — SODIUM BICARBONATE 1300 MG: 650 TABLET ORAL at 18:38

## 2021-01-01 RX ADMIN — CALCIUM GLUCONATE 1000 MG: 20 INJECTION, SOLUTION INTRAVENOUS at 10:58

## 2021-01-01 RX ADMIN — Medication 10 ML: at 14:29

## 2021-01-01 RX ADMIN — LIDOCAINE HYDROCHLORIDE 70 MG: 20 INJECTION, SOLUTION EPIDURAL; INFILTRATION; INTRACAUDAL; PERINEURAL at 10:23

## 2021-01-01 RX ADMIN — SODIUM CHLORIDE 75 ML/HR: 4.5 INJECTION, SOLUTION INTRAVENOUS at 01:33

## 2021-01-01 RX ADMIN — LATANOPROST 1 DROP: 50 SOLUTION OPHTHALMIC at 22:11

## 2021-01-01 RX ADMIN — DEXTROSE AND SODIUM CHLORIDE 75 ML/HR: 5; 450 INJECTION, SOLUTION INTRAVENOUS at 17:26

## 2021-01-01 RX ADMIN — HYDROMORPHONE HYDROCHLORIDE 1 MG: 1 INJECTION, SOLUTION INTRAMUSCULAR; INTRAVENOUS; SUBCUTANEOUS at 21:08

## 2021-01-01 RX ADMIN — POTASSIUM CHLORIDE 10 MEQ: 10 INJECTION, SOLUTION INTRAVENOUS at 09:39

## 2021-01-01 RX ADMIN — LEVOTHYROXINE SODIUM 100 MCG: 0.1 TABLET ORAL at 06:25

## 2021-01-01 RX ADMIN — CALCIUM GLUCONATE 2 G: 20 INJECTION, SOLUTION INTRAVENOUS at 08:27

## 2021-01-01 RX ADMIN — LOSARTAN POTASSIUM 100 MG: 100 TABLET, FILM COATED ORAL at 10:23

## 2021-01-01 RX ADMIN — Medication 10 ML: at 22:10

## 2021-01-01 RX ADMIN — DORZOLAMIDE HYDROCHLORIDE AND TIMOLOL MALEATE 1 DROP: 20; 5 SOLUTION/ DROPS OPHTHALMIC at 17:02

## 2021-01-01 RX ADMIN — AMLODIPINE BESYLATE 5 MG: 5 TABLET ORAL at 09:37

## 2021-01-01 RX ADMIN — FAMOTIDINE 20 MG: 20 TABLET, FILM COATED ORAL at 09:29

## 2021-01-01 RX ADMIN — DEXTROSE AND SODIUM CHLORIDE 75 ML/HR: 5; 200 INJECTION, SOLUTION INTRAVENOUS at 19:49

## 2021-01-01 RX ADMIN — Medication 1 AMPULE: at 10:30

## 2021-01-01 RX ADMIN — HEPARIN SODIUM 5000 UNITS: 5000 INJECTION INTRAVENOUS; SUBCUTANEOUS at 19:32

## 2021-01-01 RX ADMIN — CALCIUM CARBONATE (ANTACID) CHEW TAB 500 MG 400 MG: 500 CHEW TAB at 09:38

## 2021-01-01 RX ADMIN — AMLODIPINE BESYLATE 5 MG: 5 TABLET ORAL at 10:23

## 2021-01-01 RX ADMIN — ENOXAPARIN SODIUM 40 MG: 40 INJECTION SUBCUTANEOUS at 19:55

## 2021-01-01 RX ADMIN — CALCIUM CARBONATE (ANTACID) CHEW TAB 500 MG 400 MG: 500 CHEW TAB at 10:01

## 2021-01-01 RX ADMIN — DORZOLAMIDE HYDROCHLORIDE AND TIMOLOL MALEATE 1 DROP: 20; 5 SOLUTION/ DROPS OPHTHALMIC at 18:28

## 2021-01-01 RX ADMIN — Medication 10 ML: at 05:57

## 2021-01-01 RX ADMIN — OXYCODONE HYDROCHLORIDE 5 MG: 5 SOLUTION ORAL at 01:44

## 2021-01-01 RX ADMIN — Medication 1 AMPULE: at 21:13

## 2021-01-01 RX ADMIN — BRIMONIDINE TARTRATE 1 DROP: 2 SOLUTION OPHTHALMIC at 23:08

## 2021-01-01 RX ADMIN — LOSARTAN POTASSIUM 100 MG: 100 TABLET, FILM COATED ORAL at 08:52

## 2021-01-01 RX ADMIN — Medication 1 AMPULE: at 22:02

## 2021-01-01 RX ADMIN — LIDOCAINE HYDROCHLORIDE 40 MG: 20 INJECTION, SOLUTION EPIDURAL; INFILTRATION; INTRACAUDAL; PERINEURAL at 07:35

## 2021-01-01 RX ADMIN — GADOTERIDOL 15 ML: 279.3 INJECTION, SOLUTION INTRAVENOUS at 22:38

## 2021-01-01 RX ADMIN — CALCIUM GLUCONATE 2 G: 20 INJECTION, SOLUTION INTRAVENOUS at 14:18

## 2021-01-01 RX ADMIN — SODIUM BICARBONATE 1300 MG: 650 TABLET ORAL at 18:28

## 2021-01-01 RX ADMIN — Medication 10 ML: at 23:00

## 2021-01-01 RX ADMIN — LATANOPROST 1 DROP: 50 SOLUTION OPHTHALMIC at 22:02

## 2021-05-25 PROBLEM — C79.9 METASTASIS FROM ESOPHAGEAL CANCER (HCC): Status: ACTIVE | Noted: 2021-01-01

## 2021-05-25 PROBLEM — C15.9 METASTASIS FROM ESOPHAGEAL CANCER (HCC): Status: ACTIVE | Noted: 2021-01-01

## 2021-05-25 NOTE — LETTER
5/25/2021 Patient: Luis Fernando Bolaños YOB: 1940 Date of Visit: 5/25/2021 Waldemar Cramer MD 
[address] [address] [address] Fax: [phone] Zee Isaac MD 
[address] [address] [address] Fax: [phone] Dear MD Zee Lopez MD, Thank you for referring Ms. Arslan Lundberg to  [facility] for evaluation. My notes for this consultation are attached. If you have questions, please do not hesitate to call me. I look forward to following your patient along with you.  
 
 
Sincerely, 
 
Frieda Carlos MD

## 2021-05-25 NOTE — H&P (VIEW-ONLY)
HISTORY OF PRESENT ILLNESS Catalina Garcia is a [de-identified] y.o. female who comes in for consultation by Marylu Mendoza MD and Dr Salma Padgett for consideration for a port a cath HPI She was dx with esophageal cancer in 2019. She initially was reluctant but underwent chemoRT. It was cut short due to C diff   She had cryotherapy by Dr Марина Kramer. She developed lung, liver and bone mets. She was placed on capecitabine and did ok but now has progression. 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 Past Medical History:  
Diagnosis Date  Abnormal x-ray of abdomen 5/22/2019 Bas showed food and irregular stricture above ge junction in addition to large hiatal hernia  5.20.2019  Adenocarcinoma of esophagus ([address] 5/30/2019  
 chemotherapy  Anemia  Arthritis  C. difficile enteritis 06/2020  Chronic kidney disease Stage II followed by Dr Gia Moura Nephrology  Diverticulosis  Esophageal dysphagia 5/22/2019  H/O colonoscopy with polypectomy   
 benign  History of colon polyps 6/1/2018 2013 tubular adenoma  Hypertension  Ill-defined condition   
 pulmonary hypertension  Other ill-defined conditions(799.89)   
 glaucoma and cataracts  Sleep apnea CPAP compliant, as stated 5/20/2019  Thromboembolus ([address] 2015 Right  leg , spontaneous  Thyroid disease   
 hypothyroidism Past Surgical History:  
Procedure Laterality Date  ANAL PRESSURE RECORD  6/6/2019  COLONOSCOPY N/A 6/1/2018 COLONOSCOPY performed by Winston Oropeza MD at [place] [address]  6/1/2018  HX CATARACT REMOVAL Bilateral 2017  HX HEENT  1984 thyroid biopsy-benign  HX HYSTERECTOMY  IR INSERT NON TUNL CVC OVER 5 YRS  3/8/2020  IR INSERT TUNL CVC W PORT OVER 5 YEARS  7/19/2019  IR REMOVE TUNL CVAD W/O PORT / PUMP  3/8/2020  UPPER GI ENDOSCOPY,BALL DIL,30MM  5/22/2019  UPPER GI ENDOSCOPY,BALL DIL,30MM  5/30/2019  UPPER GI ENDOSCOPY,BIOPSY  5/22/2019  UPPER GI ENDOSCOPY,BIOPSY  5/30/2019  UPPER GI ENDOSCOPY,BIOPSY  10/17/2019 History reviewed. No pertinent family history. Social History Tobacco Use  Smoking status: Never Smoker  Smokeless tobacco: Never Used Substance Use Topics  Alcohol use: Not Currently Comment: in her 19's  Drug use: Never Current Outpatient Medications Medication Sig  
 sodium bicarbonate/sod citrat (SODIUM BICARB AND CITRATE PO) Take  by mouth.  losartan (COZAAR) 100 mg tablet Take 100 mg by mouth daily. Indications: high blood pressure  diphenoxylate-atropine (LOMOTIL) 2.5-0.025 mg per tablet Take 1 Tab by mouth four (4) times daily as needed for Diarrhea. Max Daily Amount: 4 Tabs.  famotidine (PEPCID) 20 mg tablet Take 1 Tab by mouth Daily (before breakfast). Indications: gastroesophageal reflux disease  omeprazole (PRILOSEC OTC) 20 mg tablet Take 20 mg by mouth daily.  folic acid-vit E3-THZ Q14 (FOLBEE) 2.5-25-1 mg tablet Take 1 Tab by mouth daily.  latanoprost (XALATAN) 0.005 % ophthalmic solution Administer 1 Drop to both eyes nightly.  DORZOLAMIDE HCL/TIMOLOL MALEAT (DORZOLAMIDE-TIMOLOL OP) Apply 1 Drop to eye three (3) times daily. One drop to each eye twice daily  brimonidine (ALPHAGAN) 0.2 % ophthalmic solution Administer 1 Drop to both eyes three (3) times daily.  acetaminophen (TYLENOL EXTRA STRENGTH) 500 mg tablet Take 1,000 mg by mouth every six (6) hours as needed. Indications: ARTHRITIC PAIN, PAIN  
 levothyroxine (SYNTHROID) 100 mcg tablet Take 100 mcg by mouth Daily (before breakfast). Indications: HYPOTHYROIDISM  magnesium oxide (MAG-OX) 400 mg tablet Take 400 mg by mouth two (2) times a day. (Patient not taking: Reported on 5/25/2021) No current facility-administered medications for this visit. No Known Allergies Review of Systems Constitutional: Negative for chills, diaphoresis, fever and weight loss. HENT: Negative for sore throat. Eyes: Negative for blurred vision and discharge. Respiratory: Negative for cough, shortness of breath and wheezing. Cardiovascular: Negative for chest pain, palpitations, orthopnea, claudication and leg swelling. Gastrointestinal: Negative for abdominal pain, constipation, diarrhea, heartburn, melena, nausea and vomiting. Dysphagia Genitourinary: Negative for dysuria, flank pain, frequency and hematuria. Musculoskeletal: Negative for back pain, joint pain, myalgias and neck pain. Skin: Negative for rash. Neurological: Negative for dizziness, speech change, focal weakness, seizures, loss of consciousness, weakness and headaches. Endo/Heme/Allergies: Does not bruise/bleed easily. Psychiatric/Behavioral: Negative for depression and memory loss. Visit Vitals BP (!) 156/89 (BP 1 Location: Left upper arm, BP Patient Position: Sitting, BP Cuff Size: Adult) Pulse 66 Temp 97.3 °F (36.3 °C) (Temporal) Resp 16 Ht 5' 8\" (1.727 m) Wt 88 kg (194 lb) SpO2 97% BMI 29.50 kg/m² Physical Exam 
Constitutional:   
   General: She is not in acute distress. Appearance: She is well-developed. She is not diaphoretic. HENT:  
   Head: Normocephalic and atraumatic. Mouth/Throat:  
   Pharynx: No oropharyngeal exudate. Eyes:  
   General: No scleral icterus. Conjunctiva/sclera: Conjunctivae normal.  
   Pupils: Pupils are equal, round, and reactive to light. Neck:  
   Thyroid: No thyromegaly. Vascular: No JVD. Trachea: No tracheal deviation. Cardiovascular:  
   Rate and Rhythm: Normal rate and regular rhythm. Heart sounds: No murmur heard. No friction rub. No gallop. Pulmonary:  
   Effort: Pulmonary effort is normal. No respiratory distress. Breath sounds: Normal breath sounds. No wheezing or rales.   
Abdominal:  
   General: Bowel sounds are normal. There is no distension. Palpations: Abdomen is soft. There is no mass. Tenderness: There is no abdominal tenderness. There is no guarding or rebound. Musculoskeletal:     
   General: Normal range of motion. Cervical back: Normal range of motion and neck supple. Lymphadenopathy:  
   Cervical: No cervical adenopathy. Skin: 
   General: Skin is warm and dry. Coloration: Skin is not pale. Findings: No erythema or rash. Neurological:  
   Mental Status: She is alert and oriented to person, place, and time. Cranial Nerves: No cranial nerve deficit. Psychiatric:     
   Behavior: Behavior normal.     
   Thought Content: Thought content normal.     
   Judgment: Judgment normal.  
 
 
 
ASSESSMENT and PLAN 1. 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 I explained to her about the anatomy and pathophysiology of port a cath placement with risks including, but not limited to, bleeding, infection, pneumothorax, DVT/PE, cardiac/CNS/pulmonary complications. 2.  Hypothyroidism  Stable on rx She desires a left sided port a cath insertion under MAC anesthesia as an outpatient (URGENT) Will add on for tomorrow morning The patient was counseled at length about the risks of shawn Covid-19 in the emeterio-operative and post-operative states including the recovery window of their procedure. The patient was made aware that shawn Covid-19 after a surgical procedure may worsen their prognosis for recovering from the virus and lend to a higher morbidity and or mortality risk. The patient was given the options of postponing their procedure. All of the risks, benefits, and alternatives were discussed. The patient  wishes to proceed with the procedure.  
 
Frieda Carlos MD FACS

## 2021-05-25 NOTE — PERIOP NOTES
[facility] Ambulatory Surgery Unit Pre-operative Instructions Surgery/Procedure Date  Wednesday, May 26, 2021            Tentative Arrival Time 0279 1. On the day of your surgery/procedure, please report to the Ambulatory Surgery Unit Registration Desk and sign in at your designated time. The Ambulatory Surgery Unit is located in [facility] on the Critical access hospital side of the [facility] across from the [address] [place] [address]. Please have all of your health insurance cards and a photo ID. 2. You must have someone with you to drive you home, as you should not drive a car for 24 hours following anesthesia. Please make arrangements for a responsible adult friend or family member to stay with you for at least the first 24 hours after your surgery. 3. Do not have anything to eat or drink (including water, gum, mints, coffee, juice) after 11:59 PM, Tuesday. This may not apply to medications prescribed by your physician. (Please note below the special instructions with medications to take the morning of surgery, if applicable.) 4. We recommend you do not drink any alcoholic beverages for 24 hours before and after your surgery. 5. Contact your surgeons office for instructions on the following medications: non-steroidal anti-inflammatory drugs (i.e. Advil, Aleve), vitamins, and supplements. (Some surgeons will want you to stop these medications prior to surgery and others may allow you to take them) **If you are currently taking Plavix, Coumadin, Aspirin and/or other blood-thinning agents, contact your surgeon for instructions. ** Your surgeon will partner with the physician prescribing these medications to determine if it is safe to stop or if you need to continue taking. Please do not stop taking these medications without instructions from your surgeon.  
 
6. In an effort to help prevent surgical site infection, we ask that you shower with an anti-bacterial soap (i.e. Dial/Safeguard, or the soap provided to you at your preadmission testing appointment) for 3 days prior to and on the morning of surgery, using a fresh towel after each shower. (Please begin this process with fresh bed linens.) Do not apply any lotions, powders, or deodorants after the shower on the day of your procedure. If applicable, please do not shave the operative site for 48 hours prior to surgery. 7. Wear comfortable clothes. Wear glasses instead of contacts. Do not bring any jewelry or money (other than copays or fees as instructed). Do not wear make-up, particularly mascara, the morning of your surgery. Do not wear nail polish, particularly if you are having foot /hand surgery. Wear your hair loose or down, no ponytails, buns, mary pins or clips. All body piercings must be removed. 8. You should understand that if you do not follow these instructions your surgery may be cancelled. If your physical condition changes (i.e. fever, cold or flu) please contact your surgeon as soon as possible. 9. It is important that you be on time. If a situation occurs where you may be late, or if you have any questions or problems, please call [phone]. 
 
10. Your surgery time may be subject to change. You will receive a phone call the day prior to surgery to confirm your arrival time. 11. Pediatric patients: please bring a change of clothes, diapers, bottle/sippy cup, pacifier, etc. 
 
 
Special Instructions: Take all medications and inhalers, as prescribed, on the morning of surgery with a sip of water. Pt not taking chemo meds tomorrow. I understand a pre-operative phone call will be made to verify my surgery time. In the event that I am not available, I give permission for a message to be left on my answering service and/or with another person? yes Preop instructions reviewed  Pt verbalized understanding.  
 
 ___________________      ___________________      ________________ (Signature of Patient)          (Witness)                   (Date and Time)

## 2021-05-25 NOTE — PROGRESS NOTES
Identified pt with two pt identifiers(name and ). Reviewed record in preparation for visit and have obtained necessary documentation. All patient medications has been reviewed. Chief Complaint Patient presents with  Esophageal Cancer Seen at the request of Dr Jesse Narvaez for eval of port placement Health Maintenance Due Topic  COVID-19 Vaccine (1)  DTaP/Tdap/Td series (1 - Tdap)  Shingrix Vaccine Age 50> (1 of 2)  Bone Densitometry (Dexa) Screening  Medicare Yearly Exam   
 
 
Vitals:  
 21 7120 21 6207 BP: (!) 160/107 (!) 156/89 Pulse: 66 Resp: 16 Temp: 97.3 °F (36.3 °C) TempSrc: Temporal   
SpO2: 97% Weight: 88 kg (194 lb) Height: 5' 8\" (1.727 m) PainSc:   0 - No pain 4. Have you been to the ER, urgent care clinic since your last visit? Hospitalized since your last visit? No 
 
5. Have you seen or consulted any other health care providers outside of the [address] [place] [address] since your last visit? Include any pap smears or colon screening. No 
 
Patient is accompanied by self I have received verbal consent from Rey Marcos to discuss any/all medical information while they are present in the room.

## 2021-05-25 NOTE — PROGRESS NOTES
HISTORY OF PRESENT ILLNESS Thornell Goldmann is a [de-identified] y.o. female who comes in for consultation by Rhonda Soto MD and Dr Prisca De Dios for consideration for a port a cath HPI She was dx with esophageal cancer in 2019. She initially was reluctant but underwent chemoRT. It was cut short due to C diff   She had cryotherapy by Dr Brenton Arreola. She developed lung, liver and bone mets. She was placed on capecitabine and did ok but now has progression. 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 Past Medical History:  
Diagnosis Date  Abnormal x-ray of abdomen 5/22/2019 Bas showed food and irregular stricture above ge junction in addition to large hiatal hernia  5.20.2019  Adenocarcinoma of esophagus ([address] 5/30/2019  
 chemotherapy  Anemia  Arthritis  C. difficile enteritis 06/2020  Chronic kidney disease Stage II followed by Dr Mitchell Day [facility]  Diverticulosis  Esophageal dysphagia 5/22/2019  H/O colonoscopy with polypectomy   
 benign  History of colon polyps 6/1/2018 2013 tubular adenoma  Hypertension  Ill-defined condition   
 pulmonary hypertension  Other ill-defined conditions(799.89)   
 glaucoma and cataracts  Sleep apnea CPAP compliant, as stated 5/20/2019  Thromboembolus ([address] 2015 Right  leg , spontaneous  Thyroid disease   
 hypothyroidism Past Surgical History:  
Procedure Laterality Date  ANAL PRESSURE RECORD  6/6/2019  COLONOSCOPY N/A 6/1/2018 COLONOSCOPY performed by Hannah Segovia MD at [place] [address]  6/1/2018  HX CATARACT REMOVAL Bilateral 2017  HX HEENT  1984 thyroid biopsy-benign  HX HYSTERECTOMY  IR INSERT NON TUNL CVC OVER 5 YRS  3/8/2020  IR INSERT TUNL CVC W PORT OVER 5 YEARS  7/19/2019  IR REMOVE TUNL CVAD W/O PORT / PUMP  3/8/2020  UPPER GI ENDOSCOPY,BALL DIL,30MM  5/22/2019  UPPER GI ENDOSCOPY,BALL DIL,30MM  5/30/2019  UPPER GI ENDOSCOPY,BIOPSY  5/22/2019  UPPER GI ENDOSCOPY,BIOPSY  5/30/2019  UPPER GI ENDOSCOPY,BIOPSY  10/17/2019 History reviewed. No pertinent family history. Social History Tobacco Use  Smoking status: Never Smoker  Smokeless tobacco: Never Used Substance Use Topics  Alcohol use: Not Currently Comment: in her 19's  Drug use: Never Current Outpatient Medications Medication Sig  
 sodium bicarbonate/sod citrat (SODIUM BICARB AND CITRATE PO) Take  by mouth.  losartan (COZAAR) 100 mg tablet Take 100 mg by mouth daily. Indications: high blood pressure  diphenoxylate-atropine (LOMOTIL) 2.5-0.025 mg per tablet Take 1 Tab by mouth four (4) times daily as needed for Diarrhea. Max Daily Amount: 4 Tabs.  famotidine (PEPCID) 20 mg tablet Take 1 Tab by mouth Daily (before breakfast). Indications: gastroesophageal reflux disease  omeprazole (PRILOSEC OTC) 20 mg tablet Take 20 mg by mouth daily.  folic acid-vit Z4-XMZ J25 (FOLBEE) 2.5-25-1 mg tablet Take 1 Tab by mouth daily.  latanoprost (XALATAN) 0.005 % ophthalmic solution Administer 1 Drop to both eyes nightly.  DORZOLAMIDE HCL/TIMOLOL MALEAT (DORZOLAMIDE-TIMOLOL OP) Apply 1 Drop to eye three (3) times daily. One drop to each eye twice daily  brimonidine (ALPHAGAN) 0.2 % ophthalmic solution Administer 1 Drop to both eyes three (3) times daily.  acetaminophen (TYLENOL EXTRA STRENGTH) 500 mg tablet Take 1,000 mg by mouth every six (6) hours as needed. Indications: ARTHRITIC PAIN, PAIN  
 levothyroxine (SYNTHROID) 100 mcg tablet Take 100 mcg by mouth Daily (before breakfast). Indications: HYPOTHYROIDISM  magnesium oxide (MAG-OX) 400 mg tablet Take 400 mg by mouth two (2) times a day. (Patient not taking: Reported on 5/25/2021) No current facility-administered medications for this visit. No Known Allergies Review of Systems Constitutional: Negative for chills, diaphoresis, fever and weight loss. HENT: Negative for sore throat. Eyes: Negative for blurred vision and discharge. Respiratory: Negative for cough, shortness of breath and wheezing. Cardiovascular: Negative for chest pain, palpitations, orthopnea, claudication and leg swelling. Gastrointestinal: Negative for abdominal pain, constipation, diarrhea, heartburn, melena, nausea and vomiting. Dysphagia Genitourinary: Negative for dysuria, flank pain, frequency and hematuria. Musculoskeletal: Negative for back pain, joint pain, myalgias and neck pain. Skin: Negative for rash. Neurological: Negative for dizziness, speech change, focal weakness, seizures, loss of consciousness, weakness and headaches. Endo/Heme/Allergies: Does not bruise/bleed easily. Psychiatric/Behavioral: Negative for depression and memory loss. Visit Vitals BP (!) 156/89 (BP 1 Location: Left upper arm, BP Patient Position: Sitting, BP Cuff Size: Adult) Pulse 66 Temp 97.3 °F (36.3 °C) (Temporal) Resp 16 Ht 5' 8\" (1.727 m) Wt 88 kg (194 lb) SpO2 97% BMI 29.50 kg/m² Physical Exam 
Constitutional:   
   General: She is not in acute distress. Appearance: She is well-developed. She is not diaphoretic. HENT:  
   Head: Normocephalic and atraumatic. Mouth/Throat:  
   Pharynx: No oropharyngeal exudate. Eyes:  
   General: No scleral icterus. Conjunctiva/sclera: Conjunctivae normal.  
   Pupils: Pupils are equal, round, and reactive to light. Neck:  
   Thyroid: No thyromegaly. Vascular: No JVD. Trachea: No tracheal deviation. Cardiovascular:  
   Rate and Rhythm: Normal rate and regular rhythm. Heart sounds: No murmur heard. No friction rub. No gallop. Pulmonary:  
   Effort: Pulmonary effort is normal. No respiratory distress. Breath sounds: Normal breath sounds. No wheezing or rales.   
Abdominal:  
   General: Bowel sounds are normal. There is no distension. Palpations: Abdomen is soft. There is no mass. Tenderness: There is no abdominal tenderness. There is no guarding or rebound. Musculoskeletal:     
   General: Normal range of motion. Cervical back: Normal range of motion and neck supple. Lymphadenopathy:  
   Cervical: No cervical adenopathy. Skin: 
   General: Skin is warm and dry. Coloration: Skin is not pale. Findings: No erythema or rash. Neurological:  
   Mental Status: She is alert and oriented to person, place, and time. Cranial Nerves: No cranial nerve deficit. Psychiatric:     
   Behavior: Behavior normal.     
   Thought Content: Thought content normal.     
   Judgment: Judgment normal.  
 
 
 
ASSESSMENT and PLAN 1. 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 I explained to her about the anatomy and pathophysiology of port a cath placement with risks including, but not limited to, bleeding, infection, pneumothorax, DVT/PE, cardiac/CNS/pulmonary complications. 2.  Hypothyroidism  Stable on rx She desires a left sided port a cath insertion under MAC anesthesia as an outpatient (URGENT) Will add on for tomorrow morning The patient was counseled at length about the risks of shawn Covid-19 in the emeterio-operative and post-operative states including the recovery window of their procedure. The patient was made aware that shawn Covid-19 after a surgical procedure may worsen their prognosis for recovering from the virus and lend to a higher morbidity and or mortality risk. The patient was given the options of postponing their procedure. All of the risks, benefits, and alternatives were discussed. The patient  wishes to proceed with the procedure.  
 
Vicente Huerta MD FACS

## 2021-05-26 NOTE — INTERVAL H&P NOTE
Update History & Physical 
 
The Patient's History and Physical of May 25,  
2021 was reviewed with the patient and I examined the patient. There was no change. The surgical site was confirmed by the patient and me. Plan:  The risk, benefits, expected outcome, and alternative to the recommended procedure have been discussed with the patient. Patient understands and wants to proceed with the procedure.  
 
Electronically signed by Brian Fink MD on 5/26/2021 at 6:53 AM

## 2021-05-26 NOTE — DISCHARGE INSTRUCTIONS
Discharge Instructions:  Port a cath Insertion  Dr. Atul Boyd    Call for appointment for follow up in 2 weeks [phone]    Activity:    Walk regularly. You may resume driving in 24 hours unless still requiring narcotics for pain. It is ok to use the arm on side of surgery but do not over do it. Work:    You may return to work in 3-7 days to light activity. No lifting more than 10 pounds for one week. Diet:    You may resume normal diet after 24 hours. Anesthesia and narcotics may cause nausea and vomiting. If persistent please call the office. Wound Care: You have a special dressing called Dermabond. It is okay to shower and let the water run over the incision but do not scrub the area or soak in a tub. If you have a small amount of drainage you may place a dry bandage over the wound and change it daily. If you experience a lot of drainage, develop redness around the wound, or a fever over 101 F occurs please call the office. Medications:    Resume home medications as indicated on the Medical Reconciliation form. Aspirin, Coumadin, and Plavix can be restarted on post operative day 2 if you were taking them preoperatively. Pain medications:  Non steroidal antiinflammatories seem to work best for post surgical pain. Try these first as prescribed. A narcotic prescription will also be given for breakthrough pain. Over the counter stool softeners and laxatives may be used if needed. Do not hesitate to call with questions or concerns. DO NOT TAKE TYLENOL/ACETAMINOPHEN WITH PERCOCET, [facility], [address] TAKE NARCOTIC PAIN MEDICATIONS WITH FOOD     Narcotics tend to be constipating, we suggest taking a stool softener such as Colace or Miralax (follow package instructions). DO NOT DRIVE WHILE TAKING NARCOTIC PAIN MEDICATIONS.     DO NOT TAKE SLEEPING MEDICATIONS OR ANTIANXIETY MEDICATIONS WHILE TAKING NARCOTIC PAIN MEDICATIONS,  ESPECIALLY THE NIGHT OF ANESTHESIA! CPAP PATIENTS BE SURE TO WEAR MACHINE WHENEVER NAPPING OR SLEEPING! DISCHARGE SUMMARY from Nurse    The following personal items collected during your admission are returned to you:   Dental Appliance: Dental Appliances: Uppers, Lowers, With patient  Vision: Visual Aid: None  Hearing Aid:    Jewelry: Jewelry: None  Clothing: Clothing: With patient  Other Valuables: Other Valuables: Cane, With patient (Under stretcher)  Valuables sent to safe:        PATIENT INSTRUCTIONS:    After General Anesthesia or Intravenous Sedation, for 24 hours or while taking prescription Narcotics:        Someone should be with you for the next 24 hours. For your own safety, a responsible adult must drive you home. · Limit your activities  · Recommended activity: Rest today, up with assistance today. Do not climb stairs or shower unattended for the next 24 hours. · Please start with a soft bland diet and advance as tolerated (no nausea) to regular diet. · If you have a sore throat you should try the following: fluids, warm salt water gargles, or throat lozenges. If it does not improve after several days please follow up with your primary physician. · Do not drive and operate hazardous machinery  · Do not make important personal or business decisions  · Do  not drink alcoholic beverages  · If you have not urinated within 8 hours after discharge, please contact your surgeon on call. Report the following to your surgeon:  · Excessive pain, swelling, redness or odor of or around the surgical area  · Temperature over 100.5  · Nausea and vomiting lasting longer than 4 hours or if unable to take medications  · Any signs of decreased circulation or nerve impairment to extremity: change in color, persistent  numbness, tingling, coldness or increase pain      · You will receive a Post Operative Call from one of the Recovery Room Nurses on the day after your surgery to check on you.  It is very important for us to know how you are recovering after your surgery. If you have an issue or need to speak with someone, please call your surgeon, do not wait for the post operative call. · You may receive an e-mail or letter in the mail from Valentín regarding your experience with us in the Ambulatory Surgery Unit. Your feedback is valuable to us and we appreciate your participation in the survey. · If the above instructions are not adequate or you are having problems after your surgery, call the physician at their office number. · We wish you a speedy recovery ? What to do at Home:      *  Please give a list of your current medications to your Primary Care Provider. *  Please update this list whenever your medications are discontinued, doses are      changed, or new medications (including over-the-counter products) are added. *  Please carry medication information at all times in case of emergency situations. If you have not received your influenza and/or pneumococcal vaccine, please follow up with your primary care physician. The discharge information has been reviewed with the patient and caregiver. The patient and caregiver verbalized understanding. TO PREVENT AN INFECTION      1. [address] YOUR HANDS     To prevent infection, good handwashing is the most important thing you or your caregiver can do.  Wash your hands with soap and water or use the hand  we gave you before you touch any wounds. 2. SHOWER     Use the antibacterial soap we gave you when you take a shower.  Shower with this soap until your wounds are healed.  To reach all areas of your body, you may need someone to help you.  Dont forget to clean your belly button with every shower. 3.  USE CLEAN SHEETS     Use freshly cleaned sheets on your bed after surgery.  To keep the surgery site clean, do not allow pets to sleep with you while your wound is still healing.      4. STOP SMOKING     Stop smoking, or at least cut back on smoking     Smoking slows your healing. 5.  CONTROL YOUR BLOOD SUGAR     High blood sugars slow wound healing.  If you are diabetic, control your blood sugar levels before and after your surgery.

## 2021-05-26 NOTE — PERIOP NOTES
Permission received to review discharge instructions and discuss private health information with son, Bailey Nguyen. Patient states that son will be with them for at least 24 hours following today's procedure. Mistral-Air warming blanket applied at this time. Set to appropriate setting that is comfortable to patient. Will continue to monitor.

## 2021-05-26 NOTE — BRIEF OP NOTE
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 Procedure(s): 
INSERTION LEFT SIDE PORT A CATH (URGENT) Surgeon(s): 
Glenroy Conde MD 
 
Surgical Assistant: None Anesthesia: MAC Estimated Blood Loss (mL): Minimal 
 
Complications: None Specimens: * No specimens in log * Implants:  
Implant Name Type Inv. Item Serial No.  Lot No. LRB No. Used Action PORT SL POWERPORT 8FR TI -- CONVERT TO ITEM [id number] - SNA  PORT SL POWERPORT 8FR TI -- CONVERT TO ITEM [id number] NA BARD PER[employer] VASC[employer]_WD [id number] Left 1 Implanted Drains: * No LDAs found * Findings: left subclavian vein approach Electronically Signed by Parag Pena MD on 5/26/2021 at 8:10 AM

## 2021-05-26 NOTE — ANESTHESIA PREPROCEDURE EVALUATION
Relevant Problems   RENAL FAILURE   (+) SONIDO (acute kidney injury) ([facility] [address]   (+) Acute on chronic renal failure (HCC)      PERSONAL HX & FAMILY HX OF CANCER   (+) Adenocarcinoma of esophagus (HCC)   (+) Metastasis from esophageal cancer (HCC)       Anesthetic History   No history of anesthetic complications            Review of Systems / Medical History  Patient summary reviewed, nursing notes reviewed and pertinent labs reviewed    Pulmonary        Sleep apnea: CPAP           Neuro/Psych   Within defined limits           Cardiovascular    Hypertension: well controlled              Exercise tolerance: >4 METS  Comments: 03/20 ECHO= EF 60-65%, moderate PH, mild-mod TR   GI/Hepatic/Renal         Renal disease: CRI  Hiatal hernia  Pertinent negatives: No GERD   Endo/Other      Hypothyroidism: well controlled  Morbid obesity, arthritis and cancer (esophageal with mets to liver, lung, bone)     Other Findings   Comments: Dysphagia  Esophageal stent  CA esophagus s/p chemo           Physical Exam    Airway  Mallampati: I  TM Distance: > 6 cm  Neck ROM: normal range of motion   Mouth opening: Normal     Cardiovascular    Rhythm: regular  Rate: normal         Dental    Dentition: Full lower dentures and Full upper dentures     Pulmonary  Breath sounds clear to auscultation               Abdominal  GI exam deferred       Other Findings            Anesthetic Plan    ASA: 3  Anesthesia type: MAC          Induction: Intravenous  Anesthetic plan and risks discussed with: Patient

## 2021-05-26 NOTE — OP NOTES
[address] 
OPERATIVE REPORT Name:  Giuseppe Haley 
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 POSTOPERATIVE DIAGNOSIS:  Esophageal cancer metastatic to the liver, lung, and bone. PROCEDURE PERFORMED:  Insertion of left subclavian vein 8-[place] PowerPort with supervision interpretation by Radiology. SURGEON:  Abelardo Christopher MD 
 
ASSISTANT:  Staff. ANESTHESIA:  MAC. COMPLICATIONS:  None. SPECIMENS REMOVED:  None. IMPLANTS:  Bard peripheral vascular PowerPort 8-[place] Titanium, lot# [id number]. ESTIMATED BLOOD LOSS:  Minimal. 
 
FINDINGS:  Left subclavian vein approach. BRIEF HISTORY:  The patient is an 24-year-old female with metastatic esophageal cancer. She did not have resection, had received some chemotherapy, but has had progression. It is a HER2/deja-driven tumor and so the plan is for monoclonal antibody therapy and she needs ventral access for that. She understands the risks and benefits of Port-A-Cath placement and wished to proceed. These are outline in my office notes. PROCEDURE:  The patient was taken to the operating room, placed on the operating room table in the supine position, underwent IV sedation and the arms were tucked and padded at the side and the neck and chest were prepped and draped in the usual sterile fashion. After appropriate time-out and antibiotics were given, with the patient in steep Trendelenburg, the chest and left brachial pectoral region was anesthetized with lidocaine with epinephrine as well as the region between the clavicle and first rib. An 18-gauge needle was inserted into the subclavian vein after first pass without difficulty and a wire was placed over utilizing Seldinger technique. The needle was removed.   Utilizing fluoroscopic guidance, we confirmed that it is going down to the right atrial-SVC junction with the wire. Next, we made a transverse oblique incision to include the insertion site of the wire and a path was created inferiorly to that for the port to fit. Then the 8-Turkish dilator and the peel-away sheath was placed over the guidewire and the wire and dilator were removed. The catheter was placed through the peel-away sheath and then again utilizing fluoroscopic guidance, it was adjusted to the cavoatrial junction. Then it was amputated off and attached to the port. The port was then sewn down to the chest wall with interrupted 2-0 Prolene suture. Next, the port was accessed with a 21-gauge Aguirre needle and had excellent blood return. It was flushed with heparinized saline followed by 3 mL of concentrated heparin flush and then was deaccessed. Interrupted 4-0 Vicryl was used to reapproximate the deep tissues and deep dermis and a running 4-0 Vicryl was used to close the skin and Dermabond dressing was then applied. Upon completion of the operation, the needle, sponge, and instrument counts were correct x2. The patient had tolerated the procedure well and was allowed to awaken and brought to the recovery room. Madison Victoria MD 
 
 
[id number] 
D:  05/26/2021 8:13 
T:  05/26/2021 13:03 
JOB #:  [id number] CC:   MD Jordana Colon MD

## 2021-05-26 NOTE — PERIOP NOTES
Melanie Friends 1940 
[id number] Situation: 
Verbal report given from: BERKLEY Sandoval CRNA [facility] RN Procedure: Procedure(s): 
INSERTION LEFT SIDE PORT A CATH (URGENT) Background: 
 
Preoperative diagnosis: ESOPHAGEAL CANCER TO LIVER Postoperative diagnosis: ESOPHAGEAL CANCER TO LIVER :  Dr. Atul Boyd Assistant(s): Circ-1: Carrie Scott Scrub Tech-1: Meseret Dinero Specimens: * No specimens in log * Assessment: 
Intra-procedure medications Anesthesia gave intra-procedure sedation and medications, see anesthesia flow sheet Intravenous fluids: Eva Patches Vital signs stable Recommendation:

## 2021-05-26 NOTE — PERIOP NOTES
Chest xray taken, awaiting results. D/c instructions completed, all questions answered. Reviewed when to call the doctor, diet, activity and hygiene. Reviewed when and what to take for pain. 0858-Xray results are back, port in proper placement. Pt given liquids. 0900- Pt voided x 1 in bathroom 
 
0924-Transported via w/c to awaiting transportation.

## 2021-05-26 NOTE — ANESTHESIA POSTPROCEDURE EVALUATION
Procedure(s): 
INSERTION LEFT SIDE PORT A CATH (URGENT). MAC Anesthesia Post Evaluation Multimodal analgesia: multimodal analgesia used between 6 hours prior to anesthesia start to PACU discharge Patient location during evaluation: PACU Patient participation: complete - patient participated Level of consciousness: awake and alert Pain score: 2 Airway patency: patent Anesthetic complications: no 
Cardiovascular status: acceptable Respiratory status: acceptable Hydration status: acceptable Comments: Postop CXR read Post anesthesia nausea and vomiting:  none Final Post Anesthesia Temperature Assessment:  Normothermia (36.0-37.5 degrees C) INITIAL Post-op Vital signs:  
Vitals Value Taken Time /86 05/26/21 0845 Temp 36.2 °C (97.1 °F) 05/26/21 0228 Pulse 61 05/26/21 0858 Resp 19 05/26/21 0858 SpO2 100 % 05/26/21 0858 Vitals shown include unvalidated device data.

## 2021-06-11 NOTE — PROGRESS NOTES
Identified pt with two pt identifiers(name and ). Reviewed record in preparation for visit and have obtained necessary documentation. All patient medications has been reviewed. No chief complaint on file. Health Maintenance Due Topic  COVID-19 Vaccine (1)  DTaP/Tdap/Td series (1 - Tdap)  Shingrix Vaccine Age 50> (1 of 2)  Bone Densitometry (Dexa) Screening  Pneumococcal 65+ yrs at Risk Vaccine (2 of 2 - PCV13)  Medicare Yearly Exam   
 
 
Vitals:  
 21 1017 Temp: (!) 96.3 °F (35.7 °C) TempSrc: Temporal  
Weight: 87.6 kg (193 lb 3.2 oz) PainSc:   0 - No pain 4. Have you been to the ER, urgent care clinic since your last visit? Hospitalized since your last visit? No 
 
5. Have you seen or consulted any other health care providers outside of the [address] [place] [address] since your last visit? Include any pap smears or colon screening. No 
 
 
Patient is accompanied by self I have received verbal consent from Thornell Goldmann to discuss any/all medical information while they are present in the room.

## 2021-06-11 NOTE — PROGRESS NOTES
Surgery Follow up Procedure: port a cath insertion OR date:  5/26/2021 Path:  none S I feel fine, no diarrhea Visit Vitals BP (!) 102/58 (BP 1 Location: Right arm, BP Patient Position: Sitting) Pulse (!) 45 Temp (!) 96.3 °F (35.7 °C) (Temporal) Wt 87.6 kg (193 lb 3.2 oz) SpO2 99% BMI 29.38 kg/m² O Incisions healing well without infection No signs of hernia A/P Doing well  RTC prn 
 
Russ Xiong MD FACS

## 2021-06-11 NOTE — LETTER
6/11/2021 Patient: Moustapha Bliss YOB: 1940 Date of Visit: 6/11/2021 Angela Farley MD 
[address] [place] [address] [address] [address] Via Fax: [phone] Yrn Strong MD 
[address] [address] Via Fax: [phone] Dear MD Yrn Dinero MD, Thank you for referring Ms. Carol De Los Santos to [address] [place] [address] for evaluation. My notes for this consultation are attached. If you have questions, please do not hesitate to call me. I look forward to following your patient along with you.  
 
 
Sincerely, 
 
Theresa Wiley MD

## 2021-08-27 NOTE — PROGRESS NOTES
Identified pt with two pt identifiers(name and ). Reviewed record in preparation for visit and have obtained necessary documentation. All patient medications has been reviewed. Chief Complaint   Patient presents with    Abdominal Pain     Seen at the request of Dr Catalina oDnovan for eval for g tube placement       Health Maintenance Due   Topic    COVID-19 Vaccine (1)    DTaP/Tdap/Td series (1 - Tdap)    Shingrix Vaccine Age 50> (1 of 2)    Bone Densitometry (Dexa) Screening     Pneumococcal 65+ yrs at Risk Vaccine (2 of 2 - PCV13)    Medicare Yearly Exam        Vitals:    21 1035   Weight: 75 kg (165 lb 6.4 oz)   PainSc:   0 - No pain       4. Have you been to the ER, urgent care clinic since your last visit? Hospitalized since your last visit? No    5. Have you seen or consulted any other health care providers outside of the [address] [place] [address] since your last visit? Include any pap smears or colon screening. No      Patient is accompanied by son and daughter I have received verbal consent from Camille Lilly to discuss any/all medical information while they are present in the room.

## 2021-08-27 NOTE — PERIOP NOTES
[facility]  Preoperative Instructions    Bring COVD card day of surgery    Surgery Date August 31         Time of Arrival 0730  Contact#[phone]  1. On the day of your surgery, please report to the Surgical Services Registration Desk and sign in at your designated time. The Surgery Center is located to the right of the Emergency Room. 2. You must have someone with you to drive you home. You should not drive a car for 24 hours following surgery. Please make arrangements for a friend or family member to stay with you for the first 24 hours after your surgery. 3. Do not have anything to eat or drink (including water, gum, mints, coffee, juice) after midnight ?8/30/21? Shira Story ? This may not apply to medications prescribed by your physician. ?(Please note below the special instructions with medications to take the morning of your procedure.)    4. We recommend you do not drink any alcoholic beverages for 24 hours before and after your surgery. 5. Contact your surgeons office for instructions on the following medications: non-steroidal anti-inflammatory drugs (i.e. Advil, Aleve), vitamins, and supplements. (Some surgeons will want you to stop these medications prior to surgery and others may allow you to take them)  **If you are currently taking Plavix, Coumadin, Aspirin and/or other blood-thinning agents, contact your surgeon for instructions. ** Your surgeon will partner with the physician prescribing these medications to determine if it is safe to stop or if you need to continue taking. Please do not stop taking these medications without instructions from your surgeon    6. Wear comfortable clothes. Wear glasses instead of contacts. Do not bring any money or jewelry. Please bring picture ID, insurance card, and any prearranged co-payment or hospital payment. Do not wear make-up, particularly mascara the morning of your surgery.   Do not wear nail polish, particularly if you are having foot /hand surgery. Wear your hair loose or down, no ponytails, buns, mary pins or clips. All body piercings must be removed. Please shower with antibacterial soap for three consecutive days before and on the morning of surgery, but do not apply any lotions, powders or deodorants after the shower on the day of surgery. Please use a fresh towels after each shower. Please sleep in clean clothes and change bed linens the night before surgery. Please do not shave for 48 hours prior to surgery. Shaving of the face is acceptable. 7. You should understand that if you do not follow these instructions your surgery may be cancelled. If your physical condition changes (I.e. fever, cold or flu) please contact your surgeon as soon as possible. 8. It is important that you be on time. If a situation occurs where you may be late, please call [phone] (OR Holding Area). 9. If you have any questions and or problems, please call [phone] (Pre-admission Testing). 10. Your surgery time may be subject to change. You will receive a phone call the evening prior if your time changes. 11.  If having outpatient surgery, you must have someone to drive you here, stay with you during the duration of your stay, and to drive you home at time of discharge. Special Instructions: Follow your physician/surgeon instructions for holding all non-steroidal anti-inflammatory drugs/NSAIDs, blood-thinning agents, vitamins & supplements prior to surgery. TAKE ALL MEDICATIONS DAY OF SURGERY EXCEPT:  none    I understand a pre-operative phone call will be made to verify my surgery time. In the event that I am not available, I give permission for a message to be left on my answering service and/or with another person?   yes         ___________________      __________   _________    (Signature of Patient)             (Witness)                (Date and Time)

## 2021-08-27 NOTE — Clinical Note
8/29/2021    Patient: Bhargavi Justice   YOB: 1940   Date of Visit: 8/27/2021     Ward Proctor, [address]  [address] [address]  Via Fax: [phone]     Paul Murphy MD  [address] [place] [address] [address] [address]  Via Fax: [phone]    Dear MD Paul Santos MD,      Thank you for referring Ms. Colleen Pereyra to [address] for evaluation. My notes for this consultation are attached. If you have questions, please do not hesitate to call me. I look forward to following your patient along with you.       Sincerely,    Anyi Brady MD

## 2021-08-29 NOTE — H&P (VIEW-ONLY)
To: CHLOE Matute MD Haze Ditty, MD M Danette Revere, MD     From: Venecia Hsu MD    Thank you for sending Thalia Arceo to see us. Please note that this dictation was completed with [employer], the computer voice recognition software. Quite often unanticipated grammatical, syntax, homophones, and other interpretive errors are inadvertently transcribed by the software. Please disregard these errors. Please excuse any errors that have escaped final proofreading. Encounter Date: 8/27/2021  History and Physical    Assessment:   Metastatic esophageal cancer. Inability to swallow. Request for feeding access. Unable to have a PEG due to inability to pass the scope. Radiographic G-tube was not able to be done due to the posterior position of the stomach. After reviewing the CAT scan, surgical G-tube would also be impossible due to large hiatal hernia and the very posterior position of the stomach behind the liver with its metastases. Morbid pulmonary hypertension and history of DVT in 2015. Status post hysterectomy. Plan/Recommendations/Medical Decision Making:   Feeding jejunostomy tube would be an option. This however would not be amenable to bolus feeds. We discussed the realities of tubes and tube feedings. Typically with jejunostomy feeding tubes the tube feeds are started at a continuous rate and run 24 hours a day. Eventually patients are typically able to cycle the tube feeds at night. Patients need to understand that tubes clog, leak, breakdown over time. We discussed the operative risks including bleeding, infection, anesthetic risks, injury to adjacent structures, leak from the enterotomy. I also explained that there is a chance we would encounter widespread carcinomatosis in which case we would not proceed to place the tube. We would start off with laparoscopy for this reason.     We also discussed the alternatives to surgery including palliative and/or hospice consultation. HPI:   Patient is a [de-identified] y.o. female who is seen in consultation at the request of Jg Carlson for consideration of G-tube. She has a history of esophageal cancer and has been treated by Dr. Manasa Quintero and Dr. Rosa Melton. Esophageal cancer diagnosed in May 2019. PET/CT at that time did not show metastatic disease. She has been treated with Xeloda. The esophageal lesion was stented and ablated. Fortunately PET/CT in May 2020 did show metastases to the liver. She continues on chemotherapy. She has a Mediport in the left chest.  She says that she has lost about 30 pounds over the last few months. She is unable to swallow because food gets stuck in her throat.     Past Medical History:   Diagnosis Date    Abnormal x-ray of abdomen 05/22/2019    Bas showed food and irregular stricture above ge junction in addition to large hiatal hernia  5.20.2019    Adenocarcinoma of esophagus ([address] 5/30/2019    chemotherapy    Anemia     Arrhythmia     Arthritis     Chronic kidney disease     Stage II followed by Dr Km Montiel Nephrology     Diverticulosis     Esophageal dysphagia 5/22/2019    Glaucoma     bilateral    Hiatal hernia     History of Clostridioides difficile infection 05/2019    History of colon polyps 6/1/2018    2013 tubular adenoma     Hypertension     Hypothyroid     Pulmonary HTN ([address]     Sleep apnea     CPAP compliant, as stated 5/25/2021    Thromboembolus ([address] 2015    Right  leg , spontaneous      Past Surgical History:   Procedure Laterality Date    ANAL PRESSURE RECORD  6/6/2019    COLONOSCOPY N/A 6/1/2018    COLONOSCOPY performed by Holli Zhang MD at [address] [place] [address]  6/1/2018         HX CATARACT REMOVAL Bilateral 2017    HX HYSTERECTOMY      HX OTHER SURGICAL  05/26/2021    port cath instertion    IR FINE NEEDLE ASPIRATION W IMAGE  1984    IR INSERT NON TUNL CVC OVER 5 YRS  3/8/2020    IR INSERT TUNL CVC W PORT OVER 5 YEARS  7/19/2019    IR REMOVE TUNL CVAD W/O PORT / PUMP  3/8/2020    UPPER GI ENDOSCOPY,BALL DIL,30MM  5/22/2019         UPPER GI ENDOSCOPY,BALL DIL,30MM  5/30/2019         UPPER GI ENDOSCOPY,BIOPSY  5/22/2019         UPPER GI ENDOSCOPY,BIOPSY  5/30/2019         UPPER GI ENDOSCOPY,BIOPSY  10/17/2019          Social History     Tobacco Use    Smoking status: Never Smoker    Smokeless tobacco: Never Used   Substance Use Topics    Alcohol use: Not Currently     Comment: in her 19's       Family History   Problem Relation Age of Onset    No Known Problems Sister        Current Outpatient Medications   Medication Sig    sodium bicarbonate/sod citrat (SODIUM BICARB AND CITRATE PO) Take 1 Tablet by mouth two (2) times a day. 650 mg tablet    losartan (COZAAR) 100 mg tablet Take 100 mg by mouth daily. Indications: high blood pressure    famotidine (PEPCID) 20 mg tablet Take 1 Tab by mouth Daily (before breakfast). Indications: gastroesophageal reflux disease    folic acid-vit L7-SNN E81 (FOLBEE) 2.5-25-1 mg tablet Take 1 Tab by mouth daily.  latanoprost (XALATAN) 0.005 % ophthalmic solution Administer 1 Drop to both eyes nightly.  DORZOLAMIDE HCL/TIMOLOL MALEAT (DORZOLAMIDE-TIMOLOL OP) Apply 1 Drop to eye three (3) times daily. One drop to each eye twice daily     levothyroxine (SYNTHROID) 100 mcg tablet Take 100 mcg by mouth Daily (before breakfast). One tablets 6 days a week then half a tablet one day  Indications: a condition with low thyroid hormone levels    amLODIPine (NORVASC) 5 mg tablet Take 5 mg by mouth daily.  brimonidine (ALPHAGAN P) 0.1 % ophthalmic solution Administer 1 Drop to both eyes every eight (8) hours.  chlorthalidone (HYGROTON) 25 mg tablet Take 25 mg by mouth daily.  capecitabine (XELODA) 500 mg tablet 1,000 mg/m2 two (2) times a day.  (Patient not taking: Reported on 8/27/2021  )    diphenoxylate-atropine (LOMOTIL) 2.5-0.025 mg per tablet Take 1 Tab by mouth four (4) times daily as needed for Diarrhea. Max Daily Amount: 4 Tabs. (Patient not taking: Reported on 5/25/2021)    acetaminophen (TYLENOL EXTRA STRENGTH) 500 mg tablet Take 1,000 mg by mouth every six (6) hours as needed. Indications: pain associated with arthritis, pain     No current facility-administered medications for this visit. Allergies:  No Known Allergies     Review of Systems:  10 systems reviewed. See scanned sheet in \"Media\" section. See HPI for pertinent positives and negatives. Objective:     Visit Vitals  BP (!) 148/91 (BP 1 Location: Left arm, BP Patient Position: Sitting)   Pulse 66   Temp (!) 96.6 °F (35.9 °C) (Oral)   Wt 75 kg (165 lb 6.4 oz)   SpO2 95%   BMI 25.15 kg/m²     General appearance  Alert, cooperative, no distress, appears stated age   HEENT  Anicteric   Neck Supple       Lungs   CTAB   Heart  Regular rate and rhythm. No murmur, rub or gallop   Abdomen   Soft. Bowel sounds normal.  Liver enlarged and palpable. No obvious palpable carcinomatosis. Extremities No CCE. Pulses 2+ right radial   Skin Skin color, texture, turgor normal.    Lymph nodes No palpable lymphadenopathy.    Neurologic Without overt sensory or motor deficit       Signed By: Gallito Llamas MD     August 29, 2021

## 2021-08-29 NOTE — PROGRESS NOTES
To: CHLOE Cesar MD Larue Arrow, MD M Shirlean Lulas, MD     From: Juan Rico MD    Thank you for sending Cezar Clark to see us. Please note that this dictation was completed with [employer], the computer voice recognition software. Quite often unanticipated grammatical, syntax, homophones, and other interpretive errors are inadvertently transcribed by the software. Please disregard these errors. Please excuse any errors that have escaped final proofreading. Encounter Date: 8/27/2021  History and Physical    Assessment:   Metastatic esophageal cancer. Inability to swallow. Request for feeding access. Unable to have a PEG due to inability to pass the scope. Radiographic G-tube was not able to be done due to the posterior position of the stomach. After reviewing the CAT scan, surgical G-tube would also be impossible due to large hiatal hernia and the very posterior position of the stomach behind the liver with its metastases. Morbid pulmonary hypertension and history of DVT in 2015. Status post hysterectomy. Plan/Recommendations/Medical Decision Making:   Feeding jejunostomy tube would be an option. This however would not be amenable to bolus feeds. We discussed the realities of tubes and tube feedings. Typically with jejunostomy feeding tubes the tube feeds are started at a continuous rate and run 24 hours a day. Eventually patients are typically able to cycle the tube feeds at night. Patients need to understand that tubes clog, leak, breakdown over time. We discussed the operative risks including bleeding, infection, anesthetic risks, injury to adjacent structures, leak from the enterotomy. I also explained that there is a chance we would encounter widespread carcinomatosis in which case we would not proceed to place the tube. We would start off with laparoscopy for this reason.     We also discussed the alternatives to surgery including palliative and/or hospice consultation. HPI:   Patient is a [de-identified] y.o. female who is seen in consultation at the request of Darline Naik for consideration of G-tube. She has a history of esophageal cancer and has been treated by Dr. Josefina Carter and Dr. Jamie Zamora. Esophageal cancer diagnosed in May 2019. PET/CT at that time did not show metastatic disease. She has been treated with Xeloda. The esophageal lesion was stented and ablated. Fortunately PET/CT in May 2020 did show metastases to the liver. She continues on chemotherapy. She has a Mediport in the left chest.  She says that she has lost about 30 pounds over the last few months. She is unable to swallow because food gets stuck in her throat.     Past Medical History:   Diagnosis Date    Abnormal x-ray of abdomen 05/22/2019    Bas showed food and irregular stricture above ge junction in addition to large hiatal hernia  5.20.2019    Adenocarcinoma of esophagus ([address] 5/30/2019    chemotherapy    Anemia     Arrhythmia     Arthritis     Chronic kidney disease     Stage II followed by Dr Silas Khanna Nephrology     Diverticulosis     Esophageal dysphagia 5/22/2019    Glaucoma     bilateral    Hiatal hernia     History of Clostridioides difficile infection 05/2019    History of colon polyps 6/1/2018    2013 tubular adenoma     Hypertension     Hypothyroid     Pulmonary HTN ([address]     Sleep apnea     CPAP compliant, as stated 5/25/2021    Thromboembolus ([address] 2015    Right  leg , spontaneous      Past Surgical History:   Procedure Laterality Date    ANAL PRESSURE RECORD  6/6/2019    COLONOSCOPY N/A 6/1/2018    COLONOSCOPY performed by Michelle Green MD at [address] [place] [address]  6/1/2018         HX CATARACT REMOVAL Bilateral 2017    HX HYSTERECTOMY      HX OTHER SURGICAL  05/26/2021    port cath instertion    IR FINE NEEDLE ASPIRATION W IMAGE  1984    IR INSERT NON TUNL CVC OVER 5 YRS  3/8/2020    IR INSERT TUNL CVC W PORT OVER 5 YEARS  7/19/2019    IR REMOVE TUNL CVAD W/O PORT / PUMP  3/8/2020    UPPER GI ENDOSCOPY,BALL DIL,30MM  5/22/2019         UPPER GI ENDOSCOPY,BALL DIL,30MM  5/30/2019         UPPER GI ENDOSCOPY,BIOPSY  5/22/2019         UPPER GI ENDOSCOPY,BIOPSY  5/30/2019         UPPER GI ENDOSCOPY,BIOPSY  10/17/2019          Social History     Tobacco Use    Smoking status: Never Smoker    Smokeless tobacco: Never Used   Substance Use Topics    Alcohol use: Not Currently     Comment: in her 19's       Family History   Problem Relation Age of Onset    No Known Problems Sister        Current Outpatient Medications   Medication Sig    sodium bicarbonate/sod citrat (SODIUM BICARB AND CITRATE PO) Take 1 Tablet by mouth two (2) times a day. 650 mg tablet    losartan (COZAAR) 100 mg tablet Take 100 mg by mouth daily. Indications: high blood pressure    famotidine (PEPCID) 20 mg tablet Take 1 Tab by mouth Daily (before breakfast). Indications: gastroesophageal reflux disease    folic acid-vit G3-WEC H39 (FOLBEE) 2.5-25-1 mg tablet Take 1 Tab by mouth daily.  latanoprost (XALATAN) 0.005 % ophthalmic solution Administer 1 Drop to both eyes nightly.  DORZOLAMIDE HCL/TIMOLOL MALEAT (DORZOLAMIDE-TIMOLOL OP) Apply 1 Drop to eye three (3) times daily. One drop to each eye twice daily     levothyroxine (SYNTHROID) 100 mcg tablet Take 100 mcg by mouth Daily (before breakfast). One tablets 6 days a week then half a tablet one day  Indications: a condition with low thyroid hormone levels    amLODIPine (NORVASC) 5 mg tablet Take 5 mg by mouth daily.  brimonidine (ALPHAGAN P) 0.1 % ophthalmic solution Administer 1 Drop to both eyes every eight (8) hours.  chlorthalidone (HYGROTON) 25 mg tablet Take 25 mg by mouth daily.  capecitabine (XELODA) 500 mg tablet 1,000 mg/m2 two (2) times a day.  (Patient not taking: Reported on 8/27/2021  )    diphenoxylate-atropine (LOMOTIL) 2.5-0.025 mg per tablet Take 1 Tab by mouth four (4) times daily as needed for Diarrhea. Max Daily Amount: 4 Tabs. (Patient not taking: Reported on 5/25/2021)    acetaminophen (TYLENOL EXTRA STRENGTH) 500 mg tablet Take 1,000 mg by mouth every six (6) hours as needed. Indications: pain associated with arthritis, pain     No current facility-administered medications for this visit. Allergies:  No Known Allergies     Review of Systems:  10 systems reviewed. See scanned sheet in \"Media\" section. See HPI for pertinent positives and negatives. Objective:     Visit Vitals  BP (!) 148/91 (BP 1 Location: Left arm, BP Patient Position: Sitting)   Pulse 66   Temp (!) 96.6 °F (35.9 °C) (Oral)   Wt 75 kg (165 lb 6.4 oz)   SpO2 95%   BMI 25.15 kg/m²     General appearance  Alert, cooperative, no distress, appears stated age   HEENT  Anicteric   Neck Supple       Lungs   CTAB   Heart  Regular rate and rhythm. No murmur, rub or gallop   Abdomen   Soft. Bowel sounds normal.  Liver enlarged and palpable. No obvious palpable carcinomatosis. Extremities No CCE. Pulses 2+ right radial   Skin Skin color, texture, turgor normal.    Lymph nodes No palpable lymphadenopathy.    Neurologic Without overt sensory or motor deficit       Signed By: Anyi Brady MD     August 29, 2021

## 2021-08-31 PROBLEM — C15.9 ESOPHAGEAL CANCER (HCC): Status: ACTIVE | Noted: 2021-01-01

## 2021-08-31 NOTE — ANESTHESIA PREPROCEDURE EVALUATION
Relevant Problems   RENAL FAILURE   (+) SONIDO (acute kidney injury) [facility]   (+) Acute on chronic renal failure (HCC)      PERSONAL HX & FAMILY HX OF CANCER   (+) Adenocarcinoma of esophagus (HCC)   (+) Metastasis from esophageal cancer (HCC)       Anesthetic History   No history of anesthetic complications            Review of Systems / Medical History  Patient summary reviewed, nursing notes reviewed and pertinent labs reviewed    Pulmonary        Sleep apnea: CPAP           Neuro/Psych   Within defined limits           Cardiovascular    Hypertension: well controlled  Valvular problems/murmurs: tricuspid insufficiency, mitral insufficiency and aortic insufficiency      Dysrhythmias       Exercise tolerance: >4 METS  Comments: TTE (5/26/21):  ·LV: Estimated LVEF is 55 - 60%. Normal cavity size and systolic function (ejection fraction normal). Mild concentric hypertrophy. Wall motion: normal. Mild (grade 1) left ventricular diastolic dysfunction. ·AV: Mild aortic valve regurgitation is present. ·MV: Mild mitral valve regurgitation is present. ·TV: Mild tricuspid valve regurgitation is present. ·PA: Mild pulmonary hypertension. Pulmonary arterial systolic pressure is 45 mmHg. ECG (3/5/20):   Sinus rhythm with occasional premature atrial complexes   Nonspecific ST abnormality   When compared with ECG of 30-JUL-2004 15:21,   premature atrial complexes are now present     GI/Hepatic/Renal         Renal disease: CRI  Hiatal hernia  Pertinent negatives: No GERD  Comments: Esophageal Dysphagia  S/P Esophageal stent placement  Esophageal Cancer s/p Chemotherapy (2019)    Diverticulosis    CRI, Stage IV-V Endo/Other      Hypothyroidism: well controlled  Morbid obesity, arthritis, cancer (esophageal with mets to liver, lung, bone) and anemia    Comments: Esophageal Cancer s/p Chemotherapy (2019) Other Findings              Physical Exam    Airway  Mallampati: I  TM Distance: > 6 cm  Neck ROM: normal range of motion   Mouth opening: Normal     Cardiovascular    Rhythm: regular  Rate: normal         Dental    Dentition: Full lower dentures and Full upper dentures     Pulmonary  Breath sounds clear to auscultation               Abdominal  GI exam deferred       Other Findings            Anesthetic Plan    ASA: 3  Anesthesia type: general    Monitoring Plan: BIS      Induction: Intravenous  Anesthetic plan and risks discussed with: Patient

## 2021-08-31 NOTE — PERIOP NOTES
[facility] from Operating Room to PACU    Report received from [facility] CRNA regarding Pranay Booth. Surgeon(s):  Peri An MD  And Procedure(s) (LRB):  LAPAROSCOPIC ASSISTED FEEDING JEJUNOSTOMY (N/A)  confirmed   with allergies and dressings discussed. Anesthesia type, drugs, patient history, complications, estimated blood loss, vital signs, intake and output, and last pain medication, lines, reversal medications and temperature were reviewed. 1258 LM on pt's son, Julee, voicemail updating him on pt's status & waiting on placement on [facility] unit. 1600 TRANSFER - OUT REPORT:    Verbal report given to [facility] RN(name) on Pranay Booth  being transferred to [facility](unit) for routine post - op       Report consisted of patients Situation, Background, Assessment and   Recommendations(SBAR). Information from the following report(s) SBAR, Kardex and OR Summary was reviewed with the receiving nurse. Lines:   Venous Access Device 08/31/21 Upper chest (subclavicular area), left (Active)   Central Line Being Utilized Yes 08/31/21 1148   Criteria for Appropriate Use Limited/no vessel suitable for conventional peripheral access 08/31/21 1148   Site Assessment Clean, dry, & intact 08/31/21 1148   Date of Last Dressing Change 08/31/21 08/31/21 1148   Dressing Status Clean, dry, & intact 08/31/21 1148   Dressing Type Transparent;Tape 08/31/21 1148   Action Taken (Medial Site) Infusing 08/31/21 1148        Opportunity for questions and clarification was provided.       Patient transported with:   [facility]

## 2021-08-31 NOTE — INTERVAL H&P NOTE
Update History & Physical    The Patient's History and Physical below was reviewed with the patient and I examined the patient today. There was no change. The surgical site was confirmed by the patient and me. Plan:  The risk, benefits, expected outcome, and alternative to the recommended procedure have been discussed with the patient. Patient understands and wants to proceed with the procedure. The patient was counseled at length about the risks of shawn Covid-19 during their perioperative period and any recovery window from their procedure. The patient was made aware that shawn Covid-19  may worsen their prognosis for recovering from their procedure and lend to a higher morbidity and/or mortality risk. All material risks, benefits, and reasonable alternatives including postponing the procedure were discussed. The patient wishes to proceed with the procedure at this time.

## 2021-08-31 NOTE — PROGRESS NOTES
Problem: Falls - Risk of  Goal: *Absence of Falls  Description: Document Mallika Lomeli Fall Risk and appropriate interventions in the flowsheet.   8/31/2021 1956 by Ana Weaver  Outcome: Progressing Towards Goal  Note: Fall Risk Interventions:  Mobility Interventions: Assess mobility with egress test         Medication Interventions: Evaluate medications/consider consulting pharmacy, Patient to call before getting OOB                8/31/2021 1956 by Ana Weaver  Outcome: Progressing Towards Goal  Note: Fall Risk Interventions:  Mobility Interventions: Assess mobility with egress test         Medication Interventions: Evaluate medications/consider consulting pharmacy, Patient to call before getting OOB                   Problem: Patient Education: Go to Patient Education Activity  Goal: Patient/Family Education  8/31/2021 1956 by Ana Weaver  Outcome: Progressing Towards Goal  8/31/2021 1956 by Ana Weaver  Outcome: Progressing Towards Goal

## 2021-08-31 NOTE — PERIOP NOTES
Notified Dr. Lyles Session patients potassium is 2.9. Received orders for potassium chloride 10 mEq in 100 ml x 2 IV.

## 2021-08-31 NOTE — ROUTINE PROCESS
Patient: Neetu Robledo MRN: [id number]  SSN: [id number]   YOB: 1940  Age: [de-identified] y.o. Sex: female     Patient is status post Procedure(s):  LAPAROSCOPIC ASSISTED FEEDING JEJUNOSTOMY. Surgeon(s) and Role:     * Erin Quick MD - Primary    Local/Dose/Irrigation:  Marcaine 0.5% with Epi 24mls. Venous Access Device 08/31/21 Upper chest (subclavicular area), left (Active)   Central Line Being Utilized Yes 08/31/21 0915   Criteria for Appropriate Use Limited/no vessel suitable for conventional peripheral access 08/31/21 0915   Site Assessment Clean, dry, & intact 08/31/21 0915   Date of Last Dressing Change 08/31/21 08/31/21 0915   Dressing Status Clean, dry, & intact 08/31/21 0915   Dressing Type Transparent;Tape 08/31/21 0915            J-Tube (Active)      Airway - Endotracheal Tube 08/31/21 Oral (Active)                   Dressing/Packing:  Incision 08/31/21 Abdomen-Dressing/Treatment: Surgical glue; Other (Comment) (DRAIN SPONGE) (08/31/21 1134)    Splint/Cast:  ]    Other:

## 2021-08-31 NOTE — PROGRESS NOTES
Comprehensive Nutrition Assessment    Type and Reason for Visit: Initial, Consult    Nutrition Recommendations/Plan:   · When pt ready to begin tube feedings, recommend start Osmolite 1.2 estelle at 20 ml/hr x 24 hr continuous infusion. · Increase by 10 ml/hr q8h as tolerated to goal rate of 60 ml/hr x 24 hr.  · Flush with 100 ml free water q8h. · Osmolite 1.2 estelle at goal rate + free water flushes will provide daily approx. 1728 kcals/80 g protein/226 g CHO/1780 ml free water. · RD will continue to follow to monitor tolerance and recs for home TF.  · RD ordered a Mg+ and Phos check. Nutrition Assessment:     8/31: Chart reviewed; med noted for metastatic esophageal cancer with inability to swallow and sustain adequate oral nutrition. PEG to unable to to be placed secondary to inability to pass the scope. Unable to place surgical G-tube either due to hiatal hernia and posterior position of the stomach behind the liver with its mets. Therefore a lap assisted feeding j-tube placed. RD consulted for tube feedings recs. Per chart review, noted to have significant weight loss x 2 months (~14.5%). Pt remains in PACU but will place TF recs to start when medically feasible. K+ and BG WNL. Last Weight Metric  Weight Loss Metrics 8/31/2021 8/27/2021 6/11/2021 5/28/2021 5/26/2021 5/25/2021 5/11/2021   Today's Wt 165 lb 5.5 oz 165 lb 6.4 oz 193 lb 3.2 oz 194 lb 0.1 oz 194 lb 194 lb 196 lb   BMI 25.14 kg/m2 25.15 kg/m2 29.38 kg/m2 29.5 kg/m2 29.5 kg/m2 29.5 kg/m2 29.8 kg/m2       Estimated Daily Nutrient Needs:  Energy (kcal): 1648 (BMR 1268 x 1. 3AF);  Weight Used for Energy Requirements: Current  Protein (g): 75-90 g/day (1.0-1.2 g/kg bw); Weight Used for Protein Requirements: Current  Fluid (ml/day): 7665-3020 ml/day; Method Used for Fluid Requirements: 1 ml/kcal    Nutrition Related Findings:  BM: none documented; Labs: reviewed; Meds: reviewed      Wounds:    Surgical incision       Current Nutrition Therapies:  No diet orders on file    Anthropometric Measures:  · Height:  5' 8\" (172.7 cm)  · Current Body Wt:  75 kg (165 lb 5.5 oz)    · Ideal Body Wt:  140 lbs:  118.1 %   · BMI Category: Overweight (BMI 25.0-29. 9)       Nutrition Diagnosis:   · Inadequate protein-energy intake related to swallowing difficulty, increased demand for energy/nutrients as evidenced by nutrition support-enteral nutrition    Nutrition Interventions:   Food and/or Nutrient Delivery: Start tube feeding  Nutrition Education and Counseling: No recommendations at this time  Coordination of Nutrition Care: Continue to monitor while inpatient    Goals:  Pt will be able to tolerate initiation of tube feedings and achieve goal rate next 2-4 days       Nutrition Monitoring and Evaluation:   Behavioral-Environmental Outcomes: None identified  Food/Nutrient Intake Outcomes: Enteral nutrition intake/tolerance  Physical Signs/Symptoms Outcomes: Biochemical data, GI status, Weight    Discharge Planning:    Enteral nutrition, Too soon to determine     Electronically signed by Ciro Justice RD on 8/31/2021 at 3:26 PM

## 2021-08-31 NOTE — ANESTHESIA POSTPROCEDURE EVALUATION
Procedure(s):  LAPAROSCOPIC ASSISTED FEEDING JEJUNOSTOMY. general    Anesthesia Post Evaluation        Patient location during evaluation: PACU  Note status: Adequate. Level of consciousness: responsive to verbal stimuli and sleepy but conscious  Pain management: satisfactory to patient  Airway patency: patent  Anesthetic complications: no  Cardiovascular status: acceptable  Respiratory status: acceptable  Hydration status: acceptable  Comments: +Post-Anesthesia Evaluation and Assessment    Patient: Abby Dixon MRN: [id number]  SSN: [id number]   YOB: 1940  Age: [de-identified] y.o. Sex: female      Cardiovascular Function/Vital Signs    BP (!) 154/89   Pulse 62   Temp 36.7 °C (98 °F)   Resp 24   Ht 5' 8\" (1.727 m)   Wt 75 kg (165 lb 5.5 oz)   SpO2 100%   BMI 25.14 kg/m²     Patient is status post Procedure(s):  LAPAROSCOPIC ASSISTED FEEDING JEJUNOSTOMY. Nausea/Vomiting: Controlled. Postoperative hydration reviewed and adequate. Pain:  Pain Scale 1: Numeric (0 - 10) (08/31/21 1215)  Pain Intensity 1: 0 (08/31/21 1215)   Managed. Neurological Status:   Neuro (WDL): Within Defined Limits (08/31/21 1148)   At baseline. Mental Status and Level of Consciousness: Arousable. Pulmonary Status:   O2 Device: Nasal cannula (08/31/21 1215)   Adequate oxygenation and airway patent. Complications related to anesthesia: None    Post-anesthesia assessment completed. No concerns. Signed By: Brian Britton MD    8/31/2021  Post anesthesia nausea and vomiting:  controlled      INITIAL Post-op Vital signs:   Vitals Value Taken Time   /94 08/31/21 1230   Temp 36.7 °C (98 °F) 08/31/21 1215   Pulse 61 08/31/21 1236   Resp 26 08/31/21 1236   SpO2 99 % 08/31/21 1236   Vitals shown include unvalidated device data.

## 2021-08-31 NOTE — BRIEF OP NOTE
[id number]  Brief Postoperative Note    Patient: Solo Palacios  YOB: 1940  MRN: [id number]    Date of Procedure: 8/31/2021     Pre-Op Diagnosis: ESOPHAGEAL CANCER    Post-Op Diagnosis: Same as preoperative diagnosis. Procedure(s):  LAPAROSCOPIC ASSISTED FEEDING JEJUNOSTOMY    Surgeon(s):  Ijeoma Sarabia MD    Surgical Assistant: Surg Asst-1: Kayla Harrell    Anesthesia: General     Estimated Blood Loss (mL): Minimal    Complications: None immediate    Specimens: * No specimens in log *     Implants: * No implants in log *    Drains:   J-Tube (Active)       Findings: liver mets. No obvious carcinomatosis.       Electronically Signed by May Mixon MD on 8/31/2021 at 11:44 AM

## 2021-09-01 NOTE — PROGRESS NOTES
Problem: Falls - Risk of  Goal: *Absence of Falls  Description: Document Tatianna Newry Fall Risk and appropriate interventions in the flowsheet.   Outcome: Progressing Towards Goal  Note: Fall Risk Interventions:  Mobility Interventions: Assess mobility with egress test         Medication Interventions: Teach patient to arise slowly

## 2021-09-01 NOTE — PROGRESS NOTES
Transition of Care Plan:    RUR: 9%  Disposition:  Home with HH and tube feedings  Follow up appointments:  PCP, surgeon  DME needed: Pt owns a cane. Transportation at Discharge: [place] Payment or means to access home:  Shin Santana can provide. IM Medicare Letter: OBS  Is patient a BCPI-A Bundle: n/a           If yes, was Bundle Letter given?:   n/a  Caregiver Contact: Qing Dali, Sr. [phone]  Discharge Caregiver contacted prior to discharge? CM will contact prior to d/c    Reason for Admission:  Esophageal CA                     RUR Score:     9%                Plan for utilizing home health:    yes      PCP: First and Last name:  Deirdre Pérez MD     Name of Practice:    Are you a current patient: yes Yes/No:    Approximate date of last visit:  2 months ago    Can you participate in a virtual visit with your PCP: prefers in office                    Current Advanced Directive/Advance Care Plan: Prior     Ismael 13 (ACP) Conversation      Date of Conversation: 9/1/21  Conducted with: Patient with Olga 153:   No healthcare decision makers have been documented. Click here to complete [address] including selection of the Healthcare Decision Maker Relationship (ie \"Primary\")      Content/Action Overview:   DECLINED ACP conversation - will revisit periodically   Reviewed DNR/DNI and patient elects Full Code (Attempt Resuscitation)      Length of Voluntary ACP Conversation in minutes:  <16 minutes (Non-Billable)    Jim Bates                                     Transition of Care Plan: Home with [facility]    CM met with pt at bedside to introduce self/role, verify demographics, insurance and PCP. CM also discussed d/c plan. Pt is a [de-identified] yo, AA,  female who is under OBS at [facility] for her above dx. Pt sees her PCP every 4 months. Pt uses the [facility] pharmacy.   Pt's two grandsons live with her in a one fl home with 4 DIANA. Pt has three supportive children. Pt uses a cane. Pt drives but not often. Pt can complete her ADLs/IADLs independently. Pt stated her family only assists with cooking. Pt's son will transport at d/c.     CM discussed [facility] with pt at d/c. Pt in agreement and does not have a HH preference. FOC reviewed, signed and placed on bedside chart. CM sent referral to [facility]. Awaiting acceptance. CM will continue to assess for d/c needs. Care Management Interventions  PCP Verified by CM: Yes (Pt sees Dr. Joceline Steiner.)  Palliative Care Criteria Met (RRAT>21 & CHF Dx)?: No  Mode of Transport at Discharge:  Other (see comment) (Pt's son will transport at d/c.)  Transition of Care Consult (CM Consult): Discharge Planning  Discharge Durable Medical Equipment: No (Pt owns a cane.)  Physical Therapy Consult: No  Occupational Therapy Consult: No  Speech Therapy Consult: No  Current Support Network: Own Home (Pt's grandsons lives with her in a one fl home with 4 DIANA.)  Confirm Follow Up Transport: Self  The Patient and/or Patient Representative was Provided with a Choice of Provider and Agrees with the Discharge Plan?: Yes  Name of the Patient Representative Who was Provided with a Choice of Provider and Agrees with the Discharge Plan: Mateus Cho  Freedom of Choice List was Provided with Basic Dialogue that Supports the Patient's Individualized Plan of Care/Goals, Treatment Preferences and Shares the Quality Data Associated with the Providers?: Yes  Discharge Location  Discharge Placement: Home with home health    JENNIFER Cabral  Care Management, [address] [place] [address]

## 2021-09-01 NOTE — PROGRESS NOTES
Attending provider:  Amada Harrell MD   PCP:  Jos Singh MD    Subjective:    Patient seen and examined by me today. Feels pretty well overall. Some pain but not too bad. Objective:    Blood pressure 134/83, pulse 69, temperature 98 °F (36.7 °C), resp. rate 19, height 5' 8\" (1.727 m), weight 75 kg (165 lb 5.5 oz), SpO2 92 %. Exam - A&O, NAD.  CTAB, RRR. Abdomen soft, ND, NABS, appropriately TTP, CDI. No edema. J-tube intact. Assessment:  Procedure(s):  LAPAROSCOPIC ASSISTED FEEDING JEJUNOSTOMY 8/31/2021    Active Hospital Problems    Diagnosis Date Noted    Esophageal cancer ([facility] [address] 08/31/2021       Plan:  Advance tube feeds as tolerated. Appreciate Dr. Laz Bettencourt input. She can have clear liquids as tolerated. Home once tolerating tube feeds and arrangements for this have been made for home.

## 2021-09-01 NOTE — PROGRESS NOTES
Transition of Care Plan:     RUR: 9%  Disposition:  Home with [facility] and tube feedings through [facility]  Follow up appointments:  PCP, surgeon  DME needed: Pt owns a cane. Transportation at Discharge: Sydnee Kahn or means to access home:  Ortegacolin Nelsonin can provide. IM Medicare Letter: OBS  Is patient a BCPI-A Bundle: n/a                      If yes, was Bundle Letter given?:   n/a  Caregiver Contact: Ivana Matthew, . [phone]  Discharge Caregiver contacted prior to discharge? CM will contact prior to d/c     [facility] will follow pt for [place] at d/c.  CM received a call from Radha Kim who stated pt's tube feedings will be $0 out of pocket per day for pt.       Cassandra Yi, MSW  Care Management, [address] [place] [address]

## 2021-09-01 NOTE — PROGRESS NOTES
Physical Therapy Screening:    An [facility] screening referral was triggered for physical therapy based on results obtained during the nursing admission assessment. The patients chart was reviewed and the patient is appropriate for a skilled therapy evaluation if there is a decline in functional mobility from baseline. Please order a consult for physical therapy if you are in agreement and would like an evaluation to be completed. Thank you.     Kavon Butler, PT, DPT

## 2021-09-01 NOTE — OP NOTES
[address]  OPERATIVE REPORT    Name:  Mortimer Lucy  MR#:  [id number]  :  1940  ACCOUNT #:  [de-identified]  DATE OF SERVICE:  2021      PREOPERATIVE DIAGNOSIS:  Esophageal cancer. POSTOPERATIVE DIAGNOSIS:  Esophageal cancer. PROCEDURE PERFORMED:  Laparoscopic-assisted feeding jejunostomy tube placement. SURGEON:  Davide Cleveland MD    ASSISTANT:  Staff. ANESTHESIA:  General.    COMPLICATIONS:  None immediate. SPECIMENS REMOVED:  None. IMPLANTS:  Feeding jejunostomy tube. DRAINS:  None. ESTIMATED BLOOD LOSS:  Minimal.    FINDINGS:  Visible liver metastases. No obvious carcinomatosis. INDICATIONS:  The patient is an 59-year-old female with metastatic esophageal cancer and inability to swallow. She is presenting for feeding access. DESCRIPTION OF PROCEDURE:  After obtaining informed consent, the patient was taken to the operating room, placed supine on the operating room table. An operative time-out was performed and general endotracheal anesthesia was induced. Preoperative antibiotics were administered and the abdomen was prepped and draped in the usual sterile fashion. The abdomen was then entered just above the umbilicus using a 5-mm optical trocar. The abdomen was insufflated without incident, was visually explored. The above findings were noted. A left upper quadrant 5-mm port was placed at the planned site of J-tube. This was done under direct vision. Using an instrument here, we attempted to identify the ligament of Treitz. This proved difficult with one grasper and so a third 5-mm port was placed below the umbilicus. We were then able move the transverse colon cephalad and identified the ligament of Treitz. The small bowel was grasped approximately 30 cm distal to this and the abdomen was then desufflated. An incision was made just lateral to the rectus and taken down through the muscle with electrocautery.   The bowel was brought up and the proximal, distal orientation was confirmed. A pursestring of 3-0 Vicryl was placed and an enterotomy was then created with electrocautery. The tube was brought in through the abdominal wall and then inserted into the enterotomy. The balloon was filled with 7 mL of water. The pursestring was tied down. The small bowel tract was created over top of the tube using the Witzel technique for approximately of 4 cm. The small bowel was then approximated to the peritoneum using interrupted 3-0 Vicryl sutures. The fascia and muscular layers were closed using 0 Vicryl sutures. The deep dermal tissues were reapproximated with 3-0 Vicryl. The skin was closed with a running 4-0 Monocryl. The incision was dressed with Dermabond. The two midline port sites were closed with 4-0 Monocryl and dressed with Dermabond. A drain sponge was left around the tube. The tube was secured to the skin with a 2-0 nylon. The patient was then recovered from general anesthesia and taken to recovery area in satisfactory condition. All instrument, sponge and needle counts were reported as correct. Fredy Fonseca MD      [id number]  D:  08/31/2021 11:51  T:  08/31/2021 22:18  JOB #:  [id number]  CC:   MD Thuy García MD

## 2021-09-01 NOTE — PROGRESS NOTES
Courtesy note: Thanks so much for placing her J-tube. I noticed that her sodium yesterday was a bit high with the expected low potassium. She got IV potassium yesterday. Will see what today's labs are and see if any further supplementation is needed.

## 2021-09-02 NOTE — PROGRESS NOTES
courtesy note - her calcium is still low - so will supplement intravenously for now, minimizing disruption to her J tube feedings. She has a sodium of 149 still so will change the IVF to D5 1/4 NSS to decrease sodium concentration  - not giving D5W yet to minimize overly rapid correction. Will see what tomorrow's labs bring.

## 2021-09-02 NOTE — PROGRESS NOTES
Problem: Falls - Risk of  Goal: *Absence of Falls  Description: Document Faviola Robleson Fall Risk and appropriate interventions in the flowsheet.   Outcome: Progressing Towards Goal  Note: Fall Risk Interventions:  Mobility Interventions: Assess mobility with egress test         Medication Interventions: Evaluate medications/consider consulting pharmacy    Elimination Interventions: Call light in reach

## 2021-09-02 NOTE — PROGRESS NOTES
Attending provider:  Prudencio Ford MD   PCP:  Karen Rosado MD    Subjective:    Patient seen and examined by me today. Denies flatus/BM. No nausea. Objective:    Blood pressure (!) 156/103, pulse 79, temperature 98.3 °F (36.8 °C), resp. rate 19, height 5' 8\" (1.727 m), weight 75 kg (165 lb 5.5 oz), SpO2 100 %. Exam - A&O, NAD.  CTAB, RRR. Abdomen soft, ND, NABS, appropriately TTP, CDI. No edema. J-tube intact. Assessment:  Procedure(s):  LAPAROSCOPIC ASSISTED FEEDING JEJUNOSTOMY 8/31/2021    Active Hospital Problems    Diagnosis Date Noted    Esophageal cancer ([facility] [address] 08/31/2021       Plan:  Advance tube feeds as tolerated. Appreciate Dr. Sharron Sutherland input. Increasing free water. She can have clear liquids as tolerated. Home once tolerating tube feeds and arrangements for this have been made for home and lytes ok. Hopefully tomorrow.

## 2021-09-02 NOTE — PROGRESS NOTES
Transition of Care Plan:     RUR: 9%  Disposition:  Home with [address] and tube feedings through [facility]  Follow up appointments:  PCP, surgeon  DME needed: Pt owns a cane. Transportation at [address] or means to access home:  Grandson can provide.      IM Medicare Letter: OBS  Is patient a BCPI-A Bundle: n/a                      If yes, was Bundle Letter given?:   n/a  Caregiver Contact: Jeronimo Phillips. [phone]  Discharge Caregiver contacted prior to discharge?  CM will contact prior to d/c    CM spoke with Balbir Watkins of [facility] [phone] about tube feedings. CM spoke with pt's nurse, Ramonita Hoffman, and had him call Balbir Watkins to discuss.       Alexey Sifuentes, MSW  Care Management, [address] [place] [address]

## 2021-09-03 NOTE — PROGRESS NOTES
Transition of Care Plan:     RUR: 14%  Disposition:  Home with [address] and tube feedings through [facility]  Follow up appointments:  PCP, surgeon  DME needed: Pt owns a cane. Transportation at [place] or means to access home:  Grandson can provide.      IM Medicare Letter: OBS  Is patient a BCPI-A Bundle: n/a                      If yes, was Bundle Letter given?:   n/a  Caregiver Contact: Ryan Glover Mcardle. [phone]  Discharge Caregiver contacted prior to discharge?  CM will contact prior to d/c    3:48 p.m. CM send final tube feeding order to [facility].      2:35 p.m. CM spoke with Roslyn Wilkinson in dietary who is putting in recommendation soon. CM reviewed chart. Pt may not d/c until tomorrow a.m.  CM called dietary x [phone] to get final tube feeding recommendations.     JENNIFER Zuleta  Care Management, [address] [place] [address]

## 2021-09-03 NOTE — PROGRESS NOTES
End of Shift Note    Bedside shift change report given to GEORGIA Navas (oncoming nurse) by Fantasma Baltazar RN (offgoing nurse). Report included the following information SBAR, Kardex and MAR    Shift worked:  [id number]     Shift summary and any significant changes:     Pt on 2L NC with O2 sats %. Pt has very congested, productive cough. No complaints of pain overnight. Pt placed on her CPAP machine for sleep. Tube feeding advanced to goal of 60mL/hr, pt tolerating well. Up to bedside commode with assistance to void and pt had 1 BM this shift. Concerns for physician to address:       Zone phone for oncoming shift:   [phone]       Activity:  Activity Level: Up with Assistance  Number times ambulated in hallways past shift: 0  Number of times OOB to chair past shift: 0    Cardiac:   Cardiac Monitoring: No      Cardiac Rhythm: Sinus Rhythm    Access:   Current line(s): port     Genitourinary:   Urinary status: voiding    Respiratory:   O2 Device: Nasal cannula  Chronic home O2 use?: NO  Incentive spirometer at bedside: YES  Actual Volume (ml): 250 ml  GI:  Last Bowel Movement Date: 08/29/21  Current diet:  ADULT DIET Clear Liquid  ADULT TUBE FEEDING Jejunostomy; Other Tube Feeding (Specify); osmolite 1.2 estelle; Delivery Method: Continuous; Continuous Initial Rate (mL/hr): 20; Continuous Advance Tube Feeding: Yes; Advancement Volume (mL/hr): 10; Advancement Frequency: Q 8 hour. .. Passing flatus: YES  Tolerating current diet: YES       Pain Management:   Patient states pain is manageable on current regimen: YES    Skin:  Tomy Score: 19  Interventions: float heels and increase time out of bed    Patient Safety:  Fall Score:  Total Score: 3  Interventions: assistive device (walker, cane, etc), gripper socks, pt to call before getting OOB and stay with me (per policy)  High Fall Risk: Yes    Length of Stay:  Expected LOS: - - -  Actual LOS: 1      Christina Hawk RN

## 2021-09-03 NOTE — PROGRESS NOTES
Problem: Falls - Risk of  Goal: *Absence of Falls  Description: Document Fernie Najera Fall Risk and appropriate interventions in the flowsheet.   Outcome: Progressing Towards Goal  Note: Fall Risk Interventions:  Mobility Interventions: Patient to call before getting OOB         Medication Interventions: Evaluate medications/consider consulting pharmacy    Elimination Interventions: Call light in reach              Problem: Patient Education: Go to Patient Education Activity  Goal: Patient/Family Education  Outcome: Progressing Towards Goal

## 2021-09-03 NOTE — PROGRESS NOTES
Courtesy note - labs correcting. Sodium coming down. Potassium normal. Dr. Ashish Black is addressing the phosphorous. Calcium still low so will order a dose of this today. As noted in UpToDate: Many surgical diseases are associated with preoperative malnutrition, including malignancies (eg, colon, lung, esophageal carcinoma), chronic organ dysfunction, and inflammatory conditions (eg, pancreatitis, colitis). As malnourished patients recover from surgery and resume dietary intake, they are at risk for refeeding syndrome, a collection of electrolyte derangements associated with a massive intracellular shift of electrolytes. Hypophosphatemia is commonly observed as extracellular phosphate is rapidly taken into the cells to generate adenosine triphosphate (ATP). Hypokalemia and hypocalcemia are also commonly observed. Malnourished patients should be closely monitored for clinical and laboratory evidence of refeeding syndrome when their nutritional intake is resumed.

## 2021-09-03 NOTE — PROGRESS NOTES
Attending provider:  Chas Sams MD   PCP:  Ozella Litten, MD    Subjective:    Patient seen and examined by me today. No nausea. +faltus. Objective:    Blood pressure (!) 157/87, pulse (!) 57, temperature 98.1 °F (36.7 °C), resp. rate 22, height 5' 8\" (1.727 m), weight 75 kg (165 lb 5.5 oz), SpO2 95 %. Exam - A&O, NAD.  CTAB, RRR. Abdomen soft, ND, NABS, appropriately TTP, CDI. No edema. J-tube intact. Assessment:  Procedure(s):  LAPAROSCOPIC ASSISTED FEEDING JEJUNOSTOMY 8/31/2021    Active Hospital Problems    Diagnosis Date Noted    Esophageal cancer ([facility] [address] 08/31/2021     Refeeding syndrome with hypophosphatemia. Plan:  Cont tube feeds as tolerated. Repleted phos this AM.  May need more. Recheck in [facility] Cast input. She can have clear liquids as tolerated. Home once tolerating tube feeds and arrangements for this have been made for home and lytere ok.

## 2021-09-03 NOTE — INTERDISCIPLINARY ROUNDS
Interdisciplinary Rounds were completed on 09/03/21 for this patient. Rounds included nursing, clinical care leader, pharmacy, and case management. Plan of care discussed. See clinical pathway and/or care plan for interventions and desired outcomes.

## 2021-09-03 NOTE — PROGRESS NOTES
Comprehensive Nutrition Assessment    Type and Reason for Visit: Reassess    Nutrition Recommendations/Plan:   · Continue Osmolite 1.2 estelle TFs at 60 ml/hr x 24 hr + 100 ml free water q4h. Continue to aggressively replete Phos for safe discharge to home. Once lytes stable and continuously achieving goal rate, then can transition to a nocturnal regimen to allow time of the pump, if desired. · Home TF Recs:  · Recommend Osmolite 1.2 estelle @ 100 ml/hr x 14 hr from 7PM to 9AM. Continue free water flushes 100 ml free water q4h. · Tube feeding will provide daily approx 1748 kcals/78 g protein/220 g CHO/1748 ml free water. · Flush with at least 30 ml free water before and after meds to prevent clogs. Nutrition Assessment:     9/3: Chart reviewed; med noted for esophageal ca. Pt continues to receive Osmolite 1.2 estelle tube feedings via Jtube at goal rate of 60 ml/hr x 24 hr + 100 ml free water q4h. RD visited with pt at bedside, no reported nausea just soreness around J-tube site. Pt did continue to spit up a large amount of mucus in emesis bag but reports no nausea or vomiting. Pt had to urinate at time of visit; notified RN for assistance. Pt was able to utilize bedside commode with assistance and pt commented that she wanted to do as much as she could by herself regarding toileting. Pt's Phos is low at 1.1 but being repleted and monitored/managed by surgeon and oncology. K+ WNL, Na+ 148, Mg+ WNL. Last Weight Metric  Weight Loss Metrics 8/31/2021 8/27/2021 6/11/2021 5/28/2021 5/26/2021 5/25/2021 5/11/2021   Today's Wt 165 lb 5.5 oz 165 lb 6.4 oz 193 lb 3.2 oz 194 lb 0.1 oz 194 lb 194 lb 196 lb   BMI 25.14 kg/m2 25.15 kg/m2 29.38 kg/m2 29.5 kg/m2 29.5 kg/m2 29.5 kg/m2 29.8 kg/m2     8/31: Chart reviewed; med noted for metastatic esophageal cancer with inability to swallow and sustain adequate oral nutrition. PEG to unable to to be placed secondary to inability to pass the scope.  Unable to place surgical G-tube either due to hiatal hernia and posterior position of the stomach behind the liver with its mets. Therefore a lap assisted feeding j-tube placed. RD consulted for tube feedings recs. Per chart review, noted to have significant weight loss x 2 months (~14.5%). Pt remains in PACU but will place TF recs to start when medically feasible. K+ and BG WNL. Estimated Daily Nutrient Needs:  Energy (kcal): 1648 (BMR 1268 x 1. 3AF); Weight Used for Energy Requirements: Current  Protein (g): 75-90 g/day (1.0-1.2 g/kg bw); Weight Used for Protein Requirements: Current  Fluid (ml/day): 9154-5840 ml/day; Method Used for Fluid Requirements: 1 ml/kcal    Nutrition Related Findings:  BM: 8/29; Labs: Phos 1.1, Na+ 148; Meds: Na+Phos, NaCl- @ 75 ml/hr      Wounds:    Surgical incision       Current Nutrition Therapies:  ADULT TUBE FEEDING Jejunostomy; Other Tube Feeding (Specify); osmolite 1.2 estelle; Delivery Method: Continuous; Continuous Initial Rate (mL/hr): 20; Continuous Advance Tube Feeding: Yes; Advancement Volume (mL/hr): 10; Advancement Frequency: Q 8 hour. .. ADULT DIET Clear Liquid    Anthropometric Measures:  · Height:  5' 8\" (172.7 cm)  · Current Body Wt:  75 kg (165 lb 5.5 oz)   · Ideal Body Wt:  140 lbs:  118.1 %    · BMI Category: Overweight (BMI 25.0-29. 9)       Nutrition Diagnosis:   · Inadequate protein-energy intake related to swallowing difficulty, increased demand for energy/nutrients as evidenced by nutrition support-enteral nutrition    Nutrition Interventions:   Food and/or Nutrient Delivery: Continue tube feeding  Nutrition Education and Counseling: No recommendations at this time  Coordination of Nutrition Care: Continue to monitor while inpatient    Goals:  Pt will be able to tolerate TFs at goal rate and trend Phos WNL next 2-4 days       Nutrition Monitoring and Evaluation:   Behavioral-Environmental Outcomes: None identified  Food/Nutrient Intake Outcomes: Enteral nutrition intake/tolerance  Physical Signs/Symptoms Outcomes: Biochemical data, Weight    Discharge Planning:    Enteral nutrition     Electronically signed by Ciro Justice RD on 9/3/2021 at 2:48 PM

## 2021-09-04 NOTE — PROGRESS NOTES
End of Shift Note    Bedside shift change report given to Lavern Vargas [address] (oncoming nurse) by Ángel Patel RN (offgoing nurse). Report included the following information SBAR, Kardex, MAR and Recent Results    Shift worked:  8526-8268     Shift summary and any significant changes:     Pt had no complaints of pain or discomfort overnight, tolerating tube feeding well. CPAP machine on overnight. Up with assist to bedside commode. Concerns for physician to address:       Zone phone for oncoming shift:   [phone]       Activity:  Activity Level: Up with Assistance  Number times ambulated in hallways past shift: 0  Number of times OOB to chair past shift: 0    Cardiac:   Cardiac Monitoring: No      Cardiac Rhythm: Sinus Rhythm    Access:   Current line(s): port     Genitourinary:   Urinary status: voiding    Respiratory:   O2 Device: Nasal cannula  Chronic home O2 use?: YES  Incentive spirometer at bedside: YES  Actual Volume (ml): 250 ml  GI:  Last Bowel Movement Date: 09/03/21  Current diet:  ADULT TUBE FEEDING Jejunostomy; Other Tube Feeding (Specify); osmolite 1.2 estelle; Delivery Method: Continuous; Continuous Initial Rate (mL/hr): 20; Continuous Advance Tube Feeding: Yes; Advancement Volume (mL/hr): 10; Advancement Frequency: Q 8 hour. .. ADULT DIET Clear Liquid  Passing flatus: YES  Tolerating current diet: YES       Pain Management:   Patient states pain is manageable on current regimen: YES    Skin:  Tomy Score: 19  Interventions: increase time out of bed    Patient Safety:  Fall Score:  Total Score: 3  Interventions: gripper socks and pt to call before getting OOB  High Fall Risk: Yes    Length of Stay:  Expected LOS: 3d 14h  Actual LOS: 2      Christina Narvaez RN

## 2021-09-04 NOTE — PROGRESS NOTES
Attending provider:  Charlynn Peabody, MD   PCP:  Radha Bettencourt MD    Subjective:    Patient anxious to go home  No nausea. +faltus. Objective:    Blood pressure (!) 148/103, pulse 78, temperature 98.1 °F (36.7 °C), resp. rate 20, height 5' 8\" (1.727 m), weight 165 lb 5.5 oz (75 kg), SpO2 96 %. Exam - A&O, NAD.  CTAB, RRR. Abdomen soft, ND, NABS, appropriately TTP, CDI. No edema. J-tube intact. Assessment:  Procedure(s):  LAPAROSCOPIC ASSISTED FEEDING JEJUNOSTOMY 8/31/2021    Active Hospital Problems    Diagnosis Date Noted    Esophageal cancer ([facility] [address] 08/31/2021     Refeeding syndrome with hypophosphatemia. Plan:  Cont tube feeds as tolerated. Repleted phos again this AM.  Recheck in [facility] input. She can have clear liquids as tolerated. Home once tolerating tube feeds and arrangements for this have been made for home and mahad ok.

## 2021-09-04 NOTE — PROGRESS NOTES
Problem: Falls - Risk of  Goal: *Absence of Falls  Description: Document Chaitanya Woo Fall Risk and appropriate interventions in the flowsheet.   Outcome: Progressing Towards Goal  Note: Fall Risk Interventions:  Mobility Interventions: Patient to call before getting OOB         Medication Interventions: Patient to call before getting OOB    Elimination Interventions: Call light in reach, Patient to call for help with toileting needs              Problem: Patient Education: Go to Patient Education Activity  Goal: Patient/Family Education  Outcome: Progressing Towards Goal

## 2021-09-04 NOTE — PROGRESS NOTES
Courtesy note - labs reviewed this AM - have rreordered the calcium gluconate and sodium phosphate to try to correct her electrolyte abnormalities. Have switched the IVF from quarter normal saline back to half normal saline now that the sodium is within the normal range. I am not on site so if the rate needs to be changed, please feel free to do so. Thanks so much for caring for her!  Aden Luong MD

## 2021-09-05 NOTE — PROGRESS NOTES
Courtesy note:  Sodium, phosphorus and potassium look great. Calcium still a bit low so I have ordered an additional IV  dose this AM and added liquid calcium to her medication orders. If this could be added to her discharge meds, we could likely manage the rest as an outpatient. My office NP could see her this upcoming week and check labs. Thanks!  Harley Goodwin MD FACP

## 2021-09-05 NOTE — PROGRESS NOTES
End of Shift Note    Bedside shift change report given to Lavern Vargas [address] (oncoming nurse) by Sharla Villalba, RN (offgoing nurse). Report included the following information SBAR, Kardex, MAR and Recent Results    Shift worked:  [id number]     Shift summary and any significant changes:     Pt had some abdominal pain last night, medicated with oxycodone which was effective. CPAP machine on overnight. Up to bedside commode to void. Pt using oral suction for secretions. Tube feeding tolerated overnight, tubing set and dressing around J tube changed this AM. Labs drawn and sent this AM.      Concerns for physician to address:       Zone phone for oncoming shift:   [phone]       Activity:  Activity Level: Up with Assistance  Number times ambulated in hallways past shift: 0  Number of times OOB to chair past shift: 0    Cardiac:   Cardiac Monitoring: No      Cardiac Rhythm: Sinus Rhythm    Access:   Current line(s): port     Genitourinary:   Urinary status: voiding    Respiratory:   O2 Device: Nasal cannula  Chronic home O2 use?: YES  Incentive spirometer at bedside: YES  Actual Volume (ml): 250 ml  GI:  Last Bowel Movement Date: 09/05/21  Current diet:  ADULT TUBE FEEDING Jejunostomy; Other Tube Feeding (Specify); osmolite 1.2 estelle; Delivery Method: Continuous; Continuous Initial Rate (mL/hr): 20; Continuous Advance Tube Feeding: Yes; Advancement Volume (mL/hr): 10; Advancement Frequency: Q 8 hour. .. ADULT DIET Clear Liquid  Passing flatus: YES  Tolerating current diet: YES       Pain Management:   Patient states pain is manageable on current regimen: YES    Skin:  Tomy Score: 19  Interventions: float heels and increase time out of bed    Patient Safety:  Fall Score:  Total Score: 3  Interventions: assistive device (walker, cane, etc), gripper socks, pt to call before getting OOB and stay with me (per policy)  High Fall Risk: Yes    Length of Stay:  Expected LOS: 3d 14h  Actual LOS: 3      Christina Dejesus RN

## 2021-09-05 NOTE — PROGRESS NOTES
Transition of Care Plan:     RUR: 14%  Disposition:  Home with [address] [place] and tube feedings through [facility]  -CM called [address] [phone] to notify them of DC today. Tube Feed Supplies are set up through [facility].  They requested that a few cans of osmolite d/t national shortage. CM talked to RN and she is going to try to send a few bottles home with her. Follow up appointments:  PCP: Weekend CM unable to schedule PCP appt. Placed information on AVS for [facility] and [facility].   DME needed: Pt owns a cane. Tube Feeds Supplies set up through [facility] [employer] at Discharge: Son here and ready to transport home. [place] or means to access home:  Grandson can provide.      IM Medicare Letter: OBS  Is patient a BCPI-A Bundle: n/a                      If yes, was Bundle Letter given?:   n/a  Caregiver Contact: Jeronimo Moreland. [phone]  Discharge Caregiver contacted prior to discharge?  CM reviewed [address] with son. No further CM needs. Care Management Interventions  PCP Verified by CM: Yes  Palliative Care Criteria Met (RRAT>21 & CHF Dx)?: No  Mode of Transport at Discharge: Other (see comment)  Transition of Care Consult (CM Consult): [address]: Yes  MyChart Signup: No  Discharge Durable Medical Equipment: No  Physical Therapy Consult: Yes  Occupational Therapy Consult: Yes  Speech Therapy Consult: No  Current Support Network: Relative's Home  Confirm Follow Up Transport: Family  The Plan for Transition of Care is Related to the Following Treatment Goals : [facility];  [facility]  The Patient and/or Patient Representative was Provided with a Choice of Provider and Agrees with the Discharge Plan?: Yes  Name of the Patient Representative Who was Provided with a Choice of Provider and Agrees with the Discharge Plan: son  Freedom of Choice List was Provided with Basic Dialogue that Supports the Patient's Individualized Plan of Care/Goals, Treatment Preferences and Shares the Quality Data Associated with the Providers?: Yes  Discharge Location  Discharge Placement: Home with home health    Felix, [address]  Ext [phone]

## 2021-09-05 NOTE — DISCHARGE SUMMARY
Physician Discharge Summary     Patient ID:  Brian Tsai  [id number]  [de-identified] y.o.  1940    Allergies: Patient has no known allergies. Admit Date: 8/31/2021    Discharge Date: 9/5/2021    * Admission Diagnoses: Esophageal cancer [facility]) [C15.9]    * Discharge Diagnoses:    Hospital Problems as of 9/5/2021 Date Reviewed: 8/31/2021        Codes Class Noted - Resolved POA    * ([facility] ICD-10-CM: C15.9  ICD-9-CM: 150.9  8/31/2021 - Present Yes               Admission Condition: Good    * Discharge Condition: good    * Procedures: Procedure(s):  LAPAROSCOPIC ASSISTED FEEDING [facility] Course:   Normal hospital course for this procedure. Consults: Hematology/Oncology    Disposition: Home    Discharge Medications:   Current Discharge Medication List      START taking these medications    Details   calcium carbonate 400 mg/5 mL susp 5 mL by Gastrostomy Tube route daily. Qty: 1 Each, Refills: 3  Start date: 9/5/2021         CONTINUE these medications which have NOT CHANGED    Details   amLODIPine (NORVASC) 5 mg tablet Take 5 mg by mouth daily. brimonidine (ALPHAGAN P) 0.1 % ophthalmic solution Administer 1 Drop to both eyes every eight (8) hours. chlorthalidone (HYGROTON) 25 mg tablet Take 25 mg by mouth daily. sodium bicarbonate/sod citrat (SODIUM BICARB AND CITRATE PO) Take 1 Tablet by mouth two (2) times a day. 650 mg tablet      losartan (COZAAR) 100 mg tablet Take 100 mg by mouth daily. Indications: high blood pressure      famotidine (PEPCID) 20 mg tablet Take 1 Tab by mouth Daily (before breakfast). Indications: gastroesophageal reflux disease  Qty: 30 Tab, Refills: 3      folic acid-vit S5-RLS D32 (FOLBEE) 2.5-25-1 mg tablet Take 1 Tab by mouth daily. latanoprost (XALATAN) 0.005 % ophthalmic solution Administer 1 Drop to both eyes nightly. DORZOLAMIDE HCL/TIMOLOL MALEAT (DORZOLAMIDE-TIMOLOL OP) Apply 1 Drop to eye three (3) times daily. One drop to each eye twice daily       acetaminophen (TYLENOL EXTRA STRENGTH) 500 mg tablet Take 1,000 mg by mouth every six (6) hours as needed. Indications: pain associated with arthritis, pain      levothyroxine (SYNTHROID) 100 mcg tablet Take 100 mcg by mouth Daily (before breakfast). One tablets 6 days a week then half a tablet one day  Indications: a condition with low thyroid hormone levels      capecitabine (XELODA) 500 mg tablet 1,000 mg/m2 two (2) times a day. diphenoxylate-atropine (LOMOTIL) 2.5-0.025 mg per tablet Take 1 Tab by mouth four (4) times daily as needed for Diarrhea. Max Daily Amount: 4 Tabs. Qty: 30 Tab, Refills: 3    Associated Diagnoses: Diarrhea of infectious origin             * Follow-up Care/Patient Instructions:   Activity: Activity as tolerated  Diet: tube feeds  Wound Care: Keep wound clean and dry    Follow-up Information     Follow up With Specialties Details Why Contact Info    Michelle Vyas MD [address]  [phone]          Follow-up tests/labs none    Signed:  Migel Carolina MD, FACS  9/5/2021  10:45 AM

## 2021-09-05 NOTE — PROGRESS NOTES
Problem: Falls - Risk of  Goal: *Absence of Falls  Description: Document Sri Saldana Fall Risk and appropriate interventions in the flowsheet.   Outcome: Progressing Towards Goal  Note: Fall Risk Interventions:  Mobility Interventions: Bed/chair exit alarm, Patient to call before getting OOB         Medication Interventions: Patient to call before getting OOB    Elimination Interventions: Bed/chair exit alarm, Call light in reach              Problem: Patient Education: Go to Patient Education Activity  Goal: Patient/Family Education  Outcome: Progressing Towards Goal

## 2021-09-07 NOTE — PROGRESS NOTES
Care Transitions Initial Call    Call within 2 business days of discharge: Yes     Patient: Raghav Guerrero Patient : 1940 MRN: [id number]    Last Discharge [address]       Complaint Diagnosis Description Type Department Provider    21  Malignant neoplasm of esophagus, unspecified location [facility]) Admission (Discharged) Sheila Brito MD          Was this an external facility discharge? No     Challenges to be reviewed by the provider   Additional needs identified to be addressed with provider: yes    Abnormal Labs at discharge:  CO2 21 - 32 mmol/L 26  25  25    Anion gap 5 - 15 mmol/L 4Low   6  6    Glucose 65 - 100 mg/dL 119High   121High   173High     BUN 6 - 20 MG/DL 32High   29High   24High     Creatinine 0.55 - 1.02 MG/DL 1. 43High   1. 46High   1. 53High     BUN/Creatinine ratio 12 - 20   22High   20  16    GFR est AA >60 ml/min/1.73m2 43Low   42Low   40Low     GFR est non-AA >60 ml/min/1.73m2 35Low   34Low   33Low     Calcium 8.5 - 10.1 MG/DL 6.5Low   6.6Low   6.9Low       Patient started on tube feedings this admission---Jejunostomy tube placed 2021    Patient to follow up with Dr Marisabel Marshall as OP--per family, pt son will schedule follow up       Method of communication with provider : chart routing    Discussed COVID-19 related testing which was not done at this time. Advance Care Planning:   Does patient have an Advance Directive:  health care decision makers updated    Inpatient Readmission Risk score: Unplanned Readmit Risk Score: 13    Was this a readmission?  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 Verified name and  with family as identifiers. Provided introduction to self, and explanation of the CTN role. CTN reviewed discharge instructions, medical action plan and red flags with family who verbalized understanding. Were discharge instructions available to patient? yes. Reviewed appropriate site of care based on symptoms and resources available to patient including: PCP and Specialist. Family given an opportunity to ask questions and does not have any further questions or concerns at this time. The family agrees to contact the PCP office for questions related to their healthcare. Medication reconciliation was performed with family, who verbalizes understanding of administration of home medications. Advised obtaining a 90-day supply of all daily and as-needed medications. Referral to Pharm D needed: no     Home Health/Outpatient orders at discharge: home health care  [address]: MAGGY LUCIANO [facility]  Date of initial visit: 2021    Durable Medical Equipment ordered at discharge: Tube feeding pump and supplies  [facility]: [address] received: per pt daughter, she states they have supplies in the home    Covid Risk Education    Educated patient about risk for severe COVID-19 due to risk factors according to CDC guidelines. CTN reviewed discharge instructions, medical action plan and red flag symptoms with the family who verbalized understanding. Discussed COVID vaccination status: yes. Education provided on COVID-19 vaccination as appropriate. Discussed exposure protocols and quarantine with CDC Guidelines.  Family was given an opportunity to verbalize any questions and concerns and agrees to contact CTN or health care provider for questions related to their healthcare. Discussed follow-up appointments. If no appointment was previously scheduled, appointment scheduling offered: yes. Is follow up appointment scheduled within 7 days of discharge? pending family scheduling oncology and PCP visits.    [facility] follow up appointment(s):   Future Appointments   Date Time Provider Kiana Simeon   9/7/2021 To Be Determined [facility]   9/9/2021 To Be Determined Jalil Stager, [facility]   9/13/2021 To Be Determined Jalil Stager, [facility]   9/15/2021 To Be Determined Jalil Stager, [facility]   9/17/2021 To Be Determined Jalil Stager, LPN [address] [facility]   9/20/2021 To Be Determined Jalil Stager, N [address] [facility]   9/21/2021  1:20 PM Joslyn Townsend MD [facility] BS AMB   9/22/2021 To Be Determined Jalil Stager, LPN [facility]   9/24/2021 To Be Determined Jalil Stager, LPN [facility]   9/27/2021 To Be Determined Jalil Stager, LPN [place]   9/30/2021 To Be Determined Jalil Stager, N [address] [facility]   10/1/2021  9:00 AM [facility] RCR PET DOSE 1 [facility]PET [place]   10/1/2021 10:00 AM [facility] RCR PET 1 [facility]HRCHPET [place]   10/4/2021 11:00 AM ECHOCARDIOGRAM ROOM [facility] [address] REG   10/5/2021 To Be Determined Jalil Stager, LPN [facility]   10/8/2021 To Be Determined Jalil Stager, LPN [facility]   10/12/2021 To Be Determined Jalil Stager, LPN [facility]   10/15/2021 To Be Determined Jalil Stager, LPN [facility]   10/19/2021 To Be Determined Jalil Stager, LPN [address] [facility]   10/26/2021 To Be Determined Jalil Stager, N [address] [facility]   11/2/2021 To Be Determined Santi Kuo, RN [address] [facility]     Non-BS follow up appointment(s): Dr Hugh Russell pending, PCP pending--family states they will schedule    Plan for follow-up call in 5-7 days based on severity of symptoms and risk factors. Plan for next call: ACP  CTN provided contact information for future needs. Goals Addressed                 This Visit's Progress     COMPLETED: Attends follow-up appointments as directed. 3/13/2020  Pt to go to [address] [place] [address] for 10 days - consecutive for IV antibiotics. If venous access difficult - reconsideration for replacement Maxx. Pt called pcp - Dr. Melody Fuller - for appt next week - for follow up  Pt needs heme onc follow up appt Dr. Pedro Courtney - [phone] - in one week - esophageal cancer and tx follow up.    ttk       COMPLETED: Knowledge and adherence of prescribed medication (ie. action, side effects, missed dose, etc.).        3/13/2020  Ms. Henry Puentes had Maxx catheter removed - concerns re: bacterial sepsis source -   She is to go to [facility] - [phone] daily for 10 more days for  IV Ceftriaxone 2 grams - Pt states she has a way to get there -     Her bp therapies are on hold - hypotension in hospital - sepsis -   Amlodipine, [place], Clonidine, Coreg, Losartan     Pt has bp cuff - encouraged to monitor and call if bp going up to normal -   ttk       Prevent complications post hospitalization. 09/07/21  CTN contacted patient daughter today to review discharge instructions and red falgs to prevent readmission. CTN discussed the following appts with daughter--she states her brother is scheduling follow ups with PCP and Dr Pedro Courtney. CTN will follow up. Patient has surgical follow up already scheduled for 9/21/2021. [facility]---tube feeding supplies---per daughter, pt has a pump feeding and she has turned this off as it completed around 8:00 this morning. She states [address] is coming today for more instruction. [facility]---per daughter RN came yesterday and will come back today.      Daughter reports pt had abdominal cramping last night about 1:00. She turned pump off for 2 hours and restarted it until it was completed. Pt did not report nausea or vomiting. Cramping went away. She states she will talk to [facility] nurse today for further education.  CTN provided information about tube flushing when feeding put on hole to make sure tube is flushed to prevent clogging.  Daughter reports pt does not have fever, no problems with tube site. CTN instructed her to call surgeon with concerns regarding the tube. [address] information provided. Daughter had to end call due to patient needing assistance. CTN provided contact information and instructed daughter to call with concerns or questions. CTN will follow up next week. ---mkrw       COMPLETED: Supportive resources in place to maintain patient in the community (ie. Home Health, DME equipment, refer to, medication assistant plan, etc.)        3/13/2020    PT evaluation in the hospital recommended home PT - pt declined/ said she does not need. \"I have everything I need at home\". Discussed with pt that we could add HH nursing/ or PT - if needed.   charlene

## 2021-09-13 NOTE — Clinical Note
Alexandra Rico, thank you    ----- Message -----  From: Reyna Mckeon PTA  Sent: 9/14/2021   9:54 AM EDT  To: Adrienne Wagner, PT      Pt sat down onto bedside commode and hit her L lat trunk on arm rest of commode. Pt reporting increased L lat trunk pain at today's visit.

## 2021-09-14 NOTE — CASE COMMUNICATION
Pt sat down onto bedside commode and hit her L lat trunk on arm rest of commode. Pt reporting increased L lat trunk pain at today's visit.

## 2021-09-15 PROBLEM — R55 SYNCOPE: Status: ACTIVE | Noted: 2021-01-01

## 2021-09-15 PROBLEM — C78.00 LUNG METASTASIS (HCC): Status: ACTIVE | Noted: 2021-01-01

## 2021-09-15 PROBLEM — R56.9 SEIZURE (HCC): Status: ACTIVE | Noted: 2021-01-01

## 2021-09-15 PROBLEM — I95.1 ORTHOSTASIS: Status: ACTIVE | Noted: 2021-01-01

## 2021-09-15 PROBLEM — E86.0 DEHYDRATION: Status: ACTIVE | Noted: 2021-01-01

## 2021-09-15 NOTE — ED PROVIDER NOTES
EMERGENCY DEPARTMENT HISTORY AND PHYSICAL EXAM      Date: 9/15/2021  Patient Name: Raghav Guerrero  Patient Age and Sex: [de-identified] y.o. female     History of Presenting Illness     Chief Complaint   Patient presents with    Fatigue     family states she started shaking and was unable to catch her breath. Her speech became slurred as well. However, ems did not note this and BFAST was negative. pt was 94% on room and 100% on 2L.  Shortness of Breath       History Provided By: Patient, Daughter, Daughter-in-law    HPI: Raghav Guerrero is a [de-identified]year old female history CKD, esophageal cancer on oral chemo (s/p PEG 8/31) presenting with syncope or seizure. Patient was sitting at table today at approximately 1250, acting her normal self. She suddenly lay her head back, developed a tremor of her right hand, patient appeared tired, when attempted to speak, she mumbled incomprehesible speech. Patient was conscious during it. Episode lasted six minutes, patient fatigued following episode, patient. Episode occurred at about 1250. Shortness of breath preceded episode. Patient reports ongoing mild dyspnea. No pain specifically no chest pain, no hemoptysis no leg swelling. Patient had a similar episode yesterday. No history seizures    There are no other complaints, changes, or physical findings at this time. PCP: Julieta Fuentes MD    No current facility-administered medications on file prior to encounter. Current Outpatient Medications on File Prior to Encounter   Medication Sig Dispense Refill    nutritional supplement (OSMOLITE 1.2 GARRICK PO) 1,400 mL by Per J Tube route daily. x14h 7pm-9am  free water flushes 100ml q4h      calcium carbonate 400 mg/5 mL susp 5 mL by Gastrostomy Tube route daily. 1 Each 3    amLODIPine (NORVASC) 5 mg tablet Take 5 mg by mouth daily.  brimonidine (ALPHAGAN P) 0.1 % ophthalmic solution Administer 1 Drop to both eyes every eight (8) hours.       chlorthalidone (HYGROTON) 25 mg tablet Take 25 mg by mouth daily.  sodium bicarbonate/sod citrat (SODIUM BICARB AND CITRATE PO) Take 1 Tablet by mouth two (2) times a day. 650 mg tablet      capecitabine (XELODA) 500 mg tablet 1,000 mg/m2 two (2) times a day. pt to start 9/14 for 2 weeks, then off 2 weeks and so forth        losartan (COZAAR) 100 mg tablet Take 100 mg by mouth daily. Indications: high blood pressure      diphenoxylate-atropine (LOMOTIL) 2.5-0.025 mg per tablet Take 1 Tab by mouth four (4) times daily as needed for Diarrhea. Max Daily Amount: 4 Tabs. (Patient not taking: Reported on 5/25/2021) 30 Tab 3    famotidine (PEPCID) 20 mg tablet Take 1 Tab by mouth Daily (before breakfast). Indications: gastroesophageal reflux disease 30 Tab 3    folic acid-vit B4-WPM V78 (FOLBEE) 2.5-25-1 mg tablet Take 1 Tab by mouth daily.  latanoprost (XALATAN) 0.005 % ophthalmic solution Administer 1 Drop to both eyes nightly.  DORZOLAMIDE HCL/TIMOLOL MALEAT (DORZOLAMIDE-TIMOLOL OP) Apply 1 Drop to eye three (3) times daily. One drop to each eye twice daily       acetaminophen (TYLENOL EXTRA STRENGTH) 500 mg tablet Take 1,000 mg by mouth every six (6) hours as needed. Indications: pain associated with arthritis, pain      levothyroxine (SYNTHROID) 100 mcg tablet Take 100 mcg by mouth Daily (before breakfast).  One tablets 6 days a week then half a tablet one day  Pt takes the half tab on Saturdays  Indications: a condition with low thyroid hormone levels         Past History     Past Medical History:  Past Medical History:   Diagnosis Date    Abnormal x-ray of abdomen 05/22/2019    Bas showed food and irregular stricture above ge junction in addition to large hiatal hernia  5.20.2019    Adenocarcinoma of esophagus ([address] 5/30/2019    chemotherapy    Anemia     Arrhythmia     Arthritis     Chronic kidney disease     Stage II followed by Dr [facility]     Diverticulosis     Esophageal dysphagia 5/22/2019    Glaucoma     bilateral    Hiatal hernia     History of Clostridioides difficile infection 05/2019    History of colon polyps 6/1/2018    2013 tubular adenoma     Hypertension     Hypothyroid     [facility] HTN [facility]     Sleep apnea     CPAP compliant, as stated 5/25/2021    Thromboembolus ([facility] [address] 2015    Right  leg , spontaneous       Past Surgical History:  Past Surgical History:   Procedure Laterality Date    ANAL PRESSURE RECORD  6/6/2019    COLONOSCOPY N/A 6/1/2018    COLONOSCOPY performed by Khalif Grider MD at [address] [place] [address]  6/1/2018         HX CATARACT REMOVAL Bilateral 2017    HX HYSTERECTOMY      HX OTHER SURGICAL  05/26/2021    port cath instertion    IR FINE NEEDLE ASPIRATION W IMAGE  1984    IR INSERT NON TUNL CVC OVER 5 YRS  3/8/2020    IR INSERT TUNL CVC W PORT OVER 5 YEARS  7/19/2019    IR REMOVE TUNL CVAD W/O PORT / PUMP  3/8/2020    UPPER GI ENDOSCOPY,BALL DIL,30MM  5/22/2019         UPPER GI ENDOSCOPY,BALL DIL,30MM  5/30/2019         UPPER GI ENDOSCOPY,BIOPSY  5/22/2019         UPPER GI ENDOSCOPY,BIOPSY  5/30/2019         UPPER GI ENDOSCOPY,BIOPSY  10/17/2019            Family History:  Family History   Problem Relation Age of Onset    No Known Problems Sister        Social History:  Social History     Tobacco Use    Smoking status: Never Smoker    Smokeless tobacco: Never Used   Vaping Use    Vaping Use: Never used   Substance Use Topics    Alcohol use: Not Currently     Comment: in her 19's     Drug use: Never       Allergies:  No Known Allergies      Review of Systems   Review of Systems   Constitutional: Negative. HENT: Negative. Respiratory: Positive for shortness of breath. Cardiovascular: Negative. Negative for chest pain. Gastrointestinal: Negative. Musculoskeletal: Negative. Neurological: Positive for tremors and seizures. All other systems reviewed and are negative.     Physical Exam   Physical Exam   General: Alert, no acute distress  Skin: Warm, dry  Head: Normocephalic, atraumatic  Eye: EOMI, normal conjunctiva  Ears, Nose, Mouth and Throat: Oral mucosa dry  Cardiovascular: No murmur, normal peripheral perfusion, regular rhythm  Pulmonary: Normal respiratory effort, normal breath sounds, tachypneic to approximately 30  Gastrointestinal: Soft, nontender, nondistended  Musculoskeletal: No swelling, no deformity  Neurological: Alert and oriented to person, place, situation. Normal speech observed. Moving all extremities symmetrically.   Psychiatric: Cooperative, appropriate affect    Diagnostic Study Results     Labs  Recent Results (from the past 12 hour(s))   EKG, 12 LEAD, INITIAL    Collection Time: 09/15/21  2:06 PM   Result Value Ref Range    Ventricular Rate 75 BPM    Atrial Rate 75 BPM    P-R Interval 150 ms    QRS Duration 92 ms    Q-T Interval 502 ms    QTC Calculation (Bezet) 560 ms    Calculated P Axis 62 degrees    Calculated R Axis -32 degrees    Calculated T Axis -23 degrees    Diagnosis       Sinus rhythm with premature atrial complexes  Left axis deviation  T wave abnormality, consider anterior ischemia  Prolonged QT  When compared with ECG of 04-MAR-2020 22:34,  QRS axis shifted left  Nonspecific T wave abnormality now evident in Inferior leads  T wave inversion now evident in Anterior leads  Nonspecific T wave abnormality, improved in Lateral leads  QT has lengthened     CBC WITH AUTOMATED DIFF    Collection Time: 09/15/21  2:17 PM   Result Value Ref Range    WBC 8.0 3.6 - 11.0 K/uL    RBC 2.72 (L) 3.80 - 5.20 M/uL    HGB 8.5 (L) 11.5 - 16.0 g/dL    HCT 28.9 (L) 35.0 - 47.0 %    .3 (H) 80.0 - 99.0 FL    MCH 31.3 26.0 - 34.0 PG    MCHC 29.4 (L) 30.0 - 36.5 g/dL    RDW 24.6 (H) 11.5 - 14.5 %    PLATELET 678 374 - 678 K/uL    MPV 11.1 8.9 - 12.9 FL    NRBC 0.0 0  WBC    ABSOLUTE NRBC 0.00 0.00 - 0.01 K/uL    NEUTROPHILS 86 (H) 32 - 75 %    LYMPHOCYTES 6 (L) 12 - 49 % MONOCYTES 6 5 - 13 %    EOSINOPHILS 1 0 - 7 %    BASOPHILS 0 0 - 1 %    IMMATURE GRANULOCYTES 1 (H) 0.0 - 0.5 %    ABS. NEUTROPHILS 6.8 1.8 - 8.0 K/UL    ABS. LYMPHOCYTES 0.5 (L) 0.8 - 3.5 K/UL    ABS. MONOCYTES 0.5 0.0 - 1.0 K/UL    ABS. EOSINOPHILS 0.1 0.0 - 0.4 K/UL    ABS. BASOPHILS 0.0 0.0 - 0.1 K/UL    ABS. IMM. GRANS. 0.1 (H) 0.00 - 0.04 K/UL    DF SMEAR SCANNED      RBC COMMENTS ANISOCYTOSIS  1+        RBC COMMENTS HYPOCHROMIA  PRESENT       METABOLIC PANEL, COMPREHENSIVE    Collection Time: 09/15/21  2:17 PM   Result Value Ref Range    Sodium 148 (H) 136 - 145 mmol/L    Potassium 5.5 (H) 3.5 - 5.1 mmol/L    Chloride 115 (H) 97 - 108 mmol/L    CO2 28 21 - 32 mmol/L    Anion gap 5 5 - 15 mmol/L    Glucose 115 (H) 65 - 100 mg/dL    BUN 44 (H) 6 - 20 MG/DL    Creatinine 1.60 (H) 0.55 - 1.02 MG/DL    BUN/Creatinine ratio 28 (H) 12 - 20      GFR est AA 38 (L) >60 ml/min/1.73m2    GFR est non-AA 31 (L) >60 ml/min/1.73m2    Calcium 6.2 (LL) 8.5 - 10.1 MG/DL    Bilirubin, total 0.4 0.2 - 1.0 MG/DL    ALT (SGPT) 22 12 - 78 U/L    AST (SGOT) 62 (H) 15 - 37 U/L    Alk.  phosphatase 145 (H) 45 - 117 U/L    Protein, total 6.3 (L) 6.4 - 8.2 g/dL    Albumin 1.9 (L) 3.5 - 5.0 g/dL    Globulin 4.4 (H) 2.0 - 4.0 g/dL    A-G Ratio 0.4 (L) 1.1 - 2.2     CK W/ REFLX CKMB    Collection Time: 09/15/21  2:17 PM   Result Value Ref Range    CK 61 26 - 192 U/L   TROPONIN I    Collection Time: 09/15/21  2:17 PM   Result Value Ref Range    Troponin-I, Qt. <0.05 <0.05 ng/mL   NT-PRO BNP    Collection Time: 09/15/21  2:17 PM   Result Value Ref Range    NT pro-BNP 2,870 (H) <780 PG/ML   METABOLIC PANEL, BASIC    Collection Time: 09/15/21  6:52 PM   Result Value Ref Range    Sodium 148 (H) 136 - 145 mmol/L    Potassium 4.8 3.5 - 5.1 mmol/L    Chloride 115 (H) 97 - 108 mmol/L    CO2 30 21 - 32 mmol/L    Anion gap 3 (L) 5 - 15 mmol/L    Glucose 96 65 - 100 mg/dL    BUN 44 (H) 6 - 20 MG/DL    Creatinine 1.51 (H) 0.55 - 1.02 MG/DL BUN/Creatinine ratio 29 (H) 12 - 20      GFR est AA 40 (L) >60 ml/min/1.73m2    GFR est non-AA 33 (L) >60 ml/min/1.73m2    Calcium 6.1 (LL) 8.5 - 10.1 MG/DL     Radiologic Studies  CTA CHEST W OR W WO CONT   Final Result   1. No evidence of pulmonary embolus. 2.  Worsening mediastinal lymphadenopathy. 3. Developing nodular fissural thickening of the right major fissure, concerning   for metastatic involvement. 4. New small bilateral pleural effusions. 5. Right basilar atelectasis. Superimposed pneumonia cannot be excluded. 6. Marked cardiomegaly. 7. Large chronic hiatal hernia. CT HEAD WO CONT   Final Result   1. No evidence of acute infarct or intracranial hemorrhage. 2. Mild periventricular white matter disease is likely secondary to chronic   small vessel ischemic changes. XR CHEST PORT   Final Result   Mild diffuse bilateral airspace disease, right worse than left, with   small right pleural effusion. MRI BRAIN W WO CONT    (Results Pending)     CT Results  (Last 48 hours)               09/15/21 1656  CTA CHEST W OR W WO CONT Final result    Impression:  1. No evidence of pulmonary embolus. 2.  Worsening mediastinal lymphadenopathy. 3. Developing nodular fissural thickening of the right major fissure, concerning   for metastatic involvement. 4. New small bilateral pleural effusions. 5. Right basilar atelectasis. Superimposed pneumonia cannot be excluded. 6. Marked cardiomegaly. 7. Large chronic hiatal hernia. Narrative:  INDICATION:   Fatigue with shortness of breath, history of esophageal cancer       COMPARISON:  CT November 2020, PET/CT May 11, 2021       TECHNIQUE:  Following the uneventful intravenous administration of 100 cc Isovue   937, thin helical axial images were obtained through the chest. Postprocessing   was performed. 3D image postprocessing was performed.        CT dose reduction was achieved through the use of a standardized protocol   tailored for this examination and automatic exposure control for dose   modulation. FINDINGS: There is a left chest zuleika catheter. Mediastinal lymphadenopathy has increased since the prior studies. For example,   a right paratracheal lymph node measures 2.7 cm, image 70 series 2, previously   1.9 cm. The heart is enlarged. There is a large hiatal hernia. There is no   pericardial effusion. No filling defect is seen within the pulmonary arterial system to suggest   pulmonary embolus. Central pulmonary arteries are dilated, compatible with   pulmonary arterial hypertension. The aorta is normal in caliber. There is bibasilar atelectasis. Superimposed mild airspace disease may be   present at the right lung base. .  There is no pneumothorax. There are small to   moderate bilateral pleural effusions. There is nodular pleural thickening of the   right major fissure, which is new since the prior study. Limited evaluation of the upper abdomen demonstrates numerous lesions in the   liver, incompletely imaged. There are also numerous right renal cysts and a left   renal cyst which is partially imaged. The osseous structures are unremarkable. 09/15/21 1656  CT HEAD WO CONT Final result    Impression:  1. No evidence of acute infarct or intracranial hemorrhage. 2. Mild periventricular white matter disease is likely secondary to chronic   small vessel ischemic changes. Narrative: Indication:  syncope vs seizure        Comparison: None       Findings: 5 mm axial images were obtained from the skull base through the   vertex. CT dose reduction was achieved through the use of a standardized protocol   tailored for this examination and automatic exposure control for dose   modulation. The ventricles and cortical sulci are prominent, compatible with age related   volume loss.  There is no evidence of intracranial hemorrhage, mass, mass effect,   or acute infarct. There is periventricular white matter disease. No extra-axial   fluid collections are seen. The visualized paranasal sinuses and mastoid air   cells are clear. The orbital structures are unremarkable. No osseous   abnormalities are seen. CXR Results  (Last 48 hours)               09/15/21 1443  XR CHEST PORT Final result    Impression:  Mild diffuse bilateral airspace disease, right worse than left, with   small right pleural effusion. Narrative:  INDICATION:  Shortness of breath        COMPARISON: May 26, 2021       FINDINGS: Single AP portable view of the chest obtained at 1441 demonstrates a   stable cardiomediastinal silhouette. The lungs are hypoinspiratory. There is a   left chest zuleika catheter. There is patchy bilateral diffuse airspace disease,   right worse than left, with a small right pleural effusion. No osseous   abnormalities are seen. Medical Decision Making   I am the first provider for this patient. I reviewed the vital signs, available nursing notes, past medical history, past surgical history, family history and social history. Vital Signs-Reviewed the patient's vital signs. Patient Vitals for the past 12 hrs:   Temp Pulse Resp BP SpO2   09/15/21 1702  85 15 (!) 153/97 (!) 65 %   09/15/21 1447     93 %   09/15/21 1430    109/75    09/15/21 1359 97.3 °F (36.3 °C) 78 16 (!) 215/90 95 %       Records Reviewed: Nursing Notes and Old Medical Records    Provider Notes (Medical Decision Making):   [de-identified]year-old esophageal cancer recent PEG tube placement presenting with syncope versus seizure with associated dyspnea    Differential includes PE, seizure, stroke, anemia    Patient hypertensive on arrival to 215/90, satting 95% on room air, document respirate of 16 on my evaluation she is tachypneic to at least 25 sometimes up to 30 resting in bed. She is hooked up to her cardiac monitor will be monitored closely in the ED.   Will obtain CBC to evaluate for leukocytosis, anemia. Will obtain chest x-ray though I have a moderately high suspicion of VTE though ultimately patient will likely require CT chest.  Obtain EKG and troponin. Will obtain CT head noncontrast ultimately patient may require MRI for evaluation of possible metastatic disease causing seizure. I more suspect syncope though cannot exclude seizure. ED Course:   Initial assessment performed. The patients presenting problems have been discussed, and they are in agreement with the care plan formulated and outlined with them. I have encouraged them to ask questions as they arise throughout their visit. ED Course as of Sep 15 2016   Wed Sep 15, 2021   1459 Hg 8.5 fgromn 8.3, no hematochezia, melena    [WB]   1536 Calcium is critically low at 6.2, EKG is pending    [WB]   1536 Potassium(!): 5.5 [WB]   1537 Potassium 5.5, hemolyzed with no significant worsening in her renal function    [WB]   1537 We will repeat CMP    [WB]   1546 Calcium corrected for hypoalbuminemia is 7.8    [WB]   1547 Chest x-ray demonstrates bilateral airspace disease with possible right pleural effusion, given her syncope, tachypnea and multiple risk factors will obtain CTPA to fully evaluate for PE, parenchymal disease    [WB]   1623 Will repeat BMP.    [WB]   6035 CT head without acute process    [WB]   1727 CT chest demonstrates no acute PE, possible metastatic involvement to the right major fissure, new small bilateral pleural effusions, bibasilar atelectasis versus superimposed pneumonia, chronic hiatal hernia, cardiomegaly    [WB]      ED Course User Index  [WB] Brijesh Brambila MD     Given possible new diagnosis of structural heart disease on CT chest,, elevated BNP syncope will admit for echocardiogram, MRI brain, close monitoring.     Disposition:    Admission Note:  Patient is being admitted to the hospital by Dr. Camila Magallon, Service: Hospitalist.  The results of their tests and reasons for their admission have been discussed with them and available family. They convey agreement and understanding for the need to be admitted and for their admission diagnosis. Diagnosis     Clinical Impression:   1. Syncope and collapse        Attestations:    Tang Pope M.D. Please note that this dictation was completed with [employer], the computer voice recognition software. Quite often unanticipated grammatical, syntax, homophones, and other interpretive errors are inadvertently transcribed by the computer software. Please disregard these errors. Please excuse any errors that have escaped final proofreading. Thank you.

## 2021-09-15 NOTE — H&P
Hospitalist Admission Note    NAME: Urban Robles   :  1940   MRN:  [id number]     Date/Time:  9/15/2021 5:51 PM    Patient PCP: Daphney Fulton MD oncology Dr. Sabrina Garcia, general surgery= Dr. Meryl Lundy  ______________________________________________________________________  Given the patient's current clinical presentation, I have a high level of concern for decompensation if discharged from the emergency department. Complex decision making was performed, which includes reviewing the patient's available past medical records, laboratory results, and x-ray films. My assessment of this patient's clinical condition and my plan of care is as follows. Assessment / Plan:  Syncope Vs questionable seizure-like activity POA- Right arm shaking episode x2 as described by the family, with no obvious postictal state/confusion  Esophageal cancer with metastases POA -? New right lung metastases suspected on CTA chest  Dehydration POA-history of recently placed PEG tube started on Osmolite tube feeding  CT head negative acute  CTA chest negative for PE, mediastinal lymphadenopathy noted, metastasis in the right major fissure noted, small bilateral pleural effusions  proBNP 2870  Troponin negative  BUN 44/creatinine 1.6    Admit to neuro telemetry bed  Check MRI brain for ruling out brain mets  Seizure precautions  Check orthostatic vitals rule out hypertension with postural change  Check 2D echo in a.m. Gentle IV fluids overnight-stop if low EF noted on 2D echo  BMP in a.m.   Inpatient PT OT eval for DC planning-likely needs home health versus SNF placement  Inpatient nutrition consult for tube feeding optimization as needed  Inpatient oncology consult Dr. Sabrina Garcia for further insight in patient's metastatic esophageal cancer and and its prognosis    EMILY on CPAP  Continue home CPAP when family brings with him    Hypertension  Hypothyroidism  History of right lower extremity DVT  CKD stage III  Glaucoma  Severe hiatal hernia  Continue home meds and eyedrops      Code Status: Full code as per patient was in ED  Surrogate Decision Maker: Dwain Paige #[id number]    DVT Prophylaxis: Subcu heparin  GI Prophylaxis: Pepcid as at home    Baseline: Patient lives with home with family      Subjective:   CHIEF COMPLAINT: Generalized weakness with syncope and questionable right arm shakiness x2 episodes    HISTORY OF PRESENT ILLNESS:     Mathew Sauceda is a [de-identified] y.o.  female who presents with above complaints from home with family ambulatory. Patient presented with chief complaint of worsening generalized weakness with fatigue associated with sudden onset right arm shakiness x2 episodes in the last 24 hours at home as per family. History of esophageal cancer on chemotherapy with oncology Dr. Narciso Huntley. History of recently placed PEG tube for nutritional needs-just started on Osmolite tube feedings. Denies any history of fever, chills or flulike symptoms. Denies any previous history of seizures or seizure-like activity in the past     Patient was found to have dehydration with mild SONIDO on work-up in the ED with mildly elevated proBNP at 2870 with negative CTA chest for PE with negative CT head. We were asked to admit for work up and evaluation of the above problems.      Past Medical History:   Diagnosis Date    Abnormal x-ray of abdomen 05/22/2019    Bas showed food and irregular stricture above ge junction in addition to large hiatal hernia  5.20.2019    Adenocarcinoma of esophagus ([address] 5/30/2019    chemotherapy    Anemia     Arrhythmia     Arthritis     Chronic kidney disease     Stage II followed by Dr Shruthi Gacsa Neph[facility]     Diverticulosis     Esophageal dysphagia 5/22/2019    Glaucoma     bilateral    Hiatal hernia     History of Clostridioides difficile infection 05/2019    History of colon polyps 6/1/2018    2013 tubular adenoma     Hypertension     Hypothyroid     [facility] HTN [facility]     Sleep apnea     CPAP compliant, as stated 5/25/2021    Thromboembolus ([facility] [facility] 2015    Right  leg , spontaneous        Past Surgical History:   Procedure Laterality Date    ANAL PRESSURE RECORD  6/6/2019    COLONOSCOPY N/A 6/1/2018    COLONOSCOPY performed by Deborah Fritz MD at [facility]  6/1/2018         HX CATARACT REMOVAL Bilateral 2017    HX HYSTERECTOMY      HX OTHER SURGICAL  05/26/2021    port cath instertion    IR FINE NEEDLE ASPIRATION W IMAGE  1984    IR INSERT NON TUNL CVC OVER 5 YRS  3/8/2020    IR INSERT TUNL CVC W PORT OVER 5 YEARS  7/19/2019    IR REMOVE TUNL CVAD W/O PORT / PUMP  3/8/2020    UPPER GI ENDOSCOPY,BALL DIL,30MM  5/22/2019         UPPER GI ENDOSCOPY,BALL DIL,30MM  5/30/2019         UPPER GI ENDOSCOPY,BIOPSY  5/22/2019         UPPER GI ENDOSCOPY,BIOPSY  5/30/2019         UPPER GI ENDOSCOPY,BIOPSY  10/17/2019            Social History     Tobacco Use    Smoking status: Never Smoker    Smokeless tobacco: Never Used   Substance Use Topics    Alcohol use: Not Currently     Comment: in her 19's         Family History   Problem Relation Age of Onset    No Known Problems Sister      No Known Allergies     Prior to Admission medications    Medication Sig Start Date End Date Taking? Authorizing Provider   nutritional supplement (OSMOLITE 1.2 GARRICK PO) 1,400 mL by Per J Tube route daily. x14h 7pm-9am  free water flushes 100ml q4h    Provider, Historical   calcium carbonate 400 mg/5 mL susp 5 mL by Gastrostomy Tube route daily. 9/5/21   Rcahelle Joyce MD   amLODIPine (NORVASC) 5 mg tablet Take 5 mg by mouth daily. Provider, Historical   brimonidine (ALPHAGAN P) 0.1 % ophthalmic solution Administer 1 Drop to both eyes every eight (8) hours. Provider, Historical   chlorthalidone (HYGROTON) 25 mg tablet Take 25 mg by mouth daily.     Provider, Historical   sodium bicarbonate/sod citrat (SODIUM BICARB AND CITRATE PO) Take 1 Tablet by mouth two (2) times a day. 650 mg tablet    Provider, Historical   capecitabine (XELODA) 500 mg tablet 1,000 mg/m2 two (2) times a day. pt to start 9/14 for 2 weeks, then off 2 weeks and so forth      Provider, Historical   losartan (COZAAR) 100 mg tablet Take 100 mg by mouth daily. Indications: high blood pressure    Provider, Historical   diphenoxylate-atropine (LOMOTIL) 2.5-0.025 mg per tablet Take 1 Tab by mouth four (4) times daily as needed for Diarrhea. Max Daily Amount: 4 Tabs. Patient not taking: Reported on 5/25/2021 6/10/20   Gregorio House MD   famotidine (PEPCID) 20 mg tablet Take 1 Tab by mouth Daily (before breakfast). Indications: gastroesophageal reflux disease 6/11/20   Gregorio House MD   folic acid-vit P3-CONCHIS E57 (FOLBEE) 2.5-25-1 mg tablet Take 1 Tab by mouth daily. Provider, Historical   latanoprost (XALATAN) 0.005 % ophthalmic solution Administer 1 Drop to both eyes nightly. Provider, Historical   DORZOLAMIDE HCL/TIMOLOL MALEAT (DORZOLAMIDE-TIMOLOL OP) Apply 1 Drop to eye three (3) times daily. One drop to each eye twice daily     Provider, Historical   acetaminophen (TYLENOL EXTRA STRENGTH) 500 mg tablet Take 1,000 mg by mouth every six (6) hours as needed. Indications: pain associated with arthritis, pain    Provider, Historical   levothyroxine (SYNTHROID) 100 mcg tablet Take 100 mcg by mouth Daily (before breakfast).  One tablets 6 days a week then half a tablet one day  Pt takes the half tab on Saturdays  Indications: a condition with low thyroid hormone levels    Provider, Historical       REVIEW OF SYSTEMS:      Total of 12 systems reviewed as follows:       POSITIVE= underlined text  Negative = text not underlined  General:  fever, chills, sweats, generalized weakness, weight loss/gain,      loss of appetite   Eyes:    blurred vision, eye pain, loss of vision, double vision  ENT:    rhinorrhea, pharyngitis   Respiratory:   cough, sputum production, SOB, DEAN, wheezing, pleuritic pain   Cardiology:   chest pain, palpitations, orthopnea, PND, edema, syncope   Gastrointestinal:  abdominal pain , N/V, diarrhea, dysphagia, constipation, bleeding   Genitourinary:  frequency, urgency, dysuria, hematuria, incontinence   Muskuloskeletal :  arthralgia, myalgia, back pain  Hematology:  easy bruising, nose or gum bleeding, lymphadenopathy   Dermatological: rash, ulceration, pruritis, color change / jaundice  Endocrine:   hot flashes or polydipsia   Neurological:  headache, dizziness, confusion, focal weakness, paresthesia,     Speech difficulties, memory loss, gait difficulty, tremors/shakiness  Psychological: Feelings of anxiety, depression, agitation    Objective:   VITALS:    Visit Vitals  BP (!) 153/97   Pulse 85   Temp 97.3 °F (36.3 °C)   Resp 15   Ht 5' 8\" (1.727 m)   Wt 76.7 kg (169 lb)   SpO2 (!) 65%   BMI 25.70 kg/m²       PHYSICAL EXAM:    General:    Alert, cooperative, no distress, appears stated age. HEENT: Atraumatic, anicteric sclerae, pink conjunctivae     No oral ulcers, mucosa moist, throat clear, dentition fair  Neck:  Supple, symmetrical,  thyroid: non tender  Lungs:   Clear to auscultation bilaterally. No Wheezing or Rhonchi. No rales. Chest wall:  No tenderness  No Accessory muscle use. Heart:   Regular  rhythm,  No  murmur   No edema  Abdomen:   Soft, non-tender. Not distended. Bowel sounds normal  Extremities: No cyanosis. No clubbing,      Skin turgor normal, Capillary refill normal, Radial dial pulse 2+  Skin:     Not pale. Not Jaundiced  No rashes   Psych:  Good insight. Not depressed. Not anxious or agitated. Neurologic: EOMs intact. No facial asymmetry. No aphasia or slurred speech. Symmetrical strength, Sensation grossly intact.  Alert and oriented X 4.     _______________________________________________________________________  Care Plan discussed with:    Comments   Patient x    Family  x  son and her daughter-in-law at bedside in ED   RN x    Care Manager                    Consultant:  mannie Brock (GUANAKITO BLEVINS)   _______________________________________________________________________  Expected  Disposition:   Home with Family    HH/PT/OT/RN ?   SNF/LTC ? [facility]    ________________________________________________________________________  TOTAL TIME:  46 Minutes    Critical Care Provided     Minutes non procedure based      Comments    x Reviewed previous records   >50% of visit spent in counseling and coordination of care x Discussion with patient and family and questions answered       ________________________________________________________________________  Signed: Jean-Paul Ceron MD    Procedures: see electronic medical records for all procedures/Xrays and details which were not copied into this note but were reviewed prior to creation of Plan.     LAB DATA REVIEWED:    Recent Results (from the past 24 hour(s))   EKG, 12 LEAD, INITIAL    Collection Time: 09/15/21  2:06 PM   Result Value Ref Range    Ventricular Rate 75 BPM    Atrial Rate 75 BPM    P-R Interval 150 ms    QRS Duration 92 ms    Q-T Interval 502 ms    QTC Calculation (Bezet) 560 ms    Calculated P Axis 62 degrees    Calculated R Axis -32 degrees    Calculated T Axis -23 degrees    Diagnosis       Sinus rhythm with premature atrial complexes  Left axis deviation  T wave abnormality, consider anterior ischemia  Prolonged QT  When compared with ECG of 04-MAR-2020 22:34,  QRS axis shifted left  Nonspecific T wave abnormality now evident in Inferior leads  T wave inversion now evident in Anterior leads  Nonspecific T wave abnormality, improved in Lateral leads  QT has lengthened     CBC WITH AUTOMATED DIFF    Collection Time: 09/15/21  2:17 PM   Result Value Ref Range    WBC 8.0 3.6 - 11.0 K/uL    RBC 2.72 (L) 3.80 - 5.20 M/uL    HGB 8.5 (L) 11.5 - 16.0 g/dL    HCT 28.9 (L) 35.0 - 47.0 %    .3 (H) 80.0 - 99.0 FL    MCH 31.3 26.0 - 34.0 PG    MCHC 29.4 (L) 30.0 - 36.5 g/dL    RDW 24.6 (H) 11.5 - 14.5 %    PLATELET 403 794 - 678 K/uL    MPV 11.1 8.9 - 12.9 FL    NRBC 0.0 0  WBC    ABSOLUTE NRBC 0.00 0.00 - 0.01 K/uL    NEUTROPHILS 86 (H) 32 - 75 %    LYMPHOCYTES 6 (L) 12 - 49 %    MONOCYTES 6 5 - 13 %    EOSINOPHILS 1 0 - 7 %    BASOPHILS 0 0 - 1 %    IMMATURE GRANULOCYTES 1 (H) 0.0 - 0.5 %    ABS. NEUTROPHILS 6.8 1.8 - 8.0 K/UL    ABS. LYMPHOCYTES 0.5 (L) 0.8 - 3.5 K/UL    ABS. MONOCYTES 0.5 0.0 - 1.0 K/UL    ABS. EOSINOPHILS 0.1 0.0 - 0.4 K/UL    ABS. BASOPHILS 0.0 0.0 - 0.1 K/UL    ABS. IMM. GRANS. 0.1 (H) 0.00 - 0.04 K/UL    DF SMEAR SCANNED      RBC COMMENTS ANISOCYTOSIS  1+        RBC COMMENTS HYPOCHROMIA  PRESENT       METABOLIC PANEL, COMPREHENSIVE    Collection Time: 09/15/21  2:17 PM   Result Value Ref Range    Sodium 148 (H) 136 - 145 mmol/L    Potassium 5.5 (H) 3.5 - 5.1 mmol/L    Chloride 115 (H) 97 - 108 mmol/L    CO2 28 21 - 32 mmol/L    Anion gap 5 5 - 15 mmol/L    Glucose 115 (H) 65 - 100 mg/dL    BUN 44 (H) 6 - 20 MG/DL    Creatinine 1.60 (H) 0.55 - 1.02 MG/DL    BUN/Creatinine ratio 28 (H) 12 - 20      GFR est AA 38 (L) >60 ml/min/1.73m2    GFR est non-AA 31 (L) >60 ml/min/1.73m2    Calcium 6.2 (LL) 8.5 - 10.1 MG/DL    Bilirubin, total 0.4 0.2 - 1.0 MG/DL    ALT (SGPT) 22 12 - 78 U/L    AST (SGOT) 62 (H) 15 - 37 U/L    Alk.  phosphatase 145 (H) 45 - 117 U/L    Protein, total 6.3 (L) 6.4 - 8.2 g/dL    Albumin 1.9 (L) 3.5 - 5.0 g/dL    Globulin 4.4 (H) 2.0 - 4.0 g/dL    A-G Ratio 0.4 (L) 1.1 - 2.2     CK W/ REFLX CKMB    Collection Time: 09/15/21  2:17 PM   Result Value Ref Range    CK 61 26 - 192 U/L   TROPONIN I    Collection Time: 09/15/21  2:17 PM   Result Value Ref Range    Troponin-I, Qt. <0.05 <0.05 ng/mL   NT-PRO BNP    Collection Time: 09/15/21  2:17 PM   Result Value Ref Range    NT pro-BNP 2,870 (H) <450 PG/ML

## 2021-09-15 NOTE — ED NOTES
Assumed care of pt from EMS stretcher. Per EMS, pt has been feeling weak and family was concerned after a brief episode of R arm shaking, SOB and slurred speech. All episodic sx had resolved upon EMS arrival no recurrence en route. Additionally, pt has thick mucus/secretions which has been occurring since surgery    Pt arrives in hospital gown, compression stockings and hospital socks. Per family, pt discharged about 1 week ago d/t peg tube. Peg tube dressing has yellow drainage POA. Pt has a port in L upper chest that is accessed monthly for infusions. Daughter at bedside, confirming pt speech is at baseline. 1445 MD at bedside     1930 Bedside and Verbal shift change report given to [facility] (oncoming nurse) by Joya Rich (offgoing nurse). Report included the following information SBAR, Kardex, ED Summary and MAR     PureWick placed on pt. Pt repositioned, saccrum evaluated - stage 1 ulcer present and covered w/ Mepilex. Will place wound consult.

## 2021-09-16 NOTE — PROGRESS NOTES
Problem: Falls - Risk of  Goal: *Absence of Falls  Description: Document Fernie Najera Fall Risk and appropriate interventions in the flowsheet.   Outcome: Progressing Towards Goal  Note: Fall Risk Interventions:  Mobility Interventions: Bed/chair exit alarm, Communicate number of staff needed for ambulation/transfer, OT consult for ADLs, Patient to call before getting OOB, PT Consult for mobility concerns, PT Consult for assist device competence, Strengthening exercises (ROM-active/passive), Utilize walker, cane, or other assistive device, Utilize gait belt for transfers/ambulation         Medication Interventions: Assess postural VS orthostatic hypotension, Bed/chair exit alarm, Patient to call before getting OOB, Teach patient to arise slowly, Utilize gait belt for transfers/ambulation    Elimination Interventions: Bed/chair exit alarm, Call light in reach, Patient to call for help with toileting needs, Toilet paper/wipes in reach, Toileting schedule/hourly rounds              Problem: Patient Education: Go to Patient Education Activity  Goal: Patient/Family Education  Outcome: Progressing Towards Goal     Problem: General Medical Care Plan  Goal: *Vital signs within specified parameters  Outcome: Progressing Towards Goal  Goal: *Labs within defined limits  Outcome: Progressing Towards Goal  Goal: *Absence of infection signs and symptoms  Outcome: Progressing Towards Goal  Goal: *Skin integrity maintained  Outcome: Progressing Towards Goal  Goal: *Optimize nutritional status  Outcome: Progressing Towards Goal  Goal: *Progressive mobility and function (eg: ADL's)  Outcome: Progressing Towards Goal     Problem: Patient Education: Go to Patient Education Activity  Goal: Patient/Family Education  Outcome: Progressing Towards Goal     Problem: General Wound Care  Goal: Interventions  Outcome: Progressing Towards Goal     Problem: Patient Education: Go to Patient Education Activity  Goal: Patient/Family Education  Outcome: Progressing Towards Goal

## 2021-09-16 NOTE — PROGRESS NOTES
Hospitalist Progress Note    NAME: Nanci Coburn   :  1940   MRN:  [id number]       Assessment / Plan:  Syncope   Esophageal cancer with metastases POA -? New right lung metastases suspected on CTA chest  Dehydration POA-history of recently placed PEG tube started on Osmolite tube feeding  CT head negative acute  CTA chest negative for PE, mediastinal lymphadenopathy noted, metastasis in the right major fissure noted, small bilateral pleural effusions     MRI negative, will do EEG  Orthostatic positive, will give iv fluids for today, half NS as also has hypernatremia  ECHO: EF 50-55, Pulmonary artery HTN moderate  PT/OT recommending home health  Oncology consulted  PEG feeding resumed     EMILY on CPAP  Continue home CPAP when family brings with him    Sacral pressure ulcer stage 3 POA:  Wound care following     Hypertension  Hypothyroidism  History of right lower extremity DVT  CKD stage III  Glaucoma  Severe hiatal hernia  Continue home meds and eyedrops        Code Status: Full code as per patient was in ED  Surrogate Decision Maker: Katie Miller #[id number]     DVT Prophylaxis: Subcu heparin  GI Prophylaxis: Pepcid as at home     Baseline: Patient lives with home with family    Estimated discharge date:   Barriers: Clinical improvement       Subjective:     Chief Complaint / Reason for Physician Visit  Follow up for syncope  She feels \"fair\"    Review of Systems:  Symptom Y/N Comments  Symptom Y/N Comments   Fever/Chills n   Chest Pain n    Poor Appetite n   Edema n    Cough    Abdominal Pain     Sputum    Joint Pain     SOB/DEAN    Pruritis/Rash     Nausea/vomit    Tolerating PT/OT     Diarrhea    Tolerating Diet     Constipation    Other       Could NOT obtain due to:      Objective:     VITALS:   Last 24hrs VS reviewed since prior progress note.  Most recent are:  Patient Vitals for the past 24 hrs:   Temp Pulse Resp BP SpO2   21 1233 98.8 °F (37.1 °C) 81 20 (!) 161/103 99 %   21 1008    (!) 167/108    09/16/21 0955    (!) 167/108    09/16/21 0835 98.4 °F (36.9 °C) 90 22 (!) 167/108 98 %   09/16/21 0702 98.6 °F (37 °C) 77 24 (!) 153/92 100 %   09/16/21 0340 97.7 °F (36.5 °C) 73 26 137/89 99 %   09/16/21 0245  75 25 (!) 131/90 99 %   09/16/21 0215  78 29 (!) 155/102 100 %   09/16/21 0130  85 27 (!) 154/97 99 %   09/16/21 0030  88 28 (!) 143/88 99 %   09/15/21 2345  87 (!) 32 (!) 156/95 98 %   09/15/21 1702  85 15 (!) 153/97 (!) 65 %       Intake/Output Summary (Last 24 hours) at 9/16/2021 1454  Last data filed at 9/16/2021 1230  Gross per 24 hour   Intake 250 ml   Output    Net 250 ml        I had a face to face encounter and independently examined this patient on 9/16/2021, as outlined below:  PHYSICAL EXAM:  General: WD, WN. Alert, cooperative, no acute distress    EENT:  EOMI. Anicteric sclerae. MMM  Resp:  CTA bilaterally, no wheezing or rales. No accessory muscle use  CV:  Regular  rhythm,  No edema  GI:  Soft, Non distended, Non tender. +Bowel sounds  Neurologic:  Alert and oriented X 2, normal speech,   Psych:   Good insight. Not anxious nor agitated  Skin:  No rashes. No jaundice    Reviewed most current lab test results and cultures  YES  Reviewed most current radiology test results   YES  Review and summation of old records today    NO  Reviewed patient's current orders and MAR    YES  PMH/SH reviewed - no change compared to H&P  ________________________________________________________________________  Care Plan discussed with:    Comments   Patient y    Family      RN y    Care Manager     Consultant                        Multidiciplinary team rounds were held today with , nursing, pharmacist and clinical coordinator. Patient's plan of care was discussed; medications were reviewed and discharge planning was addressed.      ________________________________________________________________________  Total NON critical care TIME:  35  Minutes    Total CRITICAL CARE TIME Spent:   Minutes non procedure based      Comments   >50% of visit spent in counseling and coordination of care     ________________________________________________________________________  Gareth Dimas MD     Procedures: see electronic medical records for all procedures/Xrays and details which were not copied into this note but were reviewed prior to creation of Plan. LABS:  I reviewed today's most current labs and imaging studies.   Pertinent labs include:  Recent Labs     09/15/21  1417   WBC 8.0   HGB 8.5*   HCT 28.9*        Recent Labs     09/16/21  0337 09/15/21  1852 09/15/21  1417   * 148* 148*   K 5.0 4.8 5.5*   * 115* 115*   CO2 29 30 28   GLU 84 96 115*   BUN 43* 44* 44*   CREA 1.53* 1.51* 1.60*   CA 6.1* 6.1* 6.2*   MG 1.8  --   --    ALB  --   --  1.9*   TBILI  --   --  0.4   ALT  --   --  22       Signed: Gareth Dimas MD

## 2021-09-16 NOTE — PROGRESS NOTES
Comprehensive Nutrition Assessment    Type and Reason for Visit: Initial, Consult    Nutrition Recommendations/Plan:   Osmolite 1.2 @ 100 mL/hr x 14 hours (7pm-9am) + 100 mL water flushes q 4 hours (provides 1680 kcal, 78g protein, 1748 mL water)  Monitor potassium and need for change in TF formula     Nutrition Assessment:     Patient medically noted for syncope, possible seizure, dehydration, and metastatic esophageal cancer. Patient with recent J-tube placement. Has been receiving nocturnal feedings with Osmolite at home. Will resume home regimen/recommendations from recent discharge as noted above. Adequate to meet 100% of estimated kcal/protein needs. Patient reports taking some ice at home but nothing else PO. K+ elevated on admission; still at high end of normal today. Will resume standard formula and monitor closely before switching to a specialty/low K+ formula. Estimated Daily Nutrient Needs:  Energy (kcal): 1675 kcal (BMR 1289 x 1. 3AF); Weight Used for Energy Requirements: Current  Protein (g): 77-92g (1.0-1.2 g/kg bw); Weight Used for Protein Requirements: Current  Fluid (ml/day): 1700 mL; Method Used for Fluid Requirements: 1 ml/kcal    Nutrition Related Findings:       K+ 5.0 (4.8, 5.5), BG 84-, Na 148  3+ edema bilateral lower extremities   Norvasc, nephrocap, Tums, famotidine, Synthroid     Wounds:    None       Current Nutrition Therapies:  DIET NPO Ice Chips    Anthropometric Measures:  · Height:  5' 8\" (172.7 cm)  · Current Body Wt:  76.7 kg (169 lb 1.5 oz)   · BMI Category: Overweight (BMI 25.0-29. 9)       Nutrition Diagnosis:   · Swallowing difficulty related to  (metastatic esophageal cancer) as evidenced by NPO or clear liquid status due to medical condition, nutrition support-enteral nutrition    Nutrition Interventions:   Food and/or Nutrient Delivery: Start tube feeding  Nutrition Education and Counseling: No recommendations at this time  Coordination of Nutrition Care:  No recommendation at this time    Goals:  TF tolerated at goal rate with lytes WNL next 2-4 days       Nutrition Monitoring and Evaluation:   Behavioral-Environmental Outcomes: None identified  Food/Nutrient Intake Outcomes: Enteral nutrition intake/tolerance  Physical Signs/Symptoms Outcomes: Biochemical data, Weight    Discharge Planning:    Enteral nutrition     Electronically signed by Dennis Ignacio RD on 9/16/2021 at 10:57 AM    Contact: ext [phone]

## 2021-09-16 NOTE — ED NOTES
Patient is being transferred to [facility] ICU, Room # 2538. Report given to Joe Weller RN on Adonay Ribeiro for routine progression of care. Report consisted of the following information SBAR, ED Summary, MAR and Recent Results. Patient transferred to receiving unit by: Tech (RN or tech name). Outstanding consults needed: No     Next labs due: Yes    The following personal items will be sent with the patient during transfer to the floor: All valuables:    Cardiac monitoring ordered: Yes    The following CURRENT information was reported to the receiving RN:    Code status: Full Code at time of transfer    Last set of vital signs:  Vital Signs  Level of Consciousness: Alert (0) (09/16/21 0702)  Temp: 98.6 °F (37 °C) (09/16/21 0702)  Temp Source: Oral (09/16/21 0702)  Pulse (Heart Rate): 77 (09/16/21 0702)  Resp Rate: 24 (09/16/21 0702)  BP: (!) 153/92 (09/16/21 0702)  MAP (Monitor): 99 (09/16/21 0245)  MAP (Calculated): 112 (09/16/21 0702)  BP 1 Method: Automatic (09/16/21 0702)  BP Patient Position: At rest (09/16/21 0702)  MEWS Score: 2 (09/16/21 0702)         Oxygen Therapy  O2 Sat (%): 100 % (09/16/21 0702)  Pulse via Oximetry: 74 beats per minute (09/16/21 0245)  O2 Device: Nasal cannula (09/16/21 0702)  O2 Flow Rate (L/min): 2 l/min (09/16/21 0702)      Last pain assessment:  Pain 1  Pain Scale 1: Numeric (0 - 10)  Pain Intensity 1: 0      Wounds: Yes    Urinary catheter: voiding  Is there a cuello order: No     LDAs:      Venous Access Device Upper chest (subclavicular area), left (Active)   Date Accessed (Medial Site) 09/15/21 09/15/21 1943   Access Time (Medial Site) 1844 09/15/21 1943   Positive Blood Return (Medial Site) Yes 09/15/21 1943   Action Taken (Medial Site) Blood drawn 09/15/21 1943      J-Tube (Active)        Opportunity for questions and clarification was provided.     Justino Khan RN

## 2021-09-16 NOTE — HOME CARE
Please note that this patient was open to [address] [place] [address] services at the time of hospital admission. If services will be needed at discharge, please order to resume them.  Thank you, Tonja Chery RN, [phone]

## 2021-09-16 NOTE — PROGRESS NOTES
Transition of Care Plan:    RUR: 26% moderate  Disposition: TBD   Follow up appointments: PCP/Oncology  DME needed: TBD   Transportation at Discharge: family   Jesica Nunez or means to access home:  Family has access to home      Medicare Letter: to be provided   Is patient a BCPI-A Bundle: N/a           If yes, was Bundle Letter given?:   N/a  Caregiver Contact: son: Álvaro Muniz: [phone]  Daughter Mariella Goyal: [phone]  Douglasshakir Ace: [phone]  Discharge Caregiver contacted prior to discharge? Family to be contacted          Reason for Readmission:    Syncope   Esophageal cancer with metastases     Admitted on 8/31 for esophageal CA  Discharged on 9/05/2021    Pt was readmitted on: 9/15/2021          RUR Score/Risk Level:  26%     PCP: First and Last name:  Luis Miguel Godwin   Name of Practice:    Are you a current patient: Yes/No: Yes    Approximate date of last visit: 2 months ago   Can you participate in a virtual visit with your PCP:     Is a Care Conference indicated:  Not indicated at this time       Did you attend your follow up appointment (s): If not, why not: NO. Pt did not get a chance to attend follow-up appt. Due to readmission to [address]          Resources/supports as identified by patient/family:   Pt receives significant support from her children and grandchildren. Top Challenges facing patient (as identified by patient/family and CM): Finances/Medication cost?    No issues reported at this time    Transportation :   Pt's family will discharge   Support system or lack thereof? NO issues reported at this time     Living arrangements? Pt lives in a single story residence with her grandsons. Self-care/ADLs/Cognition? Pt has been receiving assistance with ADLs and IADLs since last admission. Pt ambulates with a rolling walker        Current Advanced Directive/Advance Care Plan:  Not on file at this time. Pt's son expressed interest. CM to provide ACP information.      Son also expressed interest in palliative care. Plan for utilizing home health:   Pt is currently open to [facility] for PT services. Last admission, Pt had tube feedings and supplies arranged through [id number]:    Based on readmission, the patient's previous Plan of Care   has been evaluated and/or modified. The current Transition of Care Plan is:       CM contacted Pt's son Ricarda Melissa: [phone] to complete initial assessment. Pt's son confirmed that demographic information was accurate on chart. Pt is reported to live in a single story, private residence. Pt has been receiving more assistance with ADLs and IADLs. In the home . At discharge, family anticipates bringing the Pt home. Assigned CM will continue to follow for discharge planning needs. Care Management Interventions  PCP Verified by CM: Yes  Mode of Transport at Discharge:  (family )  Physical Therapy Consult: Yes  Occupational Therapy Consult: Yes  Support Systems: Child(nadeem), Other Family Member(s)  Confirm Follow Up Transport: Family  Discharge Location  Discharge Placement: Unable to determine at this time      Readmission Assessment  Number of days since last admission?: 8-30 days  Previous disposition: Home with Home Health  What was the patient's/caregiver's perception as to why they think they needed to return back to the hospital?: Other (Comment) (another medical issue)  Did you visit your Primary Care Physician after you left the hospital, before you returned this time?: No  Why weren't you able to visit your PCP?: Other (Comment) (did not make it to follow-up.  Son did have a phone call with PCP)  Did you see a specialist, such as Cardiac, Pulmonary, Orthopedic Physician, etc. after you left the hospital?: Yes (Oncologist)  Who advised the patient to return to the hospital?: Caregiver  Does the patient report anything that got in the way of taking their medications?: No  In our efforts to provide the best possible care to you and others like you, can you think of anything that we could have done to help you after you left the hospital the first time, so that you might not have needed to return so soon?: Arrange for more help when leaving the hospital      Fe Becerra, [facility], [facility]

## 2021-09-16 NOTE — CONSULTS
Hematology Oncology Consultation      Reason for consult: esophageal cancer    Admitting Diagnosis: Syncope [R55]  Seizure ([address] [R56.9]  Dehydration [E86.0]  Lung metastasis ([facility] [C78.00]  Esophageal cancer ([facility] [address] [facility] [C15.9]  Orthostasis [I95.1]    Discussion: Bhargavi Justice is a  [de-identified]y.o. year old female seen in consultation at the request of Dr. Justen Hannon for esophageal cancer. He has a h/o HTN, Hypothyroidism, RLE DVT, CKD3, glaucoma, severe hiatal hernia, metastatic esophageal cancer (well to moderately differentiated adenocarcinoma), HER2+, s/p chemoRT, capecitabine, and herceptin. She presented yesterday for generalized weakness with syncope and questionable right arm shakiness x 2 episodes. Onset for 1 day. She recently had a PEG placed and was started on TF. She had mild SONIDO and dehydration. CTA neg for PE but c/f worsening disease with increased medisatinal LAD, nodular fissural thickening of the right major fissure. CT Head neg. MRI Neg. She last saw Dr. Mahamed Gusman on 9/2/21. Restaging scans were planned for late September. Her treatment history is as follows:   -Dr. Manasa Olmstead performed EGD with stent placement on 6/26/19  -ChemoRT with carbo taxol in 2019. She received a total dose of 50.4 Gy in 28 fractions between August 5 and September 12, 2019. She received weekly carboplatin and taxol  during her radiation as per the Cross trial.    -She was then evaluated by Dr. Charli Quach for possible surgical resection which she was not particularly interested in.    -She underwent an EGD on 11/15/19 which reported one malignant appearing region of stenosis. Her stent was removed and biopsy unfortunately reported  persistent adenocarcinoma at 22 cm and 20 cm  -12/2019: She reluctantly agreed to start capecitabine.  capecitabine started and held due to Cdiff  -8/2020: PET CT on 8/25/20 received mild increased tracer activity corresponds to a nodule in the right adrenal gland, unchanged compared to the  prior exam. Resolved abnormal activity in the liver and esophagus. No PET avid evidence of metastatic disease.  -November 2020 scans showed worsening disease so she resumed capecitabine  -5/11/21 reported progression of disease when compared to prior study  nonspecific activity in the proximal and mid esophagus  slightly progressed, stable right adrenal lesion. New mediastinal LNs with increased metabolic activity. 2 lesions in the left hepatic lobe are  slightly progressed. New foci of increased activity in bony structures suspiciousso she was started on Herceptin monotherapy    I spoke to the patient as well as her son Cachorro Lainez over the phone today. They state she has been declining recently. She was unable to eat due to increasing dysphagia, so a PEG was placed in late August. She had been losing weight since not eating much. She was admitted for 6 days and went home with Q tube feeds. However, unfortunately she hasn't improved much since that time. She is working with PT 3 times a week. On days that PT comes she gets out of bed and is able to walk with assistance. However, on days that PT does not come she lays in bed most of the day. She appears dyspneic with conversation but she denies shortness of breath. She denies chest pain. She feels weak and tired and this has been ongoing for weeks. She was concerned she missed treatment today. Imaging:    CTA chest; I personally reviewed the images. Comparison PET CT 5/11/2021. IMPRESSION  1. No evidence of pulmonary embolus. 2.  Worsening mediastinal lymphadenopathy. 3. Developing nodular fissural thickening of the right major fissure, concerning  for metastatic involvement. 4. New small bilateral pleural effusions. 5. Right basilar atelectasis. Superimposed pneumonia cannot be excluded. 6. Marked cardiomegaly. 7. Large chronic hiatal hernia. CT Head:   IMPRESSION  1. No evidence of acute infarct or intracranial hemorrhage.   2. Mild periventricular white matter disease is likely secondary to chronic  small vessel ischemic changes. MRI Brain:    IMPRESSION  No intracranial metastatic disease demonstrated. No significant  intracranial abnormalities.       Labs:  Recent Results (from the past 24 hour(s))   EKG, 12 LEAD, INITIAL    Collection Time: 09/15/21  2:06 PM   Result Value Ref Range    Ventricular Rate 75 BPM    Atrial Rate 75 BPM    P-R Interval 150 ms    QRS Duration 92 ms    Q-T Interval 502 ms    QTC Calculation (Bezet) 560 ms    Calculated P Axis 62 degrees    Calculated R Axis -32 degrees    Calculated T Axis -23 degrees    Diagnosis       Sinus rhythm with premature atrial complexes  Left axis deviation  T wave abnormality, consider anterior ischemia  Prolonged QT  When compared with ECG of 04-MAR-2020 22:34,  QRS axis shifted left  T wave inversion now evident in Anterior leads    Confirmed by Martin Arambula MD, [facility] ([id number]) on 9/16/2021 9:01:59 AM     CBC WITH AUTOMATED DIFF    Collection Time: 09/15/21  2:17 PM   Result Value Ref Range    WBC 8.0 3.6 - 11.0 K/uL    RBC 2.72 (L) 3.80 - 5.20 M/uL    HGB 8.5 (L) 11.5 - 16.0 g/dL    HCT 28.9 (L) 35.0 - 47.0 %    .3 (H) 80.0 - 99.0 FL    MCH 31.3 26.0 - 34.0 PG    MCHC 29.4 (L) 30.0 - 36.5 g/dL    RDW 24.6 (H) 11.5 - 14.5 %    PLATELET 834 759 - 544 K/uL    MPV 11.1 8.9 - 12.9 FL    NRBC 0.0 0  WBC    ABSOLUTE NRBC 0.00 0.00 - 0.01 K/uL    NEUTROPHILS 86 (H) 32 - 75 %    LYMPHOCYTES 6 (L) 12 - 49 %    MONOCYTES 6 5 - 13 %    EOSINOPHILS 1 0 - 7 %    BASOPHILS 0 0 - 1 %    IMMATURE GRANULOCYTES 1 (H) 0.0 - 0.5 %    ABS. NEUTROPHILS 6.8 1.8 - 8.0 K/UL    ABS. LYMPHOCYTES 0.5 (L) 0.8 - 3.5 K/UL    ABS. MONOCYTES 0.5 0.0 - 1.0 K/UL    ABS. EOSINOPHILS 0.1 0.0 - 0.4 K/UL    ABS. BASOPHILS 0.0 0.0 - 0.1 K/UL    ABS. IMM.  GRANS. 0.1 (H) 0.00 - 0.04 K/UL    DF SMEAR SCANNED      RBC COMMENTS ANISOCYTOSIS  1+        RBC COMMENTS HYPOCHROMIA  PRESENT       METABOLIC PANEL, COMPREHENSIVE    Collection Time: 09/15/21  2:17 PM   Result Value Ref Range    Sodium 148 (H) 136 - 145 mmol/L    Potassium 5.5 (H) 3.5 - 5.1 mmol/L    Chloride 115 (H) 97 - 108 mmol/L    CO2 28 21 - 32 mmol/L    Anion gap 5 5 - 15 mmol/L    Glucose 115 (H) 65 - 100 mg/dL    BUN 44 (H) 6 - 20 MG/DL    Creatinine 1.60 (H) 0.55 - 1.02 MG/DL    BUN/Creatinine ratio 28 (H) 12 - 20      GFR est AA 38 (L) >60 ml/min/1.73m2    GFR est non-AA 31 (L) >60 ml/min/1.73m2    Calcium 6.2 (LL) 8.5 - 10.1 MG/DL    Bilirubin, total 0.4 0.2 - 1.0 MG/DL    ALT (SGPT) 22 12 - 78 U/L    AST (SGOT) 62 (H) 15 - 37 U/L    Alk.  phosphatase 145 (H) 45 - 117 U/L    Protein, total 6.3 (L) 6.4 - 8.2 g/dL    Albumin 1.9 (L) 3.5 - 5.0 g/dL    Globulin 4.4 (H) 2.0 - 4.0 g/dL    A-G Ratio 0.4 (L) 1.1 - 2.2     CK W/ REFLX CKMB    Collection Time: 09/15/21  2:17 PM   Result Value Ref Range    CK 61 26 - 192 U/L   TROPONIN I    Collection Time: 09/15/21  2:17 PM   Result Value Ref Range    Troponin-I, Qt. <0.05 <0.05 ng/mL   NT-PRO BNP    Collection Time: 09/15/21  2:17 PM   Result Value Ref Range    NT pro-BNP 2,870 (H) <451 PG/ML   METABOLIC PANEL, BASIC    Collection Time: 09/15/21  6:52 PM   Result Value Ref Range    Sodium 148 (H) 136 - 145 mmol/L    Potassium 4.8 3.5 - 5.1 mmol/L    Chloride 115 (H) 97 - 108 mmol/L    CO2 30 21 - 32 mmol/L    Anion gap 3 (L) 5 - 15 mmol/L    Glucose 96 65 - 100 mg/dL    BUN 44 (H) 6 - 20 MG/DL    Creatinine 1.51 (H) 0.55 - 1.02 MG/DL    BUN/Creatinine ratio 29 (H) 12 - 20      GFR est AA 40 (L) >60 ml/min/1.73m2    GFR est non-AA 33 (L) >60 ml/min/1.73m2    Calcium 6.1 (LL) 8.5 - 10.1 MG/DL   ECHO ADULT COMPLETE    Collection Time: 09/15/21  7:00 PM   Result Value Ref Range    IVSd 1.21 (A) 0.60 - 0.90 cm    LVIDd 5.01 3.90 - 5.30 cm    LVIDs 3.23 cm    LVOT d 1.90 cm    LVPWd 1.25 (A) 0.60 - 0.90 cm    LV Ejection Fraction MOD 4C 62 percent    LV ED Vol A4C 154.62 mL    LV ES Vol A4C 58.96 mL    LVOT Peak Gradient 6.96 mmHg    LVOT Peak Velocity 131.94 cm/s    RVSP 54.85 mmHg    Left Atrium Major Axis 3.45 cm    LA Area 4C 27.15 cm2    LA Vol 4C 103.66 (A) 22.00 - 52.00 mL    Est. RA Pressure 10.00 mmHg    Aortic Valve Area by Continuity of Peak Velocity 2.80 cm2    AV R PG 80.19 mmHg    Aortic Regurgitant Pressure Half-time 793.94 ms    AR Max Francisco 447.73 cm/s    AoV PG 7.30 mmHg    Aortic Valve Systolic Mean Gradient 0.04 mmHg    Aortic Valve Systolic Peak Velocity 127.56 cm/s    AoV VTI 25.04 cm    PISA MR Rad 0.24 cm    MV A Francisco 126.77 cm/s    Mitral Valve E Wave Deceleration Time 137.32 ms    MV E Francisco 77.67 cm/s    E/E' ratio (averaged) 9.52     E/E' lateral 6.49     E/E' septal 17.88     LV E' Lateral Velocity 11.97 cm/s    LV E' Septal Velocity 4.34 cm/s    Mitral Effective Regurgitant Orifice Area 0.02 cm2    MV regurgitant volume 4.18 mL    MR Peak Gradient 132.28 mmHg    TR Max Velocity 575.03 cm/s    MV Peak Gradient 6.48 mmHg    MV Mean Gradient 2.48 mmHg    Mitral Valve Pressure Half-time 29.48 ms    Mitral Valve Max Velocity 127.31 cm/s    Mitral Valve Annulus Velocity Time Integral 21.50 cm    MVA (PHT) 7.46 cm2    Mitral Regurgitant PISA Peak Velocity 597.60 cm/s    Mitral Regurgitant Velocity Time Integral 199.29 cm    Pulmonic Valve Systolic Peak Instantaneous Gradient 5.34 mmHg    Pulmonic Valve Max Velocity 115.54 cm/s    Triscuspid Valve Regurgitation Peak Gradient 44.85 mmHg    TR Max Velocity 334.84 cm/s    Ao Root D 4.45 cm   METABOLIC PANEL, BASIC    Collection Time: 09/16/21  3:37 AM   Result Value Ref Range    Sodium 148 (H) 136 - 145 mmol/L    Potassium 5.0 3.5 - 5.1 mmol/L    Chloride 116 (H) 97 - 108 mmol/L    CO2 29 21 - 32 mmol/L    Anion gap 3 (L) 5 - 15 mmol/L    Glucose 84 65 - 100 mg/dL    BUN 43 (H) 6 - 20 MG/DL    Creatinine 1.53 (H) 0.55 - 1.02 MG/DL    BUN/Creatinine ratio 28 (H) 12 - 20      GFR est AA 40 (L) >60 ml/min/1.73m2    GFR est non-AA 33 (L) >60 ml/min/1.73m2    Calcium 6.1 (LL) 8.5 - 10.1 MG/DL MAGNESIUM    Collection Time: 09/16/21  3:37 AM   Result Value Ref Range    Magnesium 1.8 1.6 - 2.4 mg/dL       History:  Past Medical History:   Diagnosis Date    Abnormal x-ray of abdomen 05/22/2019    Bas showed food and irregular stricture above ge junction in addition to large hiatal hernia  5.20.2019    Adenocarcinoma of esophagus ([facility] [address] 5/30/2019    chemotherapy    Anemia     Arrhythmia     Arthritis     Chronic kidney disease     Stage II followed by Dr Mcarthur [facility] Neph[facility]     Diverticulosis     Esophageal dysphagia 5/22/2019    Glaucoma     bilateral    Hiatal hernia     History of Clostridioides difficile infection 05/2019    History of colon polyps 6/1/2018    2013 tubular adenoma     Hypertension     Hypothyroid     Pulmonary HTN ([facility] [address]     Sleep apnea     CPAP compliant, as stated 5/25/2021    [facility]    Right  leg , spontaneous      Past Surgical History:   Procedure Laterality Date    ANAL PRESSURE RECORD  6/6/2019    COLONOSCOPY N/A 6/1/2018    COLONOSCOPY performed by Tristin Hernandez MD at [address] [place] [address]  6/1/2018         HX CATARACT REMOVAL Bilateral 2017    HX HYSTERECTOMY      HX OTHER SURGICAL  05/26/2021    port cath instertion    IR FINE NEEDLE ASPIRATION W IMAGE  1984    IR INSERT NON TUNL CVC OVER 5 YRS  3/8/2020    IR INSERT TUNL CVC W PORT OVER 5 YEARS  7/19/2019    IR REMOVE TUNL CVAD W/O PORT / PUMP  3/8/2020    UPPER GI ENDOSCOPY,BALL DIL,30MM  5/22/2019         UPPER GI ENDOSCOPY,BALL DIL,30MM  5/30/2019         UPPER GI ENDOSCOPY,BIOPSY  5/22/2019         UPPER GI ENDOSCOPY,BIOPSY  5/30/2019         UPPER GI ENDOSCOPY,BIOPSY  10/17/2019           Prior to Admission medications    Medication Sig Start Date End Date Taking? Authorizing Provider   nutritional supplement (OSMOLITE 1.2 GARRICK PO) 1,400 mL by Per J Tube route daily.  x14h 7pm-9am  free water flushes 100ml q4h    Provider, Historical   calcium carbonate 400 mg/5 mL susp 5 mL by Gastrostomy Tube route daily. 9/5/21   Hubbard Lombard, MD   amLODIPine (NORVASC) 5 mg tablet Take 5 mg by mouth daily. Provider, Historical   brimonidine (ALPHAGAN P) 0.1 % ophthalmic solution Administer 1 Drop to both eyes every eight (8) hours. Provider, Historical   chlorthalidone (HYGROTON) 25 mg tablet Take 25 mg by mouth daily. Provider, Historical   sodium bicarbonate/sod citrat (SODIUM BICARB AND CITRATE PO) Take 1 Tablet by mouth two (2) times a day. 650 mg tablet    Provider, Historical   capecitabine (XELODA) 500 mg tablet 1,000 mg/m2 two (2) times a day. pt to start 9/14 for 2 weeks, then off 2 weeks and so forth      Provider, Historical   losartan (COZAAR) 100 mg tablet Take 100 mg by mouth daily. Indications: high blood pressure    Provider, Historical   diphenoxylate-atropine (LOMOTIL) 2.5-0.025 mg per tablet Take 1 Tab by mouth four (4) times daily as needed for Diarrhea. Max Daily Amount: 4 Tabs. Patient not taking: Reported on 5/25/2021 6/10/20   Xiomara Pugh MD   famotidine (PEPCID) 20 mg tablet Take 1 Tab by mouth Daily (before breakfast). Indications: gastroesophageal reflux disease 6/11/20   Xiomara Pugh MD   folic acid-vit H7-AQI Y95 (FOLBEE) 2.5-25-1 mg tablet Take 1 Tab by mouth daily. Provider, Historical   latanoprost (XALATAN) 0.005 % ophthalmic solution Administer 1 Drop to both eyes nightly. Provider, Historical   DORZOLAMIDE HCL/TIMOLOL MALEAT (DORZOLAMIDE-TIMOLOL OP) Apply 1 Drop to eye three (3) times daily. One drop to each eye twice daily     Provider, Historical   acetaminophen (TYLENOL EXTRA STRENGTH) 500 mg tablet Take 1,000 mg by mouth every six (6) hours as needed. Indications: pain associated with arthritis, pain    Provider, Historical   levothyroxine (SYNTHROID) 100 mcg tablet Take 100 mcg by mouth Daily (before breakfast).  One tablets 6 days a week then half a tablet one day  Pt takes the half tab on Saturdays Indications: a condition with low thyroid hormone levels    Provider, Historical     No Known Allergies   Social History     Tobacco Use    Smoking status: Never Smoker    Smokeless tobacco: Never Used   Substance Use Topics    Alcohol use: Not Currently     Comment: in her 19's       Family History   Problem Relation Age of Onset    No Known Problems Sister         Current Medications:  Current Facility-Administered Medications   Medication Dose Route Frequency    0.45% sodium chloride infusion  50 mL/hr IntraVENous ONCE    calcium gluconate 1 gram in sodium chloride (ISO-OSM) 50 mL infusion  1 g IntraVENous ONCE    amLODIPine (NORVASC) tablet 5 mg  5 mg Per G Tube DAILY    brimonidine (ALPHAGAN) 0.2 % ophthalmic solution 1 Drop  1 Drop Both Eyes Q8H    calcium carbonate (TUMS) chewable tablet 400 mg [elemental]  400 mg Per G Tube DAILY    diphenoxylate-atropine (LOMOTIL) tablet 1 Tablet  1 Tablet Oral QID PRN    dorzolamide-timoloL (COSOPT) 22.3-6.8 mg/mL ophthalmic solution 1 Drop  1 Drop Both Eyes TID    famotidine (PEPCID) tablet 20 mg  20 mg Per G Tube ACB    B complex-vitaminC-folic acid (NEPHROCAP) cap  1 Capsule Oral DAILY    latanoprost (XALATAN) 0.005 % ophthalmic solution 1 Drop  1 Drop Both Eyes QHS    levothyroxine (SYNTHROID) tablet 100 mcg  100 mcg Per G Tube ACB    sodium bicarbonate tablet 1,300 mg  1,300 mg Per G Tube BID    sodium chloride (NS) flush 5-40 mL  5-40 mL IntraVENous Q8H    sodium chloride (NS) flush 5-40 mL  5-40 mL IntraVENous PRN    acetaminophen (TYLENOL) tablet 650 mg  650 mg Oral Q6H PRN    Or    acetaminophen (TYLENOL) suppository 650 mg  650 mg Rectal Q6H PRN    polyethylene glycol (MIRALAX) packet 17 g  17 g Oral DAILY PRN    ondansetron (ZOFRAN ODT) tablet 4 mg  4 mg Oral Q8H PRN    Or    ondansetron (ZOFRAN) injection 4 mg  4 mg IntraVENous Q6H PRN    LORazepam (ATIVAN) injection 1 mg  1 mg IntraVENous Q6H PRN    heparin (porcine) injection 5,000 Units  5,000 Units SubCUTAneous Q12H         Review of Systems:  10 point review of systems otherwise negative except as per HPI      Physical Exam:  Constitutional Alert, cachectic   Head Normocephalic; no scars   Eyes Conjunctivae and sclerae are clear and without icterus. Right lazy  Eye is outward deviation   ENMT MM dry   Neck Supple without masses. No jugular venous distension. Hematologic/Lymphatic No petechiae or purpura. Nadyne Josephine Respiratory Slightly increased respiratory rate with conversation. Lungs are clear in anterior breath fields. Cardiovascular Regular rate and rhythm of heart without murmurs, gallops or rubs. Chest / Line Site Chest is symmetric with no chest wall deformities. Abdomen Non-tender, non-distended, no masses, ascites or hepatosplenomegaly. Good bowel sounds. No guarding or rebound tenderness. No pulsatile masses. Musculoskeletal No tenderness or swelling, normal range of motion without obvious weakness. Extremities No visible deformities. Skin No rashes    Neurologic No sensory or motor deficits noted   Psychiatric Alert. Coherent speech. Verbalizes understanding of our discussions today. Assessment/Plan:    Abrahan Mandujano is a  [de-identified]y.o. year old female seen in consultation at the request of Dr. Mark Edmond for esophageal cancer. He has a h/o HTN, Hypothyroidism, RLE DVT, CKD3, glaucoma, severe hiatal hernia, metastatic esophageal cancer (well to moderately differentiated adenocarcinoma), HER2+, s/p chemoRT, capecitabine, and herceptin. I have been consulted to discuss her staging scans and her cancer. She is a patient of Dr. Donte Payton and I am just meeting her for the first time today. I reviewed her scans and looked at the images personally and with the radiologist. The radiologist thinks most of the hypodense spots in her liver are cysts, but she has had significant growth in her mediastinal LN and right lung disease.      I reviewed the scans with the patient and her son on the phone. I talked about what this means for next steps- that her current treatment isn't working. This means we can't keep going with either just capecitabine monotherapy or Herceptin. She does have other options for treatment, however, her performance status may preclude this. I explained that she needs to be strong enough to tolerate treatment, and if she is laying in bed most of the day, then treatment could actually be harmful. We discussed the option of hospice, but she and her son were adamant that they did not want to consider this option. Other options for her include pembro if CPS >10 or TMB >10, Pembro +Chemo (now approved in the first line setting regardless of PDL1), taxol, irinotecan, or Folfox (though at this time she could definitely not tolerate combination therapy). I did not see Foundation One on her specimen, but If NTRK found on FO then entrectinib could also be an option. Plan:  -discussed results of scans and progression of disease  -discussed other options for treatment but emphasized that she needs to be strong enough for treatment  -offered hospice as one option, but she wants to continue to work with PT to get stronger.  -Will get her set up to see Dr. Wayne Robertson as an outpatient next week. -Will ask for PDL1 to be sent on her initial tumor specimen. Dr. Wayne Robertson can determine if Foundation One is worth sending based on their discussion next week.      Daniela Mesa MD  [facility] office  [address] [place] [address]  [place], [address]  Phone [phone]  Fax [phone]

## 2021-09-16 NOTE — PROGRESS NOTES
2474: Patient admitted from ER. Report received from [facility], RN to Beverley Landaverde RN (oncoming nurse). Report included the following information: SBAR, Kardex, Intake/Output, MAR, Recent Results and Cardiac Rhythm. End of Shift Note    1703: Called by remote tele - pt had 6 beats Vtach. Dr. Mio Callejas made aware. VSS, pt was asymptomatic.       1900: Bedside shift change report given to Nga Simmons RN (oncoming nurse) by Beverley Landaverde RN (offgoing nurse). Report included the following information: Report included the following information: SBAR, Kardex, Intake/Output, MAR, Recent Results and Cardiac Rhythm. Shift worked:  [id number]     Shift summary and any significant changes:     - PT/OT consult done   - Onc consult done   - Nutrition consult done   - EEG done   - Wound consult done - sacrum   - 1703: 6 beats Vtach, asymptomatic, Mg     added to AM labs     Concerns for physician to address:       Zone phone for oncoming shift:          Activity:     Number times ambulated in hallways past shift: 0  Number of times OOB to chair past shift: 0    Neuro: WNL  Generalized weakness  Baseline disconjugate gaze    Cardiac:   Cardiac Monitoring: Yes      Cardiac Rhythm: Sinus Rhythm    Access:   Current line(s): port     Genitourinary:   Urinary status: voiding, incontinent and external catheter    Respiratory:   O2 Device: Nasal cannula  Chronic home O2 use?: NO  Incentive spirometer at bedside: N/A     GI:  Last Bowel Movement Date:  (PTA)  Current diet:  DIET NPO Ice Chips  ADULT TUBE FEEDING Jejunostomy; Standard without Fiber; Delivery Method: Cyclic; Cyclic Rate (mL/hr): 170; Cyclic Start Time: 1:19 PM; Cyclic Stop Time: 2:37 AM; Water Flush Volume (mL): 100;  Water Flush Frequency: Q 4 hours  Passing flatus: YES  Tolerating current diet: YES       Pain Management:   Patient states pain is manageable on current regimen: YES    Skin:  Tomy Score: 16  Interventions: turn team, float heels, increase time out of bed, foam dressing, PT/OT consult, limit briefs, internal/external urinary devices and nutritional support     Patient Safety:  Fall Score:  Total Score: 3  Interventions: bed/chair alarm, assistive device (walker, cane, etc), gripper socks, pt to call before getting OOB and gait belt  High Fall Risk: Yes    Length of Stay:  Expected LOS: 2d 7h  Actual LOS: [address], DIMITRYN, RN, CCRN

## 2021-09-16 NOTE — ACP (ADVANCE CARE PLANNING)
Advance Care Planning Note      NAME: Raghav Guerrero   :  1940   MRN:  [id number]     Date/Time:  9/15/2021 9:56 PM    Active Diagnoses:  Hospital Problems  Date Reviewed: 9/15/2021        Codes Class Noted POA    Dehydration ICD-10-CM: E86.0  ICD-9-CM: 276.51  9/15/2021 Yes        Seizure ([facility] ICD-10-CM: R56.9  ICD-9-CM: 313.17  9/15/2021 Clinically Undetermined        Syncope ICD-10-CM: R55  ICD-9-CM: 780.2  9/15/2021 Clinically Undetermined        Orthostasis ICD-10-CM: I95.1  ICD-9-CM: 458.0  9/15/2021 Clinically Undetermined        [facility] ICD-10-CM: C78.00  ICD-9-CM: 197.0  9/15/2021 Clinically Undetermined        Esophageal cancer ([facility] ICD-10-CM: C15.9  ICD-9-CM: 150.9  2021 Yes              These active diagnoses are of sufficient risk that focused discussion on advance care planning is indicated in order to allow the patient to thoughtfully consider personal goals of care, and if situations arise that prevent the ability to personally give input, to ensure appropriate representation of their personal desires for different levels and aggressiveness of care. Discussion:   Code status addressed and wants to be a Full Code. Patient wants central line and vasopressors if needed. Patient would also want a feeding tube, if needed, for nutritional support. Patient  would like to assign son Grace Long  as the surrogate decision maker. Persons present and participating in discussion: Kong Chan MD, Vee Mcginnis) #[phone]      Time Spent:   Total time spent face-to-face in education and discussion:   16  minutes.          Kevin Callejas MD   Hospitalist

## 2021-09-16 NOTE — WOUND CARE
Wound care Nurse Consult: consult placed by staff nurse for POA sacral wound. Patient is an [de-identified] y/o AAF admitted for syncope, with questionable seizure -like activity, dehydration with hx of esophageal cancer with metastases. Recent G-tube insertion. Past Medical History:   Diagnosis Date    Abnormal x-ray of abdomen 05/22/2019    Bas showed food and irregular stricture above ge junction in addition to large hiatal hernia  5.20.2019    Adenocarcinoma of esophagus ([facility] 5/30/2019    chemotherapy    Anemia     Arrhythmia     Arthritis     Chronic kidney disease     Stage II followed by Dr Juli Thacker Nephrology     Diverticulosis     Esophageal dysphagia 5/22/2019    Glaucoma     bilateral    Hiatal hernia     History of Clostridioides difficile infection 05/2019    History of colon polyps 6/1/2018 2013 tubular adenoma     Hypertension     Hypothyroid     Pulmonary HTN ([facility]     Sleep apnea     CPAP compliant, as stated 5/25/2021    Thromboembolus ([facility] [address] 2015    Right  leg , spontaneous     Patient has a POA Stage 3 Pressure injury to her sacrum:      WOUND POA CONDITIONS        Wound Sacral/coccyx Mid pink wound bed  09/15/21 (Active)   Wound Etiology Pressure Stage 3 09/16/21 1447   Dressing Status New dressing applied 09/16/21 1447   Cleansed Cleansed with saline 09/16/21 1447   Dressing/Treatment Alginate; Foam 09/16/21 1447   Dressing Change Due 09/17/21 09/16/21 1447   Wound Length (cm) 3 cm 09/16/21 1447   Wound Width (cm) 4 cm 09/16/21 1447   Wound Depth (cm) 0.3 cm 09/16/21 1447   Wound Surface Area (cm^2) 12 cm^2 09/16/21 1447   Wound Volume (cm^3) 3.6 cm^3 09/16/21 1447   Wound Assessment Pink/red 09/16/21 1447   Drainage Amount Scant 09/16/21 1447   Drainage Description Serous 09/16/21 1447   Wound Odor None 09/16/21 1447   Althea-Wound/Incision Assessment Fragile; Intact 09/16/21 1447   Edges Defined edges 09/16/21 1447   Wound Thickness Description Full thickness 09/16/21 1447   Number of days: 1           Recommend:    Specialty bed: Watson bed with air blower unit - if unavailable rent an Envision on a [facility] care bed frame from [facility]    Sacral wound: EOD on even days - cleanse wound with NS moistened 4x4. Cover wound with a piece of plain alginate (Maxorb II) and secure with a foam dressing.     510 Celestina Rogers RN, [employer]

## 2021-09-16 NOTE — ED NOTES
2330: Family informed me that they brought CPAP machine from home and was hoping to use it during hospital stay. Contacted RT and RT stated that we don't use own CPAP machine in hospital and would have to call company in to assess it for use. RT stated to place pt on O2 and monitor SPO2 during the night as a substitute for CPAP. Informed pt of plan. Will continue to monitor. 0200: Pt SPO2 is 99% on 2 L while resting. Will continue to monitor. 7851: Pt is resting without complaints at this time. SPO2 remains at 99% on RA. Will continue to monitor.

## 2021-09-16 NOTE — PROGRESS NOTES
Problem: Mobility Impaired (Adult and Pediatric)  Goal: *Acute Goals and Plan of Care (Insert Text)  Description: FUNCTIONAL STATUS PRIOR TO ADMISSION: Patient was modified independent using a single point cane for functional mobility. Reports using RW since having PEG placed 8/31/2021. Denies recent fall history. Has not driven in 6 months. HOME SUPPORT PRIOR TO ADMISSION: The patient lived with two grandsons and reports that she is never alone. Physical Therapy Goals  Initiated 9/16/2021  1. Patient will move from supine to sit and sit to supine , scoot up and down, and roll side to side in bed with modified independence within 7 day(s). 2.  Patient will transfer from bed to chair and chair to bed with modified independence using the least restrictive device within 7 day(s). 3.  Patient will perform sit to stand with modified independence within 7 day(s). 4.  Patient will ambulate with modified independence for 150 feet with the least restrictive device within 7 day(s). 5.  Patient will ascend/descend 4 stairs with 2 handrail(s) with modified independence within 7 day(s). Outcome: Not Met   PHYSICAL THERAPY EVALUATION  Patient: Brian Tsai (19 y.o. female)  Date: 9/16/2021  Primary Diagnosis: Syncope [R55]  Seizure ([address] [R56.9]  Dehydration [E86.0]  Lung metastasis ([address] [C78.00]  Esophageal cancer ([facility] [address] [C15.9]  Orthostasis [I95.1]        Precautions:        ASSESSMENT  Based on the objective data described below, the patient presents with impaired functional mobility and decreased independence secondary to mildly impaired standing balance, generalized weakness, increased BLE edema, mild gait impairments, limited endurance, and decreased activity tolerance. Received supine in bed and agreeable to PT evaluation with encouragement. VS noted below for session while on 2 L/min O2 NC. Patient with c/o increased fatigue, however with good effort during session.  No c/o SOB with initial mobility, however with c/o SOB once returned supine with VSS. No c/o dizziness or lightheadedness during mobility. Required increased time to mobilize, noting increased stiffness. Needed min A during bed mobility for BLEs. Limited mobility completed as patient declined gait training due to increased fatigue. Pt was left supine in bed with all needs met, RN aware, and VSS following therapy session. Anticipate pt will progress well in acute PT and return home with increased family support and HHPT at discharge. Supine: /106, HR 88 bpm, SpO2 98%  Sitting: /94, HR 93 bpm, SpO2 97%  Standing: /99,  bpm, SpO2 96%    Current Level of Function Impacting Discharge (mobility/balance): min A for bed mobility; CGA for transfers and side steps at EOB with RW support    Functional Outcome Measure: The patient scored 45/100 on the Barthel Index outcome measure which is indicative of 55% impairment for mobility and ADLs. Other factors to consider for discharge: increased risk for falls; decline from baseline mobility     Patient will benefit from skilled therapy intervention to address the above noted impairments. PLAN :  Recommendations and Planned Interventions: bed mobility training, transfer training, gait training, therapeutic exercises, patient and family training/education, and therapeutic activities      Frequency/Duration: Patient will be followed by physical therapy:  5 times a week to address goals.     Recommendation for discharge: (in order for the patient to meet his/her long term goals)  Physical therapy at least 2 days/week in the home AND ensure assist and/or supervision for safety with mobility and ADLs    This discharge recommendation:  Has been made in collaboration with the attending provider and/or case management    IF patient discharges home will need the following DME: patient owns DME required for discharge         SUBJECTIVE:   Patient stated I'm just so tired.    OBJECTIVE DATA SUMMARY:   HISTORY:    Past Medical History:   Diagnosis Date    Abnormal x-ray of abdomen 05/22/2019    Bas showed food and irregular stricture above ge junction in addition to large hiatal hernia  5.20.2019    Adenocarcinoma of esophagus ([facility] [address] 5/30/2019    chemotherapy    Anemia     Arrhythmia     Arthritis     Chronic kidney disease     Stage II followed by Dr Nia Merrill Nephrology     Diverticulosis     Esophageal dysphagia 5/22/2019    Glaucoma     bilateral    Hiatal hernia     History of Clostridioides difficile infection 05/2019    History of colon polyps 6/1/2018    2013 tubular adenoma     Hypertension     Hypothyroid     Pulmonary HTN ([address]     Sleep apnea     CPAP compliant, as stated 5/25/2021    Thromboembolus ([facility] [address] 2015    Right  leg , spontaneous     Past Surgical History:   Procedure Laterality Date    ANAL PRESSURE RECORD  6/6/2019    COLONOSCOPY N/A 6/1/2018    COLONOSCOPY performed by Humberto Albarran MD at [address]  6/1/2018         HX CATARACT REMOVAL Bilateral 2017    HX HYSTERECTOMY      HX OTHER SURGICAL  05/26/2021    port cath instertion    IR FINE NEEDLE ASPIRATION W IMAGE  1984    IR INSERT NON TUNL CVC OVER 5 YRS  3/8/2020    IR INSERT TUNL CVC W PORT OVER 5 YEARS  7/19/2019    IR REMOVE TUNL CVAD W/O PORT / PUMP  3/8/2020    UPPER GI ENDOSCOPY,BALL DIL,30MM  5/22/2019         UPPER GI ENDOSCOPY,BALL DIL,30MM  5/30/2019         UPPER GI ENDOSCOPY,BIOPSY  5/22/2019         UPPER GI ENDOSCOPY,BIOPSY  5/30/2019         UPPER GI ENDOSCOPY,BIOPSY  10/17/2019            Personal factors and/or comorbidities impacting plan of care: arthritis; CKD; CA; HTN    Home Situation  Home Environment: Private residence  # Steps to Enter: 4  Rails to Enter: Yes  Hand Rails : Bilateral  Wheelchair Ramp: No  One/Two Story Residence: One story  Living Alone: No  Support Systems: Child(nadeem), Other Family Member(s)  Patient Expects to be Discharged to[de-identified] House  Current DME Used/Available at Home: Cane, straight, Commode, bedside, Walker, rolling  Tub or Shower Type: Tub/Shower combination    EXAMINATION/PRESENTATION/DECISION MAKING:   Critical Behavior:  Neurologic State: Alert  Orientation Level: Oriented to person, Oriented to place, Oriented to time, Disoriented to situation  Cognition: Follows commands, Appropriate safety awareness, Decreased attention/concentration     Hearing:     Skin:  Intact  Edema: Mild BLE edema  Range Of Motion:  AROM: Generally decreased, functional                       Strength:    Strength: Generally decreased, functional                    Tone & Sensation:   Tone: Normal                              Coordination:  Coordination: Within functional limits  Vision:      Functional Mobility:  Bed Mobility:     Supine to Sit: Minimum assistance; Additional time  Sit to Supine: Minimum assistance; Additional time  Scooting: Stand-by assistance  Transfers:  Sit to Stand: Contact guard assistance; Additional time  Stand to Sit: Contact guard assistance; Additional time                       Balance:   Sitting: Intact  Standing: Intact; With support  Ambulation/Gait Training:  Distance (ft): 3 Feet (ft) (side steps at EOB towards [facility])  Assistive Device: Gait belt;Walker, rolling  Ambulation - Level of Assistance: Contact guard assistance;Assist x1;Additional time; Adaptive equipment     Gait Description (WDL): Exceptions to WDL  Gait Abnormalities: Decreased step clearance; Step to gait (increased trunk flexion)        Base of Support: Narrowed     Speed/Jacki: Slow  Step Length: Left shortened;Right shortened      Functional Measure:  Barthel Index:    Bathin  Bladder: 5  Bowels: 10  Groomin  Dressin  Feeding: 10  Mobility: 0  Stairs: 0  Toilet Use: 5  Transfer (Bed to Chair and Back): 10  Total: 45/100       The Barthel ADL Index: Guidelines  1.  The index should be used as a record of what a patient does, not as a record of what a patient could do. 2. The main aim is to establish degree of independence from any help, physical or verbal, however minor and for whatever reason. 3. The need for supervision renders the patient not independent. 4. A patient's performance should be established using the best available evidence. Asking the patient, friends/relatives and nurses are the usual sources, but direct observation and common sense are also important. However direct testing is not needed. 5. Usually the patient's performance over the preceding 24-48 hours is important, but occasionally longer periods will be relevant. 6. Middle categories imply that the patient supplies over 50 per cent of the effort. 7. Use of aids to be independent is allowed. Natasha Simon., Barthel, D.W. (0700). Functional evaluation: the Barthel Index. [address] [facility] (14)2. Alina Cheng pari ROBERTO Rogers, Ligia Sy., Sammy Daviesll., [place], [address] [place] [address] (1999). Measuring the change indisability after inpatient rehabilitation; comparison of the responsiveness of the Barthel Index and Functional Patten Measure. Journal of Neurology, Neurosurgery, and Psychiatry, 66(4), 553-194. Dario Huntley, N.J.A, Sho Gibbons,  W.J.DISHA, & Uday Wang, M.VIRGIE. (2004.) Assessment of post-stroke quality of life in cost-effectiveness studies: The usefulness of the Barthel Index and the EuroQoL-5D.  Quality of Life Research, 15, 816-19           Physical Therapy Evaluation Charge Determination   History Examination Presentation Decision-Making   HIGH Complexity :3+ comorbidities / personal factors will impact the outcome/ POC  HIGH Complexity : 4+ Standardized tests and measures addressing body structure, function, activity limitation and / or participation in recreation  MEDIUM Complexity : Evolving with changing characteristics  Other outcome measures Barthel Index  MEDIUM      Based on the above components, the patient evaluation is determined to be of the following complexity level: MEDIUM    Pain Rating:  No pain complaints    Activity Tolerance:   Fair, desaturates with exertion and requires oxygen, requires rest breaks, and observed SOB with activity    After treatment patient left in no apparent distress:   Supine in bed, Call bell within reach, and Side rails x 3    COMMUNICATION/EDUCATION:   The patients plan of care was discussed with: Physical therapist, Registered nurse, and Case management. Fall prevention education was provided and the patient/caregiver indicated understanding., Patient/family have participated as able in goal setting and plan of care. , and Patient/family agree to work toward stated goals and plan of care.     Thank you for this referral.  Chucky Davis, PT, DPT   Time Calculation: 32 mins

## 2021-09-16 NOTE — PROGRESS NOTES
Problem: Self Care Deficits Care Plan (Adult)  Goal: *Acute Goals and Plan of Care (Insert Text)  Description:   FUNCTIONAL STATUS PRIOR TO ADMISSION: Patient was modified independent using a single point cane for functional mobility. Reports using RW since having PEG placed 8/31/2021. Denies recent fall history. Has not driven in 6 months. No O2 used in the home other than C-PAP PTA    HOME SUPPORT PRIOR TO ADMISSION: The patient lived with two grandsons and reports that she is never alone. Occupational Therapy Goals  Initiated 9/16/2021  1. Patient will perform grooming and upper body dressing with modified independence within 7 day(s). 2.  Patient will perform bathing with supervision/set-up within 7 day(s). 3.  Patient will perform lower body dressing with supervision/set-up within 7 day(s). 4.  Patient will perform toilet transfers with supervision/set-up within 7 day(s). 5.  Patient will perform all aspects of toileting with supervision/set-up within 7 day(s). 6.  Patient will participate in upper extremity therapeutic exercise/activities with supervision/set-up for 5 minutes within 7 day(s). 7.  Patient will utilize energy conservation, fall prevention, seizure precautions, PLB techniques during functional activities with verbal cues within 7 day(s). Outcome: Progressing Towards Goal   OCCUPATIONAL THERAPY EVALUATION  Patient: Brian Tsai ([de-identified] y.o. female)  Date: 9/16/2021  Primary Diagnosis: Syncope [R55]  Seizure ([address] [R56.9]  Dehydration [E86.0]  Lung metastasis ([address] [C78.00]  Esophageal cancer ([facility] [C15.9]  Orthostasis [I95.1]        Precautions:   Fall, Seizure, Skin, Aspiration (PEG; Jamul L; NPO x ice chips)    ASSESSMENT  Based on the objective data described below, the patient presents with general debility post seizure like activity with R UE \"shaking\" SOB and slurring of speech, which appears to have now resolved.  Mild functional strength and balance deficits noted with self care and functional mobility with all movements slow with increased time for initiation of tasks. Patient attributes deficits to being \"awake all night in the ER. \"  Appears cog intact but not formally assessed; will continue to monitor post seizure like activity. Current Level of Function Impacting Discharge (ADLs/self-care): S-min A UE ADLs, Min A LE ADLs, Min A functional mobility with RW    Functional Outcome Measure: The patient scored Total: 45/100 on the Barthel Index outcome measure which is indicative of 55% impaired ability to care for basic self needs/dependency on others; inferred 100% dependency on others for instrumental ADLs. Other factors to consider for discharge: will benefit from [place]     Patient will benefit from skilled therapy intervention to address the above noted impairments. PLAN :  Recommendations and Planned Interventions: self care training, functional mobility training, therapeutic exercise, balance training, visual/perceptual training, therapeutic activities, cognitive retraining, endurance activities, neuromuscular re-education, patient education, home safety training, and family training/education    Frequency/Duration: Patient will be followed by occupational therapy 4 times a week to address goals. Recommendation for discharge: (in order for the patient to meet his/her long term goals)  Occupational therapy at least 2 days/week in the home AND ensure assist and/or supervision for safety with self care and functional mobility, IADLs    This discharge recommendation:  A follow-up discussion with the attending provider and/or case management is planned    IF patient discharges home will need the following DME: TBA       SUBJECTIVE:   Patient stated I'm so tired from being in the ER all night.     OBJECTIVE DATA SUMMARY:   HISTORY:   Past Medical History:   Diagnosis Date    Abnormal x-ray of abdomen 05/22/2019    Bas showed food and irregular stricture above ge junction in addition to large hiatal hernia  5.20.2019    Adenocarcinoma of esophagus ([facility] [address] 5/30/2019    chemotherapy    Anemia     Arrhythmia     Arthritis     Chronic kidney disease     Stage II followed by Dr Char [facility]     Diverticulosis     Esophageal dysphagia 5/22/2019    Glaucoma     bilateral    Hiatal hernia     History of Clostridioides difficile infection 05/2019    History of colon polyps 6/1/2018    2013 tubular adenoma     Hypertension     Hypothyroid     Pulmonary HTN ([facility] [address]     Sleep apnea     CPAP compliant, as stated 5/25/2021    Thromboembolus ([facility] [address] 2015    Right  leg , spontaneous     Past Surgical History:   Procedure Laterality Date    ANAL PRESSURE RECORD  6/6/2019    COLONOSCOPY N/A 6/1/2018    COLONOSCOPY performed by Delmi Lee MD at [address]  6/1/2018         HX CATARACT REMOVAL Bilateral 2017    HX HYSTERECTOMY      HX OTHER SURGICAL  05/26/2021    port cath instertion    IR FINE NEEDLE ASPIRATION W IMAGE  1984    IR INSERT NON TUNL CVC OVER 5 YRS  3/8/2020    IR INSERT TUNL CVC W PORT OVER 5 YEARS  7/19/2019    IR REMOVE TUNL CVAD W/O PORT / PUMP  3/8/2020    UPPER GI ENDOSCOPY,BALL DIL,30MM  5/22/2019         UPPER GI ENDOSCOPY,BALL DIL,30MM  5/30/2019         UPPER GI ENDOSCOPY,BIOPSY  5/22/2019         UPPER GI ENDOSCOPY,BIOPSY  5/30/2019         UPPER GI ENDOSCOPY,BIOPSY  10/17/2019            Expanded or extensive additional review of patient history:     Home Situation  Home Environment: Private residence  # Steps to Enter: 4  Rails to Enter: Yes  Hand Rails : Bilateral  Wheelchair Ramp: No  One/Two Story Residence: One story  Living Alone: No  Support Systems: Child(nadeem)  Patient Expects to be Discharged to[de-identified] [employer]  Current DME Used/Available at Home: Cane, straight, Commode, bedside, Walker, rolling  Tub or Shower Type: Tub/Shower combination    Hand dominance: Right    EXAMINATION OF PERFORMANCE DEFICITS:  Cognitive/Behavioral Status:  Neurologic State: Alert; Appropriate for age  Orientation Level: Oriented X4  Cognition: Follows commands; Appropriate safety awareness (recommend MoCA for clarification)  Perception: Appears intact (lazy eye B)  Perseveration: No perseveration noted  Safety/Judgement: Fall prevention; Awareness of environment    Skin: intact B UE; PEG tube site inflamed; See Nsg notes    Edema: see nsg notes    Hearing: Auditory  Auditory Impairment: Hard of hearing, left side    Vision/Perceptual:    Tracking:  (tracks B but Lazy eye B; eyes move out of sync; no ch)                      Acuity:  (reports no current visual changes ; WFL)    Corrective Lenses: Glasses    Range of Motion:  B UE  AROM: Generally decreased, functional                         Strength:  B UE  Strength: Generally decreased, functional                Coordination:  Coordination: Generally decreased, functional  Fine Motor Skills-Upper: Left Impaired;Right Impaired (mild; moves very slowly B UE, R slower than L)    Gross Motor Skills-Upper: Left Impaired;Right Impaired (B UE moves very slowly, R slower than L)    Tone & Sensation:  B UE  Tone: Normal                         Balance:  Sitting: Intact  Standing: Intact; With support    Functional Mobility and Transfers for ADLs:  Bed Mobility:  Rolling: Minimum assistance  Supine to Sit: Minimum assistance  Sit to Supine: Minimum assistance  Scooting: Stand-by assistance    Transfers:  Sit to Stand: Contact guard assistance; Additional time; Adaptive equipment  Stand to Sit: Contact guard assistance; Additional time; Adaptive equipment  Bed to Chair: Contact guard assistance; Additional time; Adaptive equipment (inferred)  Toilet Transfer : Contact guard assistance; Additional time; Adaptive equipment    ADL Assessment:  Feeding: Total assistance (NPO' PEG)    Oral Facial Hygiene/Grooming: Setup;Stand-by assistance    Bathing: Minimum assistance; Moderate assistance (limited by fatigue, general debility)    Upper Body Dressing: Supervision;Minimum assistance    Lower Body Dressing: Minimum assistance; Moderate assistance    Toileting: Moderate assistance;Minimum assistance; Additional time                ADL Intervention and task modifications:          Patient instructed and indicated understanding the benefits of maintaining activity tolerance, functional mobility, and independence with self care tasks during acute stay  to ensure safe return home and to baseline. Encouraged patient to increase frequency and duration OOB, be out of bed for all meals, perform daily ADLs (as approved by RN/MD regarding bathing etc), and performing functional mobility to/from bathroom with staff only. Cognitive Retraining  Safety/Judgement: Fall prevention; Awareness of environment      Functional Measure:  Barthel Index:    Bathin  Bladder: 5  Bowels: 10  Groomin  Dressin  Feeding: 10  Mobility: 0  Stairs: 0  Toilet Use: 5  Transfer (Bed to Chair and Back): 10  Total: 45/100        The Barthel ADL Index: Guidelines  1. The index should be used as a record of what a patient does, not as a record of what a patient could do. 2. The main aim is to establish degree of independence from any help, physical or verbal, however minor and for whatever reason. 3. The need for supervision renders the patient not independent. 4. A patient's performance should be established using the best available evidence. Asking the patient, friends/relatives and nurses are the usual sources, but direct observation and common sense are also important. However direct testing is not needed. 5. Usually the patient's performance over the preceding 24-48 hours is important, but occasionally longer periods will be relevant. 6. Middle categories imply that the patient supplies over 50 per cent of the effort. 7. Use of aids to be independent is allowed. Dona Tucker, Barthel, D.W. (1719). Functional evaluation: the Barthel Index.  [place] Med J ([phone]. ROBERTO Tatum, Laura Lopez., Catrina Mcmillan., [place], [address] Andrae Melendez (1999). Measuring the change indisability after inpatient rehabilitation; comparison of the responsiveness of the Barthel Index and Functional [place] Measure. Journal of Neurology, Neurosurgery, and Psychiatry, 66(4), 424-780. BRYANNA Escobedo, YUN Rossi, & Trinity Narvaez M.A. (2004.) Assessment of post-stroke quality of life in cost-effectiveness studies: The usefulness of the Barthel Index and the EuroQoL-5D. Quality of Life Research, 15, 854-69         Occupational Therapy Evaluation Charge Determination   History Examination Decision-Making   LOW Complexity : Brief history review  HIGH Complexity : 5 or more performance deficits relating to physical, cognitive , or psychosocial skils that result in activity limitations and / or participation restrictions HIGH Complexity : Patient presents with comorbidities that affect occupational performance. Signifigant modification of tasks or assistance (eg, physical or verbal) with assessment (s) is necessary to enable patient to complete evaluation       Based on the above components, the patient evaluation is determined to be of the following complexity level: LOW   Pain Rating:  No pain reported    Activity Tolerance:   Fair, desaturates with exertion and requires oxygen, requires frequent rest breaks, and observed SOB with activity    After treatment patient left in no apparent distress:    Supine in bed, Call bell within reach, Bed / chair alarm activated, and Side rails x 3    COMMUNICATION/EDUCATION:   The patients plan of care was discussed with: Registered nurse. Home safety education was provided and the patient/caregiver indicated understanding., Patient/family have participated as able in goal setting and plan of care. , and Patient/family agree to work toward stated goals and plan of care.     This patients plan of care is appropriate for delegation to ANNE.     Thank you for this referral.  Leisa Ocampo OTR/L  Time Calculation: 29 mins

## 2021-09-17 NOTE — PROGRESS NOTES
Problem: Falls - Risk of  Goal: *Absence of Falls  Description: Document César Marquez Fall Risk and appropriate interventions in the flowsheet. Outcome: Progressing Towards Goal  Note: Fall Risk Interventions:  Mobility Interventions: Assess mobility with egress test, Bed/chair exit alarm, Patient to call before getting OOB         Medication Interventions: Assess postural VS orthostatic hypotension    Elimination Interventions: Bed/chair exit alarm, Call light in reach, Patient to call for help with toileting needs              Problem: Patient Education: Go to Patient Education Activity  Goal: Patient/Family Education  Outcome: Progressing Towards Goal     Problem: Patient Education: Go to Patient Education Activity  Goal: Patient/Family Education  Outcome: Progressing Towards Goal     Problem: General Medical Care Plan  Goal: *Vital signs within specified parameters  Outcome: Progressing Towards Goal  Goal: *Labs within defined limits  Outcome: Progressing Towards Goal  Goal: *Absence of infection signs and symptoms  Outcome: Progressing Towards Goal  Goal: *Skin integrity maintained  Outcome: Progressing Towards Goal  Goal: *Optimize nutritional status  Outcome: Progressing Towards Goal  Goal: *Progressive mobility and function (eg: ADL's)  Outcome: Progressing Towards Goal     Problem: Patient Education: Go to Patient Education Activity  Goal: Patient/Family Education  Outcome: Progressing Towards Goal     Problem: General Wound Care  Goal: Interventions  Outcome: Progressing Towards Goal     Problem: Patient Education: Go to Patient Education Activity  Goal: Patient/Family Education  Outcome: Progressing Towards Goal     Problem: Pressure Injury - Risk of  Goal: *Prevention of pressure injury  Description: Document Tomy Scale and appropriate interventions in the flowsheet.   Outcome: Progressing Towards Goal  Note: Pressure Injury Interventions:       Moisture Interventions: Absorbent underpads, Internal/External urinary devices    Activity Interventions: Assess need for specialty bed    Mobility Interventions: HOB 30 degrees or less, PT/OT evaluation    Nutrition Interventions: Document food/fluid/supplement intake    Friction and Shear Interventions: Apply protective barrier, creams and emollients                Problem: Patient Education: Go to Patient Education Activity  Goal: Patient/Family Education  Outcome: Progressing Towards Goal     Problem: Patient Education: Go to Patient Education Activity  Goal: Patient/Family Education  Outcome: Progressing Towards Goal

## 2021-09-17 NOTE — PROGRESS NOTES
Problem: Mobility Impaired (Adult and Pediatric)  Goal: *Acute Goals and Plan of Care (Insert Text)  Description: FUNCTIONAL STATUS PRIOR TO ADMISSION: Patient was modified independent using a single point cane for functional mobility. Reports using RW since having PEG placed 8/31/2021. Denies recent fall history. Has not driven in 6 months. HOME SUPPORT PRIOR TO ADMISSION: The patient lived with two grandsons and reports that she is never alone. Physical Therapy Goals  Initiated 9/16/2021  1. Patient will move from supine to sit and sit to supine , scoot up and down, and roll side to side in bed with modified independence within 7 day(s). 2.  Patient will transfer from bed to chair and chair to bed with modified independence using the least restrictive device within 7 day(s). 3.  Patient will perform sit to stand with modified independence within 7 day(s). 4.  Patient will ambulate with modified independence for 150 feet with the least restrictive device within 7 day(s). 5.  Patient will ascend/descend 4 stairs with 2 handrail(s) with modified independence within 7 day(s). Outcome: Progressing Towards Goal   PHYSICAL THERAPY TREATMENT  Patient: Mulugeta Fritz (86 y.o. female)  Date: 9/17/2021  Diagnosis: Syncope [R55]  Seizure ([facility] [R56.9]  Dehydration [E86.0]  Lung metastasis ([facility] [facility] [C78.00]  Esophageal cancer ([facility] [address] [C15.9]  Orthostasis [I95.1] <principal problem not specified>       Precautions: Fall, Seizure, Skin, Aspiration (PEG; [place] L; NPO x ice chips)  Chart, physical therapy assessment, plan of care and goals were reviewed. ASSESSMENT  Patient continues with skilled PT services and is progressing towards goals. Pt received in bed, on 2L of O2. O2 removed to see how her O2 sats would do on room air. She was able to move to sitting edge of bed with min a x 1. Seated balance edge of bed intact. Pt able to stand and ambulate with RW with cga.   Gait distance tolerance increased this session, but shortness of breath noted. Pt denies being short of breath, but clearly was breathing with increased effort. Pt's family member present, observing this session. Pt limited by decreased endurance and generalized weakness, but is moving fairly well. HHPT remains appropriate at discharge. Current Level of Function Impacting Discharge (mobility/balance):  Bed Mobility:  Supine to Sit: Contact guard assistance;Stand-by assistance   Scooting: Supervision      Transfers:  Sit to Stand: Stand-by assistance;Contact guard assistance   Stand to Sit: Stand-by assistance;Contact guard assistance   Bed to Chair: Stand-by assistance;Contact guard assistance     Ambulation/Gait Training:  Distance (ft): 12 Feet (ft)  Assistive Device: Gait belt;Walker, rolling  Ambulation - Level of Assistance: Contact guard assistance; Additional time       Other factors to consider for discharge: has support at home, moving well         PLAN :  Patient continues to benefit from skilled intervention to address the above impairments. Continue treatment per established plan of care. to address goals.     Recommendation for discharge: (in order for the patient to meet his/her long term goals)  Physical therapy at least 2 days/week in the home     This discharge recommendation:  Has been made in collaboration with the attending provider and/or case management    IF patient discharges home will need the following DME: patient owns DME required for discharge       SUBJECTIVE:   Patient stated i'll try    OBJECTIVE DATA SUMMARY:   Critical Behavior:  Neurologic State: Alert  Orientation Level: Appropriate for age, Oriented X4  Cognition: Follows commands, Appropriate safety awareness, Appropriate for age attention/concentration  Safety/Judgement: Awareness of environment, Fall prevention, Home safety, Decreased insight into deficits, Insight into deficits  Functional Mobility Training:  Bed Mobility:     Supine to Sit: Contact guard assistance;Stand-by assistance   Scooting: Supervision      Transfers:  Sit to Stand: Stand-by assistance;Contact guard assistance   Stand to Sit: Stand-by assistance;Contact guard assistance   Bed to Chair: Stand-by assistance;Contact guard assistance        Balance:  Sitting: Intact   Standing: Intact; With support     Ambulation/Gait Training:  Distance (ft): 12 Feet (ft)  Assistive Device: Gait belt;Walker, rolling  Ambulation - Level of Assistance: Contact guard assistance; Additional time        Gait Abnormalities: Decreased step clearance        Base of Support: Narrowed     Speed/Jacki: Slow  Step Length: Left shortened;Right shortened    Pain Rating:  None reported, but she did report some soreness when performing bed mobility, didn't specify location    Activity Tolerance:   Fair, desaturates with exertion and requires oxygen, requires rest breaks, and observed SOB with activity    After treatment patient left in no apparent distress:   Sitting in chair, Call bell within reach, and Caregiver / family present    COMMUNICATION/COLLABORATION:   The patients plan of care was discussed with: Occupational therapist and Registered nurse.      Russella Dancer, PT   Time Calculation: 38 mins

## 2021-09-17 NOTE — PROGRESS NOTES
Problem: Self Care Deficits Care Plan (Adult)  Goal: *Acute Goals and Plan of Care (Insert Text)  Description:   FUNCTIONAL STATUS PRIOR TO ADMISSION: Patient was modified independent using a single point cane for functional mobility. Reports using RW since having PEG placed 8/31/2021. Denies recent fall history. Has not driven in 6 months. No O2 used in the home other than C-PAP PTA    HOME SUPPORT PRIOR TO ADMISSION: The patient lived with two grandsons and reports that she is never alone. Occupational Therapy Goals  Initiated 9/16/2021  1. Patient will perform grooming and upper body dressing with modified independence within 7 day(s). 2.  Patient will perform bathing with supervision/set-up within 7 day(s). 3.  Patient will perform lower body dressing with supervision/set-up within 7 day(s). 4.  Patient will perform toilet transfers with supervision/set-up within 7 day(s). 5.  Patient will perform all aspects of toileting with supervision/set-up within 7 day(s). 6.  Patient will participate in upper extremity therapeutic exercise/activities with supervision/set-up for 5 minutes within 7 day(s). 7.  Patient will utilize energy conservation, fall prevention, seizure precautions, PLB techniques during functional activities with verbal cues within 7 day(s). Outcome: Progressing Towards Goal   OCCUPATIONAL THERAPY TREATMENT  Patient: Chris Joyce ([de-identified] y.o. female)  Date: 9/17/2021  Diagnosis: Syncope [R55]  Seizure ([address] [R56.9]  Dehydration [E86.0]  Lung metastasis ([address] [C78.00]  Esophageal cancer ([facility] [C15.9]  Orthostasis [I95.1] <principal problem not specified>       Precautions: Fall, Seizure, Skin, Aspiration (PEG; Habematolel L; NPO x ice chips)  Chart, occupational therapy assessment, plan of care, and goals were reviewed. ASSESSMENT  Patient continues with skilled OT services and is progressing towards goals.   Good participation in PM session, approached patient in AM but she was not feeling well and requested return visit; reporting pain near peg tube, worse with bed mobility and transfer tasks; moving well but slow with functional mobility and ADL tasks. Son present and family training with safe positioning with tube feeds provided; recommend Foam To Size for inverted Wedge    Current Level of Function Impacting Discharge (ADLs): S-Min A self care and functional mobility with increased time, limited by fatigue and abdominal pain near peg tube; O2 weaning in progress    Other factors to consider for discharge: never alone per chart         PLAN :  Patient continues to benefit from skilled intervention to address the above impairments. Continue treatment per established plan of care to address goals. Recommend with staff: up to chair at meal times ~ 1 hour to maximize functional endurance    Recommend next OT session: standing to groom in bathroom    Recommendation for discharge: (in order for the patient to meet his/her long term goals)  Occupational therapy at least 2 days/week in the home AND ensure assist and/or supervision for safety with IADLs, Tube feeds    This discharge recommendation:  Has been made in collaboration with the attending provider and/or case management    IF patient discharges home will need the following DME: AE: long handled bathing, AE: long handled dressing, and bedside commode       SUBJECTIVE:   Patient stated I will practice over the weekend.   PLB    OBJECTIVE DATA SUMMARY:   Cognitive/Behavioral Status:  Neurologic State: Alert  Orientation Level: Appropriate for age;Oriented X4  Cognition: Follows commands; Appropriate safety awareness; Appropriate for age attention/concentration  Perception: Appears intact  Perseveration: No perseveration noted  Safety/Judgement: Awareness of environment; Fall prevention;Home safety;Decreased insight into deficits; Insight into deficits    Functional Mobility and Transfers for ADLs:  Bed Mobility:  Supine to Sit: Contact guard assistance;Stand-by assistance (Simultaneous filing. User may not have seen previous data.)  Scooting: Supervision (Simultaneous filing. User may not have seen previous data.)    Transfers:  Sit to Stand: Stand-by assistance;Contact guard assistance (Simultaneous filing. User may not have seen previous data.)  Functional Transfers  Toilet Transfer : Contact guard assistance (inferred)  Bed to Chair: Stand-by assistance;Contact guard assistance    Balance:  Sitting: Intact (Simultaneous filing. User may not have seen previous data.)  Standing: Intact; With support (Simultaneous filing. User may not have seen previous data.)    ADL Intervention:  Feeding  Feeding Assistance: Total assistance (dependent) (NPO, Tube feeds; training re 30 degrees HOB positioning/fami)  Adaptive Equipment:  (trained use of inclined wedge vs Hospital bed for safe tube )                             Toileting  Toileting Assistance: Minimum assistance (inferred based in standing tolerance, transfers, UE use marielena)    Cognitive Retraining  Attention to Task: Single task  Maintains Attention For (Time): Greater than 10 minutes  Following Commands: Follows one step commands/directions; Follows two step commands/directions  Safety/Judgement: Awareness of environment; Fall prevention;Home safety;Decreased insight into deficits; Insight into deficits    Therapeutic Exercises:   Handouts provided for home and weekend use to increase AROM HEP participation with visual cues; able to follow all instructions and reports she will participate, aware goal of 30 cumulative minutes; PLB handouts and instructions provided as patient wants strongly to have no O2 use in the home; sats low 90's to 89% on room air; able to demonstrate PLB    Pain:  Reports discomfort near PEG tube    Activity Tolerance:   Fair, requires frequent rest breaks, observed SOB with activity, and weaning O2 in progress; good participation with PLB; handout provided    After treatment patient left in no apparent distress:   Sitting in chair, Call bell within reach, and Caregiver / family present    COMMUNICATION/COLLABORATION:   The patients plan of care was discussed with: Physical therapist and Registered nurse.      Faina Stout OTR/L  Time Calculation: 42 mins

## 2021-09-17 NOTE — PROGRESS NOTES
Received notification from bedside RN about patient with regards to: calcium 6.0, corrected is 7.68  VS: /70, HR 63, RR 19, O2 sat 98% on NC     Intervention given: CMP tomorrow AM ordered

## 2021-09-17 NOTE — PROGRESS NOTES
Problem: Falls - Risk of  Goal: *Absence of Falls  Description: Document Zenaida Garcia Fall Risk and appropriate interventions in the flowsheet.   Note: Fall Risk Interventions:  Mobility Interventions: Bed/chair exit alarm, Patient to call before getting OOB         Medication Interventions: Bed/chair exit alarm, Patient to call before getting OOB    Elimination Interventions: Bed/chair exit alarm, Call light in reach, Toileting schedule/hourly rounds

## 2021-09-17 NOTE — PROGRESS NOTES
End of Shift Note    Bedside shift change report given to Lisa Murdock ,RN  (oncoming nurse) by Sofiya Bazzi RN (offgoing nurse). Report included the following information SBAR, Kardex, Intake/Output and MAR    Shift worked:  night   Shift summary and any significant changes:     POC reviewed, Central line patent and flushing well, IV fluids infusing, PEG tube in place, feeding started at 0300 am as there was not feed to start as well as pump. Finally got feeding from Nursing Supervisor, Saturating on 2 liter of oxygen via nasal cannula, productive cough, external catheter in use, stage 3 on sacrum, turn team on board and turning pt. Every two hours. Made comfortable, call bell and phone within reach. Bed alarm on        Concerns for physician to address:  need consult for RT, needs CPAP at night.    Zone phone for oncoming shift:   [phone]     Patient Information  Ciro Adam  [de-identified] y.o.  9/15/2021  2:17 PM by Jackson Kim MD. Ciro Adam was admitted from Home    Problem List  Patient Active Problem List    Diagnosis Date Noted    Dehydration 09/15/2021    Seizure ([address] 09/15/2021    Syncope 09/15/2021    Orthostasis 09/15/2021    Lung metastasis ([address] 09/15/2021    Esophageal cancer ([address] 08/31/2021    Metastasis from esophageal cancer ([address] 05/25/2021    Diarrhea 06/03/2020    Severe protein-calorie malnutrition ([address] 06/02/2020    Acute on chronic renal failure ([address] 06/01/2020    Septic shock ([address] 03/12/2020    E coli bacteremia 03/12/2020    SONIDO (acute kidney injury) ([address] 03/12/2020    Acute respiratory failure ([address] 03/05/2020    Adenocarcinoma of esophagus ([address] 05/30/2019    Esophageal dysphagia 05/22/2019    Abnormal x-ray of abdomen 05/22/2019    History of colon polyps 06/01/2018     Past Medical History:   Diagnosis Date    Abnormal x-ray of abdomen 05/22/2019    Bas showed food and irregular stricture above ge junction in addition to large hiatal hernia  5.20.2019    Adenocarcinoma of esophagus ([facility] 5/30/2019    chemotherapy    Anemia     Arrhythmia     Arthritis     Chronic kidney disease     Stage II followed by Dr Griffin Anderson Neph[facility]     Diverticulosis     Esophageal dysphagia 5/22/2019    Glaucoma     bilateral    Hiatal hernia     History of Clostridioides difficile infection 05/2019    History of colon polyps 6/1/2018    2013 tubular adenoma     Hypertension     Hypothyroid     Pulmonary HTN ([facility]     Sleep apnea     CPAP compliant, as stated 5/25/2021    Thromboembolus ([facility] 2015    Right  leg , spontaneous         Activity:  Activity Level: Bed Rest  Number times ambulated in hallways past shift: 0  Number of times OOB to chair past shift: 0    Cardiac:   Cardiac Monitoring: Yes      Cardiac Rhythm: Sinus Rhythm    Access:     Central Line? Yes Placement date  Reason Medically Necessary     Genitourinary:   Urinary status: external catheter    Respiratory:   O2 Device: Nasal cannula  Chronic home O2 use?: YES  Incentive spirometer at bedside: NO       GI:  Last Bowel Movement Date:  (PTA)  Current diet:  DIET NPO Ice Chips  ADULT TUBE FEEDING Jejunostomy; Standard without Fiber; Delivery Method: Cyclic; Cyclic Rate (mL/hr): 564; Cyclic Start Time: 9:50 PM; Cyclic Stop Time: 2:15 AM; Water Flush Volume (mL): 100; Water Flush Frequency: Q 4 hours  Passing flatus: YES  Tolerating current diet: YES       Pain Management:   Patient states pain is manageable on current regimen: YES    Skin:  Tomy Score: 12  Interventions: turn team, speciality bed, float heels, increase time out of bed, foam dressing, limit briefs and internal/external urinary devices    Patient Safety:  Fall Score:  Total Score: 3  Interventions: bed/chair alarm, assistive device (walker, cane, etc), gripper socks and pt to call before getting OOB  High Fall Risk: Yes  @Rollbelt  @dexterity to release roll belt  Yes/No ( must document dexterity  here by stating Yes or No here, otherwise this is a restraint and must follow restraint documentation policy.)    DVT prophylaxis:  DVT prophylaxis Med- Yes  DVT prophylaxis SCD or CECELIA- Yes     Wounds: (If Applicable)  Wounds- Yes  Location sacrum    Active Consults:  IP CONSULT TO ONCOLOGY    Length of Stay:  Expected LOS: 2d 7h  Actual LOS: 2  Discharge Plan: No       Mono Hebert RN

## 2021-09-17 NOTE — PROCEDURES
Patient Name: Josiah Milian  : 1940  Age: [de-identified] y.o. Ordering physician: Clotilda Olszewski   Date of EE2021  EEG procedure number: [id number]  Diagnosis: AMS   Interpreting physician: Kacie Vega MD       ELECTROENCEPHALOGRAM REPORT     PROCEDURE: EEG. CLINICAL INDICATION: The patient is a [de-identified] y.o. female who is being evaluated for baseline electro cerebral activities and to rule out seizure focus. EEG CLASSIFICATION: Normal    DESCRIPTION OF THE RECORD:   The background of this recording contains a posteriorly-located occipital alpha rhythm of 8-9 Hz that attenuates with eye opening. Throughout the recording, there were no clear areas of focal slowing nor spike or spike-and-wave discharges seen. Hyperventilation was not performed. Photic stimulation produced no response in the posterior head regions. During the recording the patient did not achieve stage II sleep    INTERPRETATION: This is a normal electroencephalogram with the patient awake and asleep, showing no clear focal abnormalities or epileptiform activity. A normal EEG does not rule out a diagnosis of epilepsy or seizures. One may consider pursuing a prolonged study. Clinical correlation recommended.       Kacie Vega MD

## 2021-09-17 NOTE — PROGRESS NOTES
Goals      Prevent complications post hospitalization. 09/07/21  CTN contacted patient daughter today to review discharge instructions and red falgs to prevent readmission. CTN discussed the following appts with daughter--she states her brother is scheduling follow ups with PCP and Dr Jamie Zamora. CTN will follow up. Patient has surgical follow up already scheduled for 9/21/2021. [facility]---tube feeding supplies---per daughter, pt has a pump feeding and she has turned this off as it completed around 8:00 this morning. She states [address] is coming today for more instruction. [facility]---per daughter RN came yesterday and will come back today.  Daughter reports pt had abdominal cramping last night about 1:00. She turned pump off for 2 hours and restarted it until it was completed. Pt did not report nausea or vomiting. Cramping went away. She states she will talk to [facility] nurse today for further education.  CTN provided information about tube flushing when feeding put on hole to make sure tube is flushed to prevent clogging.  Daughter reports pt does not have fever, no problems with tube site. CTN instructed her to call surgeon with concerns regarding the tube. [address] information provided. Daughter had to end call due to patient needing assistance. CTN provided contact information and instructed daughter to call with concerns or questions. CTN will follow up next week. ---shannan    09/17/21  Patient is currently readmitted to [id number] [place] [address] on [birth year] for complications with her cancer. Chart reviewed, Oncology spoke to pt and son. They do not want hospice and wish to proceed with chemo when possible. ---shannan

## 2021-09-17 NOTE — PROGRESS NOTES
End of Shift Note     Bedside shift change report given to [employer]  ,RN  (oncoming nurse) by Debbie Villeda RN (offgoing nurse).   Report included the following information SBAR, Kardex, Intake/Output and MAR     Shift worked:  7am-7pm   Shift summary and any significant changes:     none         Concerns for physician to address:  none   Zone phone for oncoming shift:   [phone]      Patient Information  Danilo Neal  [de-identified] y.o.  9/15/2021  2:17 PM by Jean-Paul Ceron MD. Danilo Neal was admitted from Home     Problem List       Patient Active Problem List     Diagnosis Date Noted    Dehydration 09/15/2021    Seizure ([address] 09/15/2021    Syncope 09/15/2021    Orthostasis 09/15/2021    Lung metastasis ([address] 09/15/2021    Esophageal cancer ([address] 08/31/2021    Metastasis from esophageal cancer ([address] 05/25/2021    Diarrhea 06/03/2020    Severe protein-calorie malnutrition ([address] 06/02/2020    Acute on chronic renal failure ([address] 06/01/2020    Septic shock ([address] 03/12/2020    E coli bacteremia 03/12/2020    SONIDO (acute kidney injury) ([address] 03/12/2020    Acute respiratory failure ([address] 03/05/2020    Adenocarcinoma of esophagus ([address] 05/30/2019    Esophageal dysphagia 05/22/2019    Abnormal x-ray of abdomen 05/22/2019    History of colon polyps 06/01/2018           Past Medical History:   Diagnosis Date    Abnormal x-ray of abdomen 05/22/2019     Bas showed food and irregular stricture above ge junction in addition to large hiatal hernia  5.20.2019    Adenocarcinoma of esophagus ([address] 5/30/2019     chemotherapy    Anemia      Arrhythmia      Arthritis      Chronic kidney disease       Stage II followed by Dr Shruthi Gasca Nephrology     Diverticulosis      Esophageal dysphagia 5/22/2019    Glaucoma       bilateral    Hiatal hernia      History of Clostridioides difficile infection 05/2019    History of colon polyps 6/1/2018     2013 tubular adenoma     Hypertension      Hypothyroid      Pulmonary HTN ([facility]      Sleep apnea       CPAP compliant, as stated 5/25/2021    Thromboembolus ([facility] 2015     Right  leg , spontaneous            Activity:  Activity Level: Bed Rest  Number times ambulated in hallways past shift: 0  Number of times OOB to chair past shift: 0     Cardiac:   Cardiac Monitoring: Yes      Cardiac Rhythm: Sinus Rhythm     Access:      Central Line? Yes Placement date  Reason Medically Necessary      Genitourinary:   Urinary status: external catheter     Respiratory:   O2 Device: Nasal cannula  Chronic home O2 use?: YES  Incentive spirometer at bedside: NO     GI:  Last Bowel Movement Date:  (PTA)  Current diet:  DIET NPO Ice Chips  ADULT TUBE FEEDING Jejunostomy; Standard without Fiber; Delivery Method: Cyclic; Cyclic Rate (mL/hr): 519; Cyclic Start Time: 3:87 PM; Cyclic Stop Time: 8:29 AM; Water Flush Volume (mL): 100; Water Flush Frequency: Q 4 hours  Passing flatus: YES  Tolerating current diet: YES     Pain Management:   Patient states pain is manageable on current regimen: YES     Skin:  Tomy Score: 12  Interventions: turn team, speciality bed, float heels, increase time out of bed, foam dressing, limit briefs and internal/external urinary devices    Patient Safety:  Fall Score:  Total Score: 3  Interventions: bed/chair alarm, assistive device (walker, cane, etc), gripper socks and pt to call before getting OOB  High Fall Risk: Yes  @Rollbelt  @dexterity to release roll belt  Yes/No ( must document dexterity  here by stating Yes or No here, otherwise this is a restraint and must follow restraint documentation policy.)     DVT prophylaxis:  DVT prophylaxis Med- Yes  DVT prophylaxis SCD or CECELIA- Yes      Wounds: (If Applicable)  Wounds- Yes  Location sacrum     Active Consults:  IP CONSULT TO ONCOLOGY     Length of Stay:  Expected LOS: 2d 7h  Actual LOS: 2  Discharge Plan: No         Luisa Oden RN

## 2021-09-17 NOTE — PROGRESS NOTES
Hospitalist Progress Note    NAME: Mulugeta Fritz   :  1940   MRN:  [id number]       Assessment / Plan:  Syncope   Esophageal cancer with metastases POA -?   New right lung metastases suspected on CTA chest  Dehydration POA-history of recently placed PEG tube started on Osmolite tube feeding  CT head negative acute  CTA chest negative for PE, mediastinal lymphadenopathy noted, metastasis in the right major fissure noted, small bilateral pleural effusions     MRI negative, EEG negative for seizure  Orthostatic positive yesterday, repeat in AM. Will d/c fluids as short of breath this AM  ECHO: EF 50-55, Pulmonary artery HTN moderate  PT/OT recommending home health  Oncology consulted  PEG feeding resumed    Dyspnea:  Repeat CXR show bilateral opacity, which is slightly worse than presentation  Could be be d/t fluid overload  Will give her lasix 40 mg x 1 today and reassess in AM  She is sating 98% on 2 liters oxygen, should use CPAP overnight.     EMILY on CPAP  Continue home CPAP when family brings with him    Sacral pressure ulcer stage 3 POA:  Wound care following    Hypocalcemia:  Supplemented, repeat in AM     Hypertension  Hypothyroidism  History of right lower extremity DVT  CKD stage III  Glaucoma  Severe hiatal hernia  Continue home meds and eyedrops        Code Status: Full code as per patient was in ED  Surrogate Decision Maker: Pebbles Montiel #[id number]  Updated at the bedside.     DVT Prophylaxis: Subcu heparin  GI Prophylaxis: Pepcid as at home     Baseline: Patient lives with home with family    Estimated discharge date:   Barriers: Clinical improvement       Subjective:     Chief Complaint / Reason for Physician Visit  Follow up for syncope  She was more short of breath today    Review of Systems:  Symptom Y/N Comments  Symptom Y/N Comments   Fever/Chills n   Chest Pain n    Poor Appetite n   Edema n    Cough    Abdominal Pain     Sputum    Joint Pain     SOB/DEAN    Pruritis/Rash Nausea/vomit    Tolerating PT/OT     Diarrhea    Tolerating Diet     Constipation    Other       Could NOT obtain due to:      Objective:     VITALS:   Last 24hrs VS reviewed since prior progress note. Most recent are:  Patient Vitals for the past 24 hrs:   Temp Pulse Resp BP SpO2   09/17/21 1512 98.4 °F (36.9 °C) 90 20 (!) 139/95 96 %   09/17/21 1254 97.9 °F (36.6 °C) 91 24 (!) 154/104 98 %   09/17/21 0826 97.6 °F (36.4 °C) 87 28 (!) 167/106 98 %   09/17/21 0337 97.4 °F (36.3 °C) 63 19 101/70 98 %   09/16/21 2322 97.7 °F (36.5 °C) 80 19 (!) 159/99 96 %   09/16/21 2027 97.3 °F (36.3 °C) 80 19 (!) 161/101 96 %   09/16/21 1705  85  (!) 155/98 98 %   09/16/21 1636 98.2 °F (36.8 °C) 82 22 (!) 146/103 99 %       Intake/Output Summary (Last 24 hours) at 9/17/2021 1537  Last data filed at 9/17/2021 1301  Gross per 24 hour   Intake 140 ml   Output 1000 ml   Net -860 ml        I had a face to face encounter and independently examined this patient on 9/17/2021, as outlined below:  PHYSICAL EXAM:  General: WD, WN. Alert, cooperative, no acute distress    EENT:  EOMI. Anicteric sclerae. MMM  Resp:  Mild bibasilar crackles  CV:  Regular  rhythm,  No edema  GI:  Soft, Non distended, Non tender. +Bowel sounds  Neurologic:  Alert and oriented X 2, normal speech,   Psych:   Good insight. Not anxious nor agitated  Skin:  No rashes. No jaundice    Reviewed most current lab test results and cultures  YES  Reviewed most current radiology test results   YES  Review and summation of old records today    NO  Reviewed patient's current orders and MAR    YES  PMH/SH reviewed - no change compared to H&P  ________________________________________________________________________  Care Plan discussed with:    Comments   Patient y    Family      RN y    Care Manager     Consultant                        Multidiciplinary team rounds were held today with , nursing, pharmacist and clinical coordinator.   Patient's plan of care was discussed; medications were reviewed and discharge planning was addressed. ________________________________________________________________________  Total NON critical care TIME:  35  Minutes    Total CRITICAL CARE TIME Spent:   Minutes non procedure based      Comments   >50% of visit spent in counseling and coordination of care     ________________________________________________________________________  Mckinley Adamson MD     Procedures: see electronic medical records for all procedures/Xrays and details which were not copied into this note but were reviewed prior to creation of Plan. LABS:  I reviewed today's most current labs and imaging studies. Pertinent labs include:  Recent Labs     09/17/21  0435 09/15/21  1417   WBC 6.7 8.0   HGB 8.5* 8.5*   HCT 30.4* 28.9*    255     Recent Labs     09/17/21  0435 09/16/21  0337 09/15/21  1852 09/15/21  1417 09/15/21  1417    148* 148*   < > 148*   K 5.0 5.0 4.8   < > 5.5*   * 116* 115*   < > 115*   CO2 26 29 30   < > 28   GLU 73 84 96   < > 115*   BUN 38* 43* 44*   < > 44*   CREA 1.42* 1.53* 1.51*   < > 1.60*   CA 6.0* 6.1* 6.1*   < > 6.2*   MG 1.8 1.8  --   --   --    ALB 1.9*  --   --   --  1.9*   TBILI 0.6  --   --   --  0.4   ALT 17  --   --   --  22    < > = values in this interval not displayed.        Signed: Mckinley Adamson MD

## 2021-09-18 NOTE — PROGRESS NOTES
Hospitalist Progress Note    NAME: Nanci Coburn   :  1940   MRN:  [id number]       Assessment / Plan:  Syncope   Esophageal cancer with metastases POA -  New right lung metastases suspected on CTA chest  Dehydration POA-history of recently placed PEG tube started on Osmolite tube feeding  CT head negative acute  CTA chest negative for PE, mediastinal lymphadenopathy noted, metastasis in the right major fissure noted, small bilateral pleural effusions     MRI negative, EEG negative for seizure  Bp improved with fluids, now off fluids  ECHO: EF 50-55, Pulmonary artery HTN moderate  PT/OT recommending home health  Oncology consulted  C/w PEG feeding    Dyspnea:  Repeat CXR show bilateral opacity, which is slightly worse than presentation  Could be be d/t fluid overload  Unable to wean her off oxygen today, remains on 3 liters, will given lasix 40 iv q12 for now, may need home oxygen if unable to wean off by tomorrow.     EMILY on CPAP  Continue home CPAP when family brings with him    Sacral pressure ulcer stage 3 POA:  Wound care following    Hypocalcemia:  Supplemented, repeat in AM     Hypertension  Hypothyroidism  History of right lower extremity DVT  CKD stage III  Glaucoma  Severe hiatal hernia  Continue home meds and eyedrops        Code Status: Full code as per patient was in ED  Surrogate Decision Maker: Katie Miller #[id number]     DVT Prophylaxis: Subcu heparin  GI Prophylaxis: Pepcid as at home     Baseline: Patient lives with home with family    Estimated discharge date:   Barriers: Clinical improvement       Subjective:     Chief Complaint / Reason for Physician Visit  Follow up for syncope  She still has some shortness of breath.     Review of Systems:  Symptom Y/N Comments  Symptom Y/N Comments   Fever/Chills n   Chest Pain n    Poor Appetite n   Edema n    Cough    Abdominal Pain     Sputum    Joint Pain     SOB/DEAN    Pruritis/Rash     Nausea/vomit    Tolerating PT/OT     Diarrhea Tolerating Diet     Constipation    Other       Could NOT obtain due to:      Objective:     VITALS:   Last 24hrs VS reviewed since prior progress note. Most recent are:  Patient Vitals for the past 24 hrs:   Temp Pulse Resp BP SpO2   09/18/21 0920 98.2 °F (36.8 °C) 76 21 (!) 163/115 99 %   09/18/21 0341 98 °F (36.7 °C)  28 (!) 150/102 98 %   09/17/21 2305 98.3 °F (36.8 °C)  22 (!) 160/113 98 %   09/17/21 2119 97.5 °F (36.4 °C) 82 18 (!) 163/101 98 %   09/17/21 1512 98.4 °F (36.9 °C) 90 20 (!) 139/95 96 %   09/17/21 1254 97.9 °F (36.6 °C) 91 24 (!) 154/104 98 %       Intake/Output Summary (Last 24 hours) at 9/18/2021 1109  Last data filed at 9/18/2021 2003  Gross per 24 hour   Intake 200 ml   Output 2200 ml   Net -2000 ml        I had a face to face encounter and independently examined this patient on 9/18/2021, as outlined below:  PHYSICAL EXAM:  General: WD, WN. Alert, cooperative, no acute distress    EENT:  EOMI. Anicteric sclerae. MMM  Resp:  Mild bibasilar crackles  CV:  Regular  rhythm,  No edema  GI:  Soft, Non distended, Non tender. +Bowel sounds  Neurologic:  Alert and oriented X 2, normal speech,   Psych:   Good insight. Not anxious nor agitated  Skin:  No rashes. No jaundice    Reviewed most current lab test results and cultures  YES  Reviewed most current radiology test results   YES  Review and summation of old records today    NO  Reviewed patient's current orders and MAR    YES  PMH/SH reviewed - no change compared to H&P  ________________________________________________________________________  Care Plan discussed with:    Comments   Patient y    Family      RN y    Care Manager     Consultant                        Multidiciplinary team rounds were held today with , nursing, pharmacist and clinical coordinator. Patient's plan of care was discussed; medications were reviewed and discharge planning was addressed. ________________________________________________________________________  Total NON critical care TIME:  35  Minutes    Total CRITICAL CARE TIME Spent:   Minutes non procedure based      Comments   >50% of visit spent in counseling and coordination of care     ________________________________________________________________________  Karin Glynn MD     Procedures: see electronic medical records for all procedures/Xrays and details which were not copied into this note but were reviewed prior to creation of Plan. LABS:  I reviewed today's most current labs and imaging studies. Pertinent labs include:  Recent Labs     09/17/21  0435 09/15/21  1417   WBC 6.7 8.0   HGB 8.5* 8.5*   HCT 30.4* 28.9*    255     Recent Labs     09/18/21  0355 09/17/21  0435 09/16/21  0337 09/15/21  1852 09/15/21  1417   * 145 148*   < > 148*   K 4.7 5.0 5.0   < > 5.5*   * 115* 116*   < > 115*   CO2 29 26 29   < > 28   * 73 84   < > 115*   BUN 42* 38* 43*   < > 44*   CREA 1.58* 1.42* 1.53*   < > 1.60*   CA 6.3* 6.0* 6.1*   < > 6.2*   MG  --  1.8 1.8  --   --    ALB 2.1* 1.9*  --   --  1.9*   TBILI 0.5 0.6  --   --  0.4   ALT 19 17  --   --  22    < > = values in this interval not displayed.        Signed: Karin Glynn MD

## 2021-09-18 NOTE — ROUTINE PROCESS
At 2333, I stopped the patients tube feeding due to her having crackles and SOB. Respiratory was up here and I asked her to check her out. She heard the crackles also, agreed she was SOB and suggested that it might be from the tube feeding. I asked her how she did her tube feeding at home and she stated that she would \"run it from 7 pm til about  pm then hold it for 2 hrs. Then turn it back on and finish it. \"  So I stopped it, informed NP, and checked for residual-none. At [address] inquired as to how she was feeling with her breathing now, she stated she \"felt much better\" and had less shortness of breath.

## 2021-09-18 NOTE — PROGRESS NOTES
pt lung sounds coarse and diminished in the R lower lobe, pt taken off of 2 L of 02 she dropped to 76% on room air, placed her on 2L she was maintaining around 80% bumped her up to 3L now she is at 98%    MD notified     Notified MD pt peg tube is loose and green drainage coming from the peg. MD aware and stated pt can discharge home with oxygen and follow-up with GI for PEG tube concerns.

## 2021-09-18 NOTE — PROGRESS NOTES
Received notification from bedside RN about patient with regards to: persistently elevated BP  VS: /113, HR 82, RR 22, O2 sat 98% on NC 2 L    Intervention given: Resumed home Losartan with first dose now

## 2021-09-18 NOTE — PROGRESS NOTES
MIRIAN was alerted that the patient is likely being discharged tomorrow, 9/19/21, and would need [facility] services and needs home O2 set-up. MIRIAN spoke with the patient's son, Rogers Elias (phone: [phone]), and he confirmed that the patient was receiving HHPT/OT/SN services through [facility]. MIRIAN has sent resumption orders to [facility] for review. DIMITRY  has received the LUAN orders and will continue to provide [facility] services to the patient. MIRIAN added the contact information for [facility] to the patient's AVS. The patient's son is amendable to home O2 set-up and he confirmed the patient's address on-file is correct. MIRIAN sent the patient's referral for home O2 to [facility] and [facility] for review. 2:30 PM UPDATE: [facility] has accepted the patient for home O2 set-up    CM will continue to assist with dispo planning.        Deja F[phone], [address] [facility]

## 2021-09-19 NOTE — PROGRESS NOTES
End of Shift Note     Bedside shift change report given to Benny Beatty  (oncoming nurse) by Felix Mancuso RN (offgoing nurse).   Report included the following information SBAR, Kardex, Intake/Output and MAR     Shift worked:  7a-7p   Shift summary and any significant changes:     Pt oxygen dropped pt placed on 3L, IV lasix given, adequate urinary output          Concerns for physician to address:  none   Zone phone for oncoming shift:   [phone]      Patient Information  Ten Meléndez  [de-identified] y.o.  9/15/2021  2:17 PM by Bright Larose MD. Ten Meléndez was admitted from Home     Problem List       Patient Active Problem List     Diagnosis Date Noted    Dehydration 09/15/2021    Seizure ([address] 09/15/2021    Syncope 09/15/2021    Orthostasis 09/15/2021    Lung metastasis ([address] 09/15/2021    Esophageal cancer ([address] 08/31/2021    Metastasis from esophageal cancer ([address] 05/25/2021    Diarrhea 06/03/2020    Severe protein-calorie malnutrition ([address] 06/02/2020    Acute on chronic renal failure ([address] 06/01/2020    Septic shock ([address] 03/12/2020    E coli bacteremia 03/12/2020    SONIDO (acute kidney injury) ([address] 03/12/2020    Acute respiratory failure ([address] 03/05/2020    Adenocarcinoma of esophagus ([address] 05/30/2019    Esophageal dysphagia 05/22/2019    Abnormal x-ray of abdomen 05/22/2019    History of colon polyps 06/01/2018           Past Medical History:   Diagnosis Date    Abnormal x-ray of abdomen 05/22/2019     Bas showed food and irregular stricture above ge junction in addition to large hiatal hernia  5.20.2019    Adenocarcinoma of esophagus ([address] 5/30/2019     chemotherapy    Anemia      Arrhythmia      Arthritis      Chronic kidney disease       Stage II followed by Dr Char Martinez [facility]ph[facility]     Diverticulosis      Esophageal dysphagia 5/22/2019    Glaucoma       bilateral    Hiatal hernia      History of Clostridioides difficile infection 05/2019    History of colon polyps 6/1/2018 2013 tubular adenoma     Hypertension      Hypothyroid      Pulmonary HTN (HCC)      Sleep apnea       CPAP compliant, as stated 5/25/2021    Thromboembolus ([facility] 2015     Right  leg , spontaneous            Activity:  Activity Level: Bed Rest  Number times ambulated in hallways past shift: 0  Number of times OOB to chair past shift: 0     Cardiac:   Cardiac Monitoring: Yes      Cardiac Rhythm: Sinus Rhythm     Access:      Central Line? Yes Placement date  Reason Medically Necessary      Genitourinary:   Urinary status: external catheter     Respiratory:   O2 Device: Nasal cannula  Chronic home O2 use?: YES  Incentive spirometer at bedside: NO     GI:  Last Bowel Movement Date:  (PTA)  Current diet:  DIET NPO Ice Chips  ADULT TUBE FEEDING Jejunostomy; Standard without Fiber; Delivery Method: Cyclic; Cyclic Rate (mL/hr): 892; Cyclic Start Time: 4:71 PM; Cyclic Stop Time: 5:35 AM; Water Flush Volume (mL): 100; Water Flush Frequency: Q 4 hours  Passing flatus: YES  Tolerating current diet: YES     Pain Management:   Patient states pain is manageable on current regimen: YES     Skin:  Tomy Score: 12  Interventions: turn team, speciality bed, float heels, increase time out of bed, foam dressing, limit briefs and internal/external urinary devices    Patient Safety:  Fall Score:  Total Score: 3  Interventions: bed/chair alarm, assistive device (walker, cane, etc), gripper socks and pt to call before getting OOB  High Fall Risk: Yes  @Rollbelt  @dexterity to release roll belt  Yes/No ( must document dexterity  here by stating Yes or No here, otherwise this is a restraint and must follow restraint documentation policy.)     DVT prophylaxis:  DVT prophylaxis Med- Yes  DVT prophylaxis SCD or CECELIA- Yes      Wounds: (If Applicable)  Wounds- Yes  Location sacrum     Active Consults:  IP CONSULT TO ONCOLOGY     Length of Stay:  Expected LOS: 2d 7h  Actual LOS: 2  Discharge Plan: No         Elizabeth Counter, RN

## 2021-09-19 NOTE — PROGRESS NOTES
Problem: Falls - Risk of  Goal: *Absence of Falls  Description: Document Anthony Cabrera Fall Risk and appropriate interventions in the flowsheet. Outcome: Progressing Towards Goal  Note: Fall Risk Interventions:  Mobility Interventions: Bed/chair exit alarm, Patient to call before getting OOB, Assess mobility with egress test         Medication Interventions: Assess postural VS orthostatic hypotension, Bed/chair exit alarm, Patient to call before getting OOB    Elimination Interventions: Bed/chair exit alarm, Call light in reach              Problem: Patient Education: Go to Patient Education Activity  Goal: Patient/Family Education  Outcome: Progressing Towards Goal     Problem: Patient Education: Go to Patient Education Activity  Goal: Patient/Family Education  Outcome: Progressing Towards Goal     Problem: General Medical Care Plan  Goal: *Vital signs within specified parameters  Outcome: Progressing Towards Goal  Goal: *Labs within defined limits  Outcome: Progressing Towards Goal  Goal: *Absence of infection signs and symptoms  Outcome: Progressing Towards Goal  Goal: *Skin integrity maintained  Outcome: Progressing Towards Goal  Goal: *Optimize nutritional status  Outcome: Progressing Towards Goal  Goal: *Progressive mobility and function (eg: ADL's)  Outcome: Progressing Towards Goal     Problem: Patient Education: Go to Patient Education Activity  Goal: Patient/Family Education  Outcome: Progressing Towards Goal     Problem: General Wound Care  Goal: Interventions  Outcome: Progressing Towards Goal     Problem: Patient Education: Go to Patient Education Activity  Goal: Patient/Family Education  Outcome: Progressing Towards Goal     Problem: Pressure Injury - Risk of  Goal: *Prevention of pressure injury  Description: Document Tomy Scale and appropriate interventions in the flowsheet.   Outcome: Progressing Towards Goal  Note: Pressure Injury Interventions:  Sensory Interventions: Minimize linen layers    Moisture Interventions: Limit adult briefs    Activity Interventions: Assess need for specialty bed    Mobility Interventions: HOB 30 degrees or less    Nutrition Interventions: Document food/fluid/supplement intake    Friction and Shear Interventions: Minimize layers                Problem: Patient Education: Go to Patient Education Activity  Goal: Patient/Family Education  Outcome: Progressing Towards Goal     Problem: Patient Education: Go to Patient Education Activity  Goal: Patient/Family Education  Outcome: Progressing Towards Goal     Problem: Breathing Pattern - Ineffective  Goal: *Absence of hypoxia  Outcome: Progressing Towards Goal  Goal: *Use of effective breathing techniques  Outcome: Progressing Towards Goal  Goal: *PALLIATIVE CARE:  Alleviation of Dyspnea  Outcome: Progressing Towards Goal     Problem: Patient Education: Go to Patient Education Activity  Goal: Patient/Family Education  Outcome: Progressing Towards Goal

## 2021-09-19 NOTE — DISCHARGE SUMMARY
Hospitalist Discharge Summary     Patient ID:  Dalia Jimenez  [id number]  56 y.o.  1940  9/15/2021    PCP on record: Milagros Hickman MD    Admit date: 9/15/2021  Discharge date and time: 9/19/2021    DISCHARGE DIAGNOSIS:  Esophageal cancer with mets  Syncope d/t dehydration  Acute hypoxic respiratory failure  EMILY on CPAP  Sacral ulcer  HTN    CONSULTATIONS:  IP CONSULT TO ONCOLOGY    Excerpted HPI from H&P of Zack Baltazar MD:  Elias Hollis is a [de-identified] y.o.  female who presents with above complaints from home with family ambulatory. Patient presented with chief complaint of worsening generalized weakness with fatigue associated with sudden onset right arm shakiness x2 episodes in the last 24 hours at home as per family. History of esophageal cancer on chemotherapy with oncology Dr. Chi Kauffman. History of recently placed PEG tube for nutritional needs-just started on Osmolite tube feedings. Denies any history of fever, chills or flulike symptoms. Denies any previous history of seizures or seizure-like activity in the past      Patient was found to have dehydration with mild SONIDO on work-up in the ED with mildly elevated proBNP at 2870 with negative CTA chest for PE with negative CT head.    ______________________________________________________________________  DISCHARGE SUMMARY/HOSPITAL COURSE:  for full details see H&P, daily progress notes, labs, consult notes.      Syncope   Esophageal cancer with metastases POA -  New right lung metastases suspected on CTA chest  Dehydration POA-history of recently placed PEG tube started on Osmolite tube feeding  CT head negative acute  CTA chest negative for PE, mediastinal lymphadenopathy noted, metastasis in the right major fissure noted, small bilateral pleural effusions     MRI negative, EEG negative for seizure  Bp improved with fluids, now off fluids  ECHO: EF 50-55, Pulmonary artery HTN moderate  PT/OT recommending home health  Oncology eval appreciated. C/w PEG feeding  She has deconditioning d/t progression of her cancer, at risk of readmission. Recommended son to consider palliative/hospice option while f/u with oncologist     Acute respiratory failure:  ECHO shows moderate pulmonary HTN, also lung mets could be contributing to her hypoxia  Needs home oxygen which is set up  Will send on lasix daily     EMILY on CPAP  Sacral pressure ulcer stage 3 POA:   Hypocalcemia:  Take calcium supplements     Hypertension  Hypothyroidism  History of right lower extremity DVT  CKD stage III  Glaucoma  Severe hiatal hernia  Continue home meds and eyedrops        _______________________________________________________________________  Patient seen and examined by me on discharge day. Pertinent Findings:  Gen:    Not in distress  Chest: Clear lungs  CVS:   Regular rhythm. No edema  Abd:  Soft, not distended, not tender  Neuro:  Alert,   _______________________________________________________________________  DISCHARGE MEDICATIONS:   Current Discharge Medication List      CONTINUE these medications which have NOT CHANGED    Details   nutritional supplement (OSMOLITE 1.2 GARRICK PO) 1,400 mL by Per J Tube route daily. x14h 7pm-9am  free water flushes 100ml q4h      amLODIPine (NORVASC) 5 mg tablet Take 5 mg by mouth daily. brimonidine (ALPHAGAN P) 0.1 % ophthalmic solution Administer 1 Drop to both eyes every eight (8) hours. chlorthalidone (HYGROTON) 25 mg tablet Take 25 mg by mouth daily. sodium bicarbonate/sod citrat (SODIUM BICARB AND CITRATE PO) Take 1 Tablet by mouth two (2) times a day. 650 mg tablet      capecitabine (XELODA) 500 mg tablet 1,000 mg/m2 two (2) times a day. pt to start 9/14 for 2 weeks, then off 2 weeks and so forth        losartan (COZAAR) 100 mg tablet Take 100 mg by mouth daily. Indications: high blood pressure      famotidine (PEPCID) 20 mg tablet Take 1 Tab by mouth Daily (before breakfast).  Indications: gastroesophageal reflux disease  Qty: 30 Tab, Refills: 3      folic acid-vit D5-VLO J34 (FOLBEE) 2.5-25-1 mg tablet Take 1 Tab by mouth daily. latanoprost (XALATAN) 0.005 % ophthalmic solution Administer 1 Drop to both eyes nightly. DORZOLAMIDE HCL/TIMOLOL MALEAT (DORZOLAMIDE-TIMOLOL OP) Apply 1 Drop to eye three (3) times daily. One drop to each eye twice daily       acetaminophen (TYLENOL EXTRA STRENGTH) 500 mg tablet Take 1,000 mg by mouth every six (6) hours as needed. Indications: pain associated with arthritis, pain      levothyroxine (SYNTHROID) 100 mcg tablet Take 100 mcg by mouth Daily (before breakfast). One tablets 6 days a week then half a tablet one day  Pt takes the half tab on Saturdays  Indications: a condition with low thyroid hormone levels      calcium carbonate 400 mg/5 mL susp 5 mL by Gastrostomy Tube route daily. Qty: 1 Each, Refills: 3      diphenoxylate-atropine (LOMOTIL) 2.5-0.025 mg per tablet Take 1 Tab by mouth four (4) times daily as needed for Diarrhea. Max Daily Amount: 4 Tabs. Qty: 30 Tab, Refills: 3    Associated Diagnoses: Diarrhea of infectious origin               Patient Follow Up Instructions:    Activity: Activity as tolerated  Diet: PEG feeding   Wound Care: Keep wound clean and dry    Follow-up with your PMD    Follow-up Information     Follow up With Specialties Details Why [address] [place] [address]  [address]  [id number]  [address]  [facility]   [address] [place] [address] [id number]  Phone: [phone]    Ashley Mendez MD [facility]   [address]  [phone]          ________________________________________________________________    Risk of deterioration: Moderate    Condition at Discharge: Stable  __________________________________________________________________    Disposition  Home with family and home health services    ____________________________________________________________________    Code Status: Full Code  ___________________________________________________________________      Total time in minutes spent coordinating this discharge (includes going over instructions, follow-up, prescriptions, and preparing report for sign off to her PCP) :  >30 minutes    Signed:  Talmage Bumpers, MD

## 2021-09-19 NOTE — PROGRESS NOTES
Transition of Care Plan:    [facility]   Follow up appointments: Scheduled   DME needed: Portable Oxygen   Transportation at Discharge: Tessa Rangel or means to access home:      Has [place]  IM Medicare Letter: 2nd IM          CM acknowledge discharge. Son at bedside and will provide transportation. LUAN with [id number], portable oxygen tank delivered to bedside. Medicare pt has received, reviewed, and signed 2nd IM letter informing them of their right to appeal the discharge. Signed copied has been placed on pt bedside chart. No further CM needs identified.      Tracey Snyder, MSN, RN  Care Manager

## 2021-09-19 NOTE — PROGRESS NOTES
End of Shift Note    Bedside shift change report given to Nydia Tijerina RN  (oncoming nurse) by Bon Wolff RN (offgoing nurse). Report included the following information SBAR, Kardex, Intake/Output and MAR    Shift worked:  night   Shift summary and any significant changes:     POC reviewed, saturating on 3 liter of oxygen via nasal cannula, chest port patent and flushing well. PEG dressing dry and intact, feeding finished at 5 am instead of 9 am as there was no feed to run. Nurse supervisor informed and was informed to wait till cafeteria opens. Able to make needs known, call bell and phone within reach of patient. Bed alarm on made comfortable.         Concerns for physician to address: none   Zone phone for oncoming shift:   [phone]     Patient Information  Camille Lilly  [de-identified] y.o.  9/15/2021  2:17 PM by Frandy Spaulding MD. Camille Lilly was admitted from Home    Problem List  Patient Active Problem List    Diagnosis Date Noted    Dehydration 09/15/2021    Seizure ([address] 09/15/2021    Syncope 09/15/2021    Orthostasis 09/15/2021    Lung metastasis ([address] 09/15/2021    Esophageal cancer ([address] 08/31/2021    Metastasis from esophageal cancer ([address] 05/25/2021    Diarrhea 06/03/2020    Severe protein-calorie malnutrition ([address] 06/02/2020    Acute on chronic renal failure ([address] 06/01/2020    Septic shock ([address] 03/12/2020    E coli bacteremia 03/12/2020    SONIDO (acute kidney injury) ([address] 03/12/2020    Acute respiratory failure ([address] 03/05/2020    Adenocarcinoma of esophagus ([address] 05/30/2019    Esophageal dysphagia 05/22/2019    Abnormal x-ray of abdomen 05/22/2019    History of colon polyps 06/01/2018     Past Medical History:   Diagnosis Date    Abnormal x-ray of abdomen 05/22/2019    Bas showed food and irregular stricture above ge junction in addition to large hiatal hernia  5.20.2019    Adenocarcinoma of esophagus ([address] 5/30/2019    chemotherapy    Anemia     Arrhythmia     Arthritis  Chronic kidney disease     Stage II followed by Dr Camila Magallon Nephrology     Diverticulosis     Esophageal dysphagia 5/22/2019    Glaucoma     bilateral    Hiatal hernia     History of Clostridioides difficile infection 05/2019    History of colon polyps 6/1/2018 2013 tubular adenoma     Hypertension     Hypothyroid     [facility] HT[facility]     Sleep apnea     CPAP compliant, as stated 5/25/2021    Thromboembolus ([facility] 2015    Right  leg , spontaneous         Activity:  Activity Level: Bed Rest  Number times ambulated in hallways past shift: 0  Number of times OOB to chair past shift: 0    Cardiac:   Cardiac Monitoring: Yes      Cardiac Rhythm: Sinus Rhythm    Access:   Current line(s): central line   Central Line? Yes Placement date     Genitourinary:   Urinary status: external catheter    Respiratory:   O2 Device: Nasal cannula  Chronic home O2 use?: YES  Incentive spirometer at bedside: NO       GI:  Last Bowel Movement Date: 09/15/21  Current diet:  DIET NPO Ice Chips  ADULT TUBE FEEDING Jejunostomy; Standard without Fiber; Delivery Method: Cyclic; Cyclic Rate (mL/hr): 053; Cyclic Start Time: 5:16 PM; Cyclic Stop Time: 6:72 AM; Water Flush Volume (mL): 100; Water Flush Frequency: Q 4 hours  Passing flatus: YES  Tolerating current diet: YES       Pain Management:   Patient states pain is manageable on current regimen: YES    Skin:  Tomy Score: 13  Interventions: turn team, speciality bed, increase time out of bed and foam dressing    Patient Safety:  Fall Score:  Total Score: 3  Interventions: bed/chair alarm, assistive device (walker, cane, etc), gripper socks and pt to call before getting OOB  High Fall Risk: Yes  @Rollbelt  @dexterity to release roll belt  Yes/No ( must document dexterity  here by stating Yes or No here, otherwise this is a restraint and must follow restraint documentation policy.)    DVT prophylaxis:  DVT prophylaxis Med- No  DVT prophylaxis SCD or CECELIA- Yes     Wounds: (If Applicable)  Wounds- Yes  Location sacrum    Active Consults:  IP CONSULT TO ONCOLOGY    Length of Stay:  Expected LOS: 2d 7h  Actual LOS: 4  Discharge Plan: Yes 9/19      Pito Mayberry RN

## 2021-09-19 NOTE — DISCHARGE INSTRUCTIONS
HOSPITALIST DISCHARGE INSTRUCTIONS    NAME: Ten Meléndez   :  1940   MRN:  [id number]     Date/Time:  2021 12:38 PM    ADMIT DATE: 9/15/2021     DISCHARGE DATE: 2021     DISCHARGE DIAGNOSIS:  Active Problems:    Esophageal cancer ([facility] (2021)      Dehydration (9/15/2021)      Seizure ([facility] (9/15/2021)      Syncope (9/15/2021)      Orthostasis (9/15/2021)      Lung metastasis ([facility] (9/15/2021)       Follow-up Information     Follow up With Specialties Details [address]  [address] [place] [address]. Cristi Sam [facility]   [address] [place], [place] [address] [address]  Phone: [phone]    Ángel Harrison MD [address]  [address] [address] [phone]          MEDICATIONS:  As per medication reconciliation  list  · It is important that you take the medication exactly as they are prescribed. · Keep your medication in the bottles provided by the pharmacist and keep a list of the medication names, dosages, and times to be taken in your wallet. · Do not take other medications without consulting your doctor. Pain Management: per above medications    What to do at Home    Recommended diet:  Cardiac Diet    Recommended activity: Activity as tolerated    If you have questions regarding the hospital related prescriptions or hospital related issues please call [facility] [phone]. If you experience any of the following symptoms then please call your primary care physician or return to the emergency room if you cannot get hold of your doctor:  Fever, chills, nausea, vomiting, diarrhea, change in mentation, falling, bleeding, shortness of breath    Follow Up:  Dr. Ángel Harrison MD  you are to call and set up an appointment to see them in 7-10 days.       Information obtained by :  I understand that if any problems occur once I am at home I am to contact my physician. I understand and acknowledge receipt of the instructions indicated above.                                                                                                                                            Physician's or R.N.'s Signature                                                                  Date/Time                                                                                                                                              Patient or Representative Signature                                                          Date/Time

## 2021-09-20 NOTE — PROGRESS NOTES
Care Transitions Initial Call    Call within 2 business days of discharge: Yes     Patient: Dalia Jimenez Patient : 1940 MRN: [id number]    Last Discharge [facility]  Facility       Complaint Diagnosis Description Type Department Provider    9/15/21 Fatigue; Shortness of Breath Syncope and collapse ED to Hosp-Admission (Discharged) (ADMIT) Neisha Stout MD; Tera Trejo... Was this an external facility discharge? No     Challenges to be reviewed by the provider   Additional needs identified to be addressed with provider: yes    BUN 6 - 20 MG/DL 43High   42High   38High     Creatinine 0.55 - 1.02 MG/DL 1. 56High   1. 58High   1. 42High     BUN/Creatinine ratio 12 - 20   28High   27High   27High     GFR est AA >60 ml/min/1.73m2 39Low   38Low   43Low     GFR est non-AA >60 ml/min/1.73m2 32Low   31Low   36Low     Calcium 8.5 - 10.1 MG/DL 6.4Low Panic   6.3Low Panic  CM  6.0Low Panic  CM      Vitamin D 25-Hydroxy 30 - 100 ng/mL 18.0Low       RBC 3.80 - 5.20 M/uL 2.74Low   2. 72Low   2.95Low   2.90Low   2.80Low     HGB 11.5 - 16.0 g/dL 8.5Low   8.5Low   9.3Low   9.8Low   9.5Low     HCT 35.0 - 47.0 % 30.4Low   28.9Low   30.4Low   29.1Low   27.7Low       Esophageal cancer with metastases POA -  New right lung metastases suspected on CTA chest  Dehydration POA-history of recently placed PEG tube started on Osmolite tube feeding  CT head negative acute  CTA chest negative for PE, mediastinal lymphadenopathy noted, metastasis in the right major fissure noted, small bilateral pleural effusions    Palliative consult performed this admission---per notes patient and son both want patient to remain a full code. Per patient son, they were unable to obtain the calcium [facility] [place] from [facility]. CTN had script transferred to [facility]. Method of communication with provider : chart routing    Discussed [phone] related testing which was not done at this time.        Advance Care Planning:   Does patient have an Advance Directive:  Decision makers reviewed with son, pt wants to remain full code at this time    Inpatient Readmission Risk score: Unplanned Readmit Risk Score: 29    Was this a readmission? yes   Patient stated reason for the admission: weakness and ? seizure    Patients top risk factors for readmission: medical condition-Elderly patient with cancer with mets, is debilitated. At this time patient remains full code. Interventions to address risk factors: Scheduled appointment with PCP-see goals, Scheduled appointment with Specialist-see gaols, Education of patient/family/caregiver/guardian to support self-management-hypo calcemia, tube feedings and Assessment and support for treatment adherence and medication management-calcium for tube feeding reordered through [phone] [facility] Transition Nurse (CTN) contacted the family by telephone to perform post hospital discharge assessment. Verified name and  with family as identifiers. Provided introduction to self, and explanation of the CTN role. CTN reviewed discharge instructions, medical action plan and red flags with family who verbalized understanding. Were discharge instructions available to patient? yes. Reviewed appropriate site of care based on symptoms and resources available to patient including: PCP and Specialist. Family given an opportunity to ask questions and does not have any further questions or concerns at this time. The family agrees to contact the PCP office for questions related to their healthcare. Medication reconciliation was performed with family, who verbalizes understanding of administration of home medications. Advised obtaining a 90-day supply of all daily and as-needed medications.    Referral to Pharm D needed: no     Home Health/Outpatient orders at discharge: home health care  [address]: [address]  Date of initial visit: 2021    Durable Medical Equipment ordered at discharge: [facility]: [employer] received: per patient, 02 is present in home    Covid Risk Education    Educated patient about risk for severe COVID-19 due to risk factors according to CDC guidelines. CTN reviewed discharge instructions, medical action plan and red flag symptoms with the family who verbalized understanding. Discussed COVID vaccination status: yes. Education provided on COVID-19 vaccination as appropriate. Discussed exposure protocols and quarantine with CDC Guidelines. Family was given an opportunity to verbalize any questions and concerns and agrees to contact CTN or health care provider for questions related to their healthcare. Was patient discharged with a pulse oximeter? no.     Discussed follow-up appointments. If no appointment was previously scheduled, appointment scheduling offered: yes. Is follow up appointment scheduled within 7 days of discharge? yes.    [facility] follow up appointment(s):   Future Appointments   Date Time Provider Kiana Simeon   9/21/2021 To Be Determined Mariola Cerrato PT [facility]   9/21/2021  1:20 PM Charlynn Peabody, MD [facility] BS AMB   9/22/2021 To Be Determined Carlee Carranza LPN [facility]   9/23/2021 To Be Determined Brian Au OT [facility]   9/28/2021 To Be Determined Carlee Carranza LPN [facility]   9/30/2021 To Be Determined Carlee Carranza LPN [facility]   10/1/2021  9:00 AM [facility] RCR PET DOSE 1 SMHRCHPET KIMBERLY FREDERICK   10/1/2021 10:00 AM [facility] RCR PET 1 SMHRCHPET KIMBERLY FREDERICK   10/4/2021 11:00 AM ECHOCARDIOGRAM ROOM [facility] [address] REG   10/5/2021 To Be Determined AUGUSTO Brown   10/7/2021 To Be Determined Carlee Carranza LPN [facility]   10/8/2021 To Be Determined Randee Small RN [facility] [address]   10/12/2021 To Be Determined Carlee Carranza LPN [address]   10/14/2021 To Be Determined Doylene Cherry Log, LPN [address] [facility]   10/19/2021 To Be Determined Lo Pace RN [address]   10/19/2021 To Be Determined Doylene Erwin, LPN [address] [facility]   10/21/2021 To Be Determined Doylene Cherry Log, LPN [address] [facility]   10/26/2021 To Be Determined Doylene Erwin, LPN [address] [facility]   10/28/2021 To Be Determined Doylene Cherry Log, LPN [address] [facility]   11/2/2021 To Be Determined Lo Pace RN [address] [place] [address]   11/2/2021 To Be Determined Doylene Erwin, LPN [address] [facility]   11/4/2021 To Be Determined Lo Pace RN [facility]     [facility] follow up appointment(s):  Dr Ohara Minus surgeon 9/21/2021, Dr Mercy Kaur, oncology /21/2021    Plan for follow-up call in 5-7 days based on severity of symptoms and risk factors. Plan for next call: follow up on oncology plan, follow up on calcium prescription  CTN provided contact information for future needs. Goals      Prevent complications post hospitalization. 09/07/21  CTN contacted patient daughter today to review discharge instructions and red flags to prevent readmission. CTN discussed the following appts with daughter--she states her brother is scheduling follow ups with PCP and Dr Mercy Kaur. CTN will follow up. Patient has surgical follow up already scheduled for 9/21/2021. [facility]---tube feeding supplies---per daughter, pt has a pump feeding and she has turned this off as it completed around 8:00 this morning. She states [address] is coming today for more instruction. [facility]---per daughter RN came yesterday and will come back today.  Daughter reports pt had abdominal cramping last night about 1:00. She turned pump off for 2 hours and restarted it until it was completed. Pt did not report nausea or vomiting. Cramping went away. She states she will talk to [facility] nurse today for further education.    CTN provided information about tube flushing when feeding put on hole to make sure tube is flushed to prevent clogging.  Daughter reports pt does not have fever, no problems with tube site. CTN instructed her to call surgeon with concerns regarding the tube. [address] information provided. Daughter had to end call due to patient needing assistance. CTN provided contact information and instructed daughter to call with concerns or questions. CTN will follow up next week. ---rw    09/17/21  Patient is currently readmitted to [facility] on [facility]86 for complications with her cancer. Chart reviewed, Oncology spoke to pt and son. They do not want hospice and wish to proceed with chemo when possible. ---rw    09/20/21  CTN contacted pt and patient sister at one call and pt son at another call to follow up on discharge from hospital readmission. Appts:    Dr Vidya Meneses, surgeon---pt will attend appt on tomorrow 9/21 at 1:20. Dr Amilcar Martinez, oncology---son reports he made appt for tomorrow at 2:30. PCP--Son give CTN permission to schedule appt, asks for VV if available. Appt made for 10/5 at 1:30 for VV. Pt son notified. MAGGY LUCIANO [facility]---per pt [facility] started today    Home 02---[employer]  per pt she has home 02 in the home.  Per pt son, he was unable to get the calcium that was ordered as [facility] told [facility] not [facility] in [facility], he states they have never called him to let him know it was ready for . CTN contacted [facility], on hold for 20 mins. CTN disconnected call. CTN called [address] CTN instructed pt son to purchase a pulse ox for patient to help monitor her 02 level at home, gave him Cecelia as a resource. He states he will buy one.  Son states pt is tolerating tube feedings at this time.  CTN reviewed hypo calcemia s/sx to monitor for with son.  CTN will discuss ACP at later date with son as chart notes states palliative consult was done and pt wants to be a full code.     CTN attempted to call pt son back with PCP appt and to let him know script for liquid calcium with be sent to [facility] pharmacy on [address]. CTN sent son text message asking him to call as VM was full. ---mkrw    UPDATE: CTN spoke to son and let him know about the above, asked him to call CTN if by Thursday he has not heard from [facility] to  meds.   ---Guido Davila        '

## 2021-09-21 NOTE — PROGRESS NOTES
Chief Complaint   Patient presents with    Post OP Follow Up     3w f/u Jeunostomy 8/31/21. Pt c/o pain, drainage at site. 1. Have you been to the ER, urgent care clinic since your last visit? Hospitalized since your last visit? Hospital admit [place]    2. Have you seen or consulted any other health care providers outside of the [address] [place] [address] since your last visit? Include any pap smears or colon screening.  No

## 2021-09-21 NOTE — TELEPHONE ENCOUNTER
Dr. Ashish Black did a office procedure on the patient and her stomach is cramping, she wants to know if he can call a prescription in for her, please call.

## 2021-09-22 NOTE — PROGRESS NOTES
Goals      Prevent complications post hospitalization. 09/07/21  CTN contacted patient daughter today to review discharge instructions and red flags to prevent readmission. CTN discussed the following appts with daughter--she states her brother is scheduling follow ups with PCP and Dr Sreedhar Law. CTN will follow up. Patient has surgical follow up already scheduled for 9/21/2021. [facility]---tube feeding supplies---per daughter, pt has a pump feeding and she has turned this off as it completed around 8:00 this morning. She states [address] is coming today for more instruction. [facility]---per daughter RN came yesterday and will come back today.  Daughter reports pt had abdominal cramping last night about 1:00. She turned pump off for 2 hours and restarted it until it was completed. Pt did not report nausea or vomiting. Cramping went away. She states she will talk to [facility] nurse today for further education.  CTN provided information about tube flushing when feeding put on hole to make sure tube is flushed to prevent clogging.  Daughter reports pt does not have fever, no problems with tube site. CTN instructed her to call surgeon with concerns regarding the tube. [address] information provided. Daughter had to end call due to patient needing assistance. CTN provided contact information and instructed daughter to call with concerns or questions. CTN will follow up next week. ---[facility]    09/17/21  Patient is currently readmitted to [facility] on 9[facility] for complications with her cancer. Chart reviewed, Oncology spoke to pt and son. They do not want hospice and wish to proceed with chemo when possible. ---[facility]    09/20/21  CTN contacted pt and patient sister at one call and pt son at another call to follow up on discharge from hospital readmission. Appts:    Dr Chayito Beyer, surgeon---pt will attend appt on tomorrow 9/21 at 1:20. Dr Nneka Gallagher, oncology---son reports he made appt for tomorrow at 2:30. PCP--Son give CTN permission to schedule appt, asks for VV if available. Appt made for 10/5 at 1:30 for VV. Pt son notified. [address]OO LUAN---per pt [place] started today    Home 02---[employer]  per pt she has home 02 in the home.  Per pt son, he was unable to get the calcium that was ordered as [facility] not [facility] stock, he states they have never called him to let him know it was ready for . [facility], on [facility] for 20 mins. CTN disconnected call. CTN called [address] CTN instructed pt son to purchase a pulse ox for patient to help monitor her 02 level at home, gave him Cecelia as a resource. He states he will buy one.  Son states pt is tolerating tube feedings at this time.  CTN reviewed hypo calcemia s/sx to monitor for with son.  CTN will discuss ACP at later date with son as chart notes states palliative consult was done and pt wants to be a full code. CTN attempted to call pt son back with PCP appt and to let him know script for liquid calcium with be sent to [facility] pharmacy on [address]. CTN sent son text message asking him to call as VM was full. ---mkrw    UPDATE: CTN spoke to son and let him know about the above, asked him to call CTN if by Thursday he has not heard from [employer] to  meds. ---mkrw    09/22/21  Patient son called to inform CTN that patient liquid calcium script is ready at [facility] and that he will  today. Zuleika Bhatt also states that patient nose is bleeding from oxygen and needs a script sent over to [facility] for humidification. CTN contacted [facility] directly and spoke to Ezra. She tells CTN the script needs to read: Add Humidification to patient home 02 therapy. 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 Fax script to [phone]    CTN attempted to contact PCP office--message states they are at lunch until 1:30. CTN will call again after 1:30 and will also send note to MD to ask for help. --shannan

## 2021-09-23 NOTE — PROGRESS NOTES
Goals      Prevent complications post hospitalization. 09/07/21  CTN contacted patient daughter today to review discharge instructions and red flags to prevent readmission. CTN discussed the following appts with daughter--she states her brother is scheduling follow ups with PCP and Dr Donte Payton. CTN will follow up. Patient has surgical follow up already scheduled for 9/21/2021. [facility]---tube feeding supplies---per daughter, pt has a pump feeding and she has turned this off as it completed around 8:00 this morning. She states [address] is coming today for more instruction. [facility]---per daughter RN came yesterday and will come back today.  Daughter reports pt had abdominal cramping last night about 1:00. She turned pump off for 2 hours and restarted it until it was completed. Pt did not report nausea or vomiting. Cramping went away. She states she will talk to [facility] nurse today for further education.  CTN provided information about tube flushing when feeding put on hole to make sure tube is flushed to prevent clogging.  Daughter reports pt does not have fever, no problems with tube site. CTN instructed her to call surgeon with concerns regarding the tube. [address] information provided. Daughter had to end call due to patient needing assistance. CTN provided contact information and instructed daughter to call with concerns or questions. CTN will follow up next week. ---[facility]    09/17/21  Patient is currently readmitted to [facility] on 2/80/86 for complications with her cancer. Chart reviewed, Oncology spoke to pt and son. They do not want hospice and wish to proceed with chemo when possible. ---[facility]    09/20/21  CTN contacted pt and patient sister at one call and pt son at another call to follow up on discharge from hospital readmission. Appts:    Dr Barbara Cui, surgeon---pt will attend appt on tomorrow 9/21 at 1:20. Dr Anat Sher, oncology---son reports he made appt for tomorrow at 2:30. PCP--Son give CTN permission to schedule appt, asks for VV if available. Appt made for 10/5 at 1:30 for VV. Pt son notified. [id number]---per pt [facility] started today    Home 02---[employer]  per pt she has home 02 in the home.  Per pt son, he was unable to get the calcium that was ordered as his [facility] told [facility] they did not have in stock, he states they have never called him to let him know it was ready for . [facility] contacted [facility], on hold for 20 mins. CTN disconnected call. CTN called [address] CTN instructed pt son to purchase a pulse ox for patient to help monitor her 02 level at home, gave him [employer] as a resource. He states he will buy one.  Son states pt is tolerating tube feedings at this time.  CTN reviewed hypo calcemia s/sx to monitor for with son.  CTN will discuss ACP at later date with son as chart notes states palliative consult was done and pt wants to be a full code. CTN attempted to call pt son back with PCP appt and to let him know script for liquid calcium with be sent to [facility] pharmacy on [address]. CTN sent son text message asking him to call as VM was full. ---mkrw    UPDATE: CTN spoke to son and let him know about the above, asked him to call CTN if by Thursday he has not heard from [employer] to  meds. ---mkrw    09/22/21  Patient son called to inform CTN that patient liquid calcium script is ready at [facility] and that he will  today. Vilma Renee also states that patient nose is bleeding from oxygen and needs a script sent over to [facility] for humidification. CTN contacted Sandee Vita directly and spoke to [facility]. She tells CTN the script needs to read: Add Humidification to patient home 02 therapy. 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 Fax script to [phone]    CTN attempted to contact PCP office--message states they are at lunch until 1:30. CTN will call again after 1:30 and will also send note to MD to ask for help. --mkrw    UPDATE: CTN attempted to reach PCP office again to ask for assist this humidifier for home 02., was transferred to nurse Chano Treadwell . Message left asking for return call. ---mkrw    UPDATE:  CTN received call back from Chano Treadwell at PCP office and was given ordering information for humidifier. She states she will order for patient. CTN notified Earnie Risk of this. ---mkrw

## 2021-09-24 PROBLEM — F11.99 OPIOID USE, UNSPECIFIED WITH UNSPECIFIED OPIOID-INDUCED DISORDER (HCC): Status: ACTIVE | Noted: 2021-01-01

## 2021-09-24 NOTE — PROGRESS NOTES
Patient has graduated from the Transitions of Care Coordination  program on 2021. Patient  today per son, Fredo Serna. Goals Addressed                 This Visit's Progress     COMPLETED: Prevent complications post hospitalization. 21  CTN contacted patient daughter today to review discharge instructions and red flags to prevent readmission. CTN discussed the following appts with daughter--she states her brother is scheduling follow ups with PCP and Dr Dorothy Vásquez. CTN will follow up. Patient has surgical follow up already scheduled for 2021. [facility]---tube feeding supplies---per daughter, pt has a pump feeding and she has turned this off as it completed around 8:00 this morning. She states [address] is coming today for more instruction. [facility]---per daughter RN came yesterday and will come back today.  Daughter reports pt had abdominal cramping last night about 1:00. She turned pump off for 2 hours and restarted it until it was completed. Pt did not report nausea or vomiting. Cramping went away. She states she will talk to [facility] nurse today for further education.  CTN provided information about tube flushing when feeding put on hole to make sure tube is flushed to prevent clogging.  Daughter reports pt does not have fever, no problems with tube site. CTN instructed her to call surgeon with concerns regarding the tube. [address] information provided. Daughter had to end call due to patient needing assistance. CTN provided contact information and instructed daughter to call with concerns or questions. CTN will follow up next week. ---restefani    21  Patient is currently readmitted to [facility] on  for complications with her cancer. Chart reviewed, Oncology spoke to pt and son. They do not want hospice and wish to proceed with chemo when possible. ---rw    21  CTN contacted pt and patient sister at one call and pt son at another call to follow up on discharge from hospital readmission. Appts:    Dr Miranda Caceres, surgeon---pt will attend appt on tomorrow 9/21 at 1:20. Dr Marlene Armendariz, oncology---son reports he made appt for tomorrow at 2:30. PCP--Son give CTN permission to schedule appt, asks for VV if available. Appt made for 10/5 at 1:30 for VV. Pt son notified. [address] LUAN---per pt [facility] started today    Home 02---[employer]  per pt she has home 02 in the home.  Per pt son, he was unable to get the calcium that was ordered as his [facility] told [facility] they did not have in stock, he states they have never called him to let him know it was ready for . [facility] contacted [facility], on hold for 20 mins. CTN disconnected call. CTN called [address] Barros CTN instructed pt son to purchase a pulse ox for patient to help monitor her 02 level at home, gave him [employer] as a resource. He states he will buy one.  Son states pt is tolerating tube feedings at this time.  CTN reviewed hypo calcemia s/sx to monitor for with son.  CTN will discuss ACP at later date with son as chart notes states palliative consult was done and pt wants to be a full code. CTN attempted to call pt son back with PCP appt and to let him know script for liquid calcium with be sent to [facility] pharmacy on [address]. CTN sent son text message asking him to call as VM was full. ---mkrw    UPDATE: CTN spoke to son and let him know about the above, asked him to call CTN if by Thursday he has not heard from [employer] to  meds. ---mkrw    09/22/21  Patient son called to inform CTN that patient liquid calcium script is ready at [facility] and that he will  today. Kimo Dey also states that patient nose is bleeding from oxygen and needs a script sent over to [facility] for humidification. CTN contacted [facility] directly and spoke to Amanda Matthew. She tells CTN the script needs to read: Add Humidification to patient home 02 therapy. 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   Fax script to [phone]    CTN attempted to contact PCP office--message states they are at lunch until 1:30. CTN will call again after 1:30 and will also send note to MD to ask for help. --mkrw    UPDATE: CTN attempted to reach PCP office again to ask for assist this humidifier for home 02., was transferred to nurse Brittney Kenny . Message left asking for return call. ---mkrw    UPDATE:  CTN received call back from Brittney Kenny at PCP office and was given ordering information for humidifier. She states she will order for patient. CTN notified Solo Meza of this. ---mkrw    09/24/21  CTN received call from patient son Cate Wynn that his mother passed away this morning. Condolences offered. CTN provided information that he asked for to give to [address] deputies who are present in home. CTN will notify PCP and close DAYO---mkrw            Patient has Care Transition Nurse's contact information for any further questions, concerns, or needs.   Patients upcoming visits:    Future Appointments   Date Time Provider Kiana Simeon   9/24/2021 To Be Determined Michelle Powell [facility]   9/24/2021 11:00 AM Brenda Lara OT [facility]   9/24/2021 To Be Determined Shruthi Benoit [facility]   9/27/2021 To Be Determined Michelle Powell [facility]   9/28/2021 To Be Determined Shruthi Benoit CNA [place] [id number]   9/28/2021 To Be Determined Amaya Mcduffie LPN [facility]   9/30/2021 To Be Determined Amaya Mcduffie LPN [facility]   9/30/2021 To Be Determined Michelle Powell [facility]   9/30/2021 To Be Determined Shruthi Benoit CNA [facility] [address]   10/1/2021  9:00 AM [facility] RCR PET DOSE 1 [facility]   10/1/2021 10:00 AM [facility] R[facility] PET 1 KRISTENSAADYADIRA HARRIS [place]   10/4/2021 To Be Determined Michelle Powell [facility]   10/4/2021 11:00 AM [facility] ROOM [address] [place] [address]   10/5/2021 To Be Determined Amaya Mcduffie LPN [address]   10/5/2021 To Be Determined Laila Oh,  [address]   10/7/2021 To Be Determined Job Puls [address]   10/7/2021 To Be Determined Laila Oh,  [place] [address] [address] [facility]   10/8/2021 To Be Determined Yvnone Richard RN [facility]   10/8/2021 To Be Determined Yvonne Richard  [place] [address]   10/11/2021 To Be Determined Job Puls [facility]   10/12/2021 To Be Determined Courtney Diez LPN [address]   10/12/2021 To Be Determined Laila Oh,  [place] [address] [address] [facility]   10/14/2021 To Be Determined Courtney Diez LPN [address]   10/14/2021 To Be Determined Laila Oh,  [address]   10/15/2021 To Be Determined [facility],  [place] [address] [address] [facility]   10/18/2021 To Be Determined Job Puls [facility]   10/19/2021 To Be Determined Yvonne Richard  [place] [address] [address] [facility]   10/19/2021 To Be Determined Courtney Diez LPN [address]   10/19/2021 To Be Determined Laila Oh,  [address]   10/21/2021 To Be Determined Job Puls [facility]   10/21/2021 To Be Determined Laila Oh,  [address]   10/22/2021 To Be Determined Yvonne Richard  [place] [address]   10/25/2021 To Be Determined Job Puls [facility]   10/26/2021 To Be Determined Courtney Diez LPN [address]   10/26/2021 To Be Determined Laila Mcconnell  [address] [place] [address]   10/28/2021 To Be Determined Job Puls [facility]   10/28/2021 To Be Determined Courtney Diez LPN [address]   10/28/2021 To Be Determined Laila Mcconnell  [facility] [address]   11/1/2021 To Be Determined [facility] [place]   11/2/2021 To Be Determined Yvonne Richard  [address] [place] [address]   11/2/2021 To Be Determined Courtney Diez LPN [address]   11/2/2021 To Be Determined Laila Mcconnell CNA [address]   11/4/2021 To Be Determined Javier Miller RN [facility] [address]   11/4/2021 To Be Determined Joyce Jeffers [facility] [address]   11/4/2021 To Be Determined Joceline Lechuga CNA [facility] [address]

## 2021-09-25 PROCEDURE — 3331090001 HH PPS REVENUE CREDIT

## 2021-09-25 PROCEDURE — 3331090002 HH PPS REVENUE DEBIT

## 2021-09-26 ENCOUNTER — HOME CARE VISIT (OUTPATIENT)
Dept: HOME HEALTH SERVICES | Facility: HOME HEALTH | Age: 81
End: 2021-09-26
Payer: MEDICARE

## 2021-09-26 PROCEDURE — 3331090002 HH PPS REVENUE DEBIT

## 2021-09-26 PROCEDURE — 3331090001 HH PPS REVENUE CREDIT

## 2021-09-27 PROCEDURE — 3331090002 HH PPS REVENUE DEBIT

## 2021-09-27 PROCEDURE — 3331090001 HH PPS REVENUE CREDIT

## 2021-09-28 PROCEDURE — 3331090001 HH PPS REVENUE CREDIT

## 2021-09-28 PROCEDURE — 3331090002 HH PPS REVENUE DEBIT

## 2021-09-29 PROCEDURE — 3331090001 HH PPS REVENUE CREDIT

## 2021-09-29 PROCEDURE — 3331090002 HH PPS REVENUE DEBIT

## 2021-10-28 NOTE — PROGRESS NOTES
Status post placement of laparoscopic feeding jejunostomy tube in the setting of esophageal cancer. She is not feeling very well. She has a fair amount of pain in various parts of her body, not the incisions. She has been able to manage the tube feeds. Incisions are clean dry and intact. The tube is intact. Stable from surgical perspective. I did write her a refill for pain medication. I told her this would be managed by her primary or the oncologist in the future. No further follow-up required, however she was told to call with any concerns.

## 2021-12-07 NOTE — PROGRESS NOTES
Nephrology Progress Note  [address] [facility]  [place] [facility] [employer]  [address], [place], [address]  Phone - [phone]         Fax - [phone]    Patient: Roxann Wells    YOB: 1940    Date- 6/10/2020        Admit Date: 6/1/2020     CC: Follow up for SONIDO ON CKD       IMPRESSION & PLAN:   ACUTE KIDNEY INJURY- suspect 2/2 volume depletion from GI losses + Xeloda-- no hydro on renal usg  - no more ivf  - encourage increase po intake  - follow bmp as out pt    HYPOKALEMIA  kcld 40 meq po     History of metabolic acidosis  Restart sodium bicarb 650 mg bid    DIARRHEA due to XELODA    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 Objective:   Visit Vitals  /89 (BP 1 Location: Right arm, BP Patient Position: At rest)   Pulse 74   Temp 98.5 °F (36.9 °C)   Resp 18   Ht 5' 8\" (1.727 m)   Wt 98.6 kg (217 lb 6 oz)   SpO2 97%   Breastfeeding No   BMI 33.05 kg/m²     Last 3 Recorded Weights in this Encounter    06/02/20 1445 06/04/20 0651 06/08/20 0606   Weight: 88.6 kg (195 lb 5.2 oz) 87.5 kg (192 lb 14.4 oz) 98.6 kg (217 lb 6 oz)     06/08 1901 - 06/10 0700  In: 603.3 [I.V.:603.3]  Out: 1300 [Urine:1300]    Intake/Output Summary (Last 24 hours) at 6/10/2020 0958  Last data filed at 6/10/2020 0558  Gross per 24 hour   Intake --   Output 300 ml   Net -300 ml      Physical exam:   GEN: NAD  NECK- Supple, NO MASS  RESP:clear b/l, no wheezing  CVS: S1,S2 ,RRR  NEURO: normal speech, non focal    Chart reviewed. Pertinent Notes reviewed.      Data Review :  Recent Labs     06/10/20  0556 06/09/20  1611 06/09/20  0523 06/08/20  0026     --  137 140   K 3.7 3.4* 3.2* 3.4*   *  --  108 110*   CO2 22  --  22 23   BUN 57*  --  52* 61*   CREA 2.41*  --  2.06* 2.46*   *  --  118* 112*   CA 8.0*  --  8.2* 7.5*   MG 1.7  --  2.2 1.8   PHOS 3.3  --  3.3 2.7     Recent Labs     06/09/20  0523 06/08/20  0026   WBC 8.5 7.6   HGB 9.8* 9.5*   HCT 29.1* 27.7*   * 127*     No results for input(s): FE, TIBC, PSAT, FERR in the last 72 hours. No results for input(s): CPK, CKNDX, TROIQ in the last 72 hours. No lab exists for component: CPKMB  Lab Results   Component Value Date/Time    Color YELLOW/STRAW 06/05/2020 03:07 AM    Appearance CLOUDY (A) 06/05/2020 03:07 AM    Specific gravity 1.013 06/05/2020 03:07 AM    pH (UA) 6.0 06/05/2020 03:07 AM    Protein TRACE (A) 06/05/2020 03:07 AM    Glucose Negative 06/05/2020 03:07 AM    Ketone Negative 06/05/2020 03:07 AM    Bilirubin NEGATIVE  03/04/2020 10:38 PM    Urobilinogen 0.2 06/05/2020 03:07 AM    Nitrites Negative 06/05/2020 03:07 AM    Leukocyte Esterase MODERATE (A) 06/05/2020 03:07 AM    Epithelial cells FEW 06/05/2020 03:07 AM    Bacteria 4+ (A) 06/05/2020 03:07 AM    WBC 20-50 06/05/2020 03:07 AM    RBC 10-20 06/05/2020 03:07 AM     Lab Results   Component Value Date/Time    Culture result: NO GROWTH 5 DAYS 03/08/2020 04:03 AM    Culture result:  03/06/2020 09:15 AM     MRSA NOT PRESENT. Apparent Staphylococus aureus (not MRSA noted). Culture result:  03/06/2020 09:15 AM         Screening of patient nares for MRSA is for surveillance purposes and, if positive, to facilitate isolation considerations in high risk settings. It is not intended for automatic decolonization interventions per se as regimens are not sufficiently effective to warrant routine use.      No results found for: SDES  Lab Results   Component Value Date/Time    Sodium,urine random 18 06/02/2020 10:29 PM    Creatinine, urine 97.00 06/02/2020 10:29 PM     Results from Hospital Encounter encounter on 06/01/20   XR ABD (INDRA) Narrative PROCEDURE: XR ABD (KUB)    REASON FOR STUDY: diarhea, rule out constipation with overflow     COMPARISON: 7/16/2019    FINDINGS: Supine views the abdomen demonstrate gaseous distention of the colon  and several loops of small bowel without evidence of high-grade bowel  obstruction. Impression IMPRESSION:  Mild gaseous distention of the colon and loops of small bowel  without evidence of high-grade bowel obstruction. Medication list  reviewed  No current facility-administered medications on file prior to encounter. Current Outpatient Medications on File Prior to Encounter   Medication Sig Dispense Refill    ferrous sulfate 324 mg (65 mg iron) tablet Take 324 mg by mouth two (2) times daily (with meals).  omeprazole (PRILOSEC OTC) 20 mg tablet Take 20 mg by mouth daily.  sodium bicarbonate 650 mg tablet Take 650 mg by mouth two (2) times a day.  folic acid-vit Q2-QYR Z18 (FOLBEE) 2.5-25-1 mg tablet Take 1 Tab by mouth daily.  iron bisgly,ps-FA-B-C#12-succ 65 mg-65 mg -1,000 mcg (24) tab Take 1 Tab by mouth daily.  latanoprost (XALATAN) 0.005 % ophthalmic solution Administer 1 Drop to both eyes nightly.  MULTIVITS W-FE,OTHER MIN/LUT (CENTRUM SILVER ULTRA WOMEN'S PO) Take 1 Tab by mouth daily.  DORZOLAMIDE HCL/TIMOLOL MALEAT (DORZOLAMIDE-TIMOLOL OP) Apply 1 Drop to eye three (3) times daily. One drop to each eye twice daily       brimonidine (ALPHAGAN) 0.2 % ophthalmic solution Administer 1 Drop to both eyes three (3) times daily.  levothyroxine (SYNTHROID) 100 mcg tablet Take 100 mcg by mouth Daily (before breakfast). Indications: HYPOTHYROIDISM      capecitabine (XELODA) 500 mg tablet 1,500 mg two (2) times a day. X 14 days then 7 days off      acetaminophen (TYLENOL EXTRA STRENGTH) 500 mg tablet Take 1,000 mg by mouth every six (6) hours as needed.  Indications: ARTHRITIC PAIN, PAIN                         Maryann Orosco MD  [address] [facility]   [email or web address] Home

## 2023-02-09 NOTE — H&P
5721: Patient to Phase II Recovery via bed in stable condition on room air. Denies nausea or shortness of breath. States her back pain is 4/10 & tolerable at this time. 0809: Patient requesting snack at this time. 5901: Patient resting & eating snack. 3637: IV removed. No complications. 1613: Patient getting dressed. 2078: Patient discharged. Date: 2020 9:45 AM   Patient Name: Bree Ortiz   Account #: [id number]    Gender: Female    (age): 1940 (80)       Provider:     Matt Fernández MD        Referring Physician:     Idalia Call  [address]  [phone] (phone)  [phone] (fax)     Jadine Sever, MD  [address] [place] [address]  [phone] (phone)  [phone] (fax)        Chief Complaint: SIx month follow up  esophageal cancer, dysphagia, abnormal ct           History of Present Illness:   I reviwed v[facility] notes showing progression of disease. she has been treted with local therapy, chemoxrt    I am seeing her for dysphagia. Soft foods go down ok. If she drinks too fast or if she has solid foods it will get stuck in the upper esophagus. It does not affect her breathing. CT chest 11.10.2020  shows pericardial effusion, unchanged . lg hh and thickening of the proxima esophagus. ct abdomen shows a smaller liver lesion. Overal weight loss when she took \"chemo pills\" (xeloda?)    diarrhea for which we saw her in  is gone     ? I reviwed vci notes showing progression of disease. she has been treted with local therapy, chemoxrt    I am seeing her for dysphagia. Soft foods go down ok. If she drinks too fast or if she has solid foods it will get stuck in the upper esophagus. It does not affect her breathing. CT chest 11.10.2020  shows pericardial effusion, unchanged . lg hh and thickening of the proxima esophagus . ct abdomen shows a smaller liver lesion. Overal weight loss when she took \"chemo pills\" (xeloda?)    diarrhea for which we saw her in  is gone     ?        Past Medical History      Medical Conditions: Anemia  High blood pressure  Kidney disease  Pulmonary Hypertension  Thyroid disease   Surgical Procedures: Cataract's Surgery   Dx Studies: Colonoscopy, 2018  EGD, 2019  Endoscopy, 10/17/2019  EUS, 2019 Medications: Folbee 2.5-25-1 mg TAKE 1 TABLET BY MOUTH EVERY DAY  latanoprost 0.005% INSTILL 1 DROP INTO BOTH EYES EVERY DAY AT NIGHT  levothyroxine 100 mcg Take tablet by mouth for 1 day  losartan 100 mg Take tablet by mouth for 1 day  magnesium oxide 500 mg Take 1 capsule by mouth twice a day   Allergies: Patient has no known allergies or drug allergies   Immunizations: Pneumococcal conjugate PCV 13, 2015  Influenza, seasonal, injectable, no flu shots      Social History      Alcohol: None   Tobacco: Never smoker   Drugs: None   Exercise: None   Caffeine: Weekly. Marital Status:          Occupation:               Family History No history of Colon Cancer, Colon Polyps, Esophogeal Cancer, GI Cancers, IBD (Crohn's or UC), Liver disease        Review of Systems:   Cardiovascular: Denies chest pain, irregular heart beat, palpitations, peripheral edema, syncope, Sweats. Constitutional: Denies fatigue, fever, loss of appetite, weight gain, weight loss. ENMT: Denies nose bleeds, sore throat, hearing loss. Endocrine: Denies excessive thirst, heat intolerance. Eyes: Denies loss of vision. Gastrointestinal: Presents suffers from dysphagia. Denies abdominal pain, abdominal swelling, change in bowel habits, constipation, diarrhea, Bloating/gas, heartburn, jaundice, nausea, rectal bleeding, stomach cramps, vomiting, rectal pain, Stool incontinence, hematemesis. Genitourinary: Denies dark urine, dysuria, frequent urination, hematuria, incontinence. Hematologic/Lymphatic: Denies easy bruising, prolonged bleeding. Integumentary: Denies itching, rashes, sun sensitivity. Musculoskeletal: Denies arthritis, back pain, gout, joint pain, muscle weakness, stiffness. Neurological: Denies dizziness, fainting, frequent headaches, memory loss. Psychiatric: Denies anxiety, depression, difficulty sleeping, hallucinations, nervousness, panic attacks, paranoia. Respiratory: Denies cough, dyspnea, wheezing.       Vital Signs: BP  (mmHg)  Weight (lbs/oz) Height (ft/in) BMI Resp/min Temp   160/90 208 /  5 / 8 31.62 16 96.9 (F)      Physical Exam:   Constitutional:      Appearance: No distress, appears comfortable. Lab Results:      Test 5/17/2019 Units Limits   Comp. Metabolic Panel (14)      A/G Ratio 1.3  1.2 - 2.2   Albumin 4.0 g/dL 3.5 - 4.8   Alkaline Phosphatase 95 IU/L 39 - 117   ALT (SGPT) 11 IU/L 0 - 32   AST (SGOT) 18 IU/L 0 - 40   Bilirubin, Total 0.5 mg/dL 0 - 1.2   BUN 31 mg/dL 8 - 27   BUN/Creatinine Ratio 13  12 - 28   Calcium 9.7 mg/dL 8.7 - 10.3   Carbon Dioxide, Total 20 mmol/L 20 - 29   Chloride 108 mmol/L 96 - 106   Creatinine 2.43 mg/dL 0.57 - 1   eGFR If Africn Am 21 mL/min/1.73 > 59   eGFR If NonAfricn Am 18 mL/min/1.73 > 59   Globulin, Total 3.1 g/dL 1.5 - 4.5   Glucose 101 mg/dL 65 - 99   Potassium 4.6 mmol/L 3.5 - 5.2   Protein, Total 7.1 g/dL 6 - 8.5   Sodium 144 mmol/L 134 - 144   CBC With Differential/Platelet      Baso (Absolute) 0.0 x10E3/uL 0 - 0.2   Basos 0 % Not Estab. Eos 2 % Not Estab. Eos (Absolute) 0.1 x10E3/uL 0 - 0.4   Hematocrit 37.8 % 34 - 46.6   Hematology Comments:      Hemoglobin 12.5 g/dL 11.1 - 15.9   Immature Cells      Immature Grans (Abs) 0.0 x10E3/uL 0 - 0.1   Immature Granulocytes 0 % Not Estab. Lymphs 19 % Not Estab. Lymphs (Absolute) 1.1 x10E3/uL 0.7 - 3.1   MCH 30.0 pg 26.6 - 33   MCHC 33.1 g/dL 31.5 - 35.7   MCV 91 fL 79 - 97   Monocytes 5 % Not Estab. Monocytes(Absolute) 0.3 x10E3/uL 0.1 - 0.9   Neutrophils 74 % Not Estab. 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 ? Esophageal dysphagia? ?  ? ? 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 Assessment: ? Plan: Upper Endoscopy  We discussed the objectives, risks, consequences, and alternatives to the procedure. COVID Procedure Disclaimer The patient was counseled at length about the risks of shawn Covid-19 in the emeterio-operative and post-operative states including the recovery window of their procedure. The patient was made aware that shawn Covid-19 after a surgical procedure may worsen their prognosis for recovering from the virus and lend to a higher morbidity and or mortality risk. The patient was given the options of postponing their procedure. All of the risks, benefits, and alternatives were discussed. The patient does wish to proceed with the procedure. Follow up PRN? ? Upper Endoscopy? ?  ? ? We discussed the objectives, risks, consequences, and alternatives to the procedure. ? ?  ? ?COVID Procedure Disclaimer? The patient was counseled at length about the risks of shawn Covid-19 in the emeterio-operative and post-operative states including the recovery window of their procedure. The patient was made aware that shawn Covid-19 after a surgical procedure may worsen their prognosis for recovering from the virus and lend to a higher morbidity and or mortality risk. The patient was given the options of postponing their procedure. All of the risks, benefits, and alternatives were discussed. The patient does wish to proceed with the procedure. ?  ? ? ? Follow up PRN? ? Risk & Medical Necessity: The patient requires Moderate to High Severity care for this visit. Diagnosis and management options are Extensive. The amount of data reviewed and/or ordered is Minimal/None. The level of risk is Moderate.            Notes:              Felix Gray MD     Electronically signed on 2020 10:19:42 AM by MD Terry Lima         Henrietta Ferreira[place] MRN [id number],  1940 Follow Up, 2020 New     Modify          Delete     Delete all     Edit Wording          Sign     [email or web address]

## 2023-10-30 NOTE — PROGRESS NOTES
Hematology Oncology Progress Note           Follow up for: metastatic esophageal cancer    Chart notes reviewed since last visit. Case discussed with following: pt. Physical therapist, her nurse. Patient complains of the following: much abdominal cramping which is precipitated by eating. Bm earlier today better formed, not watery but soft. Additional concerns noted by the staff: decreased strength and endurance. Working with PT - will need cane or rolling walker    Patient Vitals for the past 24 hrs:   BP Temp Pulse Resp SpO2   06/09/20 0745 120/89 98.1 °F (36.7 °C) 76 18 96 %   06/09/20 0255 (!) 120/97 97.6 °F (36.4 °C) 75 18 95 %   06/08/20 2319 (!) 121/95 98.3 °F (36.8 °C) 91 20 93 %   06/08/20 1925 120/80 98.2 °F (36.8 °C) 76 18 97 %   06/08/20 1431 123/85 99.2 °F (37.3 °C) 78 18 96 %       ROS negative for 11 organ systems except as noted above    Physical Examination:  Constitutional Alert, cooperative, oriented. Mood and affect appropriate. Appears close to chronological age. Well nourished. Well developed. Head Normocephalic; no scars   Eyes Conjunctivae and sclerae are clear and without icterus. Pupils are round. Dysconjugate gaze   ENMT Sinuses are nontender. No oral exudates, ulcers, masses, thrush or mucositis. Oropharynx clear. Tongue normal.   Neck Supple without masses or thyromegaly. No jugular venous distension. Hematologic/Lymphatic No petechiae or purpura. No tender or palpable lymph nodes noted   Respiratory Lungs are clear to auscultation without rhonchi or wheezing. Distant BS   Cardiovascular Regular rate and rhythm of heart without murmurs, gallops or rubs. Chest / Line Site Chest is symmetric with no chest wall deformities. Abdomen Non-tender, non-distended, no masses, ascites or hepatosplenomegaly. Good bowel sounds. Musculoskeletal No tenderness or swelling, normal range of motion with generalized weakness.    Extremities No visible deformities, no cyanosis, clubbing or edema. Skin No rashes, scars, or lesions suggestive of malignancy. No petechiae, purpura, or ecchymoses. No excoriations. Neurologic No sensory or motor deficits noted but not specifically tested. Psychiatric Alert and oriented. Coherent speech. Verbalizes understanding of our discussions today. Labs:  Recent Results (from the past 24 hour(s))   RENAL FUNCTION PANEL    Collection Time: 06/09/20  5:23 AM   Result Value Ref Range    Sodium 137 136 - 145 mmol/L    Potassium 3.2 (L) 3.5 - 5.1 mmol/L    Chloride 108 97 - 108 mmol/L    CO2 22 21 - 32 mmol/L    Anion gap 7 5 - 15 mmol/L    Glucose 118 (H) 65 - 100 mg/dL    BUN 52 (H) 6 - 20 MG/DL    Creatinine 2.06 (H) 0.55 - 1.02 MG/DL    BUN/Creatinine ratio 25 (H) 12 - 20      GFR est AA 28 (L) >60 ml/min/1.73m2    GFR est non-AA 23 (L) >60 ml/min/1.73m2    Calcium 8.2 (L) 8.5 - 10.1 MG/DL    Phosphorus 3.3 2.6 - 4.7 MG/DL    Albumin 2.3 (L) 3.5 - 5.0 g/dL   CBC WITH AUTOMATED DIFF    Collection Time: 06/09/20  5:23 AM   Result Value Ref Range    WBC 8.5 3.6 - 11.0 K/uL    RBC 2.90 (L) 3.80 - 5.20 M/uL    HGB 9.8 (L) 11.5 - 16.0 g/dL    HCT 29.1 (L) 35.0 - 47.0 %    .3 (H) 80.0 - 99.0 FL    MCH 33.8 26.0 - 34.0 PG    MCHC 33.7 30.0 - 36.5 g/dL    RDW 21.2 (H) 11.5 - 14.5 %    PLATELET 945 (L) 169 - 400 K/uL    MPV 10.5 8.9 - 12.9 FL    NRBC 0.0 0  WBC    ABSOLUTE NRBC 0.00 0.00 - 0.01 K/uL    NEUTROPHILS 82 (H) 32 - 75 %    LYMPHOCYTES 7 (L) 12 - 49 %    MONOCYTES 9 5 - 13 %    EOSINOPHILS 1 0 - 7 %    BASOPHILS 0 0 - 1 %    IMMATURE GRANULOCYTES 1 (H) 0.0 - 0.5 %    ABS. NEUTROPHILS 6.9 1.8 - 8.0 K/UL    ABS. LYMPHOCYTES 0.6 (L) 0.8 - 3.5 K/UL    ABS. MONOCYTES 0.8 0.0 - 1.0 K/UL    ABS. EOSINOPHILS 0.1 0.0 - 0.4 K/UL    ABS. BASOPHILS 0.0 0.0 - 0.1 K/UL    ABS. IMM.  GRANS. 0.1 (H) 0.00 - 0.04 K/UL    DF SMEAR SCANNED      RBC COMMENTS ANISOCYTOSIS  3+        RBC COMMENTS MACROCYTOSIS  1+        RBC COMMENTS SCHISTOCYTES  1+ MAGNESIUM    Collection Time: 06/09/20  5:23 AM   Result Value Ref Range    Magnesium 2.2 1.6 - 2.4 mg/dL       Imaging:  abd films 6/3/20:Supine views the abdomen demonstrate gaseous distention of the colon  and several loops of small bowel without evidence of high-grade bowel  obstruction.     IMPRESSION:  Mild gaseous distention of the colon and loops of small bowel  without evidence of high-grade bowel obstruction. Ultrasound retroperitoneum 6/2/20:   IMPRESSION: Innumerable bilateral renal cysts, compatible with adult polycystic kidney  disease. No hydronephrosis.       Assessment and Plan:   *) SONIDO on CKD:  - Admitted from clinic at end of day 6/1 when she presented with Cr 4.3 on 6/1. Baseline Cr ~2.0  - Suspected pre renal due to diarrhea from Xeloda therapy that was completed on 5/24/20. Diarrhea seemed quite prolonged for xeloda (capecitabine) and she has now been diagnosed with C diff diarrhea (PCR positive for DNA)  - Appreciate Nephrology's assistance  - Hydration per renal  - Renal us showed polycystic disease  -  Urinalysis unremarkable   - Cr improving stable, 2.06     *)  Diarrhea, context of recent use of xeloda, C diff positive  - will change Lomotil to prn given drop in frequency of BMs. Imodium prn also  - continue IVF per nephrology  - GI assistance appreciated, po vanc started 6/5/20. Will need to make sure she can afford this as discharge medication.       *)Hypokalemia, due to GI losses:  - K low, ordered 40 meq this AM and 20 meq in early afternoon     *) Esophageal cancer:  - sp ablation and adjuvant Xeloda that concluded 5/24     *) Chemo induced anemia:  - hgb 9.8, monitor     *) Cancer related anorexia, protein calorie malnutrition  - encouraged her to drink Ensure, try magic cup and jello  -- will ask dietician to see again for post discharge recommendations for PO            Vielka Andersen MD [facility] 14 office  [address], [address]  Phone [phone]  Fax [phone] Surgery

## 2023-11-03 NOTE — PROGRESS NOTES
Nephrology Progress Note Stefano Wynn Date of Admission : 3/4/2020 CC: Follow up for SONIDO Assessment and Plan SONIDO on CKD: 
- 2/2 ATN due to Gram Negative Sepsis, Hypotension  
- continue  IV NS at 100cc/hr 
- daily labs 
- cr improving - pressors to keep MAP > 65 CKD Stage IV  
- 2/2 ADPKD, HTN  
- baseline Creatinine : 2 mg/ dl  
- followed by Dr Adelita Arambula  
  
Hypokalemia: 
- repletion ordered 
  
GNR bacteremia w/ sepsis Ecoli 4/4  
- per Primary team  
- for port removal today  
  
Esophageal Ca Hypothyroidism EMILY Interval History: 
Seen and examined. Off CPAP,  Appears comfortable. UOP improved and cr better Current Medications: all current  Medications have been eviewed in [facility] hospitals Review of Systems: Pertinent items are noted in HPI. Objective: 
Vitals:   
Vitals:  
 03/08/20 1325 03/08/20 1330 03/08/20 1340 03/08/20 1430 BP: 102/65 103/71 106/72 127/81 Pulse: 66 69 73 74 Resp: 21 28 21 25 Temp:      
SpO2: 96% 97% 98% 92% Weight:      
Height:      
 
Intake and Output: 
03/08 0701 - 03/08 1900 In: 565.5 [I.V.:565.5] Out: 0  
03/06 1901 - 03/08 0700 In: 4555.3 [I.V.:4555.3] Out: 1300 [Urine:1300] Physical Examination: 
Pt intubated     No 
General: Awake on CPAP Neck:  Supple, no mass Resp:  Reduced bibasilar breath sounds CV:  RRR,  no murmur or rub, no LE edema GI:  Soft, NT, + Bowel sounds, no hepatosplenomegaly Neurologic:  Non focal 
Psych:             AAO x 3 appropriate affect Skin:  No Rash :  No cuello [x]    High complexity decision making was performed 
[x]    Patient is at high-risk of decompensation with multiple organ involvement Lab Data Personally Reviewed: I have reviewed all the pertinent labs, microbiology data and radiology studies during assessment. Recent Labs 03/08/20 
0403 03/07/20 
0413 03/06/20 
2209 03/06/20 
0402 03/05/20 
2225  142 140 141 140  
K 3.6 3.5 3.6 3.1* 3.1*  
 * 109* 110* 107 106 CO2 25 24 24 23 23 * 129* 138* 167* 157* BUN 91* 102* 110* 108* 109* CREA 2.99* 3.41* 3.53* 3.85* 4.00* CA 8.8 8.3* 8.2* 8.4* 8.1*  
MG 2.0 2.1 1.6  --   --   
PHOS 4.1 4.2 4.0  --   --   
ALB  --   --   --  2.7*  --   
SGOT  --   --   --  10*  --   
ALT  --   --   --  19  --   
INR  --  1.8*  --   --  1.9* Recent Labs 03/08/20 
0404 03/07/20 
0413 03/06/20 
1445 WBC 8.5 8.8 7.1 HGB 7.5* 7.4* 7.2* HCT 23.2* 22.7* 21.8* PLT 85* 67* 57* No results found for: SDES Lab Results Component Value Date/Time Culture result:  03/06/2020 09:15 AM  
  MRSA NOT PRESENT. Apparent Staphylococus aureus (not MRSA noted). Culture result:  03/06/2020 09:15 AM  
      Screening of patient nares for MRSA is for surveillance purposes and, if positive, to facilitate isolation considerations in high risk settings. It is not intended for automatic decolonization interventions per se as regimens are not sufficiently effective to warrant routine use. Culture result: (A) 03/06/2020 01:13 AM  
  ESCHERICHIA COLI GROWING IN 2 OF 4 BOTTLES DRAWN , EACH FROM A DIFFERENT SITE. .. LAC AND RT. WRIST Culture result:  03/06/2020 01:13 AM  
  PLEASE REFER TO PREVIOUS BLOOD CULTURE 
[id number], COLLECTED 3/4/20 FOR SENSITIVITIES Culture result: REMAINING BOTTLE(S) HAS/HAVE NO GROWTH SO FAR 03/06/2020 01:13 AM  
 
Recent Results (from the past 24 hour(s)) GLUCOSE, POC Collection Time: 03/07/20  5:13 PM  
Result Value Ref Range Glucose (POC) 178 (H) 65 - 100 mg/dL Performed by Nelson Lee, STOOL Collection Time: 03/07/20  7:41 PM  
Result Value Ref Range Occult blood, stool POSITIVE (A) NEG    
GLUCOSE, POC Collection Time: 03/08/20 12:36 AM  
Result Value Ref Range Glucose (POC) 136 (H) 65 - 100 mg/dL Performed by Reza Clement MAGNESIUM Collection Time: 03/08/20  4:03 AM  
Result Value Ref Range Magnesium 2.0 1.6 - 2.4 mg/dL PHOSPHORUS Collection Time: 03/08/20  4:03 AM  
Result Value Ref Range Phosphorus 4.1 2.6 - 4.7 MG/DL  
METABOLIC PANEL, BASIC Collection Time: 03/08/20  4:03 AM  
Result Value Ref Range Sodium 143 136 - 145 mmol/L Potassium 3.6 3.5 - 5.1 mmol/L Chloride 111 (H) 97 - 108 mmol/L  
 CO2 25 21 - 32 mmol/L Anion gap 7 5 - 15 mmol/L Glucose 126 (H) 65 - 100 mg/dL BUN 91 (H) 6 - 20 MG/DL Creatinine 2.99 (H) 0.55 - 1.02 MG/DL  
 BUN/Creatinine ratio 30 (H) 12 - 20 GFR est AA 18 (L) >60 ml/min/1.73m2 GFR est non-AA 15 (L) >60 ml/min/1.73m2 Calcium 8.8 8.5 - 10.1 MG/DL  
CBC WITH AUTOMATED DIFF Collection Time: 03/08/20  4:04 AM  
Result Value Ref Range WBC 8.5 3.6 - 11.0 K/uL  
 RBC 2.40 (L) 3.80 - 5.20 M/uL HGB 7.5 (L) 11.5 - 16.0 g/dL HCT 23.2 (L) 35.0 - 47.0 % MCV 96.7 80.0 - 99.0 FL  
 MCH 31.3 26.0 - 34.0 PG  
 MCHC 32.3 30.0 - 36.5 g/dL  
 RDW 18.6 (H) 11.5 - 14.5 % PLATELET 85 (L) 546 - 400 K/uL MPV 11.8 8.9 - 12.9 FL  
 NRBC 0.0 0  WBC ABSOLUTE NRBC 0.00 0.00 - 0.01 K/uL NEUTROPHILS 92 (H) 32 - 75 % LYMPHOCYTES 1 (L) 12 - 49 % MONOCYTES 7 5 - 13 % EOSINOPHILS 0 0 - 7 % BASOPHILS 0 0 - 1 % IMMATURE GRANULOCYTES 0 %  
 ABS. NEUTROPHILS 7.8 1.8 - 8.0 K/UL  
 ABS. LYMPHOCYTES 0.1 (L) 0.8 - 3.5 K/UL  
 ABS. MONOCYTES 0.6 0.0 - 1.0 K/UL  
 ABS. EOSINOPHILS 0.0 0.0 - 0.4 K/UL  
 ABS. BASOPHILS 0.0 0.0 - 0.1 K/UL  
 ABS. IMM. GRANS. 0.0 K/UL  
 DF MANUAL PLATELET COMMENTS Large Platelets RBC COMMENTS ANISOCYTOSIS 1+ 
    
 RBC COMMENTS OVALOCYTES PRESENT 
    
GLUCOSE, POC Collection Time: 03/08/20  5:09 AM  
Result Value Ref Range Glucose (POC) 136 (H) 65 - 100 mg/dL Performed by Matthew Rdz GLUCOSE, POC Collection Time: 03/08/20 11:24 AM  
Result Value Ref Range Glucose (POC) 124 (H) 65 - 100 mg/dL Performed by Osiel Cintron MD 
 [address] [place] [address], [facility] Phone - [phone] Fax - [phone] 
[email or web address] 
 no Rituxan Counseling:  I discussed with the patient the risks of Rituxan infusions. Side effects can include infusion reactions, severe drug rashes including mucocutaneous reactions, reactivation of latent hepatitis and other infections and rarely progressive multifocal leukoencephalopathy.  All of the patient's questions and concerns were addressed.

## 2023-11-20 NOTE — ROUTINE PROCESS
Bedside and Verbal shift change report given to Adwoa (oncoming nurse) by Katie Medellin (offgoing nurse). Report included the following information SBAR, Kardex, Intake/Output, Recent Results and Cardiac Rhythm NSR/PVC. none

## (undated) DEVICE — Z DISCONTINUED PER MEDLINE LINE GAS SAMPLING O2/CO2 LNG AD 13 FT NSL W/ TBNG FILTERLINE

## (undated) DEVICE — Device

## (undated) DEVICE — KENDALL RADIOLUCENT FOAM MONITORING ELECTRODE -RECTANGULAR SHAPE: Brand: KENDALL

## (undated) DEVICE — SYRINGE MED 20ML STD CLR PLAS LUERLOCK TIP N CTRL DISP

## (undated) DEVICE — CONTAINER SPEC 20 ML LID NEUT BUFF FORMALIN 10 % POLYPR STS

## (undated) DEVICE — SUT SLK 3-0 30IN SH BLK --

## (undated) DEVICE — NEEDLE HYPO 18GA L1.5IN PNK S STL HUB POLYPR SHLD REG BVL

## (undated) DEVICE — SUTURE PROL SZ 2-0 L36IN NONABSORBABLE BLU SH L26MM 1/2 CIR 8523H

## (undated) DEVICE — SUTURE VCRL SZ 3-0 L27IN ABSRB VLT L26MM SH 1/2 CIR J316H

## (undated) DEVICE — SOLIDIFIER FLUID 3000 CC ABSORB

## (undated) DEVICE — SET ADMIN 16ML TBNG L100IN 2 Y INJ SITE IV PIGGY BK DISP

## (undated) DEVICE — FORCEPS BX L160CM DIA8MM GRSP DISECT CUP TIP NONLOCKING ROT

## (undated) DEVICE — ENDO CARRY-ON PROCEDURE KIT INCLUDES ENZYMATIC SPONGE, GAUZE, BIOHAZARD LABEL, TRAY, LUBRICANT, DIRTY SCOPE LABEL, WATER LABEL, TRAY, DRAWSTRING PAD, AND DEFENDO 4-PIECE KIT.: Brand: ENDO CARRY-ON PROCEDURE KIT

## (undated) DEVICE — YANKAUER,BULB TIP,W/O VENT,RIGID,STERILE: Brand: MEDLINE

## (undated) DEVICE — ESOPHAGEAL BALLOON DILATATION CATHETER: Brand: CRE FIXED WIRE

## (undated) DEVICE — SYR 10ML LUER LOK 1/5ML GRAD --

## (undated) DEVICE — SUTURE PROL 2-0 L48IN NONABSORBABLE BLU SH L26MM 1/2 CIR 8533H

## (undated) DEVICE — NEONATAL-ADULT SPO2 SENSOR: Brand: NELLCOR

## (undated) DEVICE — BLOCK BITE ENDOSCP AD 21 MM W/ DIL BLU LF DISP

## (undated) DEVICE — 1200 GUARD II KIT W/5MM TUBE W/O VAC TUBE: Brand: GUARDIAN

## (undated) DEVICE — CATH IV AUTOGRD BC PNK 20GA 25 -- INSYTE

## (undated) DEVICE — SYRINGE 50ML E/T

## (undated) DEVICE — MEDI-VAC YANK SUCT HNDL W/TPRD BULBOUS TIP: Brand: CARDINAL HEALTH

## (undated) DEVICE — ELECTRODE,RADIOTRANSLUCENT,FOAM,3PK: Brand: MEDLINE

## (undated) DEVICE — CATH IV AUTOGRD BC BLU 22GA 25 -- INSYTE

## (undated) DEVICE — KENDALL RADIOLUCENT FOAM MONITORING ELECTRODE RECTANGULAR SHAPE: Brand: KENDALL

## (undated) DEVICE — SOLIDIFIER MEDC 1200ML -- CONVERT TO 356117

## (undated) DEVICE — GENERAL LAPAROSCOPY-MRMC: Brand: MEDLINE INDUSTRIES, INC.

## (undated) DEVICE — BAG SPEC BIOHZRD 10 X 10 IN --

## (undated) DEVICE — GUIDEWIRE ENDOSCP L450CM DIA0.035IN STR RND STD STIFF BILI

## (undated) DEVICE — TUBING, SUCTION, 1/4" X 10', STRAIGHT: Brand: MEDLINE

## (undated) DEVICE — BASIN EMESIS 500CC ROSE 250/CS 60/PLT: Brand: MEDEGEN MEDICAL PRODUCTS, LLC

## (undated) DEVICE — KENDALL DL ECG CABLE AND LEAD WIRE SYSTEM, 3-LEAD, SINGLE PATIENT USE: Brand: KENDALL

## (undated) DEVICE — BITE BLK ENDOSCP AD 54FR GRN POLYETH ENDOSCP W STRP SLD

## (undated) DEVICE — GLOVE SURG SZ 8 CRM LTX FREE POLYISOPRENE POLYMER BEAD ANTI

## (undated) DEVICE — CONTINU-FLO SOLUTION SET, 2 INJECTION SITES, MALE LUER LOCK ADAPTER WITH RETRACTABLE COLLAR, LARGE BORE STOPCOCK WITH ROTATING MALE LUER LOCK EXTENSION SET, 2 INJECTION SITES, MALE LUER LOCK ADAPTER WITH RETRACTABLE COLLAR: Brand: INTERLINK/CONTINU-FLO

## (undated) DEVICE — BAG BELONG PT PERS CLEAR HANDL

## (undated) DEVICE — KIT COLON W/ 1.1OZ LUB AND 2 END

## (undated) DEVICE — SUTURE ETHLN SZ 2-0 L18IN NONABSORBABLE BLK L19MM PS-2 PRIM 593H

## (undated) DEVICE — STERILE POLYISOPRENE POWDER-FREE SURGICAL GLOVES: Brand: PROTEXIS

## (undated) DEVICE — CANN NASAL O2 CAPNOGRAPHY AD -- FILTERLINE

## (undated) DEVICE — GARMENT,MEDLINE,DVT,INT,CALF,MED, GEN2: Brand: MEDLINE

## (undated) DEVICE — GLOVE SURG SZ 85 CRM LTX FREE POLYISOPRENE POLYMER BEAD ANTI

## (undated) DEVICE — STOPCOCK IV 4 W TRNSPAR

## (undated) DEVICE — Z DISCONTINUED NO SUB IDED SET EXTN W/ 4 W STPCOCK M SPIN LOK 36IN

## (undated) DEVICE — LAPAROSCOPIC TROCAR SLEEVE/SINGLE USE: Brand: KII® OPTICAL ACCESS SYSTEM

## (undated) DEVICE — SOLUTION LACTATED RINGERS INJECTION USP

## (undated) DEVICE — Device: Brand: MEDICAL ACTION INDUSTRIES

## (undated) DEVICE — (D)SENSOR RMFG 02 PULS OXMTR -- DISC BY MFR USE ITEM 133445

## (undated) DEVICE — TOWEL 4 PLY TISS 19X30 SUE WHT

## (undated) DEVICE — TUBE FEED 14FR BLLN 7-10ML L45CM JEJU SIL INFL INT SECUR

## (undated) DEVICE — C-ARM: Brand: UNBRANDED

## (undated) DEVICE — ELECTRODE,RADIOTRANSLUCENT,FOAM,5PK: Brand: MEDLINE

## (undated) DEVICE — SUTURE MCRYL SZ 4-0 L27IN ABSRB UD L19MM PS-2 1/2 CIR PRIM Y426H

## (undated) DEVICE — SUTURE VCRL SZ 4-0 L27IN ABSRB UD L19MM PS-2 3/8 CIR PRIM J426H

## (undated) DEVICE — BASIN EMSIS 16OZ GRAPHITE PLAS KID SHP MOLD GRAD FOR ORAL

## (undated) DEVICE — INTENDED FOR TISSUE SEPARATION, AND OTHER PROCEDURES THAT REQUIRE A SHARP SURGICAL BLADE TO PUNCTURE OR CUT.: Brand: BARD-PARKER ® CARBON RIB-BACK BLADES

## (undated) DEVICE — NEEDLE HYPO 25GA L1.5IN BVL ORIENTED ECLIPSE

## (undated) DEVICE — YANKAUER,TAPERED BULBOUS TIP,W/O VENT: Brand: MEDLINE

## (undated) DEVICE — TOWEL SURG W17XL27IN STD BLU COT NONFENESTRATED PREWASHED

## (undated) DEVICE — 3M™ TEGADERM™ TRANSPARENT FILM DRESSING FRAME STYLE, 1624W, 2-3/8 IN X 2-3/4 IN (6 CM X 7 CM), 100/CT 4CT/CASE: Brand: 3M™ TEGADERM™

## (undated) DEVICE — SYR 3ML LL TIP 1/10ML GRAD --

## (undated) DEVICE — SYR ASSEMB INFL BLLN 60ML --

## (undated) DEVICE — SOL INJ SOD CL 0.9% 500ML BG --

## (undated) DEVICE — DERMABOND SKIN ADH 0.7ML -- DERMABOND ADVANCED 12/BX

## (undated) DEVICE — SUTURE SZ 0 27IN 5/8 CIR UR-6  TAPER PT VIOLET ABSRB VICRYL J603H

## (undated) DEVICE — TROCAR: Brand: KII® SLEEVE

## (undated) DEVICE — FORCEPS BX L240CM JAW DIA2.8MM L CAP W/ NDL MIC MESH TOOTH

## (undated) DEVICE — BW-412T DISP COMBO CLEANING BRUSH: Brand: SINGLE USE COMBINATION CLEANING BRUSH

## (undated) DEVICE — SPONGE DRAIN NONWOVEN 4X4IN -- 2/PK

## (undated) DEVICE — SUTURE VCRL SZ 3-0 L18IN ABSRB UD L26MM SH 1/2 CIR J864D

## (undated) DEVICE — NEEDLE HYPO 22GA L1.5IN BLK S STL HUB POLYPR SHLD REG BVL

## (undated) DEVICE — SET GRAV CK VLV NEEDLESS ST 3 GANGED 4WAY STPCOCK HI FLO 10

## (undated) DEVICE — SYR 20ML LL STRL LF --

## (undated) DEVICE — SNARE ENDOSCP M L240CM W27MM SHTH DIA2.4MM CHN 2.8MM OVL

## (undated) DEVICE — CUFF RMFG BP INF SZ 11 DISP -- LAWSON OEM ITEM 238915

## (undated) DEVICE — STRAINER URIN CALC RNL MSH -- CONVERT TO ITEM 357634

## (undated) DEVICE — SYR 50ML SLIP TIP NSAF LF STRL --

## (undated) DEVICE — BITEBLOCK ENDOSCP 60FR MAXI WHT POLYETH STURDY W/ VELC WVN

## (undated) DEVICE — PREP SKN CHLRAPRP APL 26ML STR --

## (undated) DEVICE — DECANTER BAG 9": Brand: MEDLINE INDUSTRIES, INC.

## (undated) DEVICE — SHEATH CATH ANORECT MNOMTR

## (undated) DEVICE — Device: Brand: BALLOON3

## (undated) DEVICE — GOWN,SIRUS,POLYRNF,BRTHSLV,XL,30/CS: Brand: MEDLINE

## (undated) DEVICE — INFECTION CONTROL KIT SYS

## (undated) DEVICE — ICE PK EYE 4 1/2INX10IN (15/BX 2BX/CS

## (undated) DEVICE — NON-REM POLYHESIVE PATIENT RETURN ELECTRODE: Brand: VALLEYLAB

## (undated) DEVICE — TRAP SUC MUCOUS 70ML -- MEDICHOICE MEDLINE

## (undated) DEVICE — (D)CUP DENT 7.5OZ PLAS DSTY -- DISC BY MFR USE ITEM 341725

## (undated) DEVICE — BW-400L DISP SNGL-END CLEANINGBRUSH: Brand: OLYMPUS

## (undated) DEVICE — 3M™ CUROS™ DISINFECTING CAP FOR NEEDLELESS CONNECTORS 270/CARTON 20 CARTONS/CASE CFF1-270: Brand: CUROS™

## (undated) DEVICE — Device: Brand: SINGLE USE SOFT BRUSH

## (undated) DEVICE — MUI SCIENTIFIC BALLOON

## (undated) DEVICE — TROCAR: Brand: KII® OPTICAL ACCESS SYSTEM

## (undated) DEVICE — GOWN,SIRUS,NONRNF,SETINSLV,2XL,18/CS: Brand: MEDLINE

## (undated) DEVICE — SHEET, T, LAPAROTOMY, STERILE: Brand: MEDLINE

## (undated) DEVICE — KIT COMPLIANCE W ENDOGLDE + 11 NO BRSH ENDOKT

## (undated) DEVICE — REM POLYHESIVE ADULT PATIENT RETURN ELECTRODE: Brand: VALLEYLAB

## (undated) DEVICE — CANISTER, RIGID, 3000CC: Brand: MEDLINE INDUSTRIES, INC.